# Patient Record
Sex: FEMALE | Race: WHITE | Employment: OTHER | URBAN - NONMETROPOLITAN AREA
[De-identification: names, ages, dates, MRNs, and addresses within clinical notes are randomized per-mention and may not be internally consistent; named-entity substitution may affect disease eponyms.]

---

## 2017-01-04 ENCOUNTER — TELEPHONE (OUTPATIENT)
Dept: PHYSICAL MEDICINE AND REHAB | Age: 37
End: 2017-01-04

## 2017-01-04 RX ORDER — LEVETIRACETAM 500 MG/1
500 TABLET ORAL 2 TIMES DAILY
Qty: 60 TABLET | Refills: 3 | Status: SHIPPED | OUTPATIENT
Start: 2017-01-04 | End: 2017-03-06 | Stop reason: ALTCHOICE

## 2017-01-09 ENCOUNTER — CARE COORDINATION (OUTPATIENT)
Dept: CARE COORDINATION | Age: 37
End: 2017-01-09

## 2017-01-25 ENCOUNTER — TELEPHONE (OUTPATIENT)
Dept: PHYSICAL MEDICINE AND REHAB | Age: 37
End: 2017-01-25

## 2017-02-01 ENCOUNTER — OFFICE VISIT (OUTPATIENT)
Dept: FAMILY MEDICINE CLINIC | Age: 37
End: 2017-02-01

## 2017-02-01 VITALS
WEIGHT: 233 LBS | BODY MASS INDEX: 41.27 KG/M2 | DIASTOLIC BLOOD PRESSURE: 82 MMHG | HEART RATE: 84 BPM | RESPIRATION RATE: 8 BRPM | SYSTOLIC BLOOD PRESSURE: 122 MMHG

## 2017-02-01 DIAGNOSIS — R56.9 SEIZURES (HCC): ICD-10-CM

## 2017-02-01 DIAGNOSIS — M32.9 LUPUS ARTHRITIS (HCC): ICD-10-CM

## 2017-02-01 DIAGNOSIS — E72.20 HYPERAMMONEMIA (HCC): ICD-10-CM

## 2017-02-01 DIAGNOSIS — M79.7 FIBROMYALGIA: ICD-10-CM

## 2017-02-01 DIAGNOSIS — G43.719 INTRACTABLE CHRONIC MIGRAINE WITHOUT AURA AND WITHOUT STATUS MIGRAINOSUS: ICD-10-CM

## 2017-02-01 DIAGNOSIS — E66.01 MORBID OBESITY DUE TO EXCESS CALORIES (HCC): ICD-10-CM

## 2017-02-01 DIAGNOSIS — E66.01 MORBID OBESITY WITH BMI OF 40.0-44.9, ADULT (HCC): ICD-10-CM

## 2017-02-01 DIAGNOSIS — M32.9 LUPUS (HCC): ICD-10-CM

## 2017-02-01 DIAGNOSIS — K21.9 GASTROESOPHAGEAL REFLUX DISEASE WITHOUT ESOPHAGITIS: ICD-10-CM

## 2017-02-01 DIAGNOSIS — R56.9 CONVULSIONS, UNSPECIFIED CONVULSION TYPE (HCC): ICD-10-CM

## 2017-02-01 DIAGNOSIS — E78.00 PURE HYPERCHOLESTEROLEMIA: Primary | ICD-10-CM

## 2017-02-01 PROCEDURE — G8427 DOCREV CUR MEDS BY ELIG CLIN: HCPCS | Performed by: FAMILY MEDICINE

## 2017-02-01 PROCEDURE — 1036F TOBACCO NON-USER: CPT | Performed by: FAMILY MEDICINE

## 2017-02-01 PROCEDURE — G8419 CALC BMI OUT NRM PARAM NOF/U: HCPCS | Performed by: FAMILY MEDICINE

## 2017-02-01 PROCEDURE — G8599 NO ASA/ANTIPLAT THER USE RNG: HCPCS | Performed by: FAMILY MEDICINE

## 2017-02-01 PROCEDURE — G8484 FLU IMMUNIZE NO ADMIN: HCPCS | Performed by: FAMILY MEDICINE

## 2017-02-01 PROCEDURE — 99214 OFFICE O/P EST MOD 30 MIN: CPT | Performed by: FAMILY MEDICINE

## 2017-02-01 RX ORDER — PROMETHAZINE HYDROCHLORIDE 25 MG/1
25 TABLET ORAL EVERY 6 HOURS PRN
Qty: 90 TABLET | Refills: 11 | Status: SHIPPED | OUTPATIENT
Start: 2017-02-01 | End: 2021-11-15

## 2017-02-01 RX ORDER — ACETAMINOPHEN 325 MG/1
650 TABLET ORAL EVERY 4 HOURS PRN
Qty: 120 TABLET | Refills: 3 | Status: SHIPPED | OUTPATIENT
Start: 2017-02-01 | End: 2017-12-02 | Stop reason: SDUPTHER

## 2017-02-02 ENCOUNTER — CARE COORDINATION (OUTPATIENT)
Dept: CARE COORDINATION | Age: 37
End: 2017-02-02

## 2017-02-03 ENCOUNTER — TELEPHONE (OUTPATIENT)
Dept: FAMILY MEDICINE CLINIC | Age: 37
End: 2017-02-03

## 2017-02-03 DIAGNOSIS — R79.89 ELEVATED LFTS: Primary | ICD-10-CM

## 2017-02-06 ENCOUNTER — OFFICE VISIT (OUTPATIENT)
Dept: PHYSICAL MEDICINE AND REHAB | Age: 37
End: 2017-02-06

## 2017-02-06 VITALS
BODY MASS INDEX: 41.29 KG/M2 | HEART RATE: 99 BPM | SYSTOLIC BLOOD PRESSURE: 127 MMHG | HEIGHT: 63 IN | WEIGHT: 233 LBS | DIASTOLIC BLOOD PRESSURE: 88 MMHG

## 2017-02-06 DIAGNOSIS — R56.9 PARTIAL SEIZURE (HCC): Primary | ICD-10-CM

## 2017-02-06 DIAGNOSIS — R56.9 CONVULSIONS, UNSPECIFIED CONVULSION TYPE (HCC): ICD-10-CM

## 2017-02-06 PROCEDURE — G8599 NO ASA/ANTIPLAT THER USE RNG: HCPCS | Performed by: PSYCHIATRY & NEUROLOGY

## 2017-02-06 PROCEDURE — 1036F TOBACCO NON-USER: CPT | Performed by: PSYCHIATRY & NEUROLOGY

## 2017-02-06 PROCEDURE — G8419 CALC BMI OUT NRM PARAM NOF/U: HCPCS | Performed by: PSYCHIATRY & NEUROLOGY

## 2017-02-06 PROCEDURE — G8484 FLU IMMUNIZE NO ADMIN: HCPCS | Performed by: PSYCHIATRY & NEUROLOGY

## 2017-02-06 PROCEDURE — G8427 DOCREV CUR MEDS BY ELIG CLIN: HCPCS | Performed by: PSYCHIATRY & NEUROLOGY

## 2017-02-06 PROCEDURE — 99214 OFFICE O/P EST MOD 30 MIN: CPT | Performed by: PSYCHIATRY & NEUROLOGY

## 2017-02-06 RX ORDER — CHOLECALCIFEROL (VITAMIN D3) 1250 MCG
CAPSULE ORAL
COMMUNITY
End: 2017-07-13 | Stop reason: ALTCHOICE

## 2017-02-06 RX ORDER — LEFLUNOMIDE 10 MG/1
10 TABLET ORAL DAILY
Status: ON HOLD | COMMUNITY
End: 2017-02-28 | Stop reason: HOSPADM

## 2017-02-16 ENCOUNTER — OFFICE VISIT (OUTPATIENT)
Dept: PHYSICAL MEDICINE AND REHAB | Age: 37
End: 2017-02-16

## 2017-02-16 VITALS
HEART RATE: 87 BPM | WEIGHT: 230 LBS | BODY MASS INDEX: 40.75 KG/M2 | DIASTOLIC BLOOD PRESSURE: 89 MMHG | SYSTOLIC BLOOD PRESSURE: 138 MMHG | HEIGHT: 63 IN

## 2017-02-16 DIAGNOSIS — G43.719 INTRACTABLE CHRONIC MIGRAINE WITHOUT AURA AND WITHOUT STATUS MIGRAINOSUS: Primary | ICD-10-CM

## 2017-02-16 PROCEDURE — 1036F TOBACCO NON-USER: CPT | Performed by: PHYSICAL MEDICINE & REHABILITATION

## 2017-02-16 PROCEDURE — G8419 CALC BMI OUT NRM PARAM NOF/U: HCPCS | Performed by: PHYSICAL MEDICINE & REHABILITATION

## 2017-02-16 PROCEDURE — G8427 DOCREV CUR MEDS BY ELIG CLIN: HCPCS | Performed by: PHYSICAL MEDICINE & REHABILITATION

## 2017-02-16 PROCEDURE — G8599 NO ASA/ANTIPLAT THER USE RNG: HCPCS | Performed by: PHYSICAL MEDICINE & REHABILITATION

## 2017-02-16 PROCEDURE — 99213 OFFICE O/P EST LOW 20 MIN: CPT | Performed by: PHYSICAL MEDICINE & REHABILITATION

## 2017-02-16 PROCEDURE — 64615 CHEMODENERV MUSC MIGRAINE: CPT | Performed by: PHYSICAL MEDICINE & REHABILITATION

## 2017-02-16 PROCEDURE — G8484 FLU IMMUNIZE NO ADMIN: HCPCS | Performed by: PHYSICAL MEDICINE & REHABILITATION

## 2017-02-26 PROBLEM — K81.9 CHOLECYSTITIS: Status: ACTIVE | Noted: 2017-02-26

## 2017-02-26 PROBLEM — R07.9 CHEST PAIN: Status: ACTIVE | Noted: 2017-02-26

## 2017-02-26 PROBLEM — R10.13 DYSPEPSIA: Status: ACTIVE | Noted: 2017-02-26

## 2017-02-26 PROBLEM — R13.10 DYSPHAGIA: Status: ACTIVE | Noted: 2017-02-26

## 2017-02-27 PROBLEM — R07.81 PLEURODYNIA: Status: ACTIVE | Noted: 2017-02-26

## 2017-03-01 ENCOUNTER — CARE COORDINATION (OUTPATIENT)
Dept: CARE COORDINATION | Age: 37
End: 2017-03-01

## 2017-03-06 ENCOUNTER — OFFICE VISIT (OUTPATIENT)
Dept: FAMILY MEDICINE CLINIC | Age: 37
End: 2017-03-06

## 2017-03-06 ENCOUNTER — TELEPHONE (OUTPATIENT)
Dept: FAMILY MEDICINE CLINIC | Age: 37
End: 2017-03-06

## 2017-03-06 VITALS
RESPIRATION RATE: 8 BRPM | BODY MASS INDEX: 40.57 KG/M2 | SYSTOLIC BLOOD PRESSURE: 128 MMHG | TEMPERATURE: 98 F | HEART RATE: 96 BPM | DIASTOLIC BLOOD PRESSURE: 92 MMHG | WEIGHT: 229 LBS

## 2017-03-06 DIAGNOSIS — R10.11 RUQ PAIN: Primary | ICD-10-CM

## 2017-03-06 DIAGNOSIS — J02.9 SORE THROAT: Primary | ICD-10-CM

## 2017-03-06 DIAGNOSIS — R10.11 RUQ PAIN: ICD-10-CM

## 2017-03-06 DIAGNOSIS — R07.81 PLEURODYNIA: ICD-10-CM

## 2017-03-06 DIAGNOSIS — R10.84 GENERALIZED ABDOMINAL PAIN: ICD-10-CM

## 2017-03-06 LAB — S PYO AG THROAT QL: NORMAL

## 2017-03-06 PROCEDURE — 99214 OFFICE O/P EST MOD 30 MIN: CPT | Performed by: FAMILY MEDICINE

## 2017-03-06 PROCEDURE — 1036F TOBACCO NON-USER: CPT | Performed by: FAMILY MEDICINE

## 2017-03-06 PROCEDURE — G8598 ASA/ANTIPLAT THER USED: HCPCS | Performed by: FAMILY MEDICINE

## 2017-03-06 PROCEDURE — G8484 FLU IMMUNIZE NO ADMIN: HCPCS | Performed by: FAMILY MEDICINE

## 2017-03-06 PROCEDURE — 1111F DSCHRG MED/CURRENT MED MERGE: CPT | Performed by: FAMILY MEDICINE

## 2017-03-06 PROCEDURE — G8427 DOCREV CUR MEDS BY ELIG CLIN: HCPCS | Performed by: FAMILY MEDICINE

## 2017-03-06 PROCEDURE — 87880 STREP A ASSAY W/OPTIC: CPT | Performed by: FAMILY MEDICINE

## 2017-03-06 PROCEDURE — G8417 CALC BMI ABV UP PARAM F/U: HCPCS | Performed by: FAMILY MEDICINE

## 2017-03-06 ASSESSMENT — PATIENT HEALTH QUESTIONNAIRE - PHQ9
SUM OF ALL RESPONSES TO PHQ QUESTIONS 1-9: 0
SUM OF ALL RESPONSES TO PHQ9 QUESTIONS 1 & 2: 0
2. FEELING DOWN, DEPRESSED OR HOPELESS: 0
1. LITTLE INTEREST OR PLEASURE IN DOING THINGS: 0

## 2017-03-07 ENCOUNTER — TELEPHONE (OUTPATIENT)
Dept: FAMILY MEDICINE CLINIC | Age: 37
End: 2017-03-07

## 2017-03-07 DIAGNOSIS — D72.828 OTHER ELEVATED WHITE BLOOD CELL (WBC) COUNT: Primary | ICD-10-CM

## 2017-03-07 DIAGNOSIS — E87.6 HYPOKALEMIA: ICD-10-CM

## 2017-03-13 ENCOUNTER — CARE COORDINATION (OUTPATIENT)
Dept: CARE COORDINATION | Age: 37
End: 2017-03-13

## 2017-03-17 ENCOUNTER — TELEPHONE (OUTPATIENT)
Dept: FAMILY MEDICINE CLINIC | Age: 37
End: 2017-03-17

## 2017-03-17 RX ORDER — ESOMEPRAZOLE MAGNESIUM 40 MG/1
40 CAPSULE, DELAYED RELEASE ORAL 2 TIMES DAILY
Qty: 60 CAPSULE | Refills: 11 | Status: SHIPPED | OUTPATIENT
Start: 2017-03-17 | End: 2017-10-23

## 2017-03-27 ENCOUNTER — CARE COORDINATION (OUTPATIENT)
Dept: CARE COORDINATION | Age: 37
End: 2017-03-27

## 2017-04-03 RX ORDER — SUCRALFATE 1 G/1
TABLET ORAL
Qty: 120 TABLET | Refills: 11 | Status: SHIPPED | OUTPATIENT
Start: 2017-04-03 | End: 2019-06-03

## 2017-04-04 ENCOUNTER — CARE COORDINATION (OUTPATIENT)
Dept: CARE COORDINATION | Age: 37
End: 2017-04-04

## 2017-04-07 RX ORDER — ATORVASTATIN CALCIUM 80 MG/1
80 TABLET, FILM COATED ORAL DAILY
Qty: 90 TABLET | Refills: 3
Start: 2017-04-07 | End: 2017-05-15 | Stop reason: SDUPTHER

## 2017-04-07 RX ORDER — ATORVASTATIN CALCIUM 80 MG/1
80 TABLET, FILM COATED ORAL DAILY
Qty: 90 TABLET | Refills: 3 | Status: SHIPPED | OUTPATIENT
Start: 2017-04-07 | End: 2018-02-28 | Stop reason: SDUPTHER

## 2017-04-21 ENCOUNTER — TELEPHONE (OUTPATIENT)
Dept: FAMILY MEDICINE CLINIC | Age: 37
End: 2017-04-21

## 2017-05-01 ENCOUNTER — NURSE ONLY (OUTPATIENT)
Dept: FAMILY MEDICINE CLINIC | Age: 37
End: 2017-05-01

## 2017-05-01 VITALS — DIASTOLIC BLOOD PRESSURE: 80 MMHG | RESPIRATION RATE: 18 BRPM | HEART RATE: 72 BPM | SYSTOLIC BLOOD PRESSURE: 122 MMHG

## 2017-05-01 PROCEDURE — 99211 OFF/OP EST MAY X REQ PHY/QHP: CPT | Performed by: FAMILY MEDICINE

## 2017-05-01 RX ORDER — MONTELUKAST SODIUM 10 MG/1
TABLET ORAL
Qty: 30 TABLET | Refills: 5 | Status: SHIPPED | OUTPATIENT
Start: 2017-05-01 | End: 2017-11-19 | Stop reason: SDUPTHER

## 2017-05-01 RX ORDER — DULOXETIN HYDROCHLORIDE 60 MG/1
CAPSULE, DELAYED RELEASE ORAL
Qty: 30 CAPSULE | Refills: 5 | Status: SHIPPED | OUTPATIENT
Start: 2017-05-01 | End: 2017-11-19 | Stop reason: SDUPTHER

## 2017-05-10 ENCOUNTER — CARE COORDINATION (OUTPATIENT)
Dept: CARE COORDINATION | Age: 37
End: 2017-05-10

## 2017-05-15 ENCOUNTER — OFFICE VISIT (OUTPATIENT)
Dept: PHYSICAL MEDICINE AND REHAB | Age: 37
End: 2017-05-15

## 2017-05-15 VITALS
HEIGHT: 63 IN | BODY MASS INDEX: 40.72 KG/M2 | SYSTOLIC BLOOD PRESSURE: 116 MMHG | WEIGHT: 229.8 LBS | HEART RATE: 100 BPM | DIASTOLIC BLOOD PRESSURE: 68 MMHG

## 2017-05-15 DIAGNOSIS — G43.719 INTRACTABLE CHRONIC MIGRAINE WITHOUT AURA AND WITHOUT STATUS MIGRAINOSUS: Primary | ICD-10-CM

## 2017-05-15 PROCEDURE — 64615 CHEMODENERV MUSC MIGRAINE: CPT | Performed by: PHYSICAL MEDICINE & REHABILITATION

## 2017-05-15 PROCEDURE — G8598 ASA/ANTIPLAT THER USED: HCPCS | Performed by: PHYSICAL MEDICINE & REHABILITATION

## 2017-05-15 PROCEDURE — G8417 CALC BMI ABV UP PARAM F/U: HCPCS | Performed by: PHYSICAL MEDICINE & REHABILITATION

## 2017-05-15 PROCEDURE — 1036F TOBACCO NON-USER: CPT | Performed by: PHYSICAL MEDICINE & REHABILITATION

## 2017-05-15 PROCEDURE — 99213 OFFICE O/P EST LOW 20 MIN: CPT | Performed by: PHYSICAL MEDICINE & REHABILITATION

## 2017-05-15 PROCEDURE — G8427 DOCREV CUR MEDS BY ELIG CLIN: HCPCS | Performed by: PHYSICAL MEDICINE & REHABILITATION

## 2017-05-30 RX ORDER — OMEPRAZOLE 20 MG/1
20 CAPSULE, DELAYED RELEASE ORAL 2 TIMES DAILY
Qty: 60 CAPSULE | Refills: 11 | Status: SHIPPED | OUTPATIENT
Start: 2017-05-30 | End: 2017-05-31 | Stop reason: SDUPTHER

## 2017-05-30 RX ORDER — IBUPROFEN 600 MG/1
600 TABLET ORAL 3 TIMES DAILY
Qty: 42 TABLET | Refills: 5 | Status: SHIPPED | OUTPATIENT
Start: 2017-05-30 | End: 2017-05-31 | Stop reason: SDUPTHER

## 2017-05-31 RX ORDER — OMEPRAZOLE 20 MG/1
20 CAPSULE, DELAYED RELEASE ORAL 2 TIMES DAILY
Qty: 60 CAPSULE | Refills: 11 | Status: SHIPPED | OUTPATIENT
Start: 2017-05-31 | End: 2017-10-18 | Stop reason: SDUPTHER

## 2017-05-31 RX ORDER — IBUPROFEN 600 MG/1
600 TABLET ORAL 3 TIMES DAILY
Qty: 42 TABLET | Refills: 5 | Status: SHIPPED | OUTPATIENT
Start: 2017-05-31 | End: 2017-08-21 | Stop reason: ALTCHOICE

## 2017-06-08 ENCOUNTER — TELEPHONE (OUTPATIENT)
Dept: FAMILY MEDICINE CLINIC | Age: 37
End: 2017-06-08

## 2017-06-12 ENCOUNTER — CARE COORDINATION (OUTPATIENT)
Dept: CARE COORDINATION | Age: 37
End: 2017-06-12

## 2017-06-12 ENCOUNTER — OFFICE VISIT (OUTPATIENT)
Dept: PHYSICAL MEDICINE AND REHAB | Age: 37
End: 2017-06-12

## 2017-06-12 VITALS
HEART RATE: 107 BPM | HEIGHT: 63 IN | WEIGHT: 225.8 LBS | DIASTOLIC BLOOD PRESSURE: 105 MMHG | BODY MASS INDEX: 40.01 KG/M2 | SYSTOLIC BLOOD PRESSURE: 134 MMHG

## 2017-06-12 DIAGNOSIS — R56.9 PARTIAL SEIZURE (HCC): ICD-10-CM

## 2017-06-12 DIAGNOSIS — R56.9 CONVULSIONS, UNSPECIFIED CONVULSION TYPE (HCC): Primary | ICD-10-CM

## 2017-06-12 PROCEDURE — 99213 OFFICE O/P EST LOW 20 MIN: CPT | Performed by: PSYCHIATRY & NEUROLOGY

## 2017-06-12 PROCEDURE — G8417 CALC BMI ABV UP PARAM F/U: HCPCS | Performed by: PSYCHIATRY & NEUROLOGY

## 2017-06-12 PROCEDURE — G8427 DOCREV CUR MEDS BY ELIG CLIN: HCPCS | Performed by: PSYCHIATRY & NEUROLOGY

## 2017-06-12 PROCEDURE — 1036F TOBACCO NON-USER: CPT | Performed by: PSYCHIATRY & NEUROLOGY

## 2017-06-12 PROCEDURE — G8598 ASA/ANTIPLAT THER USED: HCPCS | Performed by: PSYCHIATRY & NEUROLOGY

## 2017-06-26 ENCOUNTER — CARE COORDINATION (OUTPATIENT)
Dept: CARE COORDINATION | Age: 37
End: 2017-06-26

## 2017-07-13 ENCOUNTER — OFFICE VISIT (OUTPATIENT)
Dept: FAMILY MEDICINE CLINIC | Age: 37
End: 2017-07-13

## 2017-07-13 VITALS
SYSTOLIC BLOOD PRESSURE: 140 MMHG | BODY MASS INDEX: 39.61 KG/M2 | HEART RATE: 97 BPM | WEIGHT: 223.6 LBS | DIASTOLIC BLOOD PRESSURE: 98 MMHG | RESPIRATION RATE: 15 BRPM | OXYGEN SATURATION: 98 %

## 2017-07-13 DIAGNOSIS — M79.7 FIBROMYALGIA: ICD-10-CM

## 2017-07-13 DIAGNOSIS — J01.10 ACUTE NON-RECURRENT FRONTAL SINUSITIS: Primary | ICD-10-CM

## 2017-07-13 PROCEDURE — 99213 OFFICE O/P EST LOW 20 MIN: CPT | Performed by: FAMILY MEDICINE

## 2017-07-13 PROCEDURE — G8417 CALC BMI ABV UP PARAM F/U: HCPCS | Performed by: FAMILY MEDICINE

## 2017-07-13 PROCEDURE — G8427 DOCREV CUR MEDS BY ELIG CLIN: HCPCS | Performed by: FAMILY MEDICINE

## 2017-07-13 PROCEDURE — G8598 ASA/ANTIPLAT THER USED: HCPCS | Performed by: FAMILY MEDICINE

## 2017-07-13 PROCEDURE — 1036F TOBACCO NON-USER: CPT | Performed by: FAMILY MEDICINE

## 2017-07-13 PROCEDURE — 93000 ELECTROCARDIOGRAM COMPLETE: CPT | Performed by: FAMILY MEDICINE

## 2017-07-13 RX ORDER — METHYLPREDNISOLONE 4 MG/1
TABLET ORAL
Qty: 1 KIT | Refills: 0 | Status: SHIPPED | OUTPATIENT
Start: 2017-07-13 | End: 2017-07-19

## 2017-07-13 RX ORDER — TRAZODONE HYDROCHLORIDE 50 MG/1
50 TABLET ORAL NIGHTLY
Qty: 90 TABLET | Refills: 1 | Status: SHIPPED | OUTPATIENT
Start: 2017-07-13 | End: 2017-09-08 | Stop reason: SDUPTHER

## 2017-07-13 RX ORDER — TRAZODONE HYDROCHLORIDE 50 MG/1
50 TABLET ORAL NIGHTLY
Status: CANCELLED | OUTPATIENT
Start: 2017-07-13

## 2017-07-13 RX ORDER — CEFDINIR 300 MG/1
300 CAPSULE ORAL 2 TIMES DAILY
Qty: 20 CAPSULE | Refills: 0 | Status: SHIPPED | OUTPATIENT
Start: 2017-07-13 | End: 2017-07-23

## 2017-07-26 ENCOUNTER — CARE COORDINATION (OUTPATIENT)
Dept: CARE COORDINATION | Age: 37
End: 2017-07-26

## 2017-08-02 ENCOUNTER — HOSPITAL ENCOUNTER (OUTPATIENT)
Dept: GENERAL RADIOLOGY | Age: 37
Discharge: HOME | End: 2017-08-02
Payer: MEDICARE

## 2017-08-02 ENCOUNTER — HOSPITAL ENCOUNTER (OUTPATIENT)
Age: 37
Discharge: HOME | End: 2017-08-02
Payer: MEDICARE

## 2017-08-02 ENCOUNTER — OFFICE VISIT (OUTPATIENT)
Dept: FAMILY MEDICINE CLINIC | Age: 37
End: 2017-08-02
Payer: MEDICARE

## 2017-08-02 VITALS
HEART RATE: 76 BPM | OXYGEN SATURATION: 97 % | TEMPERATURE: 98.6 F | DIASTOLIC BLOOD PRESSURE: 70 MMHG | SYSTOLIC BLOOD PRESSURE: 120 MMHG | WEIGHT: 228.2 LBS | RESPIRATION RATE: 14 BRPM | BODY MASS INDEX: 40.42 KG/M2

## 2017-08-02 DIAGNOSIS — J30.1 SEASONAL ALLERGIC RHINITIS DUE TO POLLEN: Primary | ICD-10-CM

## 2017-08-02 DIAGNOSIS — M25.561 CHRONIC PAIN OF RIGHT KNEE: ICD-10-CM

## 2017-08-02 DIAGNOSIS — E66.01 MORBID OBESITY DUE TO EXCESS CALORIES (HCC): ICD-10-CM

## 2017-08-02 DIAGNOSIS — G40.909 SEIZURE DISORDER (HCC): ICD-10-CM

## 2017-08-02 DIAGNOSIS — G89.29 CHRONIC PAIN OF RIGHT KNEE: ICD-10-CM

## 2017-08-02 DIAGNOSIS — Z51.81 MEDICATION MONITORING ENCOUNTER: ICD-10-CM

## 2017-08-02 DIAGNOSIS — K21.9 GASTROESOPHAGEAL REFLUX DISEASE WITHOUT ESOPHAGITIS: ICD-10-CM

## 2017-08-02 DIAGNOSIS — B37.0 THRUSH: ICD-10-CM

## 2017-08-02 DIAGNOSIS — E78.00 PURE HYPERCHOLESTEROLEMIA: ICD-10-CM

## 2017-08-02 DIAGNOSIS — I63.9 CEREBROVASCULAR ACCIDENT (CVA), UNSPECIFIED MECHANISM (HCC): ICD-10-CM

## 2017-08-02 PROCEDURE — G8598 ASA/ANTIPLAT THER USED: HCPCS | Performed by: FAMILY MEDICINE

## 2017-08-02 PROCEDURE — 1036F TOBACCO NON-USER: CPT | Performed by: FAMILY MEDICINE

## 2017-08-02 PROCEDURE — 99215 OFFICE O/P EST HI 40 MIN: CPT | Performed by: FAMILY MEDICINE

## 2017-08-02 PROCEDURE — G8427 DOCREV CUR MEDS BY ELIG CLIN: HCPCS | Performed by: FAMILY MEDICINE

## 2017-08-02 PROCEDURE — G8417 CALC BMI ABV UP PARAM F/U: HCPCS | Performed by: FAMILY MEDICINE

## 2017-08-02 PROCEDURE — 73564 X-RAY EXAM KNEE 4 OR MORE: CPT

## 2017-08-02 RX ORDER — AZELASTINE 1 MG/ML
2 SPRAY, METERED NASAL 2 TIMES DAILY
Qty: 1 BOTTLE | Refills: 5 | Status: SHIPPED | OUTPATIENT
Start: 2017-08-02 | End: 2017-12-06

## 2017-08-21 ENCOUNTER — APPOINTMENT (OUTPATIENT)
Dept: GENERAL RADIOLOGY | Age: 37
End: 2017-08-21
Payer: MEDICARE

## 2017-08-21 ENCOUNTER — HOSPITAL ENCOUNTER (EMERGENCY)
Age: 37
Discharge: HOME | End: 2017-08-21
Attending: FAMILY MEDICINE
Payer: MEDICARE

## 2017-08-21 ENCOUNTER — OFFICE VISIT (OUTPATIENT)
Dept: PHYSICAL MEDICINE AND REHAB | Age: 37
End: 2017-08-21
Payer: MEDICARE

## 2017-08-21 VITALS
RESPIRATION RATE: 18 BRPM | OXYGEN SATURATION: 95 % | HEIGHT: 63 IN | HEART RATE: 114 BPM | BODY MASS INDEX: 40.04 KG/M2 | SYSTOLIC BLOOD PRESSURE: 141 MMHG | WEIGHT: 226 LBS | TEMPERATURE: 97.5 F | DIASTOLIC BLOOD PRESSURE: 105 MMHG

## 2017-08-21 VITALS
SYSTOLIC BLOOD PRESSURE: 132 MMHG | DIASTOLIC BLOOD PRESSURE: 88 MMHG | HEIGHT: 62 IN | BODY MASS INDEX: 42.07 KG/M2 | WEIGHT: 228.6 LBS | HEART RATE: 108 BPM

## 2017-08-21 DIAGNOSIS — G43.719 INTRACTABLE CHRONIC MIGRAINE WITHOUT AURA AND WITHOUT STATUS MIGRAINOSUS: Primary | ICD-10-CM

## 2017-08-21 DIAGNOSIS — S39.012A LUMBAR STRAIN, INITIAL ENCOUNTER: Primary | ICD-10-CM

## 2017-08-21 PROCEDURE — G8427 DOCREV CUR MEDS BY ELIG CLIN: HCPCS | Performed by: PHYSICAL MEDICINE & REHABILITATION

## 2017-08-21 PROCEDURE — 99213 OFFICE O/P EST LOW 20 MIN: CPT | Performed by: PHYSICAL MEDICINE & REHABILITATION

## 2017-08-21 PROCEDURE — 64615 CHEMODENERV MUSC MIGRAINE: CPT | Performed by: PHYSICAL MEDICINE & REHABILITATION

## 2017-08-21 PROCEDURE — 73502 X-RAY EXAM HIP UNI 2-3 VIEWS: CPT

## 2017-08-21 PROCEDURE — 72100 X-RAY EXAM L-S SPINE 2/3 VWS: CPT

## 2017-08-21 PROCEDURE — 72110 X-RAY EXAM L-2 SPINE 4/>VWS: CPT

## 2017-08-21 PROCEDURE — 1036F TOBACCO NON-USER: CPT | Performed by: PHYSICAL MEDICINE & REHABILITATION

## 2017-08-21 PROCEDURE — G8598 ASA/ANTIPLAT THER USED: HCPCS | Performed by: PHYSICAL MEDICINE & REHABILITATION

## 2017-08-21 PROCEDURE — 99283 EMERGENCY DEPT VISIT LOW MDM: CPT

## 2017-08-21 PROCEDURE — G8417 CALC BMI ABV UP PARAM F/U: HCPCS | Performed by: PHYSICAL MEDICINE & REHABILITATION

## 2017-08-21 RX ORDER — NAPROXEN 500 MG/1
500 TABLET ORAL 2 TIMES DAILY
Qty: 20 TABLET | Refills: 0 | Status: SHIPPED | OUTPATIENT
Start: 2017-08-21 | End: 2017-11-06

## 2017-08-21 RX ORDER — CYCLOBENZAPRINE HCL 10 MG
10 TABLET ORAL 2 TIMES DAILY PRN
Qty: 20 TABLET | Refills: 0 | Status: SHIPPED | OUTPATIENT
Start: 2017-08-21 | End: 2017-08-31

## 2017-08-21 RX ORDER — PREDNISONE 1 MG/1
5 TABLET ORAL 2 TIMES DAILY
COMMUNITY
End: 2019-09-12

## 2017-08-21 ASSESSMENT — PAIN SCALES - GENERAL
PAINLEVEL_OUTOF10: 9
PAINLEVEL_OUTOF10: 9

## 2017-08-21 ASSESSMENT — PAIN DESCRIPTION - LOCATION: LOCATION: BACK;HIP

## 2017-08-21 ASSESSMENT — PAIN DESCRIPTION - ORIENTATION: ORIENTATION: RIGHT

## 2017-08-21 ASSESSMENT — PAIN DESCRIPTION - DESCRIPTORS: DESCRIPTORS: SHARP;SHOOTING;STABBING;THROBBING

## 2017-08-21 ASSESSMENT — PAIN DESCRIPTION - PAIN TYPE: TYPE: ACUTE PAIN

## 2017-08-22 ENCOUNTER — CARE COORDINATION (OUTPATIENT)
Dept: FAMILY MEDICINE CLINIC | Age: 37
End: 2017-08-22

## 2017-08-24 ENCOUNTER — CARE COORDINATION (OUTPATIENT)
Dept: CARE COORDINATION | Age: 37
End: 2017-08-24

## 2017-08-25 ENCOUNTER — HOSPITAL ENCOUNTER (OUTPATIENT)
Age: 37
Discharge: HOME | End: 2017-08-25
Payer: MEDICARE

## 2017-08-25 DIAGNOSIS — G40.909 SEIZURE DISORDER (HCC): ICD-10-CM

## 2017-08-25 DIAGNOSIS — Z51.81 MEDICATION MONITORING ENCOUNTER: ICD-10-CM

## 2017-08-25 DIAGNOSIS — E78.00 PURE HYPERCHOLESTEROLEMIA: ICD-10-CM

## 2017-08-25 DIAGNOSIS — K21.9 GASTROESOPHAGEAL REFLUX DISEASE WITHOUT ESOPHAGITIS: ICD-10-CM

## 2017-08-25 LAB
ALBUMIN SERPL-MCNC: 4.2 G/DL (ref 3.5–5.1)
ALP BLD-CCNC: 175 U/L (ref 38–126)
ALT SERPL-CCNC: 73 U/L (ref 11–66)
ANION GAP SERPL CALCULATED.3IONS-SCNC: 15 MEQ/L (ref 8–16)
AST SERPL-CCNC: 62 U/L (ref 5–40)
BILIRUB SERPL-MCNC: 0.5 MG/DL (ref 0.3–1.2)
BUN BLDV-MCNC: 13 MG/DL (ref 7–22)
CALCIUM SERPL-MCNC: 9.3 MG/DL (ref 8.5–10.5)
CHLORIDE BLD-SCNC: 103 MEQ/L (ref 98–111)
CHOLESTEROL, TOTAL: 153 MG/DL (ref 100–199)
CO2: 24 MEQ/L (ref 23–33)
CREAT SERPL-MCNC: 0.6 MG/DL (ref 0.4–1.2)
GFR SERPL CREATININE-BSD FRML MDRD: > 90 ML/MIN/1.73M2
GLUCOSE BLD-MCNC: 101 MG/DL (ref 70–108)
HCT VFR BLD CALC: 44.5 % (ref 37–47)
HDLC SERPL-MCNC: 48 MG/DL
HEMOGLOBIN: 14.9 GM/DL (ref 12–16)
LDL CHOLESTEROL CALCULATED: 86 MG/DL
MCH RBC QN AUTO: 30.6 PG (ref 27–31)
MCHC RBC AUTO-ENTMCNC: 33.5 GM/DL (ref 33–37)
MCV RBC AUTO: 91.5 FL (ref 81–99)
PDW BLD-RTO: 15.2 % (ref 11.5–14.5)
PLATELET # BLD: 260 THOU/MM3 (ref 130–400)
PMV BLD AUTO: 9.3 MCM (ref 7.4–10.4)
POTASSIUM SERPL-SCNC: 3.5 MEQ/L (ref 3.5–5.2)
RBC # BLD: 4.86 MILL/MM3 (ref 4.2–5.4)
SODIUM BLD-SCNC: 142 MEQ/L (ref 135–145)
TOTAL PROTEIN: 6.7 G/DL (ref 6.1–8)
TRIGL SERPL-MCNC: 97 MG/DL (ref 0–199)
WBC # BLD: 14 THOU/MM3 (ref 4.8–10.8)

## 2017-08-25 PROCEDURE — 80053 COMPREHEN METABOLIC PANEL: CPT

## 2017-08-25 PROCEDURE — 80061 LIPID PANEL: CPT

## 2017-08-25 PROCEDURE — 80299 QUANTITATIVE ASSAY DRUG: CPT

## 2017-08-25 PROCEDURE — 85027 COMPLETE CBC AUTOMATED: CPT

## 2017-08-25 PROCEDURE — 36415 COLL VENOUS BLD VENIPUNCTURE: CPT

## 2017-08-26 LAB — METHOTREXATE LEVEL: < 0.04 UMOL/L

## 2017-08-28 ENCOUNTER — TELEPHONE (OUTPATIENT)
Dept: FAMILY MEDICINE CLINIC | Age: 37
End: 2017-08-28

## 2017-08-28 DIAGNOSIS — R79.89 ELEVATED LFTS: Primary | ICD-10-CM

## 2017-09-08 DIAGNOSIS — M79.7 FIBROMYALGIA: ICD-10-CM

## 2017-09-08 RX ORDER — TRAZODONE HYDROCHLORIDE 50 MG/1
50 TABLET ORAL NIGHTLY
Qty: 90 TABLET | Refills: 1 | Status: SHIPPED | OUTPATIENT
Start: 2017-09-08 | End: 2017-11-06

## 2017-09-11 ENCOUNTER — OFFICE VISIT (OUTPATIENT)
Dept: FAMILY MEDICINE CLINIC | Age: 37
End: 2017-09-11
Payer: MEDICARE

## 2017-09-11 VITALS
TEMPERATURE: 98.7 F | BODY MASS INDEX: 40.57 KG/M2 | RESPIRATION RATE: 18 BRPM | HEART RATE: 96 BPM | HEIGHT: 63 IN | SYSTOLIC BLOOD PRESSURE: 125 MMHG | WEIGHT: 229 LBS | DIASTOLIC BLOOD PRESSURE: 72 MMHG | OXYGEN SATURATION: 96 %

## 2017-09-11 DIAGNOSIS — J01.90 ACUTE BACTERIAL SINUSITIS: Primary | ICD-10-CM

## 2017-09-11 DIAGNOSIS — B96.89 ACUTE BACTERIAL SINUSITIS: Primary | ICD-10-CM

## 2017-09-11 DIAGNOSIS — R56.9 SEIZURE (HCC): ICD-10-CM

## 2017-09-11 DIAGNOSIS — J02.9 SORE THROAT: ICD-10-CM

## 2017-09-11 LAB — S PYO AG THROAT QL: NORMAL

## 2017-09-11 PROCEDURE — G8598 ASA/ANTIPLAT THER USED: HCPCS | Performed by: FAMILY MEDICINE

## 2017-09-11 PROCEDURE — 87880 STREP A ASSAY W/OPTIC: CPT | Performed by: FAMILY MEDICINE

## 2017-09-11 PROCEDURE — G8427 DOCREV CUR MEDS BY ELIG CLIN: HCPCS | Performed by: FAMILY MEDICINE

## 2017-09-11 PROCEDURE — 99214 OFFICE O/P EST MOD 30 MIN: CPT | Performed by: FAMILY MEDICINE

## 2017-09-11 PROCEDURE — 1036F TOBACCO NON-USER: CPT | Performed by: FAMILY MEDICINE

## 2017-09-11 PROCEDURE — G8417 CALC BMI ABV UP PARAM F/U: HCPCS | Performed by: FAMILY MEDICINE

## 2017-09-11 PROCEDURE — 96372 THER/PROPH/DIAG INJ SC/IM: CPT | Performed by: FAMILY MEDICINE

## 2017-09-11 RX ORDER — ASPIRIN 325 MG
325 TABLET ORAL DAILY
Qty: 30 TABLET | Refills: 3 | Status: SHIPPED | OUTPATIENT
Start: 2017-09-11

## 2017-09-11 RX ORDER — CEFDINIR 300 MG/1
300 CAPSULE ORAL 2 TIMES DAILY
Qty: 20 CAPSULE | Refills: 0 | Status: SHIPPED | OUTPATIENT
Start: 2017-09-11 | End: 2017-09-21

## 2017-09-11 RX ORDER — TRIAMCINOLONE ACETONIDE 40 MG/ML
40 INJECTION, SUSPENSION INTRA-ARTICULAR; INTRAMUSCULAR ONCE
Status: COMPLETED | OUTPATIENT
Start: 2017-09-11 | End: 2017-09-11

## 2017-09-11 RX ADMIN — TRIAMCINOLONE ACETONIDE 40 MG: 40 INJECTION, SUSPENSION INTRA-ARTICULAR; INTRAMUSCULAR at 16:12

## 2017-09-14 ENCOUNTER — HOSPITAL ENCOUNTER (OUTPATIENT)
Dept: MRI IMAGING | Age: 37
Discharge: HOME | End: 2017-09-14
Payer: MEDICARE

## 2017-09-14 DIAGNOSIS — R56.9 SEIZURE (HCC): ICD-10-CM

## 2017-09-14 PROCEDURE — 70553 MRI BRAIN STEM W/O & W/DYE: CPT

## 2017-09-14 PROCEDURE — 6360000004 HC RX CONTRAST MEDICATION: Performed by: FAMILY MEDICINE

## 2017-09-14 PROCEDURE — A9579 GAD-BASE MR CONTRAST NOS,1ML: HCPCS | Performed by: FAMILY MEDICINE

## 2017-09-14 RX ADMIN — GADOTERIDOL 20 ML: 279.3 INJECTION, SOLUTION INTRAVENOUS at 09:19

## 2017-09-16 ENCOUNTER — PATIENT MESSAGE (OUTPATIENT)
Dept: FAMILY MEDICINE CLINIC | Age: 37
End: 2017-09-16

## 2017-09-17 ENCOUNTER — APPOINTMENT (OUTPATIENT)
Dept: GENERAL RADIOLOGY | Age: 37
End: 2017-09-17
Payer: MEDICARE

## 2017-09-17 ENCOUNTER — HOSPITAL ENCOUNTER (EMERGENCY)
Age: 37
Discharge: HOME | End: 2017-09-17
Attending: EMERGENCY MEDICINE
Payer: MEDICARE

## 2017-09-17 VITALS
TEMPERATURE: 98.4 F | HEART RATE: 101 BPM | RESPIRATION RATE: 16 BRPM | OXYGEN SATURATION: 94 % | BODY MASS INDEX: 40.75 KG/M2 | DIASTOLIC BLOOD PRESSURE: 83 MMHG | HEIGHT: 63 IN | SYSTOLIC BLOOD PRESSURE: 114 MMHG | WEIGHT: 230 LBS

## 2017-09-17 DIAGNOSIS — R07.89 CHEST WALL PAIN: Primary | ICD-10-CM

## 2017-09-17 LAB
ALBUMIN SERPL-MCNC: 4 G/DL (ref 3.5–5.1)
ALP BLD-CCNC: 173 U/L (ref 38–126)
ALT SERPL-CCNC: 65 U/L (ref 11–66)
ANION GAP SERPL CALCULATED.3IONS-SCNC: 13 MEQ/L (ref 8–16)
AST SERPL-CCNC: 65 U/L (ref 5–40)
BASOPHILS # BLD: 1.1 %
BASOPHILS ABSOLUTE: 0.1 THOU/MM3 (ref 0–0.1)
BILIRUB SERPL-MCNC: 0.3 MG/DL (ref 0.3–1.2)
BILIRUBIN DIRECT: < 0.2 MG/DL (ref 0–0.3)
BUN BLDV-MCNC: 10 MG/DL (ref 7–22)
C-REACTIVE PROTEIN: 1.23 MG/DL (ref 0–1)
CALCIUM SERPL-MCNC: 9.4 MG/DL (ref 8.5–10.5)
CHLORIDE BLD-SCNC: 99 MEQ/L (ref 98–111)
CO2: 26 MEQ/L (ref 23–33)
CREAT SERPL-MCNC: 0.7 MG/DL (ref 0.4–1.2)
D-DIMER QUANTITATIVE: 395 NG/ML FEU (ref 0–500)
EKG ATRIAL RATE: 102 BPM
EKG P AXIS: 2 DEGREES
EKG P-R INTERVAL: 148 MS
EKG Q-T INTERVAL: 326 MS
EKG QRS DURATION: 80 MS
EKG QTC CALCULATION (BAZETT): 424 MS
EKG VENTRICULAR RATE: 102 BPM
EOSINOPHIL # BLD: 2.1 %
EOSINOPHILS ABSOLUTE: 0.2 THOU/MM3 (ref 0–0.4)
GFR SERPL CREATININE-BSD FRML MDRD: > 90 ML/MIN/1.73M2
GLUCOSE BLD-MCNC: 102 MG/DL (ref 70–108)
HCT VFR BLD CALC: 43.1 % (ref 37–47)
HEMOGLOBIN: 14.6 GM/DL (ref 12–16)
LIPASE: 19.7 U/L (ref 5.6–51.3)
LYMPHOCYTES # BLD: 28.4 %
LYMPHOCYTES ABSOLUTE: 2.9 THOU/MM3 (ref 1–4.8)
MCH RBC QN AUTO: 30.2 PG (ref 27–31)
MCHC RBC AUTO-ENTMCNC: 33.9 GM/DL (ref 33–37)
MCV RBC AUTO: 89.1 FL (ref 81–99)
MONOCYTES # BLD: 8.1 %
MONOCYTES ABSOLUTE: 0.8 THOU/MM3 (ref 0.4–1.3)
NUCLEATED RED BLOOD CELLS: 0 /100 WBC
OSMOLALITY CALCULATION: 274.9 MOSMOL/KG (ref 275–300)
PDW BLD-RTO: 14.2 % (ref 11.5–14.5)
PLATELET # BLD: 264 THOU/MM3 (ref 130–400)
PMV BLD AUTO: 9.4 MCM (ref 7.4–10.4)
POTASSIUM SERPL-SCNC: 3.4 MEQ/L (ref 3.5–5.2)
PRO-BNP: 17.6 PG/ML (ref 0–450)
PRO-BNP: 24.8 PG/ML (ref 0–450)
RBC # BLD: 4.84 MILL/MM3 (ref 4.2–5.4)
RBC # BLD: NORMAL 10*6/UL
SEDIMENTATION RATE, ERYTHROCYTE: 25 MM/HR (ref 0–20)
SEG NEUTROPHILS: 60.3 %
SEGMENTED NEUTROPHILS ABSOLUTE COUNT: 6.2 THOU/MM3 (ref 1.8–7.7)
SODIUM BLD-SCNC: 138 MEQ/L (ref 135–145)
TOTAL PROTEIN: 7 G/DL (ref 6.1–8)
TROPONIN T: < 0.01 NG/ML
WBC # BLD: 10.2 THOU/MM3 (ref 4.8–10.8)

## 2017-09-17 PROCEDURE — 6360000002 HC RX W HCPCS: Performed by: EMERGENCY MEDICINE

## 2017-09-17 PROCEDURE — 99285 EMERGENCY DEPT VISIT HI MDM: CPT

## 2017-09-17 PROCEDURE — 93005 ELECTROCARDIOGRAM TRACING: CPT

## 2017-09-17 PROCEDURE — 85025 COMPLETE CBC W/AUTO DIFF WBC: CPT

## 2017-09-17 PROCEDURE — 6370000000 HC RX 637 (ALT 250 FOR IP): Performed by: EMERGENCY MEDICINE

## 2017-09-17 PROCEDURE — 83880 ASSAY OF NATRIURETIC PEPTIDE: CPT

## 2017-09-17 PROCEDURE — 86140 C-REACTIVE PROTEIN: CPT

## 2017-09-17 PROCEDURE — 82248 BILIRUBIN DIRECT: CPT

## 2017-09-17 PROCEDURE — 71020 XR CHEST STANDARD TWO VW: CPT

## 2017-09-17 PROCEDURE — 85651 RBC SED RATE NONAUTOMATED: CPT

## 2017-09-17 PROCEDURE — 83690 ASSAY OF LIPASE: CPT

## 2017-09-17 PROCEDURE — 36415 COLL VENOUS BLD VENIPUNCTURE: CPT

## 2017-09-17 PROCEDURE — 96374 THER/PROPH/DIAG INJ IV PUSH: CPT

## 2017-09-17 PROCEDURE — 80053 COMPREHEN METABOLIC PANEL: CPT

## 2017-09-17 PROCEDURE — 84484 ASSAY OF TROPONIN QUANT: CPT

## 2017-09-17 PROCEDURE — 85379 FIBRIN DEGRADATION QUANT: CPT

## 2017-09-17 PROCEDURE — 96375 TX/PRO/DX INJ NEW DRUG ADDON: CPT

## 2017-09-17 RX ORDER — LORAZEPAM 2 MG/ML
1 INJECTION INTRAMUSCULAR ONCE
Status: COMPLETED | OUTPATIENT
Start: 2017-09-17 | End: 2017-09-17

## 2017-09-17 RX ORDER — KETOROLAC TROMETHAMINE 30 MG/ML
30 INJECTION, SOLUTION INTRAMUSCULAR; INTRAVENOUS ONCE
Status: COMPLETED | OUTPATIENT
Start: 2017-09-17 | End: 2017-09-17

## 2017-09-17 RX ADMIN — Medication 30 ML: at 16:53

## 2017-09-17 RX ADMIN — KETOROLAC TROMETHAMINE 30 MG: 30 INJECTION, SOLUTION INTRAMUSCULAR at 17:09

## 2017-09-17 RX ADMIN — LORAZEPAM 1 MG: 2 INJECTION INTRAMUSCULAR; INTRAVENOUS at 17:09

## 2017-09-17 ASSESSMENT — ENCOUNTER SYMPTOMS
WHEEZING: 0
EYE ITCHING: 0
ABDOMINAL DISTENTION: 0
VOMITING: 0
NAUSEA: 1
SHORTNESS OF BREATH: 0
RHINORRHEA: 0
EYE DISCHARGE: 0
DIARRHEA: 0
COUGH: 0
ABDOMINAL PAIN: 0

## 2017-09-17 ASSESSMENT — PAIN SCALES - GENERAL
PAINLEVEL_OUTOF10: 8
PAINLEVEL_OUTOF10: 4
PAINLEVEL_OUTOF10: 8

## 2017-09-17 ASSESSMENT — PAIN DESCRIPTION - DESCRIPTORS: DESCRIPTORS: ACHING

## 2017-09-17 ASSESSMENT — PAIN DESCRIPTION - PAIN TYPE: TYPE: ACUTE PAIN

## 2017-09-17 ASSESSMENT — PAIN DESCRIPTION - LOCATION: LOCATION: CHEST

## 2017-09-17 ASSESSMENT — PAIN DESCRIPTION - FREQUENCY: FREQUENCY: CONTINUOUS

## 2017-09-20 ENCOUNTER — CARE COORDINATION (OUTPATIENT)
Dept: FAMILY MEDICINE CLINIC | Age: 37
End: 2017-09-20

## 2017-10-18 RX ORDER — OMEPRAZOLE 20 MG/1
20 CAPSULE, DELAYED RELEASE ORAL 2 TIMES DAILY
Qty: 60 CAPSULE | Refills: 11 | Status: SHIPPED | OUTPATIENT
Start: 2017-10-18 | End: 2018-10-31

## 2017-10-18 NOTE — TELEPHONE ENCOUNTER
From: Zoe Gaytan  Sent: 10/18/2017 5:45 AM EDT  Subject: Medication Renewal Request    Bre Armstrong would like a refill of the following medications:  omeprazole (PRILOSEC) 20 MG delayed release capsule Fernando Nicholas MD]    Preferred pharmacy: [facility], [address] [address]  N [phone] - F [phone]    Comment:

## 2017-10-26 ENCOUNTER — TELEPHONE (OUTPATIENT)
Dept: FAMILY MEDICINE CLINIC | Age: 37
End: 2017-10-26

## 2017-10-27 ENCOUNTER — TELEPHONE (OUTPATIENT)
Dept: FAMILY MEDICINE CLINIC | Age: 37
End: 2017-10-27

## 2017-10-27 NOTE — TELEPHONE ENCOUNTER
Patient called stating she is not feeling any better from yesterday. Patient is no longer vomiting but is still having a cough and fever. Per Dr. Carlito Ni patient does not need to be seen today, voiced she will not change plan of care from yesterday. Advised patient to continue pushing fluids, bland diet, and using Phenergan as needed for nausea. Advised to call office if not better by Monday or Tuesday. No concerns voiced.

## 2017-10-31 ENCOUNTER — TELEPHONE (OUTPATIENT)
Dept: PHYSICAL MEDICINE AND REHAB | Age: 37
End: 2017-10-31

## 2017-10-31 NOTE — TELEPHONE ENCOUNTER
Pt had botox injection on 08.21.17 with Dr. Han and she sent us a message saying she has been having more headaches then normal since my injections.  I would say about 3 a week.  Right now I have a full blown migraine with vision changes and nausea. Please advise.

## 2017-11-03 NOTE — TELEPHONE ENCOUNTER
My Chart message sent to patient letting her know to follow up with Dr. Alicia Kim for her headaches since Botox has always helped in the past and now having issues per Dr. Lottie Cervantes.

## 2017-11-06 ENCOUNTER — HOSPITAL ENCOUNTER (EMERGENCY)
Age: 37
Discharge: HOME | End: 2017-11-06
Attending: EMERGENCY MEDICINE
Payer: MEDICARE

## 2017-11-06 VITALS
WEIGHT: 220 LBS | HEIGHT: 63 IN | OXYGEN SATURATION: 96 % | SYSTOLIC BLOOD PRESSURE: 130 MMHG | TEMPERATURE: 97.5 F | BODY MASS INDEX: 38.98 KG/M2 | HEART RATE: 86 BPM | DIASTOLIC BLOOD PRESSURE: 102 MMHG | RESPIRATION RATE: 16 BRPM

## 2017-11-06 DIAGNOSIS — J06.9 VIRAL URI WITH COUGH: ICD-10-CM

## 2017-11-06 DIAGNOSIS — R05.9 COUGH: Primary | ICD-10-CM

## 2017-11-06 PROCEDURE — 99283 EMERGENCY DEPT VISIT LOW MDM: CPT

## 2017-11-06 RX ORDER — LORATADINE 10 MG/1
10 TABLET ORAL DAILY
Qty: 10 TABLET | Refills: 0 | Status: SHIPPED | OUTPATIENT
Start: 2017-11-06 | End: 2017-11-16

## 2017-11-06 RX ORDER — FLUTICASONE PROPIONATE 50 MCG
1 SPRAY, SUSPENSION (ML) NASAL DAILY
Qty: 1 BOTTLE | Refills: 0 | Status: SHIPPED | OUTPATIENT
Start: 2017-11-06 | End: 2017-12-06

## 2017-11-06 ASSESSMENT — ENCOUNTER SYMPTOMS
BLOOD IN STOOL: 0
SORE THROAT: 0
CHOKING: 1
DIARRHEA: 0
SINUS PAIN: 0
WHEEZING: 0
ABDOMINAL PAIN: 0
EYE DISCHARGE: 0
RHINORRHEA: 1
EYE PAIN: 0
SHORTNESS OF BREATH: 0

## 2017-11-06 ASSESSMENT — PAIN DESCRIPTION - PAIN TYPE: TYPE: ACUTE PAIN

## 2017-11-06 ASSESSMENT — PAIN SCALES - GENERAL: PAINLEVEL_OUTOF10: 7

## 2017-11-06 ASSESSMENT — PAIN DESCRIPTION - LOCATION: LOCATION: GENERALIZED

## 2017-11-06 NOTE — ED PROVIDER NOTES
Take 2 tablets by mouth every 4 hours as needed for Pain or Fever    ALBUTEROL (PROVENTIL;VENTOLIN) 90 MCG/ACT INHALER    Inhale 2 puffs into the lungs every 8 hours as needed. AMITRIPTYLINE HCL (ELAVIL PO)    Take by mouth    ASPIRIN (LORENZO ASPIRIN) 325 MG TABLET    Take 1 tablet by mouth daily    ATORVASTATIN (LIPITOR) 80 MG TABLET    Take 1 tablet by mouth daily    AZELASTINE (ASTELIN) 0.1 % NASAL SPRAY    2 sprays by Nasal route 2 times daily Use in each nostril as directed    DICLOFENAC SODIUM (VOLTAREN) 1 % GEL    Apply 2 g topically 4 times daily as needed for Pain (topical)     DULOXETINE (CYMBALTA) 60 MG EXTENDED RELEASE CAPSULE    TAKE ONE CAPSULE BY MOUTH ONCE DAILY    MONTELUKAST (SINGULAIR) 10 MG TABLET    TAKE ONE TABLET BY MOUTH ONCE DAILY    NEXIUM 40 MG DELAYED RELEASE CAPSULE    TAKE ONE CAPSULE BY MOUTH ONCE DAILY    OMEPRAZOLE (PRILOSEC) 20 MG DELAYED RELEASE CAPSULE    Take 1 capsule by mouth 2 times daily    ONDANSETRON HCL (ZOFRAN PO)    Take  by mouth. PREDNISONE (DELTASONE) 5 MG TABLET    Take 5 mg by mouth 2 times daily    PROMETHAZINE (PHENERGAN) 25 MG TABLET    Take 1 tablet by mouth every 6 hours as needed for Nausea    SODIUM CHLORIDE (OCEAN) 0.65 % NASAL SPRAY    1 spray by Nasal route as needed for Congestion    SUCRALFATE (CARAFATE) 1 GM TABLET    TAKE ONE TABLET BY MOUTH 4 TIMES DAILY    VERAPAMIL (CALAN SR) 240 MG CR TABLET    Take 1 tablet by mouth daily       ALLERGIES     is allergic to cayenne; codeine; tape [adhesive tape]; and sulfa antibiotics. FAMILY HISTORY     indicated that her mother is alive. She indicated that her father is . She indicated that her sister is alive. She indicated that her brother is alive. family history includes Cancer (age of onset: 39) in her sister; Diabetes in her brother, father, and sister; High Blood Pressure in her father and mother; High Cholesterol in her brother.     SOCIAL HISTORY      reports that she has never

## 2017-11-08 ENCOUNTER — CARE COORDINATION (OUTPATIENT)
Dept: FAMILY MEDICINE CLINIC | Age: 37
End: 2017-11-08

## 2017-11-08 ENCOUNTER — PATIENT MESSAGE (OUTPATIENT)
Dept: FAMILY MEDICINE CLINIC | Age: 37
End: 2017-11-08

## 2017-11-08 NOTE — CARE COORDINATION
I left a voicemail stating this is [place] at PCP office. Please call at your earliest convenience, [phone].

## 2017-11-14 NOTE — TELEPHONE ENCOUNTER
Received My Chart message today from Lawyer Aguirre:  Yoselyn Fortune am starting to feel better. Samuel Stallingsrs you. Rachel Benavides, I think I have been having hot flashes.  I had a hysterectomy in 2009. Mk Salter had hot flashes for maybe 4 months and haven't had any problems since until now.  Can you mention this to Dr. Jarod Archuleta for me. Tamirjermain Salter haven't been sleeping well because I wake up all sweaty.   Please advise

## 2017-11-15 ENCOUNTER — CARE COORDINATION (OUTPATIENT)
Dept: CARE COORDINATION | Age: 37
End: 2017-11-15

## 2017-11-21 ENCOUNTER — CARE COORDINATION (OUTPATIENT)
Dept: CARE COORDINATION | Age: 37
End: 2017-11-21

## 2017-11-21 NOTE — CARE COORDINATION
Attempted to reach patient for continued Care Coordination follow up and education. Patient was unavailable at the time of my call, and generic voicemail message was left asking patient to please return call to my direct number.   Kimberly Waller RN Care Coordinator

## 2017-11-27 RX ORDER — MONTELUKAST SODIUM 10 MG/1
TABLET ORAL
Qty: 30 TABLET | Refills: 5 | Status: SHIPPED | OUTPATIENT
Start: 2017-11-27 | End: 2018-03-27 | Stop reason: ALTCHOICE

## 2017-11-27 RX ORDER — DULOXETIN HYDROCHLORIDE 60 MG/1
CAPSULE, DELAYED RELEASE ORAL
Qty: 30 CAPSULE | Refills: 5 | Status: SHIPPED | OUTPATIENT
Start: 2017-11-27 | End: 2018-05-30 | Stop reason: SDUPTHER

## 2017-12-02 DIAGNOSIS — M79.7 FIBROMYALGIA: ICD-10-CM

## 2017-12-03 DIAGNOSIS — M79.7 FIBROMYALGIA: ICD-10-CM

## 2017-12-04 RX ORDER — TRAZODONE HYDROCHLORIDE 50 MG/1
TABLET ORAL
Qty: 90 TABLET | Refills: 1 | Status: SHIPPED | OUTPATIENT
Start: 2017-12-04 | End: 2018-05-08 | Stop reason: SDUPTHER

## 2017-12-04 RX ORDER — ACETAMINOPHEN 325 MG/1
TABLET ORAL
Qty: 120 TABLET | Refills: 3 | Status: SHIPPED | OUTPATIENT
Start: 2017-12-04 | End: 2020-11-30

## 2017-12-06 ENCOUNTER — APPOINTMENT (OUTPATIENT)
Dept: GENERAL RADIOLOGY | Age: 37
End: 2017-12-06
Payer: MEDICARE

## 2017-12-06 ENCOUNTER — OFFICE VISIT (OUTPATIENT)
Dept: FAMILY MEDICINE CLINIC | Age: 37
End: 2017-12-06
Payer: MEDICARE

## 2017-12-06 ENCOUNTER — HOSPITAL ENCOUNTER (EMERGENCY)
Age: 37
Discharge: HOME | End: 2017-12-06
Attending: EMERGENCY MEDICINE
Payer: MEDICARE

## 2017-12-06 ENCOUNTER — TELEPHONE (OUTPATIENT)
Dept: FAMILY MEDICINE CLINIC | Age: 37
End: 2017-12-06

## 2017-12-06 VITALS
HEIGHT: 63 IN | DIASTOLIC BLOOD PRESSURE: 82 MMHG | WEIGHT: 235 LBS | OXYGEN SATURATION: 98 % | BODY MASS INDEX: 41.64 KG/M2 | TEMPERATURE: 97.9 F | RESPIRATION RATE: 20 BRPM | SYSTOLIC BLOOD PRESSURE: 170 MMHG

## 2017-12-06 VITALS
WEIGHT: 239.2 LBS | BODY MASS INDEX: 42.38 KG/M2 | TEMPERATURE: 98.7 F | DIASTOLIC BLOOD PRESSURE: 86 MMHG | RESPIRATION RATE: 20 BRPM | HEIGHT: 63 IN | HEART RATE: 80 BPM | SYSTOLIC BLOOD PRESSURE: 130 MMHG

## 2017-12-06 DIAGNOSIS — M32.9 LUPUS ARTHRITIS (HCC): ICD-10-CM

## 2017-12-06 DIAGNOSIS — J45.909 MILD ASTHMA WITHOUT COMPLICATION, UNSPECIFIED WHETHER PERSISTENT: ICD-10-CM

## 2017-12-06 DIAGNOSIS — E78.00 PURE HYPERCHOLESTEROLEMIA: ICD-10-CM

## 2017-12-06 DIAGNOSIS — G43.719 INTRACTABLE CHRONIC MIGRAINE WITHOUT AURA AND WITHOUT STATUS MIGRAINOSUS: ICD-10-CM

## 2017-12-06 DIAGNOSIS — E66.01 MORBID OBESITY DUE TO EXCESS CALORIES (HCC): ICD-10-CM

## 2017-12-06 DIAGNOSIS — I63.9 CEREBROVASCULAR ACCIDENT (CVA), UNSPECIFIED MECHANISM (HCC): ICD-10-CM

## 2017-12-06 DIAGNOSIS — J30.1 CHRONIC SEASONAL ALLERGIC RHINITIS DUE TO POLLEN: ICD-10-CM

## 2017-12-06 DIAGNOSIS — E87.6 HYPOKALEMIA: ICD-10-CM

## 2017-12-06 DIAGNOSIS — G45.8 OTHER SPECIFIED TRANSIENT CEREBRAL ISCHEMIAS: ICD-10-CM

## 2017-12-06 DIAGNOSIS — G40.909 SEIZURE DISORDER (HCC): ICD-10-CM

## 2017-12-06 DIAGNOSIS — S93.602A SPRAIN OF LEFT FOOT, INITIAL ENCOUNTER: Primary | ICD-10-CM

## 2017-12-06 DIAGNOSIS — K21.9 GASTROESOPHAGEAL REFLUX DISEASE WITHOUT ESOPHAGITIS: Primary | ICD-10-CM

## 2017-12-06 DIAGNOSIS — M79.7 FIBROMYALGIA: ICD-10-CM

## 2017-12-06 DIAGNOSIS — R56.9 CONVULSIONS, UNSPECIFIED CONVULSION TYPE (HCC): ICD-10-CM

## 2017-12-06 LAB — POTASSIUM SERPL-SCNC: 3.6 MEQ/L (ref 3.5–5.2)

## 2017-12-06 PROCEDURE — 73630 X-RAY EXAM OF FOOT: CPT

## 2017-12-06 PROCEDURE — 73610 X-RAY EXAM OF ANKLE: CPT

## 2017-12-06 PROCEDURE — G8417 CALC BMI ABV UP PARAM F/U: HCPCS | Performed by: FAMILY MEDICINE

## 2017-12-06 PROCEDURE — G8598 ASA/ANTIPLAT THER USED: HCPCS | Performed by: FAMILY MEDICINE

## 2017-12-06 PROCEDURE — 1036F TOBACCO NON-USER: CPT | Performed by: FAMILY MEDICINE

## 2017-12-06 PROCEDURE — G8427 DOCREV CUR MEDS BY ELIG CLIN: HCPCS | Performed by: FAMILY MEDICINE

## 2017-12-06 PROCEDURE — 99215 OFFICE O/P EST HI 40 MIN: CPT | Performed by: FAMILY MEDICINE

## 2017-12-06 PROCEDURE — G8484 FLU IMMUNIZE NO ADMIN: HCPCS | Performed by: FAMILY MEDICINE

## 2017-12-06 PROCEDURE — 99283 EMERGENCY DEPT VISIT LOW MDM: CPT

## 2017-12-06 PROCEDURE — 29515 APPLICATION SHORT LEG SPLINT: CPT

## 2017-12-06 RX ORDER — HYDROCODONE BITARTRATE AND ACETAMINOPHEN 5; 325 MG/1; MG/1
1 TABLET ORAL EVERY 6 HOURS PRN
Qty: 12 TABLET | Refills: 0 | Status: SHIPPED | OUTPATIENT
Start: 2017-12-06 | End: 2017-12-13

## 2017-12-06 RX ORDER — CLOPIDOGREL BISULFATE 75 MG/1
75 TABLET ORAL DAILY
Qty: 90 TABLET | Refills: 3 | Status: SHIPPED | OUTPATIENT
Start: 2017-12-06 | End: 2018-09-28 | Stop reason: SDUPTHER

## 2017-12-06 ASSESSMENT — PAIN SCALES - GENERAL
PAINLEVEL_OUTOF10: 6
PAINLEVEL_OUTOF10: 6

## 2017-12-06 ASSESSMENT — ENCOUNTER SYMPTOMS
SHORTNESS OF BREATH: 0
EYE REDNESS: 0
SINUS PRESSURE: 0
CONSTIPATION: 0
VOMITING: 0
WHEEZING: 0
ABDOMINAL PAIN: 0
COUGH: 0
EYE PAIN: 0
SORE THROAT: 0
RHINORRHEA: 0
NAUSEA: 0
EYE ITCHING: 0
DIARRHEA: 0

## 2017-12-06 ASSESSMENT — PAIN DESCRIPTION - ORIENTATION
ORIENTATION: LEFT
ORIENTATION: LEFT

## 2017-12-06 ASSESSMENT — PAIN DESCRIPTION - LOCATION
LOCATION: FOOT
LOCATION: ANKLE;FOOT

## 2017-12-06 NOTE — ED PROVIDER NOTES
[facility]  eMERGENCY dEPARTMENT eNCOUnter      Pt Name: Shahida Sanches  MRN: [id number]  [place] 1980  Date of evaluation: 12/6/17      CHIEF COMPLAINT       Chief Complaint   Patient presents with    Fall    Ankle Pain     left    Foot Pain     left       Nurses Notes reviewed and I agree except as noted in the HPI. HISTORY OF PRESENT ILLNESS    Shahida Sanches is a 44 y.o. female who presents By private car for evaluation of left ankle and foot pain. She apparently has fairly frequent falls due to fibromyalgia and some other muscle skeletal disorders-was walking on uneven lawn surface when she stumbled and fell she's got pain in the primarily dorsal lateral foot area and some in the ankle left side. Brought in by parent or family member for evaluation. REVIEW OF SYSTEMS     Review of Systems   Constitutional: Negative for appetite change, chills, fatigue and fever. HENT: Negative for congestion, ear discharge, ear pain, rhinorrhea, sinus pressure and sore throat. Eyes: Negative for pain, redness and itching. Respiratory: Negative for cough, shortness of breath and wheezing. Cardiovascular: Negative for chest pain, palpitations and leg swelling. Gastrointestinal: Negative for abdominal pain, constipation, diarrhea, nausea and vomiting. Genitourinary: Negative for difficulty urinating, dysuria, flank pain, frequency, menstrual problem, urgency and vaginal discharge. Musculoskeletal: Positive for arthralgias, gait problem and myalgias. Negative for joint swelling. Skin: Negative for rash. Neurological: Negative for dizziness, weakness, light-headedness, numbness and headaches. Hematological: Does not bruise/bleed easily. Psychiatric/Behavioral: Negative for agitation and sleep disturbance. The patient is not nervous/anxious.            PAST MEDICAL HISTORY    has a past medical history of Asthma; Fibromyalgia; GERD

## 2017-12-06 NOTE — ED NOTES
Discharge instructions reviewed with patient and questions answered. Skin warm and dry, color normal for ethnicity. Remains alert and cooperative. Splint intact to the left foot. Neuro/circ status remains intact. Patient states that she has crutches at home to use. Denies further questions or concerns at this time.      Maryse Henriquez RN  12/06/17 0463

## 2017-12-06 NOTE — TELEPHONE ENCOUNTER
Patient went home after appt and fell while letting dog out. She injured her left ankle and left foot. It is swollen. very painful and hard to put pressure on. Wants to know if dr Cyril Tavares could order an xray at [address] [facility]. Please advise.

## 2017-12-08 NOTE — PROGRESS NOTES
[facility] PROFESSIONAL SERVS  [facility]  [address] [facility]  Dept: [phone]  Dept Fax: [phone]  Loc: [phone]    Lissa Silveira is a 44 y.o. female who presents today for:  Chief Complaint   Patient presents with    3 Month Follow-Up     GERD and seizures    Hypertension     Blood Pressures running high at home           HPI:     HPI    Hyperlipidemia: Patient presents with hyperlipidemia. She was tested because screening. Her last labs showed   Lab Results   Component Value Date    CHOL 153 08/25/2017    CHOL 143 08/25/2016    CHOL 196 05/06/2015     Lab Results   Component Value Date    TRIG 97 08/25/2017    TRIG 104 08/25/2016    TRIG 96 05/06/2015     Lab Results   Component Value Date    HDL 48 08/25/2017    HDL 37 08/25/2016    HDL 49 05/06/2015     Lab Results   Component Value Date    LDLCALC 86 08/25/2017    LDLCALC 85 08/25/2016    [address] 128 05/06/2015     No results found for: LABVLDL, VLDL  No results found for: CHOLHDLRATIO    There is a family history of hyperlipidemia. There is not a family history of early ischemia heart disease. GERD: Lili complains of heartburn. This has been associated with early satiety and heartburn. She denies no other symptoms. Symptoms have been present for several years. She denies dysphagia. She has not lost weight. She denies melena, hematochezia, hematemesis, and coffee ground emesis. Medical therapy in the past has included proton pump inhibitors. Still seeing neurology ( Dr Lorena Meraz) for seizures. They are still occurring a few times per week. She is thinking about stopping all her medications and restarting \"fresh\". She is highly encouraged NOT to do this for fear of intractable seizure with brain damage. The seizures are usually followed by a headache. 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   She had an \"episode\" in my office the last time she was here, she was started on aspirin 325 mg and went from having 8-9 episodes a day to 8 episodes in the past 2 months. Fibromyalgia and Lupus and lupus arthritis is under the care of Rheumatology in [facility] and formerly also a patient of Dr Shaye Baca who is no longer in practice. Allergies are well controlled on current medications. Knee pain on the right is chronic but getting worse. Reviewed chart for past medical history , surgical history , allergies, social history , family history and medications. Health Maintenance   Topic Date Due    Flu vaccine (1) 12/06/2019 (Originally 9/1/2017)    DTaP/Tdap/Td vaccine (1 - Tdap) 09/26/2021 (Originally 8/10/1998)    Pneumococcal med risk  Completed    HIV screen  Excluded       Subjective:      Constitutional: Negative for fever, chills, diaphoresis, activity change, appetite change and fatigue. HENT: Negative for hearing loss, ear pain, congestion, sore throat, rhinorrhea, postnasal drip and ear discharge. Eyes: Negative for photophobia and visual disturbance. Respiratory: Negative for cough, chest tightness, shortness of breath and wheezing. Cardiovascular: Negative for chest pain and leg swelling. Gastrointestinal: Negative for nausea, vomiting, abdominal pain, diarrhea and constipation. Genitourinary: Negative for dysuria, urgency and frequency. Neurological: Negative for weakness, light-headedness and headaches. Psychiatric/Behavioral: Negative for sleep disturbance.       Objective:     Vitals:    12/06/17 0846 12/06/17 0855   BP: 122/86 130/86   Site: Left Arm Left Arm   Position: Sitting Sitting   Cuff Size: Large Adult Large Adult   Pulse: 80    Resp: 20    Temp: 98.7 °F (37.1 °C)    TempSrc: Oral    Weight: 239 lb 3.2 oz (108.5 kg)    Height: 5' 3\" (1.6 m)      Wt Readings from Last 3 Encounters:   12/06/17 235 lb (106.6 kg)   12/06/17 239 lb 3.2 oz (108.5 kg)   11/06/17 220 lb (99.8 kg) Physical Exam  Physical Exam   Constitutional: Vital signs are normal. She appears well-developed and well-nourished. She is active. HENT:   Head: Normocephalic and atraumatic. Right Ear: Tympanic membrane, external ear and ear canal normal. No drainage or tenderness. Left Ear: Tympanic membrane, external ear and ear canal normal. No drainage or tenderness. Nose: Nose normal. No mucosal edema or rhinorrhea. Mouth/Throat: Uvula is midline, oropharynx is clear and moist and mucous membranes are normal. Mucous membranes are not pale. Normal dentition. No posterior oropharyngeal edema or posterior oropharyngeal erythema. Eyes: Lids are normal. Right eye exhibits no chemosis and no discharge. Left eye exhibits no chemosis and no drainage. Right conjunctiva has no hemorrhage. Left conjunctiva has no hemorrhage. Right eye exhibits normal extraocular motion. Left eye exhibits normal extraocular motion. Right pupil is round and reactive. Left pupil is round and reactive. Pupils are equal.   Cardiovascular: Normal rate, regular rhythm, S1 normal, S2 normal and normal heart sounds. Exam reveals no gallop. No murmur heard. Pulmonary/Chest: Effort normal and breath sounds normal. No respiratory distress. She has no wheezes. She has no rhonchi. She has no rales. Abdominal: Soft. Normal appearance and bowel sounds are normal. She exhibits no distension and no mass. There is no hepatosplenomegaly. No tenderness. She has no rigidity, no rebound and no guarding. No hernia. Musculoskeletal:        Right lower leg: She exhibits no edema. Left lower leg: She exhibits no edema. Neurological: She is alert. Assessment/Plan   Pricilla Rich was seen today for 3 month follow-up and hypertension. Diagnoses and all orders for this visit:    Gastroesophageal reflux disease without esophagitis  -     CBC; Future    Pure hypercholesterolemia  -     Comprehensive Metabolic Panel;  Future  -     Lipid Panel; Future    Mild asthma without complication, unspecified whether persistent    Convulsions, unspecified convulsion type (HCC)    Lupus arthritis ([facility]  -     TSH With Reflex Ft4; Future    Intractable chronic migraine without aura and without status migrainosus    Fibromyalgia    Seizure disorder ([facility]    Cerebrovascular accident (CVA), unspecified mechanism ([facility]    Morbid obesity due to excess calories ([facility]    Chronic seasonal allergic rhinitis due to pollen    Other specified transient cerebral ischemias    Hypokalemia  -     Potassium; Future  -     Comprehensive Metabolic Panel; Future  -     Potassium    Other orders  -     clopidogrel (PLAVIX) 75 MG tablet; Take 1 tablet by mouth daily    No change to medications   Continue healthy diet and exercise  Aspirin daily     Discussed use, benefit, and side effects of prescribed medications. All patient questions answered. Pt voiced understanding. Reviewed health maintenance. Instructed to continue current medications, diet and exercise. Patient agreed with treatment plan. Follow up as directed.      Over 45 minutes spent with patient with >50% spent in counseling and coordination of care    Electronically signed by Ana Cristina Powers MD

## 2017-12-20 ENCOUNTER — CARE COORDINATION (OUTPATIENT)
Dept: CARE COORDINATION | Age: 37
End: 2017-12-20

## 2017-12-20 NOTE — CARE COORDINATION
Ambulatory Care Coordination Note  12/20/2017  CM Risk Score: 10  [place]ita Mortality Risk Score:      ACC: Cha Hoang RN    Summary Note: Patient returned my call from earlier today. Patient shared she has been doing well. Patient stated she continues to have some \"cold like\" symptoms, but they are improving. Patient was reminded to be sure she is drinking plenty of fluids and to call with any continued or worsening of symptoms and she verbalized understanding. Patient reported her foot continues to improve and she is no longer using crutches. Patient stated her \"seizure like\" symptoms have improved with recent start of ASA and Plavix. Patient reported she is currently not seeing anyone from behavioral health and she does not feel it is necessary as her symptoms are improving at this time. Med Rec was completed and patient denied any questions re: her medications. Patient denied any other questions, concerns, or needs and she was encouraged to call with any that may develop. If patient continues to do well will consider returning patient to surveillance f/u in the future. Care Coordination Interventions    Program Enrollment:  Complex Care  Referral from Primary Care Provider:  No  Suggested Interventions and Community Resources  Fall Risk Prevention:  Completed  Medication Assistance Program:  Declined (Comment: pt. denied any need for med assist. at this time)  Pharmacist:  Completed (Comment: Pt. encouraged to f/u with her local pharmacist as needed)  Other Services or Interventions: Following with [facility] and psychology          Goals Addressed             Most Recent     Activity Plan   No change (12/20/2017)             I will increase my activity by  continuing to take a walk daily.      Barriers: seizures and dizziness   Plan for overcoming my barriers: N/A  Confidence: 7/10  Anticipated Goal Completion Date: ongoing             Prior to Admission medications    Medication Sig Start Date End Date

## 2017-12-20 NOTE — CARE COORDINATION
Attempted to reach patient for continued Care Coordination follow up and education. Patient was not available at the time of my call and no voicemail option available. Will continue to attempt to reach out to patient in the future.   Shanta Moctezuma RN Care Coordinator

## 2017-12-22 RX ORDER — IBUPROFEN 600 MG/1
TABLET ORAL
Qty: 42 TABLET | Refills: 5 | Status: SHIPPED | OUTPATIENT
Start: 2017-12-22 | End: 2018-02-28 | Stop reason: SDUPTHER

## 2017-12-28 ENCOUNTER — PATIENT MESSAGE (OUTPATIENT)
Dept: FAMILY MEDICINE CLINIC | Age: 37
End: 2017-12-28

## 2018-01-03 ENCOUNTER — HOSPITAL ENCOUNTER (EMERGENCY)
Age: 38
Discharge: HOME | End: 2018-01-03
Attending: EMERGENCY MEDICINE
Payer: MEDICARE

## 2018-01-03 VITALS
DIASTOLIC BLOOD PRESSURE: 96 MMHG | SYSTOLIC BLOOD PRESSURE: 171 MMHG | BODY MASS INDEX: 40.75 KG/M2 | HEIGHT: 63 IN | TEMPERATURE: 97.3 F | OXYGEN SATURATION: 96 % | RESPIRATION RATE: 14 BRPM | WEIGHT: 230 LBS | HEART RATE: 112 BPM

## 2018-01-03 DIAGNOSIS — J40 BRONCHITIS: Primary | ICD-10-CM

## 2018-01-03 PROCEDURE — 99283 EMERGENCY DEPT VISIT LOW MDM: CPT

## 2018-01-03 RX ORDER — AZITHROMYCIN 250 MG/1
TABLET, FILM COATED ORAL
Qty: 1 PACKET | Refills: 0 | Status: SHIPPED | OUTPATIENT
Start: 2018-01-03 | End: 2018-01-13

## 2018-01-03 RX ORDER — BENZONATATE 200 MG/1
200 CAPSULE ORAL 3 TIMES DAILY PRN
Qty: 30 CAPSULE | Refills: 0 | Status: SHIPPED | OUTPATIENT
Start: 2018-01-03 | End: 2018-01-13

## 2018-01-03 ASSESSMENT — PAIN DESCRIPTION - DESCRIPTORS: DESCRIPTORS: ACHING

## 2018-01-03 ASSESSMENT — PAIN DESCRIPTION - PROGRESSION: CLINICAL_PROGRESSION: GRADUALLY IMPROVING

## 2018-01-03 ASSESSMENT — PAIN DESCRIPTION - ONSET: ONSET: ON-GOING

## 2018-01-03 ASSESSMENT — PAIN DESCRIPTION - FREQUENCY: FREQUENCY: CONTINUOUS

## 2018-01-03 ASSESSMENT — PAIN SCALES - GENERAL: PAINLEVEL_OUTOF10: 8

## 2018-01-03 ASSESSMENT — PAIN DESCRIPTION - PAIN TYPE: TYPE: ACUTE PAIN

## 2018-01-03 NOTE — ED PROVIDER NOTES
tablet by mouth daily, Disp-90 tablet, R-3Normal      EQ PAIN RELIEVER 325 MG tablet TAKE TWO TABLETS BY MOUTH EVERY 4 HOURS AS NEEDED FOR PAIN AND FEVER, Disp-120 tablet, R-3Please consider 90 day supplies to promote better adherenceNormal      DULoxetine (CYMBALTA) 60 MG extended release capsule TAKE ONE CAPSULE BY MOUTH ONCE DAILY, Disp-30 capsule, R-5Please consider 90 day supplies to promote better adherenceNormal      montelukast (SINGULAIR) 10 MG tablet TAKE ONE TABLET BY MOUTH ONCE DAILY, Disp-30 tablet, R-5Please consider 90 day supplies to promote better adherenceNormal      Amitriptyline HCl (ELAVIL PO) Take by mouth nightly Historical Med      NEXIUM 40 MG delayed release capsule TAKE ONE CAPSULE BY MOUTH ONCE DAILY, Disp-30 capsule, R-5, DAWPlease consider 90 day supplies to promote better adherenceNormal      omeprazole (PRILOSEC) 20 MG delayed release capsule Take 1 capsule by mouth 2 times daily, Disp-60 capsule, R-11Normal      aspirin (LORENZO ASPIRIN) 325 MG tablet Take 1 tablet by mouth daily, Disp-30 tablet, R-3Normal      predniSONE (DELTASONE) 5 MG tablet Take 5 mg by mouth 2 times dailyHistorical Med      atorvastatin (LIPITOR) 80 MG tablet Take 1 tablet by mouth daily, Disp-90 tablet, R-3Normal      sucralfate (CARAFATE) 1 GM tablet TAKE ONE TABLET BY MOUTH 4 TIMES DAILY, Disp-120 tablet, R-11Normal      promethazine (PHENERGAN) 25 MG tablet Take 1 tablet by mouth every 6 hours as needed for Nausea, Disp-90 tablet, R-11      sodium chloride (OCEAN) 0.65 % nasal spray 1 spray by Nasal route as needed for Congestion, Disp-1 Bottle, R-3      verapamil (CALAN SR) 240 MG CR tablet Take 1 tablet by mouth daily, Disp-30 tablet, R-3      diclofenac sodium (VOLTAREN) 1 % GEL Apply 2 g topically 4 times daily as needed for Pain (topical) , Topical, 4 TIMES DAILY PRN, Until Discontinued, Historical Med      albuterol (PROVENTIL;VENTOLIN) 90 MCG/ACT inhaler Inhale 2 puffs into the lungs every 8 hours as needed. traZODone (DESYREL) 50 MG tablet TAKE 3 TABLETS BY MOUTH NIGHTLY, Disp-90 tablet, R-1Please consider 90 day supplies to promote better adherenceNormal      Ondansetron HCl (ZOFRAN PO) Take  by mouth. ALLERGIES    is allergic to cayenne; codeine; tape [adhesive tape]; and sulfa antibiotics. FAMILY HISTORY    indicated that her mother is alive. She indicated that her father is . She indicated that her sister is alive. She indicated that her brother is alive. family history includes Cancer (age of onset: 39) in her sister; Diabetes in her brother, father, and sister; High Blood Pressure in her father and mother; High Cholesterol in her brother. SOCIAL HISTORY     reports that she has never smoked. She has never used smokeless tobacco. She reports that she does not drink alcohol or use drugs. PHYSICAL EXAM       INITIAL VITALS: BP (!) 171/96   Pulse 112   Temp 97.3 °F (36.3 °C) (Oral)   Resp 14   Ht 5' 3\" (1.6 m)   Wt 230 lb (104.3 kg)   SpO2 96%   BMI 40.74 kg/m²      Physical Exam   Constitutional: She is oriented to person, place, and time. She appears well-developed and well-nourished. She appears distressed. HENT:   Head: Normocephalic and atraumatic. Mouth/Throat: No oropharyngeal exudate. TM's pink and dull, nasal congestion, pharyngeal erythema   Eyes: Conjunctivae are normal. Pupils are equal, round, and reactive to light. Neck: Neck supple. Cardiovascular: Normal rate and regular rhythm. No murmur heard. Pulmonary/Chest: Effort normal and breath sounds normal. No respiratory distress. She has no wheezes. Moist cough   Abdominal: Soft. Bowel sounds are normal. She exhibits no mass. There is no tenderness. Musculoskeletal: She exhibits no edema or tenderness. Lymphadenopathy:     She has no cervical adenopathy. Neurological: She is alert and oriented to person, place, and time. She exhibits normal muscle tone.  Coordination normal. Skin: Skin is warm and dry. No rash noted. No erythema. Psychiatric: Her behavior is normal.   Nursing note and vitals reviewed. Vitals:    Vitals:    01/03/18 1814 01/03/18 1921   BP: (!) 171/96    Pulse: 115 112   Resp: 16 14   Temp: 97.3 °F (36.3 °C)    TempSrc: Oral    SpO2: 96%    Weight: 230 lb (104.3 kg)    Height: 5' 3\" (1.6 m)        EMERGENCY DEPARTMENT COURSE:    Plan of care discussed. FINAL IMPRESSION      1.  Bronchitis        DISPOSITION/PLAN    DISPOSITION        PATIENT REFERRED TO:    Lila Gould , [address]  [address] [place] [address]  [phone]    Schedule an appointment as soon as possible for a visit         DISCHARGE MEDICATIONS:    Discharge Medication List as of 1/3/2018  6:53 PM      START taking these medications    Details   azithromycin (ZITHROMAX) 250 MG tablet As directed on the pack for infection, Disp-1 packet, R-0Print      benzonatate (TESSALON) 200 MG capsule Take 1 capsule by mouth 3 times daily as needed for Cough, Disp-30 capsule, R-0Print                (Please note that portions of this note were completed with a voice recognition program.  Efforts were made to edit the dictations but occasionally words are mis-transcribed.)      Heidy Daniel MD  01/04/18 7223

## 2018-01-03 NOTE — ED NOTES
Patient resting quietly in bed. Call light in reach. Evaluated for comfort measures. Evaluated for safety measures. Monitoring continues. No concerns were identified at this time.        Yolanda Schwartz RN  01/03/18 3011

## 2018-01-04 ASSESSMENT — ENCOUNTER SYMPTOMS
VOMITING: 0
COUGH: 1
SORE THROAT: 1
SINUS PAIN: 1
SHORTNESS OF BREATH: 1
WHEEZING: 0
ABDOMINAL PAIN: 0

## 2018-01-15 ENCOUNTER — CARE COORDINATION (OUTPATIENT)
Dept: CARE COORDINATION | Age: 38
End: 2018-01-15

## 2018-01-30 ENCOUNTER — PATIENT MESSAGE (OUTPATIENT)
Dept: FAMILY MEDICINE CLINIC | Age: 38
End: 2018-01-30

## 2018-01-30 ENCOUNTER — TELEPHONE (OUTPATIENT)
Dept: FAMILY MEDICINE CLINIC | Age: 38
End: 2018-01-30

## 2018-01-31 ENCOUNTER — HOSPITAL ENCOUNTER (OUTPATIENT)
Age: 38
Discharge: HOME | End: 2018-01-31
Payer: MEDICARE

## 2018-01-31 ENCOUNTER — OFFICE VISIT (OUTPATIENT)
Dept: FAMILY MEDICINE CLINIC | Age: 38
End: 2018-01-31
Payer: MEDICARE

## 2018-01-31 VITALS
RESPIRATION RATE: 16 BRPM | SYSTOLIC BLOOD PRESSURE: 126 MMHG | BODY MASS INDEX: 41.39 KG/M2 | WEIGHT: 233.6 LBS | DIASTOLIC BLOOD PRESSURE: 84 MMHG | HEIGHT: 63 IN | OXYGEN SATURATION: 97 % | TEMPERATURE: 96.8 F | HEART RATE: 92 BPM

## 2018-01-31 DIAGNOSIS — R50.9 FEVER, UNSPECIFIED FEVER CAUSE: ICD-10-CM

## 2018-01-31 DIAGNOSIS — B96.89 ACUTE BACTERIAL SINUSITIS: ICD-10-CM

## 2018-01-31 DIAGNOSIS — J01.90 ACUTE BACTERIAL SINUSITIS: ICD-10-CM

## 2018-01-31 DIAGNOSIS — B96.89 ACUTE BACTERIAL SINUSITIS: Primary | ICD-10-CM

## 2018-01-31 DIAGNOSIS — J01.90 ACUTE BACTERIAL SINUSITIS: Primary | ICD-10-CM

## 2018-01-31 LAB
FLU A ANTIGEN: NEGATIVE
FLU B ANTIGEN: NEGATIVE

## 2018-01-31 PROCEDURE — G8484 FLU IMMUNIZE NO ADMIN: HCPCS | Performed by: FAMILY MEDICINE

## 2018-01-31 PROCEDURE — G8598 ASA/ANTIPLAT THER USED: HCPCS | Performed by: FAMILY MEDICINE

## 2018-01-31 PROCEDURE — 1036F TOBACCO NON-USER: CPT | Performed by: FAMILY MEDICINE

## 2018-01-31 PROCEDURE — G8417 CALC BMI ABV UP PARAM F/U: HCPCS | Performed by: FAMILY MEDICINE

## 2018-01-31 PROCEDURE — 87804 INFLUENZA ASSAY W/OPTIC: CPT

## 2018-01-31 PROCEDURE — 99213 OFFICE O/P EST LOW 20 MIN: CPT | Performed by: FAMILY MEDICINE

## 2018-01-31 PROCEDURE — G8427 DOCREV CUR MEDS BY ELIG CLIN: HCPCS | Performed by: FAMILY MEDICINE

## 2018-01-31 RX ORDER — LEVOFLOXACIN 500 MG/1
500 TABLET, FILM COATED ORAL DAILY
Qty: 10 TABLET | Refills: 0 | Status: SHIPPED | OUTPATIENT
Start: 2018-01-31 | End: 2018-02-10

## 2018-01-31 RX ORDER — PREDNISONE 20 MG/1
20 TABLET ORAL DAILY
Qty: 5 TABLET | Refills: 0 | Status: SHIPPED | OUTPATIENT
Start: 2018-01-31 | End: 2018-02-05

## 2018-01-31 NOTE — PROGRESS NOTES
Subjective:      Patient ID: Jesus Kelley is a 44 y.o. female. HPI  Chief Complaint   Patient presents with    Cough     off and on x4 weeks     Congestion    Fever    Facial Pain       Here for cough that started 5 days ago . She was treated 1-3-18 with zithromax and tessalon for bronchitis. Now associated with headache, sinus presure dizziness, ear ache, sore throat, chest tightness, abdominal cramps, nausea with sinus drainage, feverish, chills. Tried:  Coricidin and zicam is some help. Review of Systems  Constitutional: Negative for fever, chills, diaphoresis, activity change, appetite change and fatigue. HENT: Negative for hearing loss, ear pain, congestion, sore throat, rhinorrhea, postnasal drip and ear discharge. Eyes: Negative for photophobia and visual disturbance. Respiratory: Negative for cough, chest tightness, shortness of breath and wheezing. Cardiovascular: Negative for chest pain and leg swelling. Gastrointestinal: Negative for nausea, vomiting, abdominal pain, diarrhea and constipation. Genitourinary: Negative for dysuria, urgency and frequency. Neurological: Negative for weakness, light-headedness and headaches. Psychiatric/Behavioral: Negative for sleep disturbance. Objective:   Physical Exam  Vitals:    01/31/18 0826   BP: 126/84   Pulse: 92   Resp: 16   Temp: 96.8 °F (36 °C)   SpO2: 97%     Wt Readings from Last 3 Encounters:   01/31/18 233 lb 9.6 oz (106 kg)   01/03/18 230 lb (104.3 kg)   12/06/17 235 lb (106.6 kg)     Physical Exam   Constitutional: Vital signs are normal. She appears well-developed and well-nourished. She is active. HENT:   Head: Normocephalic and atraumatic. Right Ear: Tympanic membrane, external ear and ear canal normal. No drainage or tenderness. Left Ear: Tympanic membrane, external ear and ear canal normal. No drainage or tenderness. Nose: Nose normal. No mucosal edema or rhinorrhea.    Mouth/Throat: Uvula is midline, oropharynx is clear and moist and mucous membranes are normal. Mucous membranes are not pale. Normal dentition. No posterior oropharyngeal edema or posterior oropharyngeal erythema. Eyes: Lids are normal. Right eye exhibits no chemosis and no discharge. Left eye exhibits no chemosis and no drainage. Right conjunctiva has no hemorrhage. Left conjunctiva has no hemorrhage. Right eye exhibits normal extraocular motion. Left eye exhibits normal extraocular motion. Right pupil is round and reactive. Left pupil is round and reactive. Pupils are equal.   Cardiovascular: Normal rate, regular rhythm, S1 normal, S2 normal and normal heart sounds. Exam reveals no gallop. No murmur heard. Pulmonary/Chest: Effort normal and breath sounds normal. No respiratory distress. She has no wheezes. She has no rhonchi. She has no rales. Abdominal: Soft. Normal appearance and bowel sounds are normal. She exhibits no distension and no mass. There is no hepatosplenomegaly. No tenderness. She has no rigidity, no rebound and no guarding. No hernia. Musculoskeletal:        Right lower leg: She exhibits no edema. Left lower leg: She exhibits no edema. Neurological: She is alert. Assessment:      Matthew Graham was seen today for cough, congestion, fever and facial pain. Diagnoses and all orders for this visit:    Acute bacterial sinusitis  -     predniSONE (DELTASONE) 20 MG tablet; Take 1 tablet by mouth daily for 5 days  -     levofloxacin (LEVAQUIN) 500 MG tablet; Take 1 tablet by mouth daily for 10 days  -     Rapid Influenza A/B Antigens; Future    Fever, unspecified fever cause  -     Rapid Influenza A/B Antigens; Future      Push fluids  Tylenol or ibuprofen prn fever  Cool mist Humidifier in the bedroom  Follow up if not better in 1 week or if symptoms get worse.

## 2018-02-05 ENCOUNTER — TELEPHONE (OUTPATIENT)
Dept: FAMILY MEDICINE CLINIC | Age: 38
End: 2018-02-05

## 2018-02-05 NOTE — TELEPHONE ENCOUNTER
Patient called and stated that she started an antibiotic on 01/30/18 and is not feeling any better. Should she come in for an appointment?

## 2018-02-12 ENCOUNTER — CARE COORDINATION (OUTPATIENT)
Dept: CARE COORDINATION | Age: 38
End: 2018-02-12

## 2018-02-14 ENCOUNTER — TELEPHONE (OUTPATIENT)
Dept: FAMILY MEDICINE CLINIC | Age: 38
End: 2018-02-14

## 2018-02-14 NOTE — TELEPHONE ENCOUNTER
Is can be rsv in adults but treatment is the same as what she is doing with supportive treatment with fluids and tylenol or motrin prn.     Call patient

## 2018-02-14 NOTE — TELEPHONE ENCOUNTER
Patient states that she finished the prescribed medications on 2/11/18 and she has not seen any relief. Patient states that her sister, a nurse, told her that rsv is going around and presents in adults as cold symptoms. She wonders if this would be possible especially with her immune system being compromised due to her lupus. Please advise.     DOLV: 1/31/18

## 2018-02-28 ENCOUNTER — TELEPHONE (OUTPATIENT)
Dept: FAMILY MEDICINE CLINIC | Age: 38
End: 2018-02-28

## 2018-02-28 RX ORDER — IBUPROFEN 600 MG/1
600 TABLET ORAL EVERY 8 HOURS PRN
Qty: 42 TABLET | Refills: 5 | Status: SHIPPED | OUTPATIENT
Start: 2018-02-28 | End: 2018-10-01 | Stop reason: SDUPTHER

## 2018-02-28 RX ORDER — ATORVASTATIN CALCIUM 80 MG/1
80 TABLET, FILM COATED ORAL DAILY
Qty: 90 TABLET | Refills: 3 | Status: SHIPPED | OUTPATIENT
Start: 2018-02-28 | End: 2018-08-09 | Stop reason: SDUPTHER

## 2018-02-28 NOTE — TELEPHONE ENCOUNTER
From: Rivas Zapata  Sent: 2/27/2018 6:11 PM EST  Subject: Medication Renewal Request    Osmany Michelle.  [place] would like a refill of the following medications:     ibuprofen (ADVIL;MOTRIN) 600 MG tablet Elvis Singh MD]    Preferred pharmacy: [facility], [id number] .[facility]  N [phone] - F [phone]    Comment:

## 2018-02-28 NOTE — TELEPHONE ENCOUNTER
Pt notified that labs are in the computer system and she can go to [facility] anytime to get labs drawn - must fast, nothing to eat or drink except water 10-12 hrs prior to testing - understanding voiced

## 2018-03-01 ENCOUNTER — HOSPITAL ENCOUNTER (OUTPATIENT)
Age: 38
Discharge: HOME | End: 2018-03-01
Payer: MEDICARE

## 2018-03-01 DIAGNOSIS — E78.00 PURE HYPERCHOLESTEROLEMIA: ICD-10-CM

## 2018-03-01 DIAGNOSIS — E87.6 HYPOKALEMIA: ICD-10-CM

## 2018-03-01 DIAGNOSIS — K21.9 GASTROESOPHAGEAL REFLUX DISEASE WITHOUT ESOPHAGITIS: ICD-10-CM

## 2018-03-01 DIAGNOSIS — M32.9 LUPUS ARTHRITIS (HCC): ICD-10-CM

## 2018-03-01 LAB
ALBUMIN SERPL-MCNC: 4.1 G/DL (ref 3.5–5.1)
ALP BLD-CCNC: 158 U/L (ref 38–126)
ALT SERPL-CCNC: 41 U/L (ref 11–66)
ANION GAP SERPL CALCULATED.3IONS-SCNC: 14 MEQ/L (ref 8–16)
AST SERPL-CCNC: 41 U/L (ref 5–40)
BILIRUB SERPL-MCNC: 0.5 MG/DL (ref 0.3–1.2)
BUN BLDV-MCNC: 8 MG/DL (ref 7–22)
CALCIUM SERPL-MCNC: 9.4 MG/DL (ref 8.5–10.5)
CHLORIDE BLD-SCNC: 99 MEQ/L (ref 98–111)
CHOLESTEROL, TOTAL: 149 MG/DL (ref 100–199)
CO2: 27 MEQ/L (ref 23–33)
CREAT SERPL-MCNC: 0.6 MG/DL (ref 0.4–1.2)
GFR SERPL CREATININE-BSD FRML MDRD: > 90 ML/MIN/1.73M2
GLUCOSE BLD-MCNC: 125 MG/DL (ref 70–108)
HCT VFR BLD CALC: 43.3 % (ref 37–47)
HDLC SERPL-MCNC: 36 MG/DL
HEMOGLOBIN: 14.2 GM/DL (ref 12–16)
LDL CHOLESTEROL CALCULATED: 81 MG/DL
MCH RBC QN AUTO: 27.2 PG (ref 27–31)
MCHC RBC AUTO-ENTMCNC: 32.7 GM/DL (ref 33–37)
MCV RBC AUTO: 83.3 FL (ref 81–99)
PDW BLD-RTO: 14.2 % (ref 11.5–14.5)
PLATELET # BLD: 239 THOU/MM3 (ref 130–400)
PMV BLD AUTO: 9.6 FL (ref 7.4–10.4)
POTASSIUM SERPL-SCNC: 4 MEQ/L (ref 3.5–5.2)
RBC # BLD: 5.2 MILL/MM3 (ref 4.2–5.4)
SODIUM BLD-SCNC: 140 MEQ/L (ref 135–145)
T4 FREE: 1.24 NG/DL (ref 0.93–1.76)
TOTAL PROTEIN: 6.6 G/DL (ref 6.1–8)
TRIGL SERPL-MCNC: 161 MG/DL (ref 0–199)
TSH SERPL DL<=0.05 MIU/L-ACNC: 7.81 UIU/ML (ref 0.4–4.2)
WBC # BLD: 6.7 THOU/MM3 (ref 4.8–10.8)

## 2018-03-01 PROCEDURE — 80061 LIPID PANEL: CPT

## 2018-03-01 PROCEDURE — 84443 ASSAY THYROID STIM HORMONE: CPT

## 2018-03-01 PROCEDURE — 36415 COLL VENOUS BLD VENIPUNCTURE: CPT

## 2018-03-01 PROCEDURE — 84439 ASSAY OF FREE THYROXINE: CPT

## 2018-03-01 PROCEDURE — 80053 COMPREHEN METABOLIC PANEL: CPT

## 2018-03-01 PROCEDURE — 85027 COMPLETE CBC AUTOMATED: CPT

## 2018-03-02 ENCOUNTER — PATIENT MESSAGE (OUTPATIENT)
Dept: FAMILY MEDICINE CLINIC | Age: 38
End: 2018-03-02

## 2018-03-07 ENCOUNTER — OFFICE VISIT (OUTPATIENT)
Dept: FAMILY MEDICINE CLINIC | Age: 38
End: 2018-03-07
Payer: MEDICARE

## 2018-03-07 VITALS
BODY MASS INDEX: 42.35 KG/M2 | TEMPERATURE: 97.6 F | WEIGHT: 239 LBS | SYSTOLIC BLOOD PRESSURE: 118 MMHG | HEART RATE: 90 BPM | HEIGHT: 63 IN | DIASTOLIC BLOOD PRESSURE: 64 MMHG | RESPIRATION RATE: 14 BRPM

## 2018-03-07 DIAGNOSIS — G43.719 INTRACTABLE CHRONIC MIGRAINE WITHOUT AURA AND WITHOUT STATUS MIGRAINOSUS: ICD-10-CM

## 2018-03-07 DIAGNOSIS — K21.9 GASTROESOPHAGEAL REFLUX DISEASE WITHOUT ESOPHAGITIS: ICD-10-CM

## 2018-03-07 DIAGNOSIS — J45.909 MILD ASTHMA WITHOUT COMPLICATION, UNSPECIFIED WHETHER PERSISTENT: ICD-10-CM

## 2018-03-07 DIAGNOSIS — M32.8 OTHER FORMS OF SYSTEMIC LUPUS ERYTHEMATOSUS, UNSPECIFIED ORGAN INVOLVEMENT STATUS (HCC): ICD-10-CM

## 2018-03-07 DIAGNOSIS — R73.09 ELEVATED GLUCOSE: ICD-10-CM

## 2018-03-07 DIAGNOSIS — J30.1 CHRONIC SEASONAL ALLERGIC RHINITIS DUE TO POLLEN: ICD-10-CM

## 2018-03-07 DIAGNOSIS — E78.00 PURE HYPERCHOLESTEROLEMIA: Primary | ICD-10-CM

## 2018-03-07 DIAGNOSIS — E66.01 MORBID OBESITY DUE TO EXCESS CALORIES (HCC): ICD-10-CM

## 2018-03-07 DIAGNOSIS — I63.9 CEREBROVASCULAR ACCIDENT (CVA), UNSPECIFIED MECHANISM (HCC): ICD-10-CM

## 2018-03-07 DIAGNOSIS — R79.89 ELEVATED TSH: ICD-10-CM

## 2018-03-07 DIAGNOSIS — G40.909 SEIZURE DISORDER (HCC): ICD-10-CM

## 2018-03-07 PROCEDURE — G8427 DOCREV CUR MEDS BY ELIG CLIN: HCPCS | Performed by: FAMILY MEDICINE

## 2018-03-07 PROCEDURE — 1036F TOBACCO NON-USER: CPT | Performed by: FAMILY MEDICINE

## 2018-03-07 PROCEDURE — 99214 OFFICE O/P EST MOD 30 MIN: CPT | Performed by: FAMILY MEDICINE

## 2018-03-07 PROCEDURE — G8484 FLU IMMUNIZE NO ADMIN: HCPCS | Performed by: FAMILY MEDICINE

## 2018-03-07 PROCEDURE — G8598 ASA/ANTIPLAT THER USED: HCPCS | Performed by: FAMILY MEDICINE

## 2018-03-07 PROCEDURE — G8417 CALC BMI ABV UP PARAM F/U: HCPCS | Performed by: FAMILY MEDICINE

## 2018-03-07 ASSESSMENT — PATIENT HEALTH QUESTIONNAIRE - PHQ9
SUM OF ALL RESPONSES TO PHQ9 QUESTIONS 1 & 2: 0
1. LITTLE INTEREST OR PLEASURE IN DOING THINGS: 0
SUM OF ALL RESPONSES TO PHQ QUESTIONS 1-9: 0

## 2018-03-08 ENCOUNTER — CARE COORDINATION (OUTPATIENT)
Dept: CARE COORDINATION | Age: 38
End: 2018-03-08

## 2018-03-12 NOTE — PROGRESS NOTES
aspirin 325 mg and went from having 8-9 episodes a day to 8 episodes in the past 2 months. Migraines are controlled with prn medications less then 2 per month. Fibromyalgia and Lupus and lupus arthritis is under the care of Rheumatology in [facility] and formerly also a patient of Dr Poonam Orr who is no longer in practice. Allergies are well controlled on current medications. Reviewed chart for past medical history , surgical history , allergies, social history , family history and medications. Health Maintenance   Topic Date Due    Flu vaccine (1) 12/06/2019 (Originally 9/1/2017)    DTaP/Tdap/Td vaccine (1 - Tdap) 09/26/2021 (Originally 8/10/1998)    Pneumococcal med risk  Completed    HIV screen  Excluded       Subjective:      Constitutional: Negative for fever, chills, diaphoresis, activity change, appetite change and fatigue. HENT: Negative for hearing loss, ear pain, congestion, sore throat, rhinorrhea, postnasal drip and ear discharge. Eyes: Negative for photophobia and visual disturbance. Respiratory: Negative for cough, chest tightness, shortness of breath and wheezing. Cardiovascular: Negative for chest pain and leg swelling. Gastrointestinal: Negative for nausea, vomiting, abdominal pain, diarrhea and constipation. Genitourinary: Negative for dysuria, urgency and frequency. Neurological: Negative for weakness, light-headedness and headaches. Psychiatric/Behavioral: Negative for sleep disturbance.       Objective:     Vitals:    03/07/18 0813   BP: 118/64   Site: Left Arm   Position: Sitting   Cuff Size: Medium Adult   Pulse: 90   Resp: 14   Temp: 97.6 °F (36.4 °C)   TempSrc: Temporal   Weight: 239 lb (108.4 kg)   Height: 5' 2.99\" (1.6 m)     Wt Readings from Last 3 Encounters:   03/07/18 239 lb (108.4 kg)   01/31/18 233 lb 9.6 oz (106 kg)   01/03/18 230 lb (104.3 kg)       Physical Exam  Physical Exam   Constitutional: Vital signs are normal. She appears

## 2018-03-27 ENCOUNTER — APPOINTMENT (OUTPATIENT)
Dept: CT IMAGING | Age: 38
End: 2018-03-27
Payer: MEDICARE

## 2018-03-27 ENCOUNTER — HOSPITAL ENCOUNTER (EMERGENCY)
Age: 38
Discharge: HOME | End: 2018-03-27
Attending: FAMILY MEDICINE
Payer: MEDICARE

## 2018-03-27 VITALS
OXYGEN SATURATION: 98 % | HEART RATE: 87 BPM | HEIGHT: 63 IN | DIASTOLIC BLOOD PRESSURE: 80 MMHG | SYSTOLIC BLOOD PRESSURE: 134 MMHG | WEIGHT: 233 LBS | TEMPERATURE: 97.5 F | BODY MASS INDEX: 41.29 KG/M2 | RESPIRATION RATE: 18 BRPM

## 2018-03-27 DIAGNOSIS — R10.11 RUQ PAIN: Primary | ICD-10-CM

## 2018-03-27 LAB
ALBUMIN SERPL-MCNC: 3.6 GM/DL (ref 3.4–5)
ALP BLD-CCNC: 174 U/L (ref 46–116)
ALT SERPL-CCNC: 119 U/L (ref 14–63)
AMORPHOUS: ABNORMAL
ANION GAP: 8 MEQ/L (ref 8–16)
AST SERPL-CCNC: 97 U/L (ref 15–37)
BACTERIA: ABNORMAL
BASOPHILS # BLD: 0.8 % (ref 0–3)
BILIRUB SERPL-MCNC: 0.3 MG/DL (ref 0.2–1)
BILIRUBIN URINE: NEGATIVE
BLOOD, URINE: ABNORMAL
BUN BLDV-MCNC: 15 MG/DL (ref 7–18)
CASTS UA: ABNORMAL /LPF
CHARACTER, URINE: ABNORMAL
CHLORIDE BLD-SCNC: 102 MEQ/L (ref 98–107)
CO2: 30 MEQ/L (ref 21–32)
COLOR: YELLOW
CREAT SERPL-MCNC: 0.7 MG/DL (ref 0.6–1.3)
CRYSTALS, UA: ABNORMAL
EOSINOPHILS RELATIVE PERCENT: 1.2 % (ref 0–4)
EPITHELIAL CELLS, UA: ABNORMAL /HPF
GFR, ESTIMATED: > 90 ML/MIN/1.73M2
GLUCOSE BLD-MCNC: 179 MG/DL (ref 74–106)
GLUCOSE, URINE: NEGATIVE MG/DL
HCT VFR BLD CALC: 42.7 % (ref 37–47)
HEMOGLOBIN: 14.3 GM/DL (ref 12–16)
KETONES, URINE: NEGATIVE
LEUKOCYTE ESTERASE, URINE: ABNORMAL
LIPASE: 108 U/L (ref 73–393)
LYMPHOCYTES # BLD: 28.6 % (ref 15–47)
MCH RBC QN AUTO: 27.4 PG (ref 27–31)
MCHC RBC AUTO-ENTMCNC: 33.4 GM/DL (ref 33–37)
MCV RBC AUTO: 82.1 FL (ref 81–99)
MONOCYTES: 7 % (ref 0–12)
MUCUS: ABNORMAL
NITRITE, URINE: NEGATIVE
PDW BLD-RTO: 12.3 % (ref 11.5–14.5)
PH UA: 5.5 (ref 5–9)
PLATELET # BLD: 249 THOU/MM3 (ref 130–400)
PMV BLD AUTO: 8.5 FL (ref 7.4–10.4)
POC CALCIUM: 9.2 MG/DL (ref 8.5–10.1)
POTASSIUM SERPL-SCNC: 4 MEQ/L (ref 3.5–5.1)
PREGNANCY, SERUM: NEGATIVE
PROTEIN UA: NEGATIVE MG/DL
RBC # BLD: 5.21 MILL/MM3 (ref 4.2–5.4)
RBC URINE: ABNORMAL /HPF
SEGS: 62.4 % (ref 43–75)
SODIUM BLD-SCNC: 140 MEQ/L (ref 136–145)
SPECIFIC GRAVITY UA: 1.02 (ref 1–1.03)
TOTAL PROTEIN: 7.5 GM/DL (ref 6.4–8.2)
TROPONIN I: < 0.017 NG/ML (ref 0.02–0.05)
UROBILINOGEN, URINE: 0.2 EU/DL (ref 0.2–1)
WBC # BLD: 9.4 THOU/MM3 (ref 4.8–10.8)
WBC UA: ABNORMAL /HPF

## 2018-03-27 PROCEDURE — 81001 URINALYSIS AUTO W/SCOPE: CPT

## 2018-03-27 PROCEDURE — 83690 ASSAY OF LIPASE: CPT

## 2018-03-27 PROCEDURE — 96376 TX/PRO/DX INJ SAME DRUG ADON: CPT

## 2018-03-27 PROCEDURE — 6360000002 HC RX W HCPCS: Performed by: FAMILY MEDICINE

## 2018-03-27 PROCEDURE — 84703 CHORIONIC GONADOTROPIN ASSAY: CPT

## 2018-03-27 PROCEDURE — 80074 ACUTE HEPATITIS PANEL: CPT

## 2018-03-27 PROCEDURE — 96374 THER/PROPH/DIAG INJ IV PUSH: CPT

## 2018-03-27 PROCEDURE — 80053 COMPREHEN METABOLIC PANEL: CPT

## 2018-03-27 PROCEDURE — 84484 ASSAY OF TROPONIN QUANT: CPT

## 2018-03-27 PROCEDURE — 36415 COLL VENOUS BLD VENIPUNCTURE: CPT

## 2018-03-27 PROCEDURE — 85025 COMPLETE CBC W/AUTO DIFF WBC: CPT

## 2018-03-27 PROCEDURE — 96375 TX/PRO/DX INJ NEW DRUG ADDON: CPT

## 2018-03-27 PROCEDURE — 74177 CT ABD & PELVIS W/CONTRAST: CPT

## 2018-03-27 PROCEDURE — 87086 URINE CULTURE/COLONY COUNT: CPT

## 2018-03-27 PROCEDURE — 99284 EMERGENCY DEPT VISIT MOD MDM: CPT

## 2018-03-27 PROCEDURE — 6360000004 HC RX CONTRAST MEDICATION: Performed by: FAMILY MEDICINE

## 2018-03-27 RX ORDER — ONDANSETRON 2 MG/ML
4 INJECTION INTRAMUSCULAR; INTRAVENOUS ONCE
Status: COMPLETED | OUTPATIENT
Start: 2018-03-27 | End: 2018-03-27

## 2018-03-27 RX ORDER — METOCLOPRAMIDE HYDROCHLORIDE 5 MG/ML
10 INJECTION INTRAMUSCULAR; INTRAVENOUS ONCE
Status: COMPLETED | OUTPATIENT
Start: 2018-03-27 | End: 2018-03-27

## 2018-03-27 RX ORDER — TRAMADOL HYDROCHLORIDE 50 MG/1
50 TABLET, COATED ORAL EVERY 6 HOURS PRN
Qty: 20 TABLET | Refills: 0 | Status: SHIPPED | OUTPATIENT
Start: 2018-03-27 | End: 2018-04-01

## 2018-03-27 RX ORDER — ONDANSETRON 4 MG/1
4 TABLET, ORALLY DISINTEGRATING ORAL EVERY 8 HOURS PRN
Qty: 20 TABLET | Refills: 0 | Status: SHIPPED | OUTPATIENT
Start: 2018-03-27 | End: 2020-02-03

## 2018-03-27 RX ADMIN — HYDROMORPHONE HYDROCHLORIDE 1 MG: 1 INJECTION, SOLUTION INTRAMUSCULAR; INTRAVENOUS; SUBCUTANEOUS at 16:38

## 2018-03-27 RX ADMIN — ONDANSETRON 4 MG: 2 INJECTION INTRAMUSCULAR; INTRAVENOUS at 16:38

## 2018-03-27 RX ADMIN — IOPAMIDOL 100 ML: 755 INJECTION, SOLUTION INTRAVENOUS at 16:58

## 2018-03-27 RX ADMIN — METOCLOPRAMIDE 10 MG: 5 INJECTION, SOLUTION INTRAMUSCULAR; INTRAVENOUS at 18:01

## 2018-03-27 RX ADMIN — HYDROMORPHONE HYDROCHLORIDE 0.5 MG: 1 INJECTION, SOLUTION INTRAMUSCULAR; INTRAVENOUS; SUBCUTANEOUS at 18:50

## 2018-03-27 ASSESSMENT — PAIN DESCRIPTION - PAIN TYPE: TYPE: ACUTE PAIN

## 2018-03-27 ASSESSMENT — ENCOUNTER SYMPTOMS
COUGH: 0
VOMITING: 1
NAUSEA: 1
SHORTNESS OF BREATH: 0
SORE THROAT: 0
ABDOMINAL PAIN: 1
EYE PAIN: 0
EYE DISCHARGE: 0
WHEEZING: 0
BACK PAIN: 0
RHINORRHEA: 0
DIARRHEA: 0

## 2018-03-27 ASSESSMENT — PAIN SCALES - GENERAL
PAINLEVEL_OUTOF10: 7
PAINLEVEL_OUTOF10: 9

## 2018-03-27 ASSESSMENT — PAIN DESCRIPTION - LOCATION: LOCATION: ABDOMEN

## 2018-03-27 NOTE — ED NOTES
Patient complaint of dry-heaving. New orders obtained. Patient given medication.       Gwen Aguirre RN  03/27/18 1870

## 2018-03-27 NOTE — ED NOTES
Patient presents with complaint of abdominal and right flank pain. Patient states she has a history of lupus. Rates pain a 9 on 0-10 scale. Skin is pink, warm and dry. Respirations are even and unlabored.       Sheldon Kelley RN  03/27/18 9822

## 2018-03-27 NOTE — ED NOTES
Pt states pain improved at this time and she would like to be discharged. Pt given discharge instructions and prescriptions. Pt left ambulatory with mother. Pt in stable condition.       Bryce Barrientos RN  03/27/18 8222

## 2018-03-28 LAB
HAV IGM SER IA-ACNC: NEGATIVE
HEPATITIS B CORE IGM ANTIBODY: NEGATIVE
HEPATITIS B SURFACE ANTIGEN: NEGATIVE
HEPATITIS C ANTIBODY: NEGATIVE

## 2018-03-29 LAB
ORGANISM: ABNORMAL
URINE CULTURE, ROUTINE: ABNORMAL

## 2018-04-02 ENCOUNTER — CARE COORDINATION (OUTPATIENT)
Dept: CARE COORDINATION | Age: 38
End: 2018-04-02

## 2018-04-09 ENCOUNTER — CARE COORDINATION (OUTPATIENT)
Dept: CARE COORDINATION | Age: 38
End: 2018-04-09

## 2018-04-12 ENCOUNTER — APPOINTMENT (OUTPATIENT)
Dept: GENERAL RADIOLOGY | Age: 38
End: 2018-04-12
Payer: MEDICARE

## 2018-04-12 ENCOUNTER — HOSPITAL ENCOUNTER (EMERGENCY)
Age: 38
Discharge: HOME | End: 2018-04-12
Attending: FAMILY MEDICINE
Payer: MEDICARE

## 2018-04-12 ENCOUNTER — TELEPHONE (OUTPATIENT)
Dept: FAMILY MEDICINE CLINIC | Age: 38
End: 2018-04-12

## 2018-04-12 VITALS
RESPIRATION RATE: 15 BRPM | WEIGHT: 232 LBS | BODY MASS INDEX: 41.11 KG/M2 | HEIGHT: 63 IN | OXYGEN SATURATION: 95 % | HEART RATE: 96 BPM | DIASTOLIC BLOOD PRESSURE: 110 MMHG | SYSTOLIC BLOOD PRESSURE: 144 MMHG | TEMPERATURE: 98.4 F

## 2018-04-12 DIAGNOSIS — S93.402A SPRAIN OF LEFT ANKLE, UNSPECIFIED LIGAMENT, INITIAL ENCOUNTER: Primary | ICD-10-CM

## 2018-04-12 PROCEDURE — 99283 EMERGENCY DEPT VISIT LOW MDM: CPT

## 2018-04-12 PROCEDURE — 73610 X-RAY EXAM OF ANKLE: CPT

## 2018-04-12 ASSESSMENT — PAIN SCALES - GENERAL: PAINLEVEL_OUTOF10: 9

## 2018-04-12 ASSESSMENT — PAIN DESCRIPTION - LOCATION: LOCATION: ANKLE

## 2018-04-16 ENCOUNTER — CARE COORDINATION (OUTPATIENT)
Dept: CARE COORDINATION | Age: 38
End: 2018-04-16

## 2018-04-26 ENCOUNTER — CARE COORDINATION (OUTPATIENT)
Dept: CARE COORDINATION | Age: 38
End: 2018-04-26

## 2018-04-27 RX ORDER — SUCRALFATE 1 G/1
TABLET ORAL
Qty: 120 TABLET | Refills: 11 | Status: SHIPPED | OUTPATIENT
Start: 2018-04-27 | End: 2018-06-08 | Stop reason: SDUPTHER

## 2018-05-01 ENCOUNTER — NURSE ONLY (OUTPATIENT)
Dept: FAMILY MEDICINE CLINIC | Age: 38
End: 2018-05-01
Payer: MEDICARE

## 2018-05-01 DIAGNOSIS — R73.09 ELEVATED GLUCOSE: ICD-10-CM

## 2018-05-01 DIAGNOSIS — R79.89 ELEVATED LFTS: ICD-10-CM

## 2018-05-01 DIAGNOSIS — R79.89 ELEVATED TSH: ICD-10-CM

## 2018-05-01 LAB
ALBUMIN SERPL-MCNC: 4.1 G/DL (ref 3.5–5.1)
ALP BLD-CCNC: 167 U/L (ref 38–126)
ALT SERPL-CCNC: 38 U/L (ref 11–66)
ANION GAP SERPL CALCULATED.3IONS-SCNC: 14 MEQ/L (ref 8–16)
AST SERPL-CCNC: 39 U/L (ref 5–40)
AVERAGE GLUCOSE: 141 MG/DL (ref 70–126)
BILIRUB SERPL-MCNC: 0.4 MG/DL (ref 0.3–1.2)
BILIRUBIN DIRECT: < 0.2 MG/DL (ref 0–0.3)
BUN BLDV-MCNC: 9 MG/DL (ref 7–22)
CALCIUM SERPL-MCNC: 9.4 MG/DL (ref 8.5–10.5)
CHLORIDE BLD-SCNC: 98 MEQ/L (ref 98–111)
CO2: 25 MEQ/L (ref 23–33)
CREAT SERPL-MCNC: 0.6 MG/DL (ref 0.4–1.2)
GFR SERPL CREATININE-BSD FRML MDRD: > 90 ML/MIN/1.73M2
GLUCOSE BLD-MCNC: 132 MG/DL (ref 70–108)
HBA1C MFR BLD: 6.7 % (ref 4.4–6.4)
INSULIN, RANDOM OR FASTING: NORMAL
POTASSIUM SERPL-SCNC: 3.3 MEQ/L (ref 3.5–5.2)
SODIUM BLD-SCNC: 137 MEQ/L (ref 135–145)
TOTAL PROTEIN: 7 G/DL (ref 6.1–8)
TSH SERPL DL<=0.05 MIU/L-ACNC: 3.95 UIU/ML (ref 0.4–4.2)

## 2018-05-01 PROCEDURE — 36415 COLL VENOUS BLD VENIPUNCTURE: CPT | Performed by: FAMILY MEDICINE

## 2018-05-02 ENCOUNTER — TELEPHONE (OUTPATIENT)
Dept: FAMILY MEDICINE CLINIC | Age: 38
End: 2018-05-02

## 2018-05-02 DIAGNOSIS — E87.6 HYPOKALEMIA: ICD-10-CM

## 2018-05-02 DIAGNOSIS — E88.81 INSULIN RESISTANCE: Primary | ICD-10-CM

## 2018-05-03 ENCOUNTER — NURSE ONLY (OUTPATIENT)
Dept: FAMILY MEDICINE CLINIC | Age: 38
End: 2018-05-03
Payer: MEDICARE

## 2018-05-03 DIAGNOSIS — E87.6 HYPOKALEMIA: ICD-10-CM

## 2018-05-03 LAB — POTASSIUM SERPL-SCNC: 3.3 MEQ/L (ref 3.5–5.2)

## 2018-05-03 PROCEDURE — 36415 COLL VENOUS BLD VENIPUNCTURE: CPT | Performed by: FAMILY MEDICINE

## 2018-05-04 ENCOUNTER — TELEPHONE (OUTPATIENT)
Dept: FAMILY MEDICINE CLINIC | Age: 38
End: 2018-05-04

## 2018-05-04 DIAGNOSIS — E87.6 HYPOKALEMIA: Primary | ICD-10-CM

## 2018-05-04 RX ORDER — POTASSIUM CHLORIDE 750 MG/1
10 TABLET, EXTENDED RELEASE ORAL DAILY
Qty: 30 TABLET | Refills: 3 | Status: SHIPPED | OUTPATIENT
Start: 2018-05-04 | End: 2018-08-29 | Stop reason: SDUPTHER

## 2018-05-08 DIAGNOSIS — M79.7 FIBROMYALGIA: ICD-10-CM

## 2018-05-09 RX ORDER — TRAZODONE HYDROCHLORIDE 50 MG/1
TABLET ORAL
Qty: 90 TABLET | Refills: 1 | Status: SHIPPED | OUTPATIENT
Start: 2018-05-09 | End: 2018-07-31 | Stop reason: SDUPTHER

## 2018-05-22 ENCOUNTER — APPOINTMENT (OUTPATIENT)
Dept: GENERAL RADIOLOGY | Age: 38
End: 2018-05-22
Payer: MEDICARE

## 2018-05-22 ENCOUNTER — CARE COORDINATION (OUTPATIENT)
Dept: CARE COORDINATION | Age: 38
End: 2018-05-22

## 2018-05-22 ENCOUNTER — HOSPITAL ENCOUNTER (EMERGENCY)
Age: 38
Discharge: HOME | End: 2018-05-22
Attending: EMERGENCY MEDICINE
Payer: MEDICARE

## 2018-05-22 VITALS
RESPIRATION RATE: 15 BRPM | SYSTOLIC BLOOD PRESSURE: 132 MMHG | DIASTOLIC BLOOD PRESSURE: 84 MMHG | WEIGHT: 223 LBS | HEIGHT: 63 IN | HEART RATE: 100 BPM | BODY MASS INDEX: 39.51 KG/M2 | OXYGEN SATURATION: 96 % | TEMPERATURE: 98.3 F

## 2018-05-22 DIAGNOSIS — S93.402A SPRAIN OF LEFT ANKLE, UNSPECIFIED LIGAMENT, INITIAL ENCOUNTER: Primary | ICD-10-CM

## 2018-05-22 DIAGNOSIS — S93.602A SPRAIN OF LEFT FOOT, INITIAL ENCOUNTER: ICD-10-CM

## 2018-05-22 PROCEDURE — 99283 EMERGENCY DEPT VISIT LOW MDM: CPT

## 2018-05-22 PROCEDURE — 73610 X-RAY EXAM OF ANKLE: CPT

## 2018-05-22 PROCEDURE — 73630 X-RAY EXAM OF FOOT: CPT

## 2018-05-22 PROCEDURE — L4361 PNEUMA/VAC WALK BOOT PRE OTS: HCPCS

## 2018-05-22 ASSESSMENT — PAIN SCALES - GENERAL
PAINLEVEL_OUTOF10: 2
PAINLEVEL_OUTOF10: 8

## 2018-05-22 ASSESSMENT — PAIN DESCRIPTION - ORIENTATION: ORIENTATION: LEFT

## 2018-05-22 ASSESSMENT — PAIN DESCRIPTION - LOCATION: LOCATION: ANKLE

## 2018-05-24 ENCOUNTER — NURSE ONLY (OUTPATIENT)
Dept: FAMILY MEDICINE CLINIC | Age: 38
End: 2018-05-24
Payer: MEDICARE

## 2018-05-24 ENCOUNTER — CARE COORDINATION (OUTPATIENT)
Dept: CARE COORDINATION | Age: 38
End: 2018-05-24

## 2018-05-24 DIAGNOSIS — E88.81 INSULIN RESISTANCE: ICD-10-CM

## 2018-05-24 DIAGNOSIS — E87.6 HYPOKALEMIA: ICD-10-CM

## 2018-05-24 LAB
ANION GAP SERPL CALCULATED.3IONS-SCNC: 17 MEQ/L (ref 8–16)
AVERAGE GLUCOSE: 135 MG/DL (ref 70–126)
BUN BLDV-MCNC: 9 MG/DL (ref 7–22)
CALCIUM SERPL-MCNC: 9.1 MG/DL (ref 8.5–10.5)
CHLORIDE BLD-SCNC: 101 MEQ/L (ref 98–111)
CO2: 24 MEQ/L (ref 23–33)
CREAT SERPL-MCNC: 0.5 MG/DL (ref 0.4–1.2)
GFR SERPL CREATININE-BSD FRML MDRD: > 90 ML/MIN/1.73M2
GLUCOSE BLD-MCNC: 115 MG/DL (ref 70–108)
HBA1C MFR BLD: 6.5 % (ref 4.4–6.4)
INSULIN, RANDOM OR FASTING: NORMAL
MAGNESIUM: 1.5 MG/DL (ref 1.6–2.4)
POTASSIUM SERPL-SCNC: 3.4 MEQ/L (ref 3.5–5.2)
SODIUM BLD-SCNC: 142 MEQ/L (ref 135–145)

## 2018-05-24 PROCEDURE — 36415 COLL VENOUS BLD VENIPUNCTURE: CPT | Performed by: FAMILY MEDICINE

## 2018-05-25 ENCOUNTER — CARE COORDINATION (OUTPATIENT)
Dept: CARE COORDINATION | Age: 38
End: 2018-05-25

## 2018-05-30 RX ORDER — MONTELUKAST SODIUM 10 MG/1
TABLET ORAL
Qty: 30 TABLET | Refills: 5 | Status: SHIPPED | OUTPATIENT
Start: 2018-05-30 | End: 2018-05-31 | Stop reason: SDUPTHER

## 2018-05-30 RX ORDER — DULOXETIN HYDROCHLORIDE 60 MG/1
CAPSULE, DELAYED RELEASE ORAL
Qty: 30 CAPSULE | Refills: 5 | Status: SHIPPED | OUTPATIENT
Start: 2018-05-30 | End: 2018-11-28 | Stop reason: SDUPTHER

## 2018-05-31 RX ORDER — MONTELUKAST SODIUM 10 MG/1
10 TABLET ORAL NIGHTLY
Qty: 30 TABLET | Refills: 5 | Status: SHIPPED | OUTPATIENT
Start: 2018-05-31 | End: 2018-11-28 | Stop reason: SDUPTHER

## 2018-06-08 ENCOUNTER — CARE COORDINATION (OUTPATIENT)
Dept: CARE COORDINATION | Age: 38
End: 2018-06-08

## 2018-06-08 ENCOUNTER — OFFICE VISIT (OUTPATIENT)
Dept: FAMILY MEDICINE CLINIC | Age: 38
End: 2018-06-08
Payer: MEDICARE

## 2018-06-08 VITALS
RESPIRATION RATE: 16 BRPM | TEMPERATURE: 98.6 F | HEART RATE: 96 BPM | HEIGHT: 63 IN | DIASTOLIC BLOOD PRESSURE: 74 MMHG | WEIGHT: 233.6 LBS | BODY MASS INDEX: 41.39 KG/M2 | SYSTOLIC BLOOD PRESSURE: 122 MMHG

## 2018-06-08 DIAGNOSIS — E78.00 PURE HYPERCHOLESTEROLEMIA: ICD-10-CM

## 2018-06-08 DIAGNOSIS — S93.402A SPRAIN OF LEFT ANKLE, UNSPECIFIED LIGAMENT, INITIAL ENCOUNTER: ICD-10-CM

## 2018-06-08 DIAGNOSIS — E66.01 MORBID OBESITY DUE TO EXCESS CALORIES (HCC): ICD-10-CM

## 2018-06-08 DIAGNOSIS — J30.1 CHRONIC SEASONAL ALLERGIC RHINITIS DUE TO POLLEN: ICD-10-CM

## 2018-06-08 DIAGNOSIS — R13.12 OROPHARYNGEAL DYSPHAGIA: ICD-10-CM

## 2018-06-08 DIAGNOSIS — R10.13 DYSPEPSIA: ICD-10-CM

## 2018-06-08 DIAGNOSIS — K21.9 GASTROESOPHAGEAL REFLUX DISEASE WITHOUT ESOPHAGITIS: ICD-10-CM

## 2018-06-08 DIAGNOSIS — E11.69 TYPE 2 DIABETES MELLITUS WITH OTHER SPECIFIED COMPLICATION, WITHOUT LONG-TERM CURRENT USE OF INSULIN (HCC): Primary | ICD-10-CM

## 2018-06-08 DIAGNOSIS — I63.9 CEREBROVASCULAR ACCIDENT (CVA), UNSPECIFIED MECHANISM (HCC): ICD-10-CM

## 2018-06-08 DIAGNOSIS — M32.9 LUPUS ARTHRITIS (HCC): ICD-10-CM

## 2018-06-08 DIAGNOSIS — M79.7 FIBROMYALGIA: ICD-10-CM

## 2018-06-08 DIAGNOSIS — E83.42 HYPOMAGNESEMIA: ICD-10-CM

## 2018-06-08 PROBLEM — E11.9 DIABETES MELLITUS (HCC): Status: ACTIVE | Noted: 2018-06-08

## 2018-06-08 PROCEDURE — G8598 ASA/ANTIPLAT THER USED: HCPCS | Performed by: FAMILY MEDICINE

## 2018-06-08 PROCEDURE — G8427 DOCREV CUR MEDS BY ELIG CLIN: HCPCS | Performed by: FAMILY MEDICINE

## 2018-06-08 PROCEDURE — 99215 OFFICE O/P EST HI 40 MIN: CPT | Performed by: FAMILY MEDICINE

## 2018-06-08 PROCEDURE — 3044F HG A1C LEVEL LT 7.0%: CPT | Performed by: FAMILY MEDICINE

## 2018-06-08 PROCEDURE — 1036F TOBACCO NON-USER: CPT | Performed by: FAMILY MEDICINE

## 2018-06-08 PROCEDURE — G8417 CALC BMI ABV UP PARAM F/U: HCPCS | Performed by: FAMILY MEDICINE

## 2018-06-08 PROCEDURE — 2022F DILAT RTA XM EVC RTNOPTHY: CPT | Performed by: FAMILY MEDICINE

## 2018-06-08 RX ORDER — LANOLIN ALCOHOL/MO/W.PET/CERES
400 CREAM (GRAM) TOPICAL DAILY
Qty: 30 TABLET | Refills: 5 | Status: SHIPPED | OUTPATIENT
Start: 2018-06-08 | End: 2018-11-28 | Stop reason: SDUPTHER

## 2018-06-08 ASSESSMENT — PATIENT HEALTH QUESTIONNAIRE - PHQ9
2. FEELING DOWN, DEPRESSED OR HOPELESS: 0
1. LITTLE INTEREST OR PLEASURE IN DOING THINGS: 0
SUM OF ALL RESPONSES TO PHQ QUESTIONS 1-9: 0
SUM OF ALL RESPONSES TO PHQ9 QUESTIONS 1 & 2: 0

## 2018-06-14 ENCOUNTER — TELEPHONE (OUTPATIENT)
Dept: FAMILY MEDICINE CLINIC | Age: 38
End: 2018-06-14

## 2018-06-25 ENCOUNTER — CARE COORDINATION (OUTPATIENT)
Dept: CARE COORDINATION | Age: 38
End: 2018-06-25

## 2018-06-26 ENCOUNTER — TELEPHONE (OUTPATIENT)
Dept: FAMILY MEDICINE CLINIC | Age: 38
End: 2018-06-26

## 2018-07-09 ENCOUNTER — PATIENT MESSAGE (OUTPATIENT)
Dept: FAMILY MEDICINE CLINIC | Age: 38
End: 2018-07-09

## 2018-07-17 ENCOUNTER — CARE COORDINATION (OUTPATIENT)
Dept: CARE COORDINATION | Age: 38
End: 2018-07-17

## 2018-07-17 NOTE — CARE COORDINATION
Attempted to reach patient for continued Care Coordination follow up and education. Patient was unavailable at the time of my call, and generic voicemail message was left asking patient to please return call to my direct number.   Dustin Delcid RN Care Coordinator

## 2018-07-18 ENCOUNTER — OFFICE VISIT (OUTPATIENT)
Dept: FAMILY MEDICINE CLINIC | Age: 38
End: 2018-07-18
Payer: MEDICARE

## 2018-07-18 VITALS
BODY MASS INDEX: 40.57 KG/M2 | SYSTOLIC BLOOD PRESSURE: 120 MMHG | TEMPERATURE: 98.6 F | DIASTOLIC BLOOD PRESSURE: 78 MMHG | WEIGHT: 229 LBS | HEART RATE: 80 BPM

## 2018-07-18 DIAGNOSIS — H65.03 BILATERAL ACUTE SEROUS OTITIS MEDIA, RECURRENCE NOT SPECIFIED: Primary | ICD-10-CM

## 2018-07-18 PROCEDURE — 1036F TOBACCO NON-USER: CPT | Performed by: FAMILY MEDICINE

## 2018-07-18 PROCEDURE — G8598 ASA/ANTIPLAT THER USED: HCPCS | Performed by: FAMILY MEDICINE

## 2018-07-18 PROCEDURE — G8427 DOCREV CUR MEDS BY ELIG CLIN: HCPCS | Performed by: FAMILY MEDICINE

## 2018-07-18 PROCEDURE — 99213 OFFICE O/P EST LOW 20 MIN: CPT | Performed by: FAMILY MEDICINE

## 2018-07-18 PROCEDURE — G8417 CALC BMI ABV UP PARAM F/U: HCPCS | Performed by: FAMILY MEDICINE

## 2018-07-18 NOTE — PROGRESS NOTES
Subjective:      Patient ID: Amina Ulloa is a 44 y.o. female. HPI  Chief Complaint   Patient presents with    Otalgia     x 1 day    Pharyngitis     x 1 day    Cough     x 1 day       here for a cough for the past 48 hours. Associated with headache, sore throat, ear ache, dysphagia on the left side, sob with exertion, but normal appetite. Tried:  Nothing yet. Review of Systems  Constitutional: Negative for fever, chills, diaphoresis, activity change, appetite change and fatigue. HENT: Positive for hearing loss, ear pain, congestion, sore throat, rhinorrhea, postnasal drip and no ear discharge. Eyes: Negative for photophobia and visual disturbance. Respiratory: Negative for cough, chest tightness, shortness of breath and wheezing. Cardiovascular: Negative for chest pain and leg swelling. Gastrointestinal: Negative for nausea, vomiting, abdominal pain, diarrhea and constipation. Genitourinary: Negative for dysuria, urgency and frequency. Neurological: Negative for weakness, light-headedness and headaches. Psychiatric/Behavioral: Negative for sleep disturbance. Objective:   Physical Exam  Vitals:    07/18/18 1154   BP: 120/78   Pulse: 80   Temp: 98.6 °F (37 °C)     Wt Readings from Last 3 Encounters:   07/18/18 229 lb (103.9 kg)   06/08/18 233 lb 9.6 oz (106 kg)   05/22/18 223 lb (101.2 kg)     Physical Exam   Constitutional: Vital signs are normal. She appears well-developed and well-nourished. She is active. HENT:   Head: Normocephalic and atraumatic. Right Ear: Tympanic membrane, external ear and ear canal normal. No drainage or tenderness. Left Ear: Tympanic membrane, external ear and ear canal normal. No drainage or tenderness. Nose: Nose normal. moderate mucosal edema or rhinorrhea. Mouth/Throat: Uvula is midline, oropharynx is clear and moist and mucous membranes are normal. Mucous membranes are not pale. Normal dentition.  No posterior oropharyngeal edema or posterior oropharyngeal erythema. Eyes: Lids are normal. Right eye exhibits no chemosis and no discharge. Left eye exhibits no chemosis and no drainage. Right conjunctiva has no hemorrhage. Left conjunctiva has no hemorrhage. Right eye exhibits normal extraocular motion. Left eye exhibits normal extraocular motion. Right pupil is round and reactive. Left pupil is round and reactive. Pupils are equal.   Cardiovascular: Normal rate, regular rhythm, S1 normal, S2 normal and normal heart sounds. Exam reveals no gallop. No murmur heard. Pulmonary/Chest: Effort normal and breath sounds normal. No respiratory distress. She has no wheezes. She has no rhonchi. She has no rales. Abdominal: Soft. Normal appearance and bowel sounds are normal. She exhibits no distension and no mass. There is no hepatosplenomegaly. No tenderness. She has no rigidity, no rebound and no guarding. No hernia. Musculoskeletal:        Right lower leg: She exhibits no edema. Left lower leg: She exhibits no edema. Neurological: She is alert. Assessment:      Mikaela Belcher was seen today for otalgia, pharyngitis and cough. Diagnoses and all orders for this visit:    Bilateral acute serous otitis media, recurrence not specified      Push fluids  Tylenol or ibuprofen prn fever  Cool mist Humidifier in the bedroom  Follow up if not better in 1 week or if symptoms get worse.

## 2018-07-31 DIAGNOSIS — M79.7 FIBROMYALGIA: ICD-10-CM

## 2018-07-31 RX ORDER — TRAZODONE HYDROCHLORIDE 50 MG/1
TABLET ORAL
Qty: 90 TABLET | Refills: 1 | Status: SHIPPED | OUTPATIENT
Start: 2018-07-31 | End: 2018-09-28 | Stop reason: SDUPTHER

## 2018-07-31 NOTE — TELEPHONE ENCOUNTER
From: Hunter Crawford  Sent: 2018 5:27 PM EDT  Subject: Medication Renewal Request    Goldie Ball.  [facility] [facility] would like a refill of the following medications:     traZODone (DESYREL) 50 MG tablet Ole Ladd MD]    [facility]: [facility], [address] [address] [address]  N [phone] - F [phone]    Comment:

## 2018-08-07 ENCOUNTER — APPOINTMENT (OUTPATIENT)
Dept: CT IMAGING | Age: 38
DRG: 069 | End: 2018-08-07
Payer: MEDICARE

## 2018-08-07 ENCOUNTER — APPOINTMENT (OUTPATIENT)
Dept: MRI IMAGING | Age: 38
DRG: 069 | End: 2018-08-07
Payer: MEDICARE

## 2018-08-07 ENCOUNTER — HOSPITAL ENCOUNTER (INPATIENT)
Age: 38
LOS: 1 days | Discharge: HOME | DRG: 069 | End: 2018-08-08
Attending: EMERGENCY MEDICINE | Admitting: INTERNAL MEDICINE
Payer: MEDICARE

## 2018-08-07 DIAGNOSIS — R29.90 STROKE-LIKE SYMPTOMS: Primary | ICD-10-CM

## 2018-08-07 DIAGNOSIS — R91.1 LUNG NODULE, SOLITARY: ICD-10-CM

## 2018-08-07 PROBLEM — I63.9 ACUTE CEREBROVASCULAR ACCIDENT (CVA) (HCC): Status: ACTIVE | Noted: 2018-08-07

## 2018-08-07 PROBLEM — G45.9 TIA (TRANSIENT ISCHEMIC ATTACK): Status: ACTIVE | Noted: 2018-08-07

## 2018-08-07 LAB
ALBUMIN SERPL-MCNC: 3.5 GM/DL (ref 3.4–5)
ALP BLD-CCNC: 162 U/L (ref 46–116)
ALT SERPL-CCNC: 78 U/L (ref 14–63)
ANION GAP: 7 MEQ/L (ref 8–16)
APTT: 28.8 SECONDS (ref 22–38)
AST SERPL-CCNC: 61 U/L (ref 15–37)
BASOPHILS # BLD: 0.5 % (ref 0–3)
BILIRUB SERPL-MCNC: 0.3 MG/DL (ref 0.2–1)
BUN BLDV-MCNC: 5 MG/DL (ref 7–18)
C-REACTIVE PROTEIN: 0.59 MG/DL (ref 0–1)
CHLORIDE BLD-SCNC: 104 MEQ/L (ref 98–107)
CO2: 27 MEQ/L (ref 21–32)
CREAT SERPL-MCNC: 0.6 MG/DL (ref 0.6–1.3)
EKG ATRIAL RATE: 93 BPM
EKG P AXIS: 38 DEGREES
EKG P-R INTERVAL: 150 MS
EKG Q-T INTERVAL: 380 MS
EKG QRS DURATION: 72 MS
EKG QTC CALCULATION (BAZETT): 472 MS
EKG R AXIS: 14 DEGREES
EKG T AXIS: 24 DEGREES
EKG VENTRICULAR RATE: 93 BPM
EOSINOPHILS RELATIVE PERCENT: 3.7 % (ref 0–4)
GFR, ESTIMATED: > 90 ML/MIN/1.73M2
GLUCOSE BLD-MCNC: 108 MG/DL (ref 70–108)
GLUCOSE BLD-MCNC: 138 MG/DL (ref 70–108)
GLUCOSE BLD-MCNC: 140 MG/DL (ref 70–108)
GLUCOSE BLD-MCNC: 144 MG/DL (ref 74–106)
HCT VFR BLD CALC: 42.6 % (ref 37–47)
HEMOGLOBIN: 14.2 GM/DL (ref 12–16)
INR BLD: 1.06 (ref 0.85–1.13)
LYMPHOCYTES # BLD: 38.2 % (ref 15–47)
MCH RBC QN AUTO: 27.4 PG (ref 27–31)
MCHC RBC AUTO-ENTMCNC: 33.4 GM/DL (ref 33–37)
MCV RBC AUTO: 82 FL (ref 81–99)
MONOCYTES: 11 % (ref 0–12)
PDW BLD-RTO: 14.5 % (ref 11.5–14.5)
PLATELET # BLD: 235 THOU/MM3 (ref 130–400)
PLATELET ESTIMATE: NORMAL
PMV BLD AUTO: 9.1 FL (ref 7.4–10.4)
POC CALCIUM: 8.9 MG/DL (ref 8.5–10.1)
POTASSIUM SERPL-SCNC: 4 MEQ/L (ref 3.5–5.1)
RBC # BLD: 5.2 MILL/MM3 (ref 4.2–5.4)
SCAN OF BLOOD SMEAR: NORMAL
SEDIMENTATION RATE, ERYTHROCYTE: 10 MM/HR (ref 0–20)
SEGS: 46.6 % (ref 43–75)
SODIUM BLD-SCNC: 138 MEQ/L (ref 136–145)
TOTAL PROTEIN: 7 GM/DL (ref 6.4–8.2)
TROPONIN I: < 0.017 NG/ML (ref 0.02–0.05)
WBC # BLD: 6.2 THOU/MM3 (ref 4.8–10.8)

## 2018-08-07 PROCEDURE — 2060000000 HC ICU INTERMEDIATE R&B

## 2018-08-07 PROCEDURE — 70551 MRI BRAIN STEM W/O DYE: CPT

## 2018-08-07 PROCEDURE — 85610 PROTHROMBIN TIME: CPT

## 2018-08-07 PROCEDURE — 85025 COMPLETE CBC W/AUTO DIFF WBC: CPT

## 2018-08-07 PROCEDURE — 2709999900 HC NON-CHARGEABLE SUPPLY

## 2018-08-07 PROCEDURE — 6370000000 HC RX 637 (ALT 250 FOR IP): Performed by: INTERNAL MEDICINE

## 2018-08-07 PROCEDURE — 84484 ASSAY OF TROPONIN QUANT: CPT

## 2018-08-07 PROCEDURE — 80053 COMPREHEN METABOLIC PANEL: CPT

## 2018-08-07 PROCEDURE — 70496 CT ANGIOGRAPHY HEAD: CPT

## 2018-08-07 PROCEDURE — 6370000000 HC RX 637 (ALT 250 FOR IP): Performed by: FAMILY MEDICINE

## 2018-08-07 PROCEDURE — 70498 CT ANGIOGRAPHY NECK: CPT

## 2018-08-07 PROCEDURE — 70450 CT HEAD/BRAIN W/O DYE: CPT

## 2018-08-07 PROCEDURE — 99285 EMERGENCY DEPT VISIT HI MDM: CPT

## 2018-08-07 PROCEDURE — 6360000004 HC RX CONTRAST MEDICATION: Performed by: PSYCHIATRY & NEUROLOGY

## 2018-08-07 PROCEDURE — 86140 C-REACTIVE PROTEIN: CPT

## 2018-08-07 PROCEDURE — 93010 ELECTROCARDIOGRAM REPORT: CPT | Performed by: INTERNAL MEDICINE

## 2018-08-07 PROCEDURE — 99223 1ST HOSP IP/OBS HIGH 75: CPT | Performed by: INTERNAL MEDICINE

## 2018-08-07 PROCEDURE — 85651 RBC SED RATE NONAUTOMATED: CPT

## 2018-08-07 PROCEDURE — 85730 THROMBOPLASTIN TIME PARTIAL: CPT

## 2018-08-07 PROCEDURE — 93005 ELECTROCARDIOGRAM TRACING: CPT | Performed by: EMERGENCY MEDICINE

## 2018-08-07 PROCEDURE — 36415 COLL VENOUS BLD VENIPUNCTURE: CPT

## 2018-08-07 PROCEDURE — 82948 REAGENT STRIP/BLOOD GLUCOSE: CPT

## 2018-08-07 PROCEDURE — 2580000003 HC RX 258: Performed by: INTERNAL MEDICINE

## 2018-08-07 RX ORDER — TRAZODONE HYDROCHLORIDE 50 MG/1
50 TABLET ORAL NIGHTLY
Status: DISCONTINUED | OUTPATIENT
Start: 2018-08-07 | End: 2018-08-07 | Stop reason: ALTCHOICE

## 2018-08-07 RX ORDER — DULOXETIN HYDROCHLORIDE 60 MG/1
60 CAPSULE, DELAYED RELEASE ORAL DAILY
Status: DISCONTINUED | OUTPATIENT
Start: 2018-08-08 | End: 2018-08-08 | Stop reason: HOSPADM

## 2018-08-07 RX ORDER — ACETAMINOPHEN 325 MG/1
650 TABLET ORAL EVERY 4 HOURS PRN
Status: DISCONTINUED | OUTPATIENT
Start: 2018-08-07 | End: 2018-08-08 | Stop reason: HOSPADM

## 2018-08-07 RX ORDER — DEXTROSE MONOHYDRATE 25 G/50ML
12.5 INJECTION, SOLUTION INTRAVENOUS PRN
Status: DISCONTINUED | OUTPATIENT
Start: 2018-08-07 | End: 2018-08-08 | Stop reason: HOSPADM

## 2018-08-07 RX ORDER — MONTELUKAST SODIUM 10 MG/1
10 TABLET ORAL NIGHTLY
Status: DISCONTINUED | OUTPATIENT
Start: 2018-08-07 | End: 2018-08-08 | Stop reason: HOSPADM

## 2018-08-07 RX ORDER — DEXTROSE MONOHYDRATE 50 MG/ML
100 INJECTION, SOLUTION INTRAVENOUS PRN
Status: DISCONTINUED | OUTPATIENT
Start: 2018-08-07 | End: 2018-08-08 | Stop reason: HOSPADM

## 2018-08-07 RX ORDER — AMITRIPTYLINE HYDROCHLORIDE 25 MG/1
25 TABLET, FILM COATED ORAL NIGHTLY
Status: DISCONTINUED | OUTPATIENT
Start: 2018-08-07 | End: 2018-08-08 | Stop reason: HOSPADM

## 2018-08-07 RX ORDER — ASPIRIN 325 MG
325 TABLET ORAL DAILY
Status: DISCONTINUED | OUTPATIENT
Start: 2018-08-08 | End: 2018-08-08 | Stop reason: HOSPADM

## 2018-08-07 RX ORDER — SODIUM CHLORIDE 0.9 % (FLUSH) 0.9 %
10 SYRINGE (ML) INJECTION PRN
Status: DISCONTINUED | OUTPATIENT
Start: 2018-08-07 | End: 2018-08-08 | Stop reason: HOSPADM

## 2018-08-07 RX ORDER — ONDANSETRON 4 MG/1
4 TABLET, ORALLY DISINTEGRATING ORAL EVERY 6 HOURS PRN
Status: DISCONTINUED | OUTPATIENT
Start: 2018-08-07 | End: 2018-08-08 | Stop reason: HOSPADM

## 2018-08-07 RX ORDER — ONDANSETRON 2 MG/ML
4 INJECTION INTRAMUSCULAR; INTRAVENOUS EVERY 6 HOURS PRN
Status: DISCONTINUED | OUTPATIENT
Start: 2018-08-07 | End: 2018-08-07

## 2018-08-07 RX ORDER — PANTOPRAZOLE SODIUM 40 MG/1
40 TABLET, DELAYED RELEASE ORAL
Status: DISCONTINUED | OUTPATIENT
Start: 2018-08-08 | End: 2018-08-08 | Stop reason: HOSPADM

## 2018-08-07 RX ORDER — SODIUM CHLORIDE 0.9 % (FLUSH) 0.9 %
10 SYRINGE (ML) INJECTION EVERY 12 HOURS SCHEDULED
Status: DISCONTINUED | OUTPATIENT
Start: 2018-08-07 | End: 2018-08-08 | Stop reason: HOSPADM

## 2018-08-07 RX ORDER — ALBUTEROL SULFATE 90 UG/1
2 AEROSOL, METERED RESPIRATORY (INHALATION) EVERY 8 HOURS PRN
Status: DISCONTINUED | OUTPATIENT
Start: 2018-08-07 | End: 2018-08-08 | Stop reason: HOSPADM

## 2018-08-07 RX ORDER — ATORVASTATIN CALCIUM 80 MG/1
80 TABLET, FILM COATED ORAL NIGHTLY
Status: DISCONTINUED | OUTPATIENT
Start: 2018-08-07 | End: 2018-08-08 | Stop reason: HOSPADM

## 2018-08-07 RX ORDER — NICOTINE POLACRILEX 4 MG
15 LOZENGE BUCCAL PRN
Status: DISCONTINUED | OUTPATIENT
Start: 2018-08-07 | End: 2018-08-08 | Stop reason: HOSPADM

## 2018-08-07 RX ORDER — SUCRALFATE 1 G/1
1 TABLET ORAL EVERY 12 HOURS SCHEDULED
Status: DISCONTINUED | OUTPATIENT
Start: 2018-08-07 | End: 2018-08-08 | Stop reason: HOSPADM

## 2018-08-07 RX ORDER — CLOPIDOGREL BISULFATE 75 MG/1
75 TABLET ORAL DAILY
Status: DISCONTINUED | OUTPATIENT
Start: 2018-08-08 | End: 2018-08-08 | Stop reason: HOSPADM

## 2018-08-07 RX ORDER — LABETALOL HYDROCHLORIDE 5 MG/ML
10 INJECTION, SOLUTION INTRAVENOUS EVERY 10 MIN PRN
Status: DISCONTINUED | OUTPATIENT
Start: 2018-08-07 | End: 2018-08-08 | Stop reason: HOSPADM

## 2018-08-07 RX ADMIN — ATORVASTATIN CALCIUM 80 MG: 80 TABLET, FILM COATED ORAL at 21:49

## 2018-08-07 RX ADMIN — MONTELUKAST SODIUM 10 MG: 10 TABLET, FILM COATED ORAL at 21:49

## 2018-08-07 RX ADMIN — ACETAMINOPHEN 650 MG: 325 TABLET ORAL at 23:34

## 2018-08-07 RX ADMIN — IOPAMIDOL 80 ML: 755 INJECTION, SOLUTION INTRAVENOUS at 16:14

## 2018-08-07 RX ADMIN — AMITRIPTYLINE HYDROCHLORIDE 25 MG: 25 TABLET, FILM COATED ORAL at 21:49

## 2018-08-07 RX ADMIN — SUCRALFATE 1 G: 1 TABLET ORAL at 21:49

## 2018-08-07 RX ADMIN — Medication 10 ML: at 21:54

## 2018-08-07 ASSESSMENT — PAIN SCALES - GENERAL
PAINLEVEL_OUTOF10: 8
PAINLEVEL_OUTOF10: 7
PAINLEVEL_OUTOF10: 7

## 2018-08-07 ASSESSMENT — PAIN DESCRIPTION - LOCATION
LOCATION: LEG;HEAD
LOCATION: LEG
LOCATION: HEAD

## 2018-08-07 ASSESSMENT — ENCOUNTER SYMPTOMS
ABDOMINAL PAIN: 0
COUGH: 0
BACK PAIN: 0
SHORTNESS OF BREATH: 0
BLURRED VISION: 0
DOUBLE VISION: 0
NAUSEA: 0

## 2018-08-07 ASSESSMENT — PAIN DESCRIPTION - ORIENTATION
ORIENTATION: RIGHT;LEFT
ORIENTATION: ANTERIOR

## 2018-08-07 ASSESSMENT — PAIN DESCRIPTION - PAIN TYPE
TYPE: ACUTE PAIN
TYPE: CHRONIC PAIN
TYPE: ACUTE PAIN;CHRONIC PAIN

## 2018-08-07 ASSESSMENT — PAIN DESCRIPTION - ONSET: ONSET: ON-GOING

## 2018-08-07 ASSESSMENT — PAIN DESCRIPTION - FREQUENCY: FREQUENCY: CONTINUOUS

## 2018-08-07 ASSESSMENT — PAIN DESCRIPTION - DESCRIPTORS: DESCRIPTORS: ACHING

## 2018-08-07 NOTE — ED NOTES
Dr Phillips All talking with Dr Dinh Driscoll at this time.  Pt to be tranported to [facility] ED to see Dr Raul Corley, EDWIN  08/07/18 1722

## 2018-08-07 NOTE — PROGRESS NOTES
Telephone orders entered per Dr. Darby Johnson for STAT CTA of head and neck ordered, patient to go directly to CT scan before going to 4A15. Staff in ED, CT and 4A all aware of this plan.

## 2018-08-07 NOTE — H&P
Patient:  Gonzalo Lucio  YOB: 1980    MRN: [id number]     Acct: [de-identified]    PCP: Kirby Leach MD    Date of Admission: 8/7/2018    Date of Service: Pt seen/examined on 8/7/2018   and Admitted to Inpatient with expected LOS greater than two midnights due to medical therapy. Chief Complaint:  Slurred speech and Rt sided weakness      History Of Present Illness:      44 y.o. female who presented to [address] [place] [address] from [facility] ED where she initially presented with slurred speech that stated in the morning later after few hrs having Rt arm and leg weakness which made her unstable and so her her mother brought her to ED . She was so transferred to [address] [place] [address] for evaluation by neurology as she is having these episodes several times in a month per her mother . During my examination her slurred speech aswell her weakness is better . She denies head ache , dizziness , palpitations , SOB or CP . Occasional diffuse joint pains. NIHSS on initial arrival to ED was 8 , now its 1    Past Medical History:          Diagnosis Date    Asthma     Diabetes mellitus ([facility] 6/8/2018    Fibromyalgia     GERD (gastroesophageal reflux disease)     Hyperlipidemia     Lupus (systemic lupus erythematosus) ([facility]     Lupus arthritis [facility])     [facility]    Migraine     Plaquenil adverse reaction in therapeutic use 5/2016    changes in the eyes    TIA (transient ischemic attack)     8/2016       Past Surgical History:          Procedure Laterality Date    HYSTERECTOMY  2009    Total, endometriosis    LAPAROSCOPY      SHOULDER ARTHROSCOPY Left 08/20/2015    Debridement of rotator cuff and bone spurs       Medications Prior to Admission:      Prior to Admission medications    Medication Sig Start Date End Date Taking?  Authorizing Provider   ibuprofen (ADVIL;MOTRIN) 600 MG tablet Take 1 tablet by mouth every 8 hours as needed for Pain 2/28/18  Yes Kirby Leach MD   aspirin (LORENZO ASPIRIN) 325 MG tablet Take 1 tablet by mouth daily 9/11/17  Yes Alka Bustos MD   traZODone (DESYREL) 50 MG tablet TAKE 3 TABLETS BY MOUTH NIGHTLY 7/31/18   Alka Bustos MD   magnesium oxide (MAG-OX) 400 (240 Mg) MG tablet Take 1 tablet by mouth daily 6/8/18   Alka Bustos MD   montelukast (SINGULAIR) 10 MG tablet Take 1 tablet by mouth nightly 5/31/18   Alka Bustos MD   DULoxetine (CYMBALTA) 60 MG extended release capsule TAKE ONE CAPSULE BY MOUTH ONCE DAILY 5/30/18   Alka Bustos MD   potassium chloride (KLOR-CON M) 10 MEQ extended release tablet Take 1 tablet by mouth daily 5/4/18   Alka Bustos MD   metFORMIN (GLUCOPHAGE) 500 MG tablet Take 1 tablet by mouth 2 times daily (with meals) 5/2/18   Alka Bustos MD   NEXIUM 40 MG delayed release capsule TAKE ONE CAPSULE BY MOUTH ONCE DAILY 4/27/18   Alka Bustos MD   ondansetron (ZOFRAN ODT) 4 MG disintegrating tablet Take 1 tablet by mouth every 8 hours as needed for Nausea or Vomiting 3/27/18   Marina Richmond MD   atorvastatin (LIPITOR) 80 MG tablet Take 1 tablet by mouth daily 2/28/18   Alka Bustos MD   clopidogrel (PLAVIX) 75 MG tablet Take 1 tablet by mouth daily 12/6/17   Alka Bustos MD   EQ PAIN RELIEVER 325 MG tablet TAKE TWO TABLETS BY MOUTH EVERY 4 HOURS AS NEEDED FOR PAIN AND FEVER 12/4/17   Alka Bustos MD   Amitriptyline HCl (ELAVIL PO) Take 25 mg by mouth nightly     Historical Provider, MD   omeprazole (PRILOSEC) 20 MG delayed release capsule Take 1 capsule by mouth 2 times daily 10/18/17   Alka Bustos MD   predniSONE (DELTASONE) 5 MG tablet Take 5 mg by mouth 2 times daily    Historical Provider, MD   sucralfate (CARAFATE) 1 GM tablet TAKE ONE TABLET BY MOUTH 4 TIMES DAILY 4/3/17   Alka Bustos MD   promethazine (PHENERGAN) 25 MG tablet Take 1 tablet by mouth every 6 hours as needed for Nausea 2/1/17   Alka Bustos MD   sodium chloride (OCEAN) 0.65 % nasal spray 1 spray by Nasal route as needed for Congestion 12/19/16

## 2018-08-07 NOTE — ED NOTES
Report called to Gilda Schumacher, RN at [facility] ED.      Elizabeth Encinas RN  08/07/18 1800

## 2018-08-07 NOTE — PROGRESS NOTES
Pharmacy will assess for completion of ordered procedure and administration of IV contrast media. Serum creatinine: 0.6 mg/dL 08/07/18 1257  Estimated creatinine clearance: 143 mL/min    Clinical Pharmacy Note  Metformin-IV Contrast Interaction Monitoring    James Castelan is a 44 y.o. female. Patient has received IV contrast and is on metformin. Current Metformin dose: 500 mg BID with meals    Height:   Ht Readings from Last 1 Encounters:   08/07/18 5' 3\" (1.6 m)     Weight:  Wt Readings from Last 1 Encounters:   08/07/18 220 lb (99.8 kg)       Recent Labs      08/07/18   1257   CREATININE  0.6       Estimated Creatinine Clearance: 143 mL/min (based on SCr of 0.6 mg/dL). Assessment:  Contrast administered: Yes  Metformin discontinued: Yes    Date/time of contrast administration: 8-7-18 at 1614      Plan:  1. Metformin will be held for at least 48 hours and restarted if serum creatinine is within FDA approved guidelines. 2.  Pharmacy will check serum creatinine with morning labs on 8-10-18    Thank you. Merlyn ROBLES. Ph. 8/7/2018 5:08 PM

## 2018-08-07 NOTE — ED PROVIDER NOTES
HISTORY     has a past surgical history that includes Hysterectomy (2009); laparoscopy; and Shoulder arthroscopy (Left, 08/20/2015).     CURRENT MEDICATIONS    Current Discharge Medication List      CONTINUE these medications which have NOT CHANGED    Details   ibuprofen (ADVIL;MOTRIN) 600 MG tablet Take 1 tablet by mouth every 8 hours as needed for Pain  Qty: 42 tablet, Refills: 5    Comments: Please consider 90 day supplies to promote better adherence      aspirin (LORENZO ASPIRIN) 325 MG tablet Take 1 tablet by mouth daily  Qty: 30 tablet, Refills: 3    Associated Diagnoses: Seizure (HCC)      traZODone (DESYREL) 50 MG tablet TAKE 3 TABLETS BY MOUTH NIGHTLY  Qty: 90 tablet, Refills: 1    Comments: Please consider 90 day supplies to promote better adherence  Associated Diagnoses: Fibromyalgia      magnesium oxide (MAG-OX) 400 (240 Mg) MG tablet Take 1 tablet by mouth daily  Qty: 30 tablet, Refills: 5    Associated Diagnoses: Hypomagnesemia      montelukast (SINGULAIR) 10 MG tablet Take 1 tablet by mouth nightly  Qty: 30 tablet, Refills: 5    Comments: Please consider 90 day supplies to promote better adherence      DULoxetine (CYMBALTA) 60 MG extended release capsule TAKE ONE CAPSULE BY MOUTH ONCE DAILY  Qty: 30 capsule, Refills: 5    Comments: Please consider 90 day supplies to promote better adherence      potassium chloride (KLOR-CON M) 10 MEQ extended release tablet Take 1 tablet by mouth daily  Qty: 30 tablet, Refills: 3      metFORMIN (GLUCOPHAGE) 500 MG tablet Take 1 tablet by mouth 2 times daily (with meals)  Qty: 60 tablet, Refills: 3      NEXIUM 40 MG delayed release capsule TAKE ONE CAPSULE BY MOUTH ONCE DAILY  Qty: 30 capsule, Refills: 5    Comments: Please consider 90 day supplies to promote better adherence      ondansetron (ZOFRAN ODT) 4 MG disintegrating tablet Take 1 tablet by mouth every 8 hours as needed for Nausea or Vomiting  Qty: 20 tablet, Refills: 0      atorvastatin (LIPITOR) 80 MG tablet report has been created using voice recognition software. It may contain minor errors which are inherent in voice recognition technology. **      Final report electronically signed by Dr. Francisco Serna on 8/7/2018 1:01 PM      CTA HEAD W WO CONTRAST    (Results Pending)   CTA NECK W WO CONTRAST    (Results Pending)      LABS:     Labs Reviewed   COMPREHENSIVE METABOLIC PANEL - Abnormal; Notable for the following:        Result Value    Glucose 144 (*)     BUN 5 (*)     AST 61 (*)     Alkaline Phosphatase 162 (*)     ALT 78 (*)     All other components within normal limits   ANION GAP - Abnormal; Notable for the following: Anion Gap 7.0 (*)     All other components within normal limits   POCT GLUCOSE - Abnormal; Notable for the following:     POC Glucose 140 (*)     All other components within normal limits   CBC WITH AUTO DIFFERENTIAL   SEDIMENTATION RATE   TROPONIN   APTT   PROTIME   GLOMERULAR FILTRATION RATE, ESTIMATED   SCAN OF BLOOD SMEAR   URINE RT REFLEX TO CULTURE       Vitals:    Vitals:    08/07/18 1259 08/07/18 1330 08/07/18 1359 08/07/18 1519   BP: (!) 128/109 (!) 130/91 (!) 143/96 (!) 150/97   Pulse: 95 85 89 86   Resp: 13 20 18 18   Temp:    98.3 °F (36.8 °C)   TempSrc:    Oral   SpO2: 96% 92% 94% 95%   Weight:       Height:           EMERGENCY DEPARTMENT COURSE:    IV, monitored. No progressive neurologic changes. Complains of a dull frontal headache. After I got no response to [employer] call,  I spoke with Dr. Sharp Patient of the ED, who suggested that I call the hospitalist, Dr. Ramiro Block, who did accept the patient for admission. I then got a call back from Dr. Pat Barth, neurologist, who asked that I speak with Dr. Marika Mcdonough, neuro-interventionalist. He called back, and asked to hear the patient speak on the phone. This was done, and Dr. Marika Mcdonough then directed that the patient go to main ED for further evaluation.  This message was relayed to ED, and Dr. Sharp Patient called back, and I explained the

## 2018-08-08 ENCOUNTER — TELEPHONE (OUTPATIENT)
Dept: FAMILY MEDICINE CLINIC | Age: 38
End: 2018-08-08

## 2018-08-08 VITALS
HEIGHT: 63 IN | RESPIRATION RATE: 16 BRPM | DIASTOLIC BLOOD PRESSURE: 99 MMHG | WEIGHT: 224.3 LBS | HEART RATE: 98 BPM | TEMPERATURE: 98.6 F | BODY MASS INDEX: 39.74 KG/M2 | SYSTOLIC BLOOD PRESSURE: 132 MMHG | OXYGEN SATURATION: 96 %

## 2018-08-08 PROBLEM — Z79.52 LONG TERM SYSTEMIC STEROID USER: Status: ACTIVE | Noted: 2018-08-08

## 2018-08-08 PROBLEM — E66.9 OBESITY (BMI 30-39.9): Status: ACTIVE | Noted: 2018-08-08

## 2018-08-08 PROBLEM — R91.1 LUNG NODULE, SOLITARY: Status: ACTIVE | Noted: 2018-08-08

## 2018-08-08 LAB
AVERAGE GLUCOSE: 111 MG/DL (ref 70–126)
CHOLESTEROL, TOTAL: 263 MG/DL (ref 100–199)
GLUCOSE BLD-MCNC: 158 MG/DL (ref 70–108)
HBA1C MFR BLD: 5.7 % (ref 4.4–6.4)
HDLC SERPL-MCNC: 28 MG/DL
LDL CHOLESTEROL CALCULATED: 188 MG/DL
TRIGL SERPL-MCNC: 237 MG/DL (ref 0–199)

## 2018-08-08 PROCEDURE — G8979 MOBILITY GOAL STATUS: HCPCS

## 2018-08-08 PROCEDURE — 2709999900 HC NON-CHARGEABLE SUPPLY

## 2018-08-08 PROCEDURE — 97165 OT EVAL LOW COMPLEX 30 MIN: CPT

## 2018-08-08 PROCEDURE — G8980 MOBILITY D/C STATUS: HCPCS

## 2018-08-08 PROCEDURE — 82948 REAGENT STRIP/BLOOD GLUCOSE: CPT

## 2018-08-08 PROCEDURE — 6370000000 HC RX 637 (ALT 250 FOR IP): Performed by: FAMILY MEDICINE

## 2018-08-08 PROCEDURE — G8978 MOBILITY CURRENT STATUS: HCPCS

## 2018-08-08 PROCEDURE — 6370000000 HC RX 637 (ALT 250 FOR IP): Performed by: INTERNAL MEDICINE

## 2018-08-08 PROCEDURE — 99239 HOSP IP/OBS DSCHRG MGMT >30: CPT | Performed by: INTERNAL MEDICINE

## 2018-08-08 PROCEDURE — 83036 HEMOGLOBIN GLYCOSYLATED A1C: CPT

## 2018-08-08 PROCEDURE — G8987 SELF CARE CURRENT STATUS: HCPCS

## 2018-08-08 PROCEDURE — G8988 SELF CARE GOAL STATUS: HCPCS

## 2018-08-08 PROCEDURE — 86147 CARDIOLIPIN ANTIBODY EA IG: CPT

## 2018-08-08 PROCEDURE — 97161 PT EVAL LOW COMPLEX 20 MIN: CPT

## 2018-08-08 PROCEDURE — G8989 SELF CARE D/C STATUS: HCPCS

## 2018-08-08 PROCEDURE — 80061 LIPID PANEL: CPT

## 2018-08-08 PROCEDURE — 36415 COLL VENOUS BLD VENIPUNCTURE: CPT

## 2018-08-08 PROCEDURE — 2580000003 HC RX 258: Performed by: INTERNAL MEDICINE

## 2018-08-08 RX ADMIN — Medication 10 ML: at 08:41

## 2018-08-08 RX ADMIN — ASPIRIN 325 MG: 325 TABLET ORAL at 08:37

## 2018-08-08 RX ADMIN — ACETAMINOPHEN 650 MG: 325 TABLET ORAL at 05:56

## 2018-08-08 RX ADMIN — DULOXETINE HYDROCHLORIDE 60 MG: 60 CAPSULE, DELAYED RELEASE ORAL at 08:37

## 2018-08-08 RX ADMIN — SUCRALFATE 1 G: 1 TABLET ORAL at 08:37

## 2018-08-08 RX ADMIN — PANTOPRAZOLE SODIUM 40 MG: 40 TABLET, DELAYED RELEASE ORAL at 05:59

## 2018-08-08 RX ADMIN — CLOPIDOGREL BISULFATE 75 MG: 75 TABLET ORAL at 08:37

## 2018-08-08 ASSESSMENT — PAIN SCALES - GENERAL
PAINLEVEL_OUTOF10: 8
PAINLEVEL_OUTOF10: 7

## 2018-08-08 NOTE — DISCHARGE SUMMARY
Hospital Medicine Discharge Summary      Patient Identification:   Leoncio oPrter   : 1980  MRN: [id number]   Account: [de-identified]      Patient's PCP: Paula Landeros MD    Admit Date: 2018     Discharge Date:   18      Admitting Physician: Lulu Finnegan MD     Discharge Physician: Sharmaine Massey MD     Discharge Diagnoses: Active Hospital Problems    Diagnosis Date Noted    Lung nodule, solitary [R91.1] 2018    Obesity (BMI 30-39. 9) [E66.9] 2018    Long term systemic steroid user [Z79.52] 2018    TIA (transient ischemic attack) [G45.9] 2018    Lupus [L93.0] 2016    GERD (gastroesophageal reflux disease) [K21.9] 01/10/2013       The patient was seen and examined on day of discharge and this discharge summary is in conjunction with any daily progress note from day of discharge. Hospital Course:   Leoncio Porter is a 44 y.o. female admitted to [address] [place] [address] on 2018 for slurred speech and right sided weakness-Negative brain MRI for acute CVA. Left pleural lung nodule at the lung apex-1.1 cm on CT. Needs OP f/u w/ pulmonary. Stable for home d/c today. Exam:     Vitals:  Vitals:    18 2126 18 2337 18 0523 18 0829   BP: (!) 153/99 (!) 153/100 125/85 (!) 132/99   Pulse: 94 92 88 98   Resp: 16 18 16 16   Temp: 98.1 °F (36.7 °C) 97.3 °F (36.3 °C) 98.2 °F (36.8 °C) 98.6 °F (37 °C)   TempSrc: Oral Oral Oral Oral   SpO2: 97% 95% 92% 96%   Weight:   224 lb 4.8 oz (101.7 kg)    Height:         Weight: Weight: 224 lb 4.8 oz (101.7 kg)     24 hour intake/output:    Intake/Output Summary (Last 24 hours) at 18 1109  Last data filed at 18 5534   Gross per 24 hour   Intake             2225 ml   Output                0 ml   Net             2225 ml       General appearance:  No apparent distress, appears stated age and cooperative. Respiratory:  Normal respiratory effort.  Clear to auscultation bilaterally-no rales or rhonchi. Cardiovascular:  Regular rate and rhythm with normal S1/S2,no murmurs. Abdomen: Soft, non-tender, non-distended with normal bowel sounds. Musculoskeletal:  No joint swellings or deformities ,no b/l  LE edema. No limited  range of joint motions. Neurologic: Non focal neuro exam , intact sensation and no cranial nerves deficits. Labs: For convenience and continuity at follow-up the following most recent labs are provided:      CBC:    Lab Results   Component Value Date    WBC 6.2 08/07/2018    HGB 14.2 08/07/2018    HCT 42.6 08/07/2018     08/07/2018       Renal:    Lab Results   Component Value Date     08/07/2018    K 4.0 08/07/2018     08/07/2018    CO2 27 08/07/2018    BUN 5 08/07/2018    CREATININE 0.6 08/07/2018    CALCIUM 9.1 05/24/2018     MICROBIOLOGY AND PATHOLOGY STUDIES. None. PROCEDURES. None. ENDOSCOPE STUDIES. None. RADIOLOGY STUDIES. BRAIN MRI-8/7/2018. Unremarkable MRI of the brain. No acute intracranial abnormality is identified. CT HEAD-8/7/2018. No acute intracranial findings. CTA HEAD AND NECK-8/7/2018. 1. No major branch occlusion, flow-limiting stenosis or aneurysm is identified intracranially. 2. No occlusion, flow-limiting stenosis, dissection or aneurysm is identified of the major       cervical arterial structures.     3. A 1.1 cm nodule is noted along the pleura at the left lung apex. A follow-up CT of the chest with contrast in 3 months is recommended according to the       Fleischner Society guidelines. ECHO-2/27/2017. Systolic function was normal.  Ejection fraction is visually estimated at 55%. Consults:     None    Disposition:    [x] Home       [] TCU       [] Rehab       [] Psych       [] SNF       [] [place]       [] Other-    Condition at Discharge: Stable    Code Status:  Full Code     Patient Instructions:    Discharge lab work:    Activity: activity as tolerated  Diet: DIET LOW SODIUM 2 GM; Carb Control: 4 carb choices (60 gms)/meal      Follow-up visits:   Ponce Alvarado MD  [address], [address]  [address] [place] [id number] [phone]    Schedule an appointment as soon as possible for a visit  Nurse will call you to schedule follow up   Discharge Medications:      Angel Cullen   [place] Medication Instructions VZO:[id number]    Printed on:08/08/18 7846   Medication Information                      albuterol (PROVENTIL;VENTOLIN) 90 MCG/ACT inhaler  Inhale 2 puffs into the lungs every 8 hours as needed.                Amitriptyline HCl (ELAVIL PO)  Take 25 mg by mouth nightly              aspirin (LORENZO ASPIRIN) 325 MG tablet  Take 1 tablet by mouth daily             atorvastatin (LIPITOR) 80 MG tablet  Take 1 tablet by mouth daily             clopidogrel (PLAVIX) 75 MG tablet  Take 1 tablet by mouth daily             diclofenac sodium (VOLTAREN) 1 % GEL  Apply 2 g topically 4 times daily as needed for Pain (topical)              DULoxetine (CYMBALTA) 60 MG extended release capsule  TAKE ONE CAPSULE BY MOUTH ONCE DAILY             EQ PAIN RELIEVER 325 MG tablet  TAKE TWO TABLETS BY MOUTH EVERY 4 HOURS AS NEEDED FOR PAIN AND FEVER             ibuprofen (ADVIL;MOTRIN) 600 MG tablet  Take 1 tablet by mouth every 8 hours as needed for Pain             magnesium oxide (MAG-OX) 400 (240 Mg) MG tablet  Take 1 tablet by mouth daily             metFORMIN (GLUCOPHAGE) 500 MG tablet  Take 1 tablet by mouth 2 times daily (with meals)             montelukast (SINGULAIR) 10 MG tablet  Take 1 tablet by mouth nightly             NEXIUM 40 MG delayed release capsule  TAKE ONE CAPSULE BY MOUTH ONCE DAILY             omeprazole (PRILOSEC) 20 MG delayed release capsule  Take 1 capsule by mouth 2 times daily             ondansetron (ZOFRAN ODT) 4 MG disintegrating tablet  Take 1 tablet by mouth every 8 hours as needed for Nausea or Vomiting             potassium chloride (KLOR-CON M) 10 MEQ extended release tablet  Take 1 tablet by mouth daily             predniSONE (DELTASONE) 5 MG tablet  Take 5 mg by mouth 2 times daily             promethazine (PHENERGAN) 25 MG tablet  Take 1 tablet by mouth every 6 hours as needed for Nausea             sodium chloride (OCEAN) 0.65 % nasal spray  1 spray by Nasal route as needed for Congestion             sucralfate (CARAFATE) 1 GM tablet  TAKE ONE TABLET BY MOUTH 4 TIMES DAILY             traZODone (DESYREL) 50 MG tablet  TAKE 3 TABLETS BY MOUTH NIGHTLY             verapamil (CALAN SR) 240 MG CR tablet  Take 1 tablet by mouth daily                 Time Spent on discharge is more than 30 minutes in the examination, evaluation, counseling and review of medications and discharge plan. Signed: Thank you Gina Bagley MD for the opportunity to be involved in this patient's care.     Electronically signed by Soraya Baker MD on 8/8/2018 at 11:09 AM

## 2018-08-08 NOTE — PROGRESS NOTES
Kevin Zuniga 59  INPATIENT OCCUPATIONAL THERAPY  [facility] NEUROSCIENCES 4A  EVALUATION    Time:  Time In: 5317  Time Out: 9914  Timed Code Treatment Minutes: 0 Minutes  Minutes: 12          Date: 2018  Patient Name: Rohith Carter,   Gender: female      MRN: [id number]  : 1980  (44 y.o.)  Referring Practitioner: Dr. Padmini Egan. Divi  Diagnosis: acute CVA  Additional Pertinent Hx: Pt ER note on 18: 44 y.o. female who presents with her mother, who states that when the patient got up at 0900 this AM, she noticed that her speech was halting and slurred. At first, she did not think much of this, since this has happened before, and and the patient has been diagnosed with TIA's and possible seizures. However, then symptoms usually clear up within 30 minutes or so, but today, then slurred speech continued, and then at 1100, the mother noticed that when the patient was walking she was \"dragging\" her right leg, and then she \"dropped her fork\" when trying to eat lunch. By then, it had been about 3 1/2 hours since she got up, and she seemed worse, so the mother decided to bring her in. The patient has slow, halting speech, and says she \"feels tired\". MRI & CT are negative.      Restrictions/Precautions:  General Precautions           Past Medical History:   Diagnosis Date    Asthma     Diabetes mellitus ([facility] [address] 2018    Fibromyalgia     GERD (gastroesophageal reflux disease)     Hyperlipidemia     Lup[facility]ic lupus erythematosus) ([facility])     Lupus arthritis [facility])     [facility]    Migraine     Plaquenil adverse reaction in therapeutic use 2016    changes in the eyes    TIA (transient ischemic attack)     2016     Past Surgical History:   Procedure Laterality Date    HYSTERECTOMY  2009    Total, endometriosis    LAPAROSCOPY      SHOULDER ARTHROSCOPY Left 2015    Debridement of rotator cuff and bone spurs           Subjective  Chart Reviewed: Yes (orders, progress notes)  Patient device  Activity: To/from bathroom  Assist Level: Independent        Activity Tolerance:  Activity Tolerance: Patient Tolerated treatment well    Treatment Initiated:  Pt ambulated to/from 4A gym with indep using no AD. Assessment:  Assessment: Pt appears to be at baseline. Prognosis: Good  Discharge Recommendations: Home with assist PRN  No Skilled OT: At baseline function    Clinical Decision Making: Clinical Decision making was of Low Complexity as the result of analysis of data from a problem focused assessment, a consideration of a limited number of treatment options, no significant comorbidities affecting the plan of care and no modification or assistance required to complete the evaluation. Patient Education:  Patient Education: OT role  Barriers to Learning: reported decreased STM    Equipment Recommendations:  Equipment Needed: No    Safety:  Safety Devices in place: Yes  Type of devices: All fall risk precautions in place, Gait belt, Call light within reach    Plan:  Times per week: No further OT indicated at this time. Goals:  Patient goals : Go home ASAP         Evaluation Complexity: Based on the findings of patient history, examination, clinical presentation, and decision making during this evaluation, this patient is of low complexity.     OT G-codes  Functional Limitation: Self care  Self Care Current Status (): 0 percent impaired, limited or restricted  Self Care Goal Status (): 0 percent impaired, limited or restricted  Self Care Discharge Status (): 0 percent impaired, limited or restricted  AM-PAC Inpatient Daily Activity Raw Score: 24  AM-PAC Inpatient ADL T-Scale Score : 57.54  ADL Inpatient CMS 0-100% Score: 0  ADL Inpatient CMS G-Code Modifier : [facility]

## 2018-08-08 NOTE — PLAN OF CARE
Problem: Pain:  Goal: Pain level will decrease  Pain level will decrease   Outcome: Ongoing  Patient continues to report pain in legs. Tylenol given per order. Will continue to monitor. Goal: Control of acute pain  Control of acute pain   Outcome: Ongoing    Goal: Control of chronic pain  Control of chronic pain   Outcome: Ongoing      Problem: HEMODYNAMIC STATUS  Goal: Patient has stable vital signs and fluid balance  Outcome: Ongoing  Vitals signs remain stable at this time without intervention. Continue to monitor. Patient tolerating prescribed diet and oral fluids without difficulty. No signs of edema. Problem: ACTIVITY INTOLERANCE/IMPAIRED MOBILITY  Goal: Mobility/activity is maintained at optimum level for patient  Outcome: Ongoing  Patient ambulates well with standby assist and no assistive devices. Continue to encourage patient to ambulate as able/activity as tolerated. Problem: COMMUNICATION IMPAIRMENT  Goal: Ability to express needs and understand communication  Outcome: Ongoing  Patient alert and oriented x4. Able to communicate well without difficulty at this time. Comments: Care plan reviewed with patient. Patient verbalizes understanding of the plan of care and contributes to goal setting.

## 2018-08-08 NOTE — TELEPHONE ENCOUNTER
.Transition of Care visit NEEDS scheduled. Patient is being discharged from [facility] on 8/8/18 to home. She was admitted for \"stroke like\" symptoms. Geisinger score is 12%   Requesting 1 week hospital follow up appointment. Please contact patient with appointment. Thank you!

## 2018-08-08 NOTE — PROGRESS NOTES
home today. General:  Overall Orientation Status: Within Functional Limits  Follows Commands: Within Functional Limits    Vision: Impaired  Vision Exceptions: Wears glasses at all times    Hearing: Within functional limits       Pain:   . Pre Treatment Pain Screening  Pain at present: 7  Scale Used: Numeric Score  Intervention List: Patient able to continue with treatment  Comments / Details: Chronic leg pain, fibromyalgia and lupus pain    Social/Functional History:    Lives With:  (Mother)  Type of Home: House  Home Layout: One level  Home Access: Stairs to enter with rails  Entrance Stairs - Number of Steps: 3  Entrance Stairs - Rails: Right  Home Equipment: Rolling walker, Cane        Receives Help From: Family        Ambulation Assistance: Independent  Transfer Assistance: Independent    Active : No  Occupation: On disability  Additional Comments: Pt amb with SC at times with increased pain      Objective:       RLE AROM: WFL       LLE AROM : WFL       B LE's grossly 4+/5       RLE Tone: Normotonic  LLE Tone: Normotonic        Supine to Sit: Independent  Sit to Supine: Independent    Transfers  Sit to Stand: Independent  Stand to sit: Independent       Ambulation 1  Surface: level tile  Device: No Device  Assistance: Independent  Quality of Gait: Pt amb with decreased speed, good step length, steady without LOB. Distance: ~250 feet          Stairs  # Steps : 3  Stairs Height: 6\"  Rails: Right ascending  Assistance: Supervision  Comment: Step-to pattern          Exercises:  Comments: Pt performs supine B LE AROM: ankle pumps, heel slides and hip abd/add x 10 reps to increase strength for improved gait. Activity Tolerance:  Activity Tolerance: Patient Tolerated treatment well    Treatment Initiated: None    Assessment: Body structures, Functions, Activity limitations: Decreased strength  Assessment: Pt tolerates session well, amb in hallway without AD without difficulty.   Pt ed to amb

## 2018-08-09 ENCOUNTER — CARE COORDINATION (OUTPATIENT)
Dept: CASE MANAGEMENT | Age: 38
End: 2018-08-09

## 2018-08-09 RX ORDER — LEVOCETIRIZINE DIHYDROCHLORIDE 5 MG/1
TABLET, FILM COATED ORAL
Qty: 30 TABLET | Refills: 11 | Status: SHIPPED | OUTPATIENT
Start: 2018-08-09 | End: 2019-07-30 | Stop reason: SDUPTHER

## 2018-08-09 RX ORDER — ATORVASTATIN CALCIUM 80 MG/1
80 TABLET, FILM COATED ORAL DAILY
Qty: 90 TABLET | Refills: 3 | Status: SHIPPED | OUTPATIENT
Start: 2018-08-09 | End: 2019-09-01 | Stop reason: SDUPTHER

## 2018-08-09 NOTE — CARE COORDINATION
[facility] Transitions Initial Follow Up Call    Call within 2 business days of discharge: Yes    Patient: Jessenia Fragoso Patient : 1980   MRN: [id number]  Reason for Admission: stroke like symptoms   Discharge Date: 18 RARS: Readmission Risk Score: 12 CMRS 7     Spoke with: Oli Mendozaton: [facility]    Non-face-to-face services provided:  Scheduled appointment with PCP-declined, stating she spoke with the office. Obtained and reviewed discharge summary and/or continuity of care documents    Care Transitions 24 Hour Call    Schedule Follow Up Appointment with PCP:  Declined  Do you have any ongoing symptoms?:  No  Do you have a copy of your discharge instructions?:  Yes  Do you have all of your prescriptions and are they filled?:  Yes  Have you been contacted by a [facility] Pharmacist?:  No  Have you scheduled your follow up appointment?:  Yes  Were you discharged with any Home Care or Post Acute Services:  No  Patient DME:  Sundar geiger  Do you have support at home?:  Parent(s)  Do you feel like you have everything you need to keep you well at home?:  Yes  Are you an active caregiver in your home?:  No  Care Transitions Interventions     Prisca Valadez returned [facility]'s call for initial care transition call post hospital discharge. Med review not completed at this time, as she stated she completed it with the office earlier today. Prisca Valadez reports that she is feeling \"back to normal\". She denies any numbness, tingling, or weakness on the right side and her slurred speech has resolved. Prisca Valadez denies any needs, questions, or concerns at this time. Since Prisca Valadez has expressed no concerns today, care transition signing off, will provide warm handoff to Ash Andre, [address], as she is active with this patient.           Follow Up  Future Appointments  Date Time Provider Herbert Portillo   2018 8:00 AM [facility]EDU[facility] [place]   2018 8:10 AM MD Jos rOopeza [facility] [place]   2018 11:20 NATHAN Sewell [facility] [place] RN

## 2018-08-09 NOTE — TELEPHONE ENCOUNTER
[facility] Transitions Initial Follow Up Call    Outreach made within 2 business days of discharge: Yes    Patient: Duane [place] Patient : 1980   MRN: [id number]  Reason for Admission: There are no discharge diagnoses documented for the most recent discharge. Discharge Date: 18       Spoke with: Luke Mcgrath    Discharge department/facility: [facility]    TCM Interactive Patient Contact:  Was patient able to fill all prescriptions: No: no new meds prescribed  Was patient instructed to bring all medications to the follow-up visit: Yes- advised to bring to her up coming rheumatology appointment and any future appointments in this the office. Is patient taking all medications as directed in the discharge summary? Yes  Does patient understand their discharge instructions: Yes  Does patient have questions or concerns that need addressed prior to 7-14 day follow up office visit: no   Does patient have pain? yes - patient voiced she is always in pain. She is seeing Rheumatology in a few weeks and will discuss her increased pain at that appointment.        Scheduled appointment with PCP within 7-14 days    Follow Up  Future Appointments  Date Time Provider Herbert Portillo   2018 8:10 AM MD Marlene Larkin - Ramon Thornton LPN

## 2018-08-09 NOTE — TELEPHONE ENCOUNTER
Patient asking if she will need to have the blood work done that Dr. Bobby Hernandez ordered for her to have done next month. Patient had most of it done during her hospital stay. Please advise.

## 2018-08-10 LAB
CARDIOLIPIN AB IGM: 0 MPL (ref 0–12)
CARDIOLIPIN AB IGM: 0 MPL (ref 0–12)
CARDIOLIPIN ANTIBODY, IGG: 0 GPL (ref 0–14)
CARDIOLIPIN ANTIBODY, IGG: 0 GPL (ref 0–14)

## 2018-08-15 ENCOUNTER — CARE COORDINATION (OUTPATIENT)
Dept: CARE COORDINATION | Age: 38
End: 2018-08-15

## 2018-08-15 NOTE — CARE COORDINATION
other questions, concerns or needs and she was encouraged to call with any that may develop. Will continue to work to f/u with patient in the future. Care Coordination Interventions    Program Enrollment:  Complex Care  Referral from Primary Care Provider:  No  Suggested Interventions and Community Resources  Diabetes Education:  Completed  Fall Risk Prevention:  Completed  Medication Assistance Program:  Declined (Comment: pt. denied any need for med assist. at this time)  Pharmacist:  Completed (Comment: Pt. encouraged to f/u with her local pharmacist as needed)  Physical Therapy:  Not Started  Zone Management Tools:  Completed  Other Services or Interventions: Following with [facility] and psychology          Goals Addressed             Most Recent     Activity Plan   No change (8/15/2018)             I will increase my activity by  continuing to take a walk daily. Barriers: seizures and dizziness   Plan for overcoming my barriers: N/A  Confidence: 7/10  Anticipated Goal Completion Date: ongoing             Prior to Admission medications    Medication Sig Start Date End Date Taking?  Authorizing Provider   levocetirizine (XYZAL) 5 MG tablet TAKE ONE TABLET BY MOUTH NIGHTLY 8/9/18   Megha Victor MD   atorvastatin (LIPITOR) 80 MG tablet Take 1 tablet by mouth daily 8/9/18   Megha Victor MD   traZODone (DESYREL) 50 MG tablet TAKE 3 TABLETS BY MOUTH NIGHTLY 7/31/18   Megha Victor MD   magnesium oxide (MAG-OX) 400 (240 Mg) MG tablet Take 1 tablet by mouth daily 6/8/18   Megha Victor MD   montelukast (SINGULAIR) 10 MG tablet Take 1 tablet by mouth nightly 5/31/18   Megha Victor MD   DULoxetine (CYMBALTA) 60 MG extended release capsule TAKE ONE CAPSULE BY MOUTH ONCE DAILY 5/30/18   Megha Victor MD   potassium chloride (KLOR-CON M) 10 MEQ extended release tablet Take 1 tablet by mouth daily 5/4/18   Megha Victor MD   metFORMIN (GLUCOPHAGE) 500 MG tablet Take 1 tablet by mouth 2 times daily (with meals) 5/2/18   Carroll Boyd MD   NEXIUM 40 MG delayed release capsule TAKE ONE CAPSULE BY MOUTH ONCE DAILY 4/27/18   Carroll Boyd MD   ondansetron (ZOFRAN ODT) 4 MG disintegrating tablet Take 1 tablet by mouth every 8 hours as needed for Nausea or Vomiting 3/27/18   Kandi Brewer MD   ibuprofen (ADVIL;MOTRIN) 600 MG tablet Take 1 tablet by mouth every 8 hours as needed for Pain 2/28/18   Carroll Boyd MD   clopidogrel (PLAVIX) 75 MG tablet Take 1 tablet by mouth daily 12/6/17   Carroll Boyd MD   EQ PAIN RELIEVER 325 MG tablet TAKE TWO TABLETS BY MOUTH EVERY 4 HOURS AS NEEDED FOR PAIN AND FEVER 12/4/17   Carroll Boyd MD   Amitriptyline HCl (ELAVIL PO) Take 25 mg by mouth nightly     Historical Provider, MD   omeprazole (PRILOSEC) 20 MG delayed release capsule Take 1 capsule by mouth 2 times daily 10/18/17   Carroll Boyd MD   aspirin (LORENZO ASPIRIN) 325 MG tablet Take 1 tablet by mouth daily 9/11/17   Carroll Boyd MD   predniSONE (DELTASONE) 5 MG tablet Take 5 mg by mouth 2 times daily    Historical Provider, MD   sucralfate (CARAFATE) 1 GM tablet TAKE ONE TABLET BY MOUTH 4 TIMES DAILY 4/3/17   Carroll Boyd MD   promethazine (PHENERGAN) 25 MG tablet Take 1 tablet by mouth every 6 hours as needed for Nausea 2/1/17   Carroll Boyd MD   sodium chloride (OCEAN) 0.65 % nasal spray 1 spray by Nasal route as needed for Congestion 12/19/16   Carroll Boyd MD   verapamil (CALAN SR) 240 MG CR tablet Take 1 tablet by mouth daily 8/17/16   Regis Pedroza MD   diclofenac sodium (VOLTAREN) 1 % GEL Apply 2 g topically 4 times daily as needed for Pain (topical)     Historical Provider, MD   albuterol (PROVENTIL;VENTOLIN) 90 MCG/ACT inhaler Inhale 2 puffs into the lungs every 8 hours as needed.       Historical Provider, MD       Future Appointments  Date Time Provider Herbert Portillo   9/6/2018 8:00 AM [facility] [place]   9/14/2018 8:10 AM Carroll Boyd MD [facility] [place]   11/8/2018 11:20 AM Titi Woodruff MD [facility] [address]     ,   Diabetes Assessment    Meal Planning:  Avoidance of concentrated sweets   How often do you test your blood sugar?:  Daily, Bedtime   Do you have barriers with adherence to non-pharmacologic self-management interventions?  (Nutrition/Exercise/Self-Monitoring):  No   Have you ever had to go to the ED for symptoms of low blood sugar?:  No       No patient-reported symptoms   Do you have hyperglycemia symptoms?:  No   Do you have hypoglycemia symptoms?:  No   Last Blood Sugar Value:  108   Blood Sugar Monitoring Regimen:  At Bedtime, Morning Fasting   Blood Sugar Trends:  No Change      ,   General Assessment    Do you have any symptoms that are causing concern?:  Yes  Progression since Onset:  Unchanged, Intermittent - Waxing/Waning  Reported Symptoms:  Pain      and Care Coordination Episodes    Type: Amb Care Coordination  Episode: Complex Care   Noted: 12/21/2015

## 2018-08-15 NOTE — CARE COORDINATION
Dr. Vashti Stanton:  Please see note below. Patient hadCT completed during recent hospitalization that showed lung nodule and they recommend f/u test in 3 months and f/u with pulmonary. Patient is scheduled to see pulmonary in Nov. but they will not order CT scan prior to appointment since patient is new to their office. Patient is asking if you are willing to order the repeat CT scan so she can have it completed and pulmonary can review prior to her appointment. (3 months would be in Oct.  Patient is scheduled to see you 9/14). Please advise. Thank you!

## 2018-08-16 NOTE — CARE COORDINATION
Attempted to reach patient in f/u to our conversation yesterday. Patient was not available at the time of my call and voicemail message was left asking patient to please return call to my direct number.   Lizzie Kirk RN Care Coordinator

## 2018-08-16 NOTE — TELEPHONE ENCOUNTER
We can discuss this in September but she should probably wait to get pulmonology opinion on the type of scan that they would like to see

## 2018-08-30 NOTE — TELEPHONE ENCOUNTER
From: Robe Kitchen  Sent: 8/29/2018 4:59 PM EDT  Subject: Medication Renewal Request    Marly Gamez.  Afua Odor would like a refill of the following medications:     metFORMIN (GLUCOPHAGE) 500 MG tablet Roberta Vail MD]    Preferred pharmacy: [facility], [address] [address] [place] [address]  N [phone] - F [phone]    Comment:

## 2018-09-06 ENCOUNTER — HOSPITAL ENCOUNTER (OUTPATIENT)
Age: 38
Discharge: HOME | End: 2018-09-06
Payer: MEDICARE

## 2018-09-06 DIAGNOSIS — E11.69 TYPE 2 DIABETES MELLITUS WITH OTHER SPECIFIED COMPLICATION, WITHOUT LONG-TERM CURRENT USE OF INSULIN (HCC): ICD-10-CM

## 2018-09-06 DIAGNOSIS — E83.42 HYPOMAGNESEMIA: ICD-10-CM

## 2018-09-06 LAB
ANION GAP SERPL CALCULATED.3IONS-SCNC: 14 MEQ/L (ref 8–16)
AVERAGE GLUCOSE: 126 MG/DL (ref 70–126)
BUN BLDV-MCNC: 7 MG/DL (ref 7–22)
CALCIUM SERPL-MCNC: 9.2 MG/DL (ref 8.5–10.5)
CHLORIDE BLD-SCNC: 101 MEQ/L (ref 98–111)
CO2: 27 MEQ/L (ref 23–33)
CREAT SERPL-MCNC: 0.6 MG/DL (ref 0.4–1.2)
GFR SERPL CREATININE-BSD FRML MDRD: > 90 ML/MIN/1.73M2
GLUCOSE BLD-MCNC: 92 MG/DL (ref 70–108)
HBA1C MFR BLD: 6.2 % (ref 4.4–6.4)
INSULIN, RANDOM OR FASTING: NORMAL
MAGNESIUM: 1.8 MG/DL (ref 1.6–2.4)
POTASSIUM SERPL-SCNC: 3.6 MEQ/L (ref 3.5–5.2)
SODIUM BLD-SCNC: 142 MEQ/L (ref 135–145)

## 2018-09-06 PROCEDURE — 80048 BASIC METABOLIC PNL TOTAL CA: CPT

## 2018-09-06 PROCEDURE — 83735 ASSAY OF MAGNESIUM: CPT

## 2018-09-06 PROCEDURE — 83036 HEMOGLOBIN GLYCOSYLATED A1C: CPT

## 2018-09-06 PROCEDURE — 36415 COLL VENOUS BLD VENIPUNCTURE: CPT

## 2018-09-06 PROCEDURE — 83525 ASSAY OF INSULIN: CPT

## 2018-09-12 ENCOUNTER — CARE COORDINATION (OUTPATIENT)
Dept: CARE COORDINATION | Age: 38
End: 2018-09-12

## 2018-09-12 NOTE — CARE COORDINATION
Attempted to reach patient for continued Care Coordination follow up and education. Patient was unavailable at the time of my call, and generic voicemail message was left asking patient to please return call to my direct number.   Albino Waldron RN Care Coordinator

## 2018-09-14 ENCOUNTER — OFFICE VISIT (OUTPATIENT)
Dept: FAMILY MEDICINE CLINIC | Age: 38
End: 2018-09-14
Payer: MEDICARE

## 2018-09-14 VITALS
WEIGHT: 221.4 LBS | HEART RATE: 88 BPM | HEIGHT: 63 IN | TEMPERATURE: 98.9 F | SYSTOLIC BLOOD PRESSURE: 122 MMHG | RESPIRATION RATE: 16 BRPM | DIASTOLIC BLOOD PRESSURE: 86 MMHG | BODY MASS INDEX: 39.23 KG/M2

## 2018-09-14 DIAGNOSIS — E66.9 OBESITY (BMI 30-39.9): ICD-10-CM

## 2018-09-14 DIAGNOSIS — E78.00 PURE HYPERCHOLESTEROLEMIA: ICD-10-CM

## 2018-09-14 DIAGNOSIS — E11.69 TYPE 2 DIABETES MELLITUS WITH OTHER SPECIFIED COMPLICATION, WITHOUT LONG-TERM CURRENT USE OF INSULIN (HCC): Primary | ICD-10-CM

## 2018-09-14 DIAGNOSIS — I63.9 CEREBROVASCULAR ACCIDENT (CVA), UNSPECIFIED MECHANISM (HCC): ICD-10-CM

## 2018-09-14 DIAGNOSIS — G40.909 SEIZURE DISORDER (HCC): ICD-10-CM

## 2018-09-14 DIAGNOSIS — H81.13 BPV (BENIGN POSITIONAL VERTIGO), BILATERAL: ICD-10-CM

## 2018-09-14 DIAGNOSIS — K21.9 GASTROESOPHAGEAL REFLUX DISEASE WITHOUT ESOPHAGITIS: ICD-10-CM

## 2018-09-14 DIAGNOSIS — E66.01 MORBID OBESITY DUE TO EXCESS CALORIES (HCC): ICD-10-CM

## 2018-09-14 DIAGNOSIS — M32.9 LUPUS ARTHRITIS (HCC): ICD-10-CM

## 2018-09-14 PROCEDURE — 99214 OFFICE O/P EST MOD 30 MIN: CPT | Performed by: FAMILY MEDICINE

## 2018-09-14 PROCEDURE — 2022F DILAT RTA XM EVC RTNOPTHY: CPT | Performed by: FAMILY MEDICINE

## 2018-09-14 PROCEDURE — G8417 CALC BMI ABV UP PARAM F/U: HCPCS | Performed by: FAMILY MEDICINE

## 2018-09-14 PROCEDURE — 1036F TOBACCO NON-USER: CPT | Performed by: FAMILY MEDICINE

## 2018-09-14 PROCEDURE — G8598 ASA/ANTIPLAT THER USED: HCPCS | Performed by: FAMILY MEDICINE

## 2018-09-14 PROCEDURE — G8427 DOCREV CUR MEDS BY ELIG CLIN: HCPCS | Performed by: FAMILY MEDICINE

## 2018-09-14 PROCEDURE — 3044F HG A1C LEVEL LT 7.0%: CPT | Performed by: FAMILY MEDICINE

## 2018-09-14 NOTE — PROGRESS NOTES
wheezing. Cardiovascular: Negative for chest pain and leg swelling. Gastrointestinal: Negative for nausea, vomiting, abdominal pain, diarrhea and constipation. Genitourinary: Negative for dysuria, urgency and frequency. Neurological: Negative for weakness, light-headedness and headaches. Psychiatric/Behavioral: Negative for sleep disturbance. Objective:     Vitals:    09/14/18 0810   BP: 122/86   Site: Left Upper Arm   Position: Sitting   Cuff Size: Large Adult   Pulse: 88   Resp: 16   Temp: 98.9 °F (37.2 °C)   TempSrc: Oral   Weight: 221 lb 6.4 oz (100.4 kg)   Height: 5' 3\" (1.6 m)     Wt Readings from Last 3 Encounters:   09/14/18 221 lb 6.4 oz (100.4 kg)   08/08/18 224 lb 4.8 oz (101.7 kg)   07/18/18 229 lb (103.9 kg)       Physical Exam  Physical Exam   Constitutional: Vital signs are normal. She appears well-developed and well-nourished. She is active. HENT:   Head: Normocephalic and atraumatic. Right Ear: Tympanic membrane, external ear and ear canal normal. No drainage or tenderness. Left Ear: Tympanic membrane, external ear and ear canal normal. No drainage or tenderness. Nose: Nose normal. No mucosal edema or rhinorrhea. Mouth/Throat: Uvula is midline, oropharynx is clear and moist and mucous membranes are normal. Mucous membranes are not pale. Normal dentition. No posterior oropharyngeal edema or posterior oropharyngeal erythema. Eyes: Lids are normal. Right eye exhibits no chemosis and no discharge. Left eye exhibits no chemosis and no drainage. Right conjunctiva has no hemorrhage. Left conjunctiva has no hemorrhage. Right eye exhibits normal extraocular motion. Left eye exhibits normal extraocular motion. Right pupil is round and reactive. Left pupil is round and reactive. Pupils are equal.   Cardiovascular: Normal rate, regular rhythm, S1 normal, S2 normal and normal heart sounds. Exam reveals no gallop. No murmur heard.   Pulmonary/Chest: Effort normal and breath sounds

## 2018-09-28 DIAGNOSIS — M79.7 FIBROMYALGIA: ICD-10-CM

## 2018-10-01 DIAGNOSIS — M79.7 FIBROMYALGIA: ICD-10-CM

## 2018-10-01 RX ORDER — IBUPROFEN 600 MG/1
600 TABLET ORAL EVERY 8 HOURS PRN
Qty: 42 TABLET | Refills: 5 | Status: SHIPPED | OUTPATIENT
Start: 2018-10-01 | End: 2018-10-31 | Stop reason: SDUPTHER

## 2018-10-01 RX ORDER — CLOPIDOGREL BISULFATE 75 MG/1
75 TABLET ORAL DAILY
Qty: 90 TABLET | Refills: 3 | Status: SHIPPED | OUTPATIENT
Start: 2018-10-01 | End: 2018-11-28 | Stop reason: SDUPTHER

## 2018-10-01 RX ORDER — POTASSIUM CHLORIDE 750 MG/1
10 TABLET, EXTENDED RELEASE ORAL DAILY
Qty: 30 TABLET | Refills: 5 | Status: SHIPPED | OUTPATIENT
Start: 2018-10-01 | End: 2019-03-30 | Stop reason: SDUPTHER

## 2018-10-01 RX ORDER — CLOPIDOGREL BISULFATE 75 MG/1
TABLET ORAL
Qty: 90 TABLET | Refills: 3 | Status: SHIPPED | OUTPATIENT
Start: 2018-10-01 | End: 2018-10-01

## 2018-10-01 RX ORDER — TRAZODONE HYDROCHLORIDE 50 MG/1
TABLET ORAL
Qty: 90 TABLET | Refills: 1 | Status: SHIPPED | OUTPATIENT
Start: 2018-10-01 | End: 2018-10-01

## 2018-10-01 RX ORDER — TRAZODONE HYDROCHLORIDE 50 MG/1
150 TABLET ORAL NIGHTLY
Qty: 90 TABLET | Refills: 5 | Status: SHIPPED | OUTPATIENT
Start: 2018-10-01 | End: 2019-03-30 | Stop reason: SDUPTHER

## 2018-10-04 ENCOUNTER — CARE COORDINATION (OUTPATIENT)
Dept: CARE COORDINATION | Age: 38
End: 2018-10-04

## 2018-10-10 ENCOUNTER — CARE COORDINATION (OUTPATIENT)
Dept: CARE COORDINATION | Age: 38
End: 2018-10-10

## 2018-10-10 NOTE — CARE COORDINATION
Ambulatory Care Coordination Note  10/10/2018  CM Risk Score: 7  Ankita Mortality Risk Score:      ACC: Anju Gossr, RN    Summary Note: Patient was called for continued Care Coordination follow up and education. Patient shared she has been feeling under the weather with \"cold\" like symptoms. Patient denied any c/o fever being present but stated she does have sinus congestion, cough, and sore throat present. Patient stated she is taking coricidin with some relief. Patient denied need for evaluation at this time. Patient was encouraged to continue with OTC treatment and to be sure she is drinking plenty of fluids and to use humidifier at night. Patient acknowledged understanding. Patient was instructed to call for an appointment if symptoms continue or worsen and she verbalized understanding. Patient stated she continues to monitor her BS every AM and prior to dinner in the evening. Patient reported BS have been slightly higher at times recently while having cold symptoms. Diabetes education was reviewed including what to call and report and patient verbalized understanding. Patient stated BS returned to normal range of 110 this AM but was 180 yesterday. Patient continues to work to monitor her diet and she was congratulated on recent improvements and monitoring. RNCC reviewed the importance of good BS control to help prevent development/worsening of chronic complications as well as importance of regular foot and eye exams. Patient verbalized understanding. Patient denied any other questions, concerns, or needs and she was encouraged to call with any that may develop. Will continue to work to f/u with patient in the future for continued support and education and monitor for readiness to return to surveillance follow up.       Care Coordination Interventions    Program Enrollment:  Complex Care  Referral from Primary Care Provider:  No  Suggested Interventions and Community Resources  Diabetes 4/27/18   Jatinder Tse MD   ondansetron (ZOFRAN ODT) 4 MG disintegrating tablet Take 1 tablet by mouth every 8 hours as needed for Nausea or Vomiting 3/27/18   Omar Jasmine MD   EQ PAIN RELIEVER 325 MG tablet TAKE TWO TABLETS BY MOUTH EVERY 4 HOURS AS NEEDED FOR PAIN AND FEVER 12/4/17   Jatinder Tse MD   Amitriptyline HCl (ELAVIL PO) Take 25 mg by mouth nightly     Historical Provider, MD   omeprazole (PRILOSEC) 20 MG delayed release capsule Take 1 capsule by mouth 2 times daily 10/18/17   Jatinder Tse MD   aspirin (LORENZO ASPIRIN) 325 MG tablet Take 1 tablet by mouth daily 9/11/17   Jatinder Tse MD   predniSONE (DELTASONE) 5 MG tablet Take 5 mg by mouth 2 times daily    Historical Provider, MD   sucralfate (CARAFATE) 1 GM tablet TAKE ONE TABLET BY MOUTH 4 TIMES DAILY 4/3/17   Jatinder Tse MD   promethazine (PHENERGAN) 25 MG tablet Take 1 tablet by mouth every 6 hours as needed for Nausea 2/1/17   Jatinder Tse MD   sodium chloride (OCEAN) 0.65 % nasal spray 1 spray by Nasal route as needed for Congestion 12/19/16   Jatinder Tse MD   verapamil (CALAN SR) 240 MG CR tablet Take 1 tablet by mouth daily 8/17/16   Carlos Longoria MD   diclofenac sodium (VOLTAREN) 1 % GEL Apply 2 g topically 4 times daily as needed for Pain (topical)     Historical Provider, MD   albuterol (PROVENTIL;VENTOLIN) 90 MCG/ACT inhaler Inhale 2 puffs into the lungs every 8 hours as needed. Historical Provider, MD       Future Appointments  Date Time Provider Herbert Portillo   11/8/2018 11:20 AM [facility] [place] [facility]   12/11/2018 8:20 AM MD Ce Jara [facility] [address] [address]     ,   Diabetes Assessment    Meal Planning:  Avoidance of concentrated sweets   How often do you test your blood sugar?:  Daily, Bedtime   Do you have barriers with adherence to non-pharmacologic self-management interventions?  (Nutrition/Exercise/Self-Monitoring):  No   Have you ever had to go to the ED for symptoms of low blood

## 2018-10-31 RX ORDER — OMEPRAZOLE 20 MG/1
20 CAPSULE, DELAYED RELEASE ORAL 2 TIMES DAILY
Qty: 60 CAPSULE | Refills: 11 | Status: SHIPPED | OUTPATIENT
Start: 2018-10-31 | End: 2019-03-30 | Stop reason: SDUPTHER

## 2018-10-31 RX ORDER — IBUPROFEN 600 MG/1
600 TABLET ORAL EVERY 8 HOURS PRN
Qty: 42 TABLET | Refills: 5 | Status: SHIPPED | OUTPATIENT
Start: 2018-10-31 | End: 2019-03-31 | Stop reason: SDUPTHER

## 2018-10-31 RX ORDER — OMEPRAZOLE 20 MG/1
CAPSULE, DELAYED RELEASE ORAL
Qty: 60 CAPSULE | Refills: 11 | Status: SHIPPED | OUTPATIENT
Start: 2018-10-31 | End: 2018-10-31

## 2018-11-08 ENCOUNTER — OFFICE VISIT (OUTPATIENT)
Dept: PULMONOLOGY | Age: 38
End: 2018-11-08
Payer: MEDICARE

## 2018-11-08 VITALS
HEART RATE: 90 BPM | WEIGHT: 223.6 LBS | TEMPERATURE: 97.9 F | DIASTOLIC BLOOD PRESSURE: 84 MMHG | SYSTOLIC BLOOD PRESSURE: 128 MMHG | OXYGEN SATURATION: 97 % | BODY MASS INDEX: 39.62 KG/M2 | HEIGHT: 63 IN

## 2018-11-08 DIAGNOSIS — M32.9 SYSTEMIC LUPUS ERYTHEMATOSUS, UNSPECIFIED SLE TYPE, UNSPECIFIED ORGAN INVOLVEMENT STATUS (HCC): ICD-10-CM

## 2018-11-08 DIAGNOSIS — R06.02 SOB (SHORTNESS OF BREATH): ICD-10-CM

## 2018-11-08 DIAGNOSIS — R91.1 LUNG NODULE, SOLITARY: Primary | ICD-10-CM

## 2018-11-08 DIAGNOSIS — R91.1 LUNG NODULE: ICD-10-CM

## 2018-11-08 PROCEDURE — 99204 OFFICE O/P NEW MOD 45 MIN: CPT | Performed by: INTERNAL MEDICINE

## 2018-11-08 PROCEDURE — G8427 DOCREV CUR MEDS BY ELIG CLIN: HCPCS | Performed by: INTERNAL MEDICINE

## 2018-11-08 PROCEDURE — G8417 CALC BMI ABV UP PARAM F/U: HCPCS | Performed by: INTERNAL MEDICINE

## 2018-11-08 PROCEDURE — G8598 ASA/ANTIPLAT THER USED: HCPCS | Performed by: INTERNAL MEDICINE

## 2018-11-08 PROCEDURE — G8484 FLU IMMUNIZE NO ADMIN: HCPCS | Performed by: INTERNAL MEDICINE

## 2018-11-08 PROCEDURE — 1036F TOBACCO NON-USER: CPT | Performed by: INTERNAL MEDICINE

## 2018-11-08 NOTE — PROGRESS NOTES
Vannessa Sofia Tape]      Tears skin    Sulfa Antibiotics Other (See Comments)     Can not take medication due to illness      Family History   Problem Relation Age of Onset    High Blood Pressure Mother     Diabetes Father     High Blood Pressure Father     Diabetes Sister     Cancer Sister 39        breast    Other Sister         Crest Syndrome    Diabetes Brother     High Cholesterol Brother      Current Outpatient Prescriptions   Medication Sig Dispense Refill    omeprazole (PRILOSEC) 20 MG delayed release capsule Take 1 capsule by mouth 2 times daily 60 capsule 11    NEXIUM 40 MG delayed release capsule Take 1 capsule by mouth every morning (before breakfast) 30 capsule 5    ibuprofen (ADVIL;MOTRIN) 600 MG tablet Take 1 tablet by mouth every 8 hours as needed for Pain 42 tablet 5    potassium chloride (KLOR-CON M10) 10 MEQ extended release tablet Take 1 tablet by mouth daily 30 tablet 5    clopidogrel (PLAVIX) 75 MG tablet Take 1 tablet by mouth daily 90 tablet 3    traZODone (DESYREL) 50 MG tablet Take 3 tablets by mouth nightly 90 tablet 5    metFORMIN (GLUCOPHAGE) 500 MG tablet Take 1 tablet by mouth 2 times daily (with meals) 60 tablet 3    levocetirizine (XYZAL) 5 MG tablet TAKE ONE TABLET BY MOUTH NIGHTLY 30 tablet 11    atorvastatin (LIPITOR) 80 MG tablet Take 1 tablet by mouth daily 90 tablet 3    magnesium oxide (MAG-OX) 400 (240 Mg) MG tablet Take 1 tablet by mouth daily 30 tablet 5    montelukast (SINGULAIR) 10 MG tablet Take 1 tablet by mouth nightly 30 tablet 5    DULoxetine (CYMBALTA) 60 MG extended release capsule TAKE ONE CAPSULE BY MOUTH ONCE DAILY 30 capsule 5    ondansetron (ZOFRAN ODT) 4 MG disintegrating tablet Take 1 tablet by mouth every 8 hours as needed for Nausea or Vomiting 20 tablet 0    EQ PAIN RELIEVER 325 MG tablet TAKE TWO TABLETS BY MOUTH EVERY 4 HOURS AS NEEDED FOR PAIN AND FEVER 120 tablet 3    Amitriptyline HCl (ELAVIL PO) Take 25 mg by mouth nightly      

## 2018-11-13 ENCOUNTER — CARE COORDINATION (OUTPATIENT)
Dept: CARE COORDINATION | Age: 38
End: 2018-11-13

## 2018-11-21 ENCOUNTER — HOSPITAL ENCOUNTER (OUTPATIENT)
Dept: PULMONOLOGY | Age: 38
Discharge: HOME | End: 2018-11-21
Payer: MEDICARE

## 2018-11-21 ENCOUNTER — HOSPITAL ENCOUNTER (OUTPATIENT)
Dept: CT IMAGING | Age: 38
Discharge: HOME | End: 2018-11-21
Payer: MEDICARE

## 2018-11-21 DIAGNOSIS — R06.02 SOB (SHORTNESS OF BREATH): ICD-10-CM

## 2018-11-21 DIAGNOSIS — M32.9 SYSTEMIC LUPUS ERYTHEMATOSUS, UNSPECIFIED SLE TYPE, UNSPECIFIED ORGAN INVOLVEMENT STATUS (HCC): ICD-10-CM

## 2018-11-21 DIAGNOSIS — R91.1 LUNG NODULE, SOLITARY: ICD-10-CM

## 2018-11-21 LAB — POC CREATININE WHOLE BLOOD: 0.7 MG/DL (ref 0.5–1.2)

## 2018-11-21 PROCEDURE — 2709999900 HC NON-CHARGEABLE SUPPLY

## 2018-11-21 PROCEDURE — 71260 CT THORAX DX C+: CPT

## 2018-11-21 PROCEDURE — 6360000004 HC RX CONTRAST MEDICATION: Performed by: INTERNAL MEDICINE

## 2018-11-21 PROCEDURE — 94726 PLETHYSMOGRAPHY LUNG VOLUMES: CPT

## 2018-11-21 PROCEDURE — 94060 EVALUATION OF WHEEZING: CPT

## 2018-11-21 PROCEDURE — 94729 DIFFUSING CAPACITY: CPT

## 2018-11-21 PROCEDURE — 82565 ASSAY OF CREATININE: CPT

## 2018-11-21 RX ADMIN — IOPAMIDOL 85 ML: 755 INJECTION, SOLUTION INTRAVENOUS at 08:34

## 2018-11-28 ENCOUNTER — CARE COORDINATION (OUTPATIENT)
Dept: CARE COORDINATION | Age: 38
End: 2018-11-28

## 2018-11-28 DIAGNOSIS — E83.42 HYPOMAGNESEMIA: ICD-10-CM

## 2018-11-28 RX ORDER — MONTELUKAST SODIUM 10 MG/1
10 TABLET ORAL NIGHTLY
Qty: 30 TABLET | Refills: 5 | Status: SHIPPED | OUTPATIENT
Start: 2018-11-28 | End: 2019-05-30 | Stop reason: SDUPTHER

## 2018-11-28 RX ORDER — DULOXETIN HYDROCHLORIDE 60 MG/1
CAPSULE, DELAYED RELEASE ORAL
Qty: 30 CAPSULE | Refills: 5 | Status: SHIPPED | OUTPATIENT
Start: 2018-11-28 | End: 2019-05-30 | Stop reason: SDUPTHER

## 2018-11-28 RX ORDER — CLOPIDOGREL BISULFATE 75 MG/1
75 TABLET ORAL DAILY
Qty: 90 TABLET | Refills: 3 | Status: SHIPPED | OUTPATIENT
Start: 2018-11-28 | End: 2019-11-29 | Stop reason: SDUPTHER

## 2018-11-28 NOTE — CARE COORDINATION
sodium (VOLTAREN) 1 % GEL Apply 2 g topically 4 times daily as needed for Pain (topical)     Historical Provider, MD   albuterol (PROVENTIL;VENTOLIN) 90 MCG/ACT inhaler Inhale 2 puffs into the lungs every 8 hours as needed. Historical Provider, MD       Future Appointments  Date Time Provider Herbert Saavedrai   12/6/2018 10:00 AM [facility]res [facility] [place]   12/11/2018 8:20 AM MD Sera Egan [facility] [place] [address]     ,   Diabetes Assessment    Meal Planning:  Avoidance of concentrated sweets   How often do you test your blood sugar?:  Daily, Bedtime   Do you have barriers with adherence to non-pharmacologic self-management interventions?  (Nutrition/Exercise/Self-Monitoring):  No   Have you ever had to go to the ED for symptoms of low blood sugar?:  No       No patient-reported symptoms      ,   General Assessment    Do you have any symptoms that are causing concern?:  Yes  Progression since Onset:  Unchanged  Reported Symptoms:  Other, Fatigue (Comment: viral symptoms)      and Care Coordination Episodes    Type: Amb Care Coordination  Episode: Complex Care   Noted: 12/21/2015

## 2018-12-06 ENCOUNTER — OFFICE VISIT (OUTPATIENT)
Dept: PULMONOLOGY | Age: 38
End: 2018-12-06
Payer: MEDICARE

## 2018-12-06 VITALS
SYSTOLIC BLOOD PRESSURE: 126 MMHG | OXYGEN SATURATION: 96 % | HEIGHT: 63 IN | BODY MASS INDEX: 39.51 KG/M2 | WEIGHT: 223 LBS | DIASTOLIC BLOOD PRESSURE: 82 MMHG | TEMPERATURE: 96.9 F | HEART RATE: 100 BPM

## 2018-12-06 DIAGNOSIS — M79.7 FIBROMYALGIA: ICD-10-CM

## 2018-12-06 DIAGNOSIS — M32.9 LUPUS ARTHRITIS (HCC): ICD-10-CM

## 2018-12-06 DIAGNOSIS — R91.1 INCIDENTAL LUNG NODULE, GREATER THAN OR EQUAL TO 8MM: Primary | ICD-10-CM

## 2018-12-06 DIAGNOSIS — E66.01 MORBID OBESITY DUE TO EXCESS CALORIES (HCC): ICD-10-CM

## 2018-12-06 DIAGNOSIS — J30.2 SEASONAL ALLERGIES: ICD-10-CM

## 2018-12-06 DIAGNOSIS — J45.40 MODERATE PERSISTENT ASTHMA WITHOUT COMPLICATION: ICD-10-CM

## 2018-12-06 DIAGNOSIS — Z79.52 CURRENT CHRONIC USE OF SYSTEMIC STEROIDS: ICD-10-CM

## 2018-12-06 PROCEDURE — 99214 OFFICE O/P EST MOD 30 MIN: CPT | Performed by: NURSE PRACTITIONER

## 2018-12-06 PROCEDURE — G8417 CALC BMI ABV UP PARAM F/U: HCPCS | Performed by: NURSE PRACTITIONER

## 2018-12-06 PROCEDURE — 95012 NITRIC OXIDE EXP GAS DETER: CPT | Performed by: NURSE PRACTITIONER

## 2018-12-06 PROCEDURE — G8598 ASA/ANTIPLAT THER USED: HCPCS | Performed by: NURSE PRACTITIONER

## 2018-12-06 PROCEDURE — 1036F TOBACCO NON-USER: CPT | Performed by: NURSE PRACTITIONER

## 2018-12-06 PROCEDURE — G8484 FLU IMMUNIZE NO ADMIN: HCPCS | Performed by: NURSE PRACTITIONER

## 2018-12-06 PROCEDURE — G8427 DOCREV CUR MEDS BY ELIG CLIN: HCPCS | Performed by: NURSE PRACTITIONER

## 2018-12-06 RX ORDER — NEBULIZER ACCESSORIES
1 EACH MISCELLANEOUS EVERY 6 HOURS PRN
Qty: 1 EACH | Refills: 0 | Status: SHIPPED | OUTPATIENT
Start: 2018-12-06

## 2018-12-06 RX ORDER — LEVALBUTEROL INHALATION SOLUTION 0.63 MG/3ML
0.63 SOLUTION RESPIRATORY (INHALATION) EVERY 6 HOURS PRN
Qty: 270 ML | Refills: 5 | Status: SHIPPED | OUTPATIENT
Start: 2018-12-06 | End: 2022-08-11 | Stop reason: SDUPTHER

## 2018-12-06 ASSESSMENT — ENCOUNTER SYMPTOMS
COUGH: 1
WHEEZING: 1
SHORTNESS OF BREATH: 1
VOMITING: 0
ABDOMINAL PAIN: 0
NAUSEA: 0
EYES NEGATIVE: 1
DIARRHEA: 0

## 2018-12-06 NOTE — PROGRESS NOTES
[facility]    Patient: Anaid Miner, 43 y.o.   : 1980  2018    Pt of Dr. John Harding   Patient presents with   [facility] Pulmonary Nodule     4 week follow up with CT chest 18  PFT- 18 Creatinine 18. DNQ  N-15.75  MP- 4        HPI  Becca Dick is here for 4 week follow up for Lung nodule, last seen by Dr Cesar Gray  History of incidental.1 PAVEL peripheral lung nodule found during neurological w/u for TIA. PMH SLE, discoid, fibromyalgia, DMII, Asthma- follows with rheumatologist in [facility] [place]   C/o exertional dyspnea and occasional wheeze, wheezing worse in summer months. Denies nocturnal symptoms. C/o frequent bronchitis infections. Per patient \"was sick with bronchitis for almost 6 months of the year last year\"  On daily prednisone of 5 mg PO BID for her SLE, joint pain. Previously on Advair and Symbicort for her asthma - stopped due to concern of bone health with daily oral steroids. 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 Had Aunt with lung cancer - was a smoker  Has Albuterol to use PRN- not using due to severe jitteriness - hands will shake so bad she drops things with one use of albuterol     Any recent ER visits or hospitalizations? No  Flu / Pneumonia vaccine ?  Yes, UTD on both   On disability    Past Medical hx   PMH:  Past Medical History:   Diagnosis Date    Asthma     Diabetes mellitus ([facility] [address] 2018    Fibromyalgia     GERD (gastroesophageal reflux disease)     Hyperlipidemia     Lupus (systemic lupus erythematosus) ([facility] [address]     Lupus arthritis [facility]    Migraine     Plaquenil adverse reaction in therapeutic use 2016    changes in the eyes    TIA (transient ischemic attack)     2016     SURGICAL HISTORY:  Past Surgical History:   Procedure Laterality Date    HYSTERECTOMY  2009    Total, endometriosis    LAPAROSCOPY      SHOULDER ARTHROSCOPY Left 08/20/2015    Debridement of rotator cuff and bone spurs     SOCIAL HISTORY:  Social History   Substance Use Topics    Smoking status: Never Smoker    Smokeless tobacco: Never Used    Alcohol use No     ALLERGIES:  Allergies   Allergen Reactions    Cayenne Swelling     Tongue and throat swelling    Codeine Rash    Tape [Adhesive Tape]      Tears skin    Sulfa Antibiotics Other (See Comments)     Can not take medication due to illness     FAMILY HISTORY:  Family History   Problem Relation Age of Onset    High Blood Pressure Mother     Diabetes Father     High Blood Pressure Father     Diabetes Sister     Cancer Sister 39        breast    Other Sister         Crest Syndrome    Diabetes Brother     High Cholesterol Brother      CURRENT MEDICATIONS:  Current Outpatient Prescriptions   Medication Sig Dispense Refill    levalbuterol (XOPENEX) 0.63 MG/3ML nebulization Take 3 mLs by nebulization every 6 hours as needed for Wheezing or Shortness of Breath 270 mL 5    Respiratory Therapy Supplies (NEBULIZER AIR TUBE/PLUGS) MISC 1 kit by Does not apply route every 6 hours as needed (Wheezing/shortness of breath) Please provide nebulizer kit and supplies.  1 each 0    magnesium oxide (MAG-OX) 400 (241.3 Mg) MG TABS tablet TAKE 1 TABLET BY MOUTH ONCE DAILY 30 tablet 5    DULoxetine (CYMBALTA) 60 MG extended release capsule TAKE 1 CAPSULE BY MOUTH ONCE DAILY 30 capsule 5    montelukast (SINGULAIR) 10 MG tablet Take 1 tablet by mouth nightly 30 tablet 5    clopidogrel (PLAVIX) 75 MG tablet Take 1 tablet by mouth daily 90 tablet 3    omeprazole (PRILOSEC) 20 MG delayed release capsule Take 1 capsule by mouth 2 times daily 60 capsule 11    NEXIUM 40 MG delayed release capsule Take 1 capsule by mouth every morning (before breakfast) 30 capsule 5    ibuprofen (ADVIL;MOTRIN) 600 MG tablet Take 1 tablet by mouth every 8 hours as needed for Pain 42 tablet 5    potassium chloride (KLOR-CON M10) 10 MEQ extended release tablet Take 1 tablet by mouth daily 30 tablet 5    traZODone (DESYREL) 50 MG tablet Take 3 tablets by mouth nightly 90 tablet 5    metFORMIN (GLUCOPHAGE) 500 MG tablet Take 1 tablet by mouth 2 times daily (with meals) 60 tablet 3    levocetirizine (XYZAL) 5 MG tablet TAKE ONE TABLET BY MOUTH NIGHTLY 30 tablet 11    atorvastatin (LIPITOR) 80 MG tablet Take 1 tablet by mouth daily 90 tablet 3    ondansetron (ZOFRAN ODT) 4 MG disintegrating tablet Take 1 tablet by mouth every 8 hours as needed for Nausea or Vomiting 20 tablet 0    EQ PAIN RELIEVER 325 MG tablet TAKE TWO TABLETS BY MOUTH EVERY 4 HOURS AS NEEDED FOR PAIN AND FEVER 120 tablet 3    Amitriptyline HCl (ELAVIL PO) Take 25 mg by mouth nightly       aspirin (LORENZO ASPIRIN) 325 MG tablet Take 1 tablet by mouth daily 30 tablet 3    predniSONE (DELTASONE) 5 MG tablet Take 5 mg by mouth 2 times daily      sucralfate (CARAFATE) 1 GM tablet TAKE ONE TABLET BY MOUTH 4 TIMES DAILY 120 tablet 11    promethazine (PHENERGAN) 25 MG tablet Take 1 tablet by mouth every 6 hours as needed for Nausea 90 tablet 11    sodium chloride (OCEAN) 0.65 % nasal spray 1 spray by Nasal route as needed for Congestion 1 Bottle 3    verapamil (CALAN SR) 240 MG CR tablet Take 1 tablet by mouth daily 30 tablet 3    diclofenac sodium (VOLTAREN) 1 % GEL Apply 2 g topically 4 times daily as needed for Pain (topical)       albuterol (PROVENTIL;VENTOLIN) 90 MCG/ACT inhaler Inhale 2 puffs into the lungs every 8 hours as needed. No current facility-administered medications for this visit. [place] Route ROS   Review of Systems   Constitutional: Negative for chills and fever. HENT: Negative. Eyes: Negative. Respiratory: Positive for cough, shortness of breath (with exertion ) and wheezing (occasional ). Cardiovascular: Negative for chest pain, palpitations and leg swelling. Gastrointestinal: Negative for abdominal pain, diarrhea, nausea and vomiting.

## 2018-12-09 ASSESSMENT — ENCOUNTER SYMPTOMS
WHEEZING: 0
SHORTNESS OF BREATH: 0
COUGH: 0
TROUBLE SWALLOWING: 0
VOICE CHANGE: 0
CHEST TIGHTNESS: 0
STRIDOR: 0
CHOKING: 0

## 2018-12-17 ENCOUNTER — CARE COORDINATION (OUTPATIENT)
Dept: CARE COORDINATION | Age: 38
End: 2018-12-17

## 2018-12-18 ENCOUNTER — HOSPITAL ENCOUNTER (OUTPATIENT)
Age: 38
Discharge: HOME | End: 2018-12-18
Payer: MEDICARE

## 2018-12-18 DIAGNOSIS — J45.40 MODERATE PERSISTENT ASTHMA WITHOUT COMPLICATION: ICD-10-CM

## 2018-12-18 DIAGNOSIS — M32.9 LUPUS ARTHRITIS (HCC): ICD-10-CM

## 2018-12-18 DIAGNOSIS — E78.00 PURE HYPERCHOLESTEROLEMIA: ICD-10-CM

## 2018-12-18 DIAGNOSIS — E11.69 TYPE 2 DIABETES MELLITUS WITH OTHER SPECIFIED COMPLICATION, WITHOUT LONG-TERM CURRENT USE OF INSULIN (HCC): ICD-10-CM

## 2018-12-18 LAB
ALBUMIN SERPL-MCNC: 4.4 G/DL (ref 3.5–5.1)
ALP BLD-CCNC: 175 U/L (ref 38–126)
ALT SERPL-CCNC: 44 U/L (ref 11–66)
ANION GAP SERPL CALCULATED.3IONS-SCNC: 17 MEQ/L (ref 8–16)
AST SERPL-CCNC: 42 U/L (ref 5–40)
AVERAGE GLUCOSE: 123 MG/DL (ref 70–126)
BASOPHILS # BLD: 0.7 %
BASOPHILS ABSOLUTE: 0 THOU/MM3 (ref 0–0.1)
BILIRUB SERPL-MCNC: 0.4 MG/DL (ref 0.3–1.2)
BUN BLDV-MCNC: 12 MG/DL (ref 7–22)
CALCIUM SERPL-MCNC: 9.7 MG/DL (ref 8.5–10.5)
CHLORIDE BLD-SCNC: 99 MEQ/L (ref 98–111)
CHOLESTEROL, TOTAL: 135 MG/DL (ref 100–199)
CO2: 26 MEQ/L (ref 23–33)
CREAT SERPL-MCNC: 0.6 MG/DL (ref 0.4–1.2)
EOSINOPHIL # BLD: 3.1 %
EOSINOPHILS ABSOLUTE: 0.2 THOU/MM3 (ref 0–0.4)
ERYTHROCYTE [DISTWIDTH] IN BLOOD BY AUTOMATED COUNT: 13.9 % (ref 11.5–14.5)
ERYTHROCYTE [DISTWIDTH] IN BLOOD BY AUTOMATED COUNT: 41.7 FL (ref 35–45)
GFR SERPL CREATININE-BSD FRML MDRD: > 90 ML/MIN/1.73M2
GLUCOSE BLD-MCNC: 103 MG/DL (ref 70–108)
HBA1C MFR BLD: 6.1 % (ref 4.4–6.4)
HCT VFR BLD CALC: 42.3 % (ref 37–47)
HDLC SERPL-MCNC: 32 MG/DL
HEMOGLOBIN: 13.4 GM/DL (ref 12–16)
IMMATURE GRANS (ABS): 0.01 THOU/MM3 (ref 0–0.07)
IMMATURE GRANULOCYTES: 0.1 %
LDL CHOLESTEROL CALCULATED: 79 MG/DL
LYMPHOCYTES # BLD: 36 %
LYMPHOCYTES ABSOLUTE: 2.6 THOU/MM3 (ref 1–4.8)
MCH RBC QN AUTO: 26.1 PG (ref 26–33)
MCHC RBC AUTO-ENTMCNC: 31.7 GM/DL (ref 32.2–35.5)
MCV RBC AUTO: 82.5 FL (ref 81–99)
MONOCYTES # BLD: 7.8 %
MONOCYTES ABSOLUTE: 0.6 THOU/MM3 (ref 0.4–1.3)
NUCLEATED RED BLOOD CELLS: 0 /100 WBC
PLATELET # BLD: 231 THOU/MM3 (ref 130–400)
PMV BLD AUTO: 11.6 FL (ref 9.4–12.4)
POTASSIUM SERPL-SCNC: 4 MEQ/L (ref 3.5–5.2)
RBC # BLD: 5.13 MILL/MM3 (ref 4.2–5.4)
SEG NEUTROPHILS: 52.3 %
SEGMENTED NEUTROPHILS ABSOLUTE COUNT: 3.7 THOU/MM3 (ref 1.8–7.7)
SODIUM BLD-SCNC: 142 MEQ/L (ref 135–145)
TOTAL PROTEIN: 7 G/DL (ref 6.1–8)
TRIGL SERPL-MCNC: 121 MG/DL (ref 0–199)
WBC # BLD: 7.1 THOU/MM3 (ref 4.8–10.8)

## 2018-12-18 PROCEDURE — 80053 COMPREHEN METABOLIC PANEL: CPT

## 2018-12-18 PROCEDURE — 80061 LIPID PANEL: CPT

## 2018-12-18 PROCEDURE — 86003 ALLG SPEC IGE CRUDE XTRC EA: CPT

## 2018-12-18 PROCEDURE — 36415 COLL VENOUS BLD VENIPUNCTURE: CPT

## 2018-12-18 PROCEDURE — 83036 HEMOGLOBIN GLYCOSYLATED A1C: CPT

## 2018-12-18 PROCEDURE — 85025 COMPLETE CBC W/AUTO DIFF WBC: CPT

## 2018-12-20 LAB
ALLERGEN BERMUDA GRASS IGE: < 0.1 KU/L
ALLERGEN BIRCH IGE: < 0.1 KU/L
ALLERGEN BOX ELDER: < 0.1 KU/L
ALLERGEN CAT DANDER IGE: < 0.1 KU/L
ALLERGEN COMMON SHORT RAGWEED IGE: < 0.1 KU/L
ALLERGEN COTTONWOOD: < 0.1 KU/L
ALLERGEN D. FARINAE (DUST MITE): < 0.1 KU/L
ALLERGEN DOG DANDER IGE: < 0.1 KU/L
ALLERGEN ELM IGE: < 0.1 KU/L
ALLERGEN FUNGI/MOLD A. ALTERNATA IGE: < 0.1 KU/L
ALLERGEN FUNGI/MOLD A. FUMIGATUS IGE: < 0.1 KU/L
ALLERGEN FUNGI/MOLD M.RACEMOSUS IGE: < 0.1 KU/L
ALLERGEN FUNGI/MOLD P. NOTATUM IGE: < 0.1 KU/L
ALLERGEN GERMAN COCKROACH IGE: < 0.1 KU/L
ALLERGEN INTERPRETATION/SCORE: NORMAL
ALLERGEN MILK IGE: < 0.1 KU/L
ALLERGEN MITE DUST PTERONYSSINUS IGE: < 0.1 KU/L
ALLERGEN MOUNTAIN CEDAR: < 0.1 KU/L
ALLERGEN MOUSE EPITHELIA IGE: < 0.1 KU/L
ALLERGEN PEANUT (F13) IGE: < 0.1 KU/L
ALLERGEN PECAN TREE IGE: < 0.1 KU/L
ALLERGEN RUSSIAN THISTLE IGE: < 0.1 KU/L
ALLERGEN SHEEP SORREL (W18) IGE: < 0.1 KU/L
ALLERGEN TIMOTHY GRASS: < 0.1 KU/L
ALLERGEN TREE SYCAMORE: < 0.1 KU/L
ALLERGEN WALNUT TREE IGE: < 0.1 KU/L
ALLERGEN WEED, PIGWEED IGE: < 0.1 KU/L
ALLERGEN WHITE ASH: < 0.1 KU/L
ALLERGEN WHITE MULBERRY TREE, IGE: < 0.1 KU/L
ALLERGEN, FUNGI/MOLD: < 0.1 KU/L
ALLERGEN, TREE, OAK: < 0.1 KU/L
IMMUNOGLOBULIN E: 3 KU/L

## 2019-01-03 ENCOUNTER — OFFICE VISIT (OUTPATIENT)
Dept: PULMONOLOGY | Age: 39
End: 2019-01-03
Payer: MEDICARE

## 2019-01-03 VITALS
HEART RATE: 80 BPM | BODY MASS INDEX: 39.16 KG/M2 | DIASTOLIC BLOOD PRESSURE: 70 MMHG | OXYGEN SATURATION: 95 % | WEIGHT: 221 LBS | TEMPERATURE: 98.5 F | HEIGHT: 63 IN | SYSTOLIC BLOOD PRESSURE: 124 MMHG

## 2019-01-03 DIAGNOSIS — M32.9 LUPUS ARTHRITIS (HCC): ICD-10-CM

## 2019-01-03 DIAGNOSIS — R91.1 LUNG NODULE, SOLITARY: ICD-10-CM

## 2019-01-03 DIAGNOSIS — J45.40 MODERATE PERSISTENT ASTHMA WITHOUT COMPLICATION: Primary | ICD-10-CM

## 2019-01-03 DIAGNOSIS — M32.9 SYSTEMIC LUPUS ERYTHEMATOSUS, UNSPECIFIED SLE TYPE, UNSPECIFIED ORGAN INVOLVEMENT STATUS (HCC): ICD-10-CM

## 2019-01-03 DIAGNOSIS — E66.01 MORBID OBESITY DUE TO EXCESS CALORIES (HCC): ICD-10-CM

## 2019-01-03 DIAGNOSIS — M79.7 FIBROMYALGIA: ICD-10-CM

## 2019-01-03 PROCEDURE — G8484 FLU IMMUNIZE NO ADMIN: HCPCS | Performed by: NURSE PRACTITIONER

## 2019-01-03 PROCEDURE — 1036F TOBACCO NON-USER: CPT | Performed by: NURSE PRACTITIONER

## 2019-01-03 PROCEDURE — G8427 DOCREV CUR MEDS BY ELIG CLIN: HCPCS | Performed by: NURSE PRACTITIONER

## 2019-01-03 PROCEDURE — 99214 OFFICE O/P EST MOD 30 MIN: CPT | Performed by: NURSE PRACTITIONER

## 2019-01-03 PROCEDURE — G8598 ASA/ANTIPLAT THER USED: HCPCS | Performed by: NURSE PRACTITIONER

## 2019-01-03 PROCEDURE — G8417 CALC BMI ABV UP PARAM F/U: HCPCS | Performed by: NURSE PRACTITIONER

## 2019-01-03 RX ORDER — LEVALBUTEROL TARTRATE 45 UG/1
1-2 AEROSOL, METERED ORAL EVERY 4 HOURS PRN
Qty: 1 INHALER | Refills: 3 | Status: SHIPPED | OUTPATIENT
Start: 2019-01-03

## 2019-01-03 ASSESSMENT — ENCOUNTER SYMPTOMS
NAUSEA: 0
EYES NEGATIVE: 1
CHEST TIGHTNESS: 0
BACK PAIN: 0
STRIDOR: 0
COUGH: 0
ALLERGIC/IMMUNOLOGIC NEGATIVE: 1
WHEEZING: 1
DIARRHEA: 0
SHORTNESS OF BREATH: 1

## 2019-01-10 ENCOUNTER — OFFICE VISIT (OUTPATIENT)
Dept: FAMILY MEDICINE CLINIC | Age: 39
End: 2019-01-10
Payer: MEDICARE

## 2019-01-10 VITALS
BODY MASS INDEX: 39.8 KG/M2 | HEIGHT: 63 IN | TEMPERATURE: 96.9 F | HEART RATE: 98 BPM | WEIGHT: 224.6 LBS | DIASTOLIC BLOOD PRESSURE: 74 MMHG | RESPIRATION RATE: 16 BRPM | SYSTOLIC BLOOD PRESSURE: 128 MMHG | OXYGEN SATURATION: 98 %

## 2019-01-10 DIAGNOSIS — G40.909 SEIZURE DISORDER (HCC): ICD-10-CM

## 2019-01-10 DIAGNOSIS — E78.00 PURE HYPERCHOLESTEROLEMIA: ICD-10-CM

## 2019-01-10 DIAGNOSIS — E66.01 MORBID OBESITY DUE TO EXCESS CALORIES (HCC): ICD-10-CM

## 2019-01-10 DIAGNOSIS — J30.2 SEASONAL ALLERGIES: ICD-10-CM

## 2019-01-10 DIAGNOSIS — K21.9 GASTROESOPHAGEAL REFLUX DISEASE WITHOUT ESOPHAGITIS: ICD-10-CM

## 2019-01-10 DIAGNOSIS — E11.69 TYPE 2 DIABETES MELLITUS WITH OTHER SPECIFIED COMPLICATION, WITHOUT LONG-TERM CURRENT USE OF INSULIN (HCC): Primary | ICD-10-CM

## 2019-01-10 DIAGNOSIS — I63.9 CEREBROVASCULAR ACCIDENT (CVA), UNSPECIFIED MECHANISM (HCC): ICD-10-CM

## 2019-01-10 DIAGNOSIS — M32.9 LUPUS ARTHRITIS (HCC): ICD-10-CM

## 2019-01-10 DIAGNOSIS — E28.319 EARLY MENOPAUSE OCCURRING IN PATIENT AGE YOUNGER THAN 45 YEARS: ICD-10-CM

## 2019-01-10 DIAGNOSIS — E66.9 OBESITY (BMI 30-39.9): ICD-10-CM

## 2019-01-10 PROCEDURE — 1036F TOBACCO NON-USER: CPT | Performed by: FAMILY MEDICINE

## 2019-01-10 PROCEDURE — G8598 ASA/ANTIPLAT THER USED: HCPCS | Performed by: FAMILY MEDICINE

## 2019-01-10 PROCEDURE — G8484 FLU IMMUNIZE NO ADMIN: HCPCS | Performed by: FAMILY MEDICINE

## 2019-01-10 PROCEDURE — 99214 OFFICE O/P EST MOD 30 MIN: CPT | Performed by: FAMILY MEDICINE

## 2019-01-10 PROCEDURE — G8427 DOCREV CUR MEDS BY ELIG CLIN: HCPCS | Performed by: FAMILY MEDICINE

## 2019-01-10 PROCEDURE — G8417 CALC BMI ABV UP PARAM F/U: HCPCS | Performed by: FAMILY MEDICINE

## 2019-01-10 PROCEDURE — 3046F HEMOGLOBIN A1C LEVEL >9.0%: CPT | Performed by: FAMILY MEDICINE

## 2019-01-10 PROCEDURE — 2022F DILAT RTA XM EVC RTNOPTHY: CPT | Performed by: FAMILY MEDICINE

## 2019-01-16 ENCOUNTER — CARE COORDINATION (OUTPATIENT)
Dept: CARE COORDINATION | Age: 39
End: 2019-01-16

## 2019-02-01 ENCOUNTER — CARE COORDINATION (OUTPATIENT)
Dept: CARE COORDINATION | Age: 39
End: 2019-02-01

## 2019-02-27 ENCOUNTER — CARE COORDINATION (OUTPATIENT)
Dept: CARE COORDINATION | Age: 39
End: 2019-02-27

## 2019-03-04 ENCOUNTER — CARE COORDINATION (OUTPATIENT)
Dept: CARE COORDINATION | Age: 39
End: 2019-03-04

## 2019-03-21 ENCOUNTER — CARE COORDINATION (OUTPATIENT)
Dept: CARE COORDINATION | Age: 39
End: 2019-03-21

## 2019-03-21 DIAGNOSIS — M32.9 LUPUS ARTHRITIS (HCC): ICD-10-CM

## 2019-03-21 DIAGNOSIS — M79.7 FIBROMYALGIA: Primary | ICD-10-CM

## 2019-03-28 ENCOUNTER — APPOINTMENT (OUTPATIENT)
Dept: GENERAL RADIOLOGY | Age: 39
End: 2019-03-28
Payer: MEDICARE

## 2019-03-28 ENCOUNTER — HOSPITAL ENCOUNTER (EMERGENCY)
Age: 39
Discharge: HOME | End: 2019-03-28
Attending: FAMILY MEDICINE
Payer: MEDICARE

## 2019-03-28 VITALS
HEIGHT: 63 IN | DIASTOLIC BLOOD PRESSURE: 91 MMHG | HEART RATE: 109 BPM | BODY MASS INDEX: 39.69 KG/M2 | OXYGEN SATURATION: 95 % | SYSTOLIC BLOOD PRESSURE: 135 MMHG | TEMPERATURE: 98.8 F | RESPIRATION RATE: 16 BRPM | WEIGHT: 224 LBS

## 2019-03-28 DIAGNOSIS — M25.551 RIGHT HIP PAIN: Primary | ICD-10-CM

## 2019-03-28 DIAGNOSIS — S70.01XA CONTUSION OF RIGHT HIP, INITIAL ENCOUNTER: ICD-10-CM

## 2019-03-28 PROCEDURE — 6360000002 HC RX W HCPCS: Performed by: FAMILY MEDICINE

## 2019-03-28 PROCEDURE — 96372 THER/PROPH/DIAG INJ SC/IM: CPT

## 2019-03-28 PROCEDURE — 99284 EMERGENCY DEPT VISIT MOD MDM: CPT

## 2019-03-28 PROCEDURE — 73502 X-RAY EXAM HIP UNI 2-3 VIEWS: CPT

## 2019-03-28 RX ORDER — KETOROLAC TROMETHAMINE 30 MG/ML
30 INJECTION, SOLUTION INTRAMUSCULAR; INTRAVENOUS ONCE
Status: COMPLETED | OUTPATIENT
Start: 2019-03-28 | End: 2019-03-28

## 2019-03-28 RX ORDER — ORPHENADRINE CITRATE 30 MG/ML
60 INJECTION INTRAMUSCULAR; INTRAVENOUS ONCE
Status: DISCONTINUED | OUTPATIENT
Start: 2019-03-28 | End: 2019-03-28 | Stop reason: HOSPADM

## 2019-03-28 RX ADMIN — KETOROLAC TROMETHAMINE 30 MG: 30 INJECTION, SOLUTION INTRAMUSCULAR; INTRAVENOUS at 17:20

## 2019-03-28 ASSESSMENT — PAIN SCALES - GENERAL
PAINLEVEL_OUTOF10: 2
PAINLEVEL_OUTOF10: 9
PAINLEVEL_OUTOF10: 9

## 2019-03-28 ASSESSMENT — ENCOUNTER SYMPTOMS: COLOR CHANGE: 0

## 2019-03-28 ASSESSMENT — PAIN DESCRIPTION - LOCATION: LOCATION: HIP;BACK

## 2019-03-29 ENCOUNTER — TELEPHONE (OUTPATIENT)
Dept: FAMILY MEDICINE CLINIC | Age: 39
End: 2019-03-29

## 2019-03-30 DIAGNOSIS — M79.7 FIBROMYALGIA: ICD-10-CM

## 2019-04-01 RX ORDER — OMEPRAZOLE 20 MG/1
20 CAPSULE, DELAYED RELEASE ORAL 2 TIMES DAILY
Qty: 60 CAPSULE | Refills: 11 | Status: SHIPPED | OUTPATIENT
Start: 2019-04-01 | End: 2019-04-01

## 2019-04-01 RX ORDER — POTASSIUM CHLORIDE 750 MG/1
10 TABLET, EXTENDED RELEASE ORAL DAILY
Qty: 30 TABLET | Refills: 5 | Status: SHIPPED | OUTPATIENT
Start: 2019-04-01 | End: 2019-09-29 | Stop reason: SDUPTHER

## 2019-04-01 RX ORDER — OMEPRAZOLE 20 MG/1
20 CAPSULE, DELAYED RELEASE ORAL 2 TIMES DAILY
Qty: 60 CAPSULE | Refills: 11 | Status: SHIPPED | OUTPATIENT
Start: 2019-04-01 | End: 2019-04-29

## 2019-04-01 RX ORDER — IBUPROFEN 600 MG/1
600 TABLET ORAL EVERY 8 HOURS PRN
Qty: 42 TABLET | Refills: 5 | Status: SHIPPED | OUTPATIENT
Start: 2019-04-01 | End: 2019-09-29 | Stop reason: SDUPTHER

## 2019-04-01 RX ORDER — POTASSIUM CHLORIDE 750 MG/1
TABLET, EXTENDED RELEASE ORAL
Qty: 30 TABLET | Refills: 5 | Status: SHIPPED | OUTPATIENT
Start: 2019-04-01 | End: 2019-04-01

## 2019-04-01 RX ORDER — TRAZODONE HYDROCHLORIDE 50 MG/1
150 TABLET ORAL NIGHTLY
Qty: 90 TABLET | Refills: 5 | Status: SHIPPED | OUTPATIENT
Start: 2019-04-01 | End: 2019-04-01

## 2019-04-01 RX ORDER — TRAZODONE HYDROCHLORIDE 50 MG/1
150 TABLET ORAL NIGHTLY
Qty: 90 TABLET | Refills: 5 | Status: SHIPPED | OUTPATIENT
Start: 2019-04-01 | End: 2019-09-29 | Stop reason: SDUPTHER

## 2019-04-03 ENCOUNTER — CARE COORDINATION (OUTPATIENT)
Dept: CARE COORDINATION | Age: 39
End: 2019-04-03

## 2019-04-03 NOTE — CARE COORDINATION
Attempted to reach patient for ED follow up and education. Patient was unavailable at the time of my call, and a generic voicemail message was left asking patient to return my call at [phone].

## 2019-04-11 ENCOUNTER — HOSPITAL ENCOUNTER (OUTPATIENT)
Age: 39
Discharge: HOME | End: 2019-04-11
Payer: MEDICARE

## 2019-04-11 DIAGNOSIS — E11.69 TYPE 2 DIABETES MELLITUS WITH OTHER SPECIFIED COMPLICATION, WITHOUT LONG-TERM CURRENT USE OF INSULIN (HCC): ICD-10-CM

## 2019-04-11 DIAGNOSIS — M32.9 LUPUS ARTHRITIS (HCC): ICD-10-CM

## 2019-04-11 LAB
ANION GAP SERPL CALCULATED.3IONS-SCNC: 11 MEQ/L (ref 8–16)
AVERAGE GLUCOSE: 138 MG/DL (ref 70–126)
BUN BLDV-MCNC: 8 MG/DL (ref 7–22)
CALCIUM SERPL-MCNC: 9.2 MG/DL (ref 8.5–10.5)
CHLORIDE BLD-SCNC: 103 MEQ/L (ref 98–111)
CO2: 26 MEQ/L (ref 23–33)
CREAT SERPL-MCNC: 0.6 MG/DL (ref 0.4–1.2)
CREATININE, URINE: 90.5 MG/DL
GFR SERPL CREATININE-BSD FRML MDRD: > 90 ML/MIN/1.73M2
GLUCOSE BLD-MCNC: 133 MG/DL (ref 70–108)
HBA1C MFR BLD: 6.6 % (ref 4.4–6.4)
MICROALBUMIN UR-MCNC: < 1.2 MG/DL
MICROALBUMIN/CREAT UR-RTO: 13 MG/G (ref 0–30)
POTASSIUM SERPL-SCNC: 3.9 MEQ/L (ref 3.5–5.2)
SODIUM BLD-SCNC: 140 MEQ/L (ref 135–145)
TSH SERPL DL<=0.05 MIU/L-ACNC: 2.94 UIU/ML (ref 0.4–4.2)

## 2019-04-11 PROCEDURE — 82043 UR ALBUMIN QUANTITATIVE: CPT

## 2019-04-11 PROCEDURE — 84443 ASSAY THYROID STIM HORMONE: CPT

## 2019-04-11 PROCEDURE — 83036 HEMOGLOBIN GLYCOSYLATED A1C: CPT

## 2019-04-11 PROCEDURE — 36415 COLL VENOUS BLD VENIPUNCTURE: CPT

## 2019-04-11 PROCEDURE — 80048 BASIC METABOLIC PNL TOTAL CA: CPT

## 2019-04-16 ENCOUNTER — OFFICE VISIT (OUTPATIENT)
Dept: FAMILY MEDICINE CLINIC | Age: 39
End: 2019-04-16
Payer: MEDICARE

## 2019-04-16 ENCOUNTER — TELEPHONE (OUTPATIENT)
Dept: FAMILY MEDICINE CLINIC | Age: 39
End: 2019-04-16

## 2019-04-16 VITALS
OXYGEN SATURATION: 98 % | TEMPERATURE: 96.8 F | SYSTOLIC BLOOD PRESSURE: 122 MMHG | HEIGHT: 63 IN | HEART RATE: 82 BPM | BODY MASS INDEX: 40.4 KG/M2 | RESPIRATION RATE: 15 BRPM | WEIGHT: 228 LBS | DIASTOLIC BLOOD PRESSURE: 82 MMHG

## 2019-04-16 DIAGNOSIS — E78.00 PURE HYPERCHOLESTEROLEMIA: ICD-10-CM

## 2019-04-16 DIAGNOSIS — M32.8 OTHER FORMS OF SYSTEMIC LUPUS ERYTHEMATOSUS, UNSPECIFIED ORGAN INVOLVEMENT STATUS (HCC): ICD-10-CM

## 2019-04-16 DIAGNOSIS — G45.9 TIA (TRANSIENT ISCHEMIC ATTACK): ICD-10-CM

## 2019-04-16 DIAGNOSIS — R91.1 LUNG NODULE, SOLITARY: ICD-10-CM

## 2019-04-16 DIAGNOSIS — E11.69 TYPE 2 DIABETES MELLITUS WITH OTHER SPECIFIED COMPLICATION, WITHOUT LONG-TERM CURRENT USE OF INSULIN (HCC): Primary | ICD-10-CM

## 2019-04-16 DIAGNOSIS — I63.9 CEREBROVASCULAR ACCIDENT (CVA), UNSPECIFIED MECHANISM (HCC): ICD-10-CM

## 2019-04-16 DIAGNOSIS — G43.719 INTRACTABLE CHRONIC MIGRAINE WITHOUT AURA AND WITHOUT STATUS MIGRAINOSUS: ICD-10-CM

## 2019-04-16 DIAGNOSIS — J30.2 SEASONAL ALLERGIES: ICD-10-CM

## 2019-04-16 DIAGNOSIS — E66.01 MORBID OBESITY DUE TO EXCESS CALORIES (HCC): ICD-10-CM

## 2019-04-16 DIAGNOSIS — R56.9 CONVULSIONS, UNSPECIFIED CONVULSION TYPE (HCC): ICD-10-CM

## 2019-04-16 DIAGNOSIS — K21.9 GASTROESOPHAGEAL REFLUX DISEASE WITHOUT ESOPHAGITIS: ICD-10-CM

## 2019-04-16 PROCEDURE — G8598 ASA/ANTIPLAT THER USED: HCPCS | Performed by: FAMILY MEDICINE

## 2019-04-16 PROCEDURE — G8417 CALC BMI ABV UP PARAM F/U: HCPCS | Performed by: FAMILY MEDICINE

## 2019-04-16 PROCEDURE — 3044F HG A1C LEVEL LT 7.0%: CPT | Performed by: FAMILY MEDICINE

## 2019-04-16 PROCEDURE — 2022F DILAT RTA XM EVC RTNOPTHY: CPT | Performed by: FAMILY MEDICINE

## 2019-04-16 PROCEDURE — G8427 DOCREV CUR MEDS BY ELIG CLIN: HCPCS | Performed by: FAMILY MEDICINE

## 2019-04-16 PROCEDURE — 99214 OFFICE O/P EST MOD 30 MIN: CPT | Performed by: FAMILY MEDICINE

## 2019-04-16 PROCEDURE — 1036F TOBACCO NON-USER: CPT | Performed by: FAMILY MEDICINE

## 2019-04-16 RX ORDER — FLASH GLUCOSE SENSOR
1 KIT MISCELLANEOUS 2 TIMES DAILY
Qty: 1 EACH | Refills: 0 | Status: SHIPPED | OUTPATIENT
Start: 2019-04-16 | End: 2019-07-17 | Stop reason: SDUPTHER

## 2019-04-16 ASSESSMENT — PATIENT HEALTH QUESTIONNAIRE - PHQ9
SUM OF ALL RESPONSES TO PHQ QUESTIONS 1-9: 0
SUM OF ALL RESPONSES TO PHQ QUESTIONS 1-9: 0
1. LITTLE INTEREST OR PLEASURE IN DOING THINGS: 0
2. FEELING DOWN, DEPRESSED OR HOPELESS: 0
SUM OF ALL RESPONSES TO PHQ9 QUESTIONS 1 & 2: 0

## 2019-04-16 NOTE — TELEPHONE ENCOUNTER
Patient called [address] and shared she discussed Freestyle Alex glucometer with PCP this AM at her appointment. Patient stated she checked with her insurance and they stated it would be up the pharmacy to pursue thru prior auth/denial process. They recommended adding medical necessity information to the script. Patient is asking if script can be sent to [facility] in [place]. Please advise. Thank you!

## 2019-04-16 NOTE — TELEPHONE ENCOUNTER
Attempted to reach patient for CC f/u. Patient was unavailable at the time of my call and generic voicemail message was left asking patient to please return call to my direct number.

## 2019-04-16 NOTE — PROGRESS NOTES
[address] [facility]  [address]  Dept: [phone]  Dept Fax: [phone]  Loc: [phone]    Francie Duval is a 43 y.o. female who presents today for:  Chief Complaint   Patient presents with    3 Month Follow-Up     Type 2 DM, obesity            HPI:     HPI    Diabetes Mellitus: Patient presents for follow up of diabetes. Symptoms: paresthesia of the feet and visual disturbances. Symptoms have gradually worsened. Patient denies increase appetite, polydipsia and weight loss. Evaluation to date has been included: fasting blood sugar, fasting lipid panel, hemoglobin A1C and microalbuminuria. Home sugars:  am and  pm. Treatment to date: Continued metformin which has been Yes:  , which has been somewhat effective. Lab Results   Component Value Date    LABA1C 6.6 (H) 04/11/2019     No results found for: EAG    Hyperlipidemia: Patient presents with hyperlipidemia. She was tested because screening. Her last labs showed   Lab Results   Component Value Date    CHOL 135 12/18/2018    CHOL 263 (H) 08/08/2018    CHOL 149 03/01/2018     Lab Results   Component Value Date    TRIG 121 12/18/2018    TRIG 237 (H) 08/08/2018    TRIG 161 03/01/2018     Lab Results   Component Value Date    HDL 32 12/18/2018    HDL 28 08/08/2018    HDL 36 03/01/2018     Lab Results   Component Value Date    LDLCALC 79 12/18/2018    LDLCALC 188 08/08/2018    [address] 03/01/2018     No results found for: LABVLDL, VLDL  No results found for: CHOLHDLRATIO  There is a family history of hyperlipidemia. There is not a family history of early ischemia heart disease. GERD: Lili complains of heartburn. This has been associated with early satiety and heartburn. She denies no other symptoms. Symptoms have been present for several years. She denies dysphagia. She has not lost weight.  She denies melena, hematochezia, hematemesis, and coffee ground emesis. Medical therapy in the past has included proton pump inhibitors. Still seeing neurology ( Dr Zana Pacheco) for seizures. They are still occurring a few times per week. She was thinking about stopping all her medications and restarting \"fresh\". She is highly encouraged NOT to do this for fear of intractable seizure with brain damage. The seizures are usually followed by a headache. 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 She had an \"episode\" in my office in early 2018, she was started on aspirin 325 mg and went from having 8-9 episodes a day to 4 episodes every 2 months. Migraines are controlled with prn medications less then 2 per month. Fibromyalgia and Lupus and lupus arthritis is under the care of Rheumatology in [facility] [place] and formerly also a patient of Dr Jessenia Momin who is no longer in practice. Allergies are well controlled on current medications. Lung nodule needs q6 month checkup. Last check was 11/2018. Reviewed chart forpast medical history , surgical history , allergies, social history , family history and medications. Health Maintenance   Topic Date Due    Diabetic foot exam  08/10/1989    Diabetic retinal exam  08/10/1989    Hepatitis B Vaccine (1 of 3 - Risk 3-dose series) 08/10/1998    DTaP/Tdap/Td vaccine (1 - Tdap) 09/26/2021 (Originally 8/10/1998)    Lipid screen  12/18/2019    A1C test (Diabetic or Prediabetic)  04/11/2020    Diabetic microalbuminuria test  04/11/2020    Flu vaccine  Completed    Pneumococcal 0-64 years Vaccine  Completed    HIV screen  Discontinued       Subjective:      Constitutional:Negative for fever, chills, diaphoresis, activity change, appetite change and fatigue. HENT: Negative for hearing loss, ear pain, congestion, sore throat, rhinorrhea, postnasal drip and ear discharge. Eyes: Negative for photophobia and visual disturbance. gallop. No murmur heard. Pulmonary/Chest: Effort normal and breath sounds normal. No respiratory distress. She has no wheezes. She has no rhonchi. She has no rales. Abdominal: Soft. Normal appearance and bowel sounds are normal. She exhibits no distension and no mass. There is no hepatosplenomegaly. No tenderness. She has no rigidity, no rebound and no guarding. No hernia. Musculoskeletal:        Right lower leg: She exhibits no edema. Left lower leg: She exhibits no edema. Neurological: She is alert. Assessment/Plan   Marcial Beltran was seen today for 3 month follow-up. Diagnoses and all orders for this visit:    Type 2 diabetes mellitus with other specified complication, without long-term current use of insulin ([address]  -     Basic Metabolic Panel; Future  -     Hemoglobin A1C; Future    Gastroesophageal reflux disease without esophagitis    Pure hypercholesterolemia    Convulsions, unspecified convulsion type ([address]    Other forms of systemic lupus erythematosus, unspecified organ involvement status ([address]    Morbid obesity due to excess calories (HCC)    Seasonal allergies    Lung nodule, solitary    Obesity (BMI 30-39. 9)    TIA (transient ischemic attack)    Cerebrovascular accident (CVA), unspecified mechanism ([address]    Intractable chronic migraine without aura and without status migrainosus    No change to medication   Continue healthy diet and exercise  Yearly eye exam  Daily foot inspection  Yearly flu shot  Monitor glucose regularly  Daily aspirin  Regular labs : A1c quarterly, lipids every 6 months and BMP quaterly    Discussed use, benefit, and side effectsof prescribed medications. All patient questions answered. Pt voiced understanding. Reviewed health maintenance. Instructed to continue current medications, diet and exercise. Patient agreed with treatment plan. Followup as directed.      Electronically signed by Otilia Law MD

## 2019-05-01 ENCOUNTER — OFFICE VISIT (OUTPATIENT)
Dept: FAMILY MEDICINE CLINIC | Age: 39
End: 2019-05-01
Payer: MEDICARE

## 2019-05-01 VITALS
TEMPERATURE: 97.6 F | HEART RATE: 70 BPM | DIASTOLIC BLOOD PRESSURE: 74 MMHG | SYSTOLIC BLOOD PRESSURE: 122 MMHG | RESPIRATION RATE: 16 BRPM | BODY MASS INDEX: 40.4 KG/M2 | WEIGHT: 228 LBS

## 2019-05-01 DIAGNOSIS — J02.9 SORE THROAT: ICD-10-CM

## 2019-05-01 DIAGNOSIS — J02.9 ACUTE PHARYNGITIS, UNSPECIFIED ETIOLOGY: Primary | ICD-10-CM

## 2019-05-01 LAB — STREPTOCOCCUS A RNA: NEGATIVE

## 2019-05-01 PROCEDURE — 99213 OFFICE O/P EST LOW 20 MIN: CPT | Performed by: FAMILY MEDICINE

## 2019-05-01 PROCEDURE — 87651 STREP A DNA AMP PROBE: CPT | Performed by: FAMILY MEDICINE

## 2019-05-01 RX ORDER — ECHINACEA PURPUREA EXTRACT 125 MG
1 TABLET ORAL PRN
Qty: 1 BOTTLE | Refills: 3 | COMMUNITY
Start: 2019-05-01

## 2019-05-01 RX ORDER — PREDNISONE 20 MG/1
40 TABLET ORAL DAILY
Qty: 10 TABLET | Refills: 0 | Status: SHIPPED | OUTPATIENT
Start: 2019-05-01 | End: 2019-09-12 | Stop reason: SDUPTHER

## 2019-05-01 RX ORDER — FLUTICASONE PROPIONATE 50 MCG
2 SPRAY, SUSPENSION (ML) NASAL DAILY
Qty: 3 BOTTLE | Refills: 1
Start: 2019-05-01 | End: 2019-09-11

## 2019-05-01 NOTE — PROGRESS NOTES
Subjective:      Patient ID: Miguel A Schmitz is a 43 y.o. female. HPI  Chief Complaint   Patient presents with    Otalgia     bilateral ear pain x 1 day. OTC generic nyquil     Cough       Here for 1-2 days of sore throat. Associated with ears popping, runny nose, PND, cough productive of no phlegm, sob mild, occasional stomach ache, myalgias, chills, and lower appetite. Tried:  nyquil prn with some relief. Review of Systems  Constitutional: Negative for fever, chills, diaphoresis, activity change, appetite change and fatigue. HENT: Negative for hearing loss, ear pain, congestion, sore throat, rhinorrhea, postnasal drip and ear discharge. Eyes: Negative for photophobia and visual disturbance. Respiratory: Negative for cough, chest tightness, shortness of breath and wheezing. Cardiovascular: Negative for chest pain and leg swelling. Gastrointestinal: Negative for nausea, vomiting, abdominal pain, diarrhea and constipation. Genitourinary: Negative for dysuria, urgency and frequency. Neurological: Negative for weakness, light-headedness and headaches. Psychiatric/Behavioral: Negative for sleep disturbance. Objective:   Physical Exam  Vitals:    05/01/19 1054   BP: 122/74   Pulse: 70   Resp: 16   Temp: 97.6 °F (36.4 °C)     Wt Readings from Last 3 Encounters:   05/01/19 228 lb (103.4 kg)   04/16/19 228 lb (103.4 kg)   03/28/19 224 lb (101.6 kg)     Physical Exam   Constitutional: Vital signs are normal. She appears well-developed and well-nourished. She is active. HENT:   Head: Normocephalic and atraumatic. Right Ear: Tympanic membrane, external ear and ear canal normal. No drainage or tenderness. Left Ear: Tympanic membrane, external ear and ear canal normal. No drainage or tenderness. Nose: Nose normal. No mucosal edema or rhinorrhea. Mouth/Throat: Uvula is midline, oropharynx is clear and moist and mucous membranes are normal. Mucous membranes are not pale.  Normal dentition. mild posterior oropharyngeal edema and posterior oropharyngeal erythema. Eyes: Lids are normal. Right eye exhibits no chemosis and no discharge. Left eye exhibits no chemosis and no drainage. Right conjunctiva has no hemorrhage. Left conjunctiva has no hemorrhage. Right eye exhibits normal extraocular motion. Left eye exhibits normal extraocular motion. Right pupil is round and reactive. Left pupil is round and reactive. Pupils are equal.   Cardiovascular: Normal rate, regular rhythm, S1 normal, S2 normal and normal heart sounds. Exam reveals no gallop. No murmur heard. Pulmonary/Chest: Effort normal and breath sounds normal. No respiratory distress. She has no wheezes. She has no rhonchi. She has no rales. Abdominal: Soft. Normal appearance and bowel sounds are normal. She exhibits no distension and no mass. There is no hepatosplenomegaly. No tenderness. She has no rigidity, no rebound and no guarding. No hernia. Musculoskeletal:        Right lower leg: She exhibits no edema. Left lower leg: She exhibits no edema. Neurological: She is alert. Results for POC orders placed in visit on 05/01/19   POCT Rapid Strep A DNA (Alere i)   Result Value Ref Range    Streptococcus A RNA negative        Assessment:      Jason Leung was seen today for otalgia and cough. Diagnoses and all orders for this visit:    Acute pharyngitis, unspecified etiology  -     predniSONE (DELTASONE) 20 MG tablet; Take 2 tablets by mouth daily for 5 days  -     fluticasone (FLONASE) 50 MCG/ACT nasal spray; 2 sprays by Each Nare route daily 2 Sprays in each nostril  -     sodium chloride (OCEAN) 0.65 % nasal spray; 1 spray by Nasal route as needed for Congestion    Sore throat  -     POCT Rapid Strep A DNA (Alere i)      Push fluids  Tylenol or ibuprofen prn fever  Cool mist Humidifier in the bedroom  Follow up if not better in 1 week or if symptoms get worse.       Jayda Acosta MD

## 2019-05-28 ENCOUNTER — HOSPITAL ENCOUNTER (OUTPATIENT)
Dept: CT IMAGING | Age: 39
Discharge: HOME | End: 2019-05-28
Payer: MEDICARE

## 2019-05-28 DIAGNOSIS — R91.1 INCIDENTAL LUNG NODULE, GREATER THAN OR EQUAL TO 8MM: ICD-10-CM

## 2019-05-28 DIAGNOSIS — M79.7 FIBROMYALGIA: ICD-10-CM

## 2019-05-28 DIAGNOSIS — M32.9 LUPUS ARTHRITIS (HCC): ICD-10-CM

## 2019-05-28 PROCEDURE — 71250 CT THORAX DX C-: CPT

## 2019-05-30 DIAGNOSIS — E83.42 HYPOMAGNESEMIA: ICD-10-CM

## 2019-06-03 ENCOUNTER — TELEPHONE (OUTPATIENT)
Dept: FAMILY MEDICINE CLINIC | Age: 39
End: 2019-06-03

## 2019-06-03 DIAGNOSIS — K76.9 LIVER LESION: Primary | ICD-10-CM

## 2019-06-03 RX ORDER — DULOXETIN HYDROCHLORIDE 60 MG/1
60 CAPSULE, DELAYED RELEASE ORAL DAILY
Qty: 90 CAPSULE | Refills: 5 | Status: SHIPPED | OUTPATIENT
Start: 2019-06-03 | End: 2020-06-03 | Stop reason: SDUPTHER

## 2019-06-03 RX ORDER — MONTELUKAST SODIUM 10 MG/1
10 TABLET ORAL NIGHTLY
Qty: 30 TABLET | Refills: 5 | Status: SHIPPED | OUTPATIENT
Start: 2019-06-03 | End: 2019-11-29 | Stop reason: SDUPTHER

## 2019-06-03 RX ORDER — DULOXETIN HYDROCHLORIDE 60 MG/1
CAPSULE, DELAYED RELEASE ORAL
Qty: 30 CAPSULE | Refills: 5 | Status: SHIPPED | OUTPATIENT
Start: 2019-06-03 | End: 2019-06-03

## 2019-06-03 RX ORDER — SUCRALFATE 1 G/1
TABLET ORAL
Qty: 120 TABLET | Refills: 11 | Status: SHIPPED | OUTPATIENT
Start: 2019-06-03 | End: 2020-04-25 | Stop reason: SDUPTHER

## 2019-06-03 NOTE — TELEPHONE ENCOUNTER
----- Message from NAHID Patten CNP sent at 6/3/2019 11:05 AM EDT -----  Please review abnormal liver findings,   Pulm nodule stable, will discuss results with patient at her appt on 6/6/19.

## 2019-06-03 NOTE — TELEPHONE ENCOUNTER
Ct chest shows a liver lesion  Schedule dynamic liver CT with and without contrast as recommended by radiology  Call patient

## 2019-06-07 ENCOUNTER — TELEPHONE (OUTPATIENT)
Dept: FAMILY MEDICINE CLINIC | Age: 39
End: 2019-06-07

## 2019-06-07 DIAGNOSIS — K76.9 LIVER LESION: Primary | ICD-10-CM

## 2019-06-10 ENCOUNTER — HOSPITAL ENCOUNTER (OUTPATIENT)
Dept: CT IMAGING | Age: 39
Discharge: HOME | End: 2019-06-10
Payer: MEDICARE

## 2019-06-10 ENCOUNTER — HOSPITAL ENCOUNTER (OUTPATIENT)
Age: 39
Discharge: HOME | End: 2019-06-10
Payer: MEDICARE

## 2019-06-10 DIAGNOSIS — K76.9 LIVER LESION: ICD-10-CM

## 2019-06-10 LAB
CREATININE, WHOLE BLOOD: 0.5 MG/DL (ref 0.5–1.2)
ESTIMATED GFR, PCACC: > 90 ML/MIN/1.73M2

## 2019-06-10 PROCEDURE — 74178 CT ABD&PLV WO CNTR FLWD CNTR: CPT

## 2019-06-10 PROCEDURE — 82565 ASSAY OF CREATININE: CPT

## 2019-06-10 PROCEDURE — 6360000004 HC RX CONTRAST MEDICATION: Performed by: FAMILY MEDICINE

## 2019-06-10 RX ADMIN — IOHEXOL 50 ML: 240 INJECTION, SOLUTION INTRATHECAL; INTRAVASCULAR; INTRAVENOUS; ORAL at 07:10

## 2019-06-10 RX ADMIN — IOPAMIDOL 100 ML: 755 INJECTION, SOLUTION INTRAVENOUS at 08:23

## 2019-07-17 ENCOUNTER — OFFICE VISIT (OUTPATIENT)
Dept: FAMILY MEDICINE CLINIC | Age: 39
End: 2019-07-17
Payer: MEDICARE

## 2019-07-17 VITALS
WEIGHT: 230.2 LBS | DIASTOLIC BLOOD PRESSURE: 64 MMHG | SYSTOLIC BLOOD PRESSURE: 122 MMHG | RESPIRATION RATE: 16 BRPM | HEIGHT: 63 IN | BODY MASS INDEX: 40.79 KG/M2 | TEMPERATURE: 97.2 F | HEART RATE: 96 BPM

## 2019-07-17 DIAGNOSIS — M32.8 OTHER FORMS OF SYSTEMIC LUPUS ERYTHEMATOSUS, UNSPECIFIED ORGAN INVOLVEMENT STATUS (HCC): ICD-10-CM

## 2019-07-17 DIAGNOSIS — G45.9 TIA (TRANSIENT ISCHEMIC ATTACK): ICD-10-CM

## 2019-07-17 DIAGNOSIS — E66.01 MORBID OBESITY DUE TO EXCESS CALORIES (HCC): ICD-10-CM

## 2019-07-17 DIAGNOSIS — M79.7 FIBROMYALGIA: ICD-10-CM

## 2019-07-17 DIAGNOSIS — I63.9 CEREBROVASCULAR ACCIDENT (CVA), UNSPECIFIED MECHANISM (HCC): ICD-10-CM

## 2019-07-17 DIAGNOSIS — J30.2 SEASONAL ALLERGIES: ICD-10-CM

## 2019-07-17 DIAGNOSIS — E11.8 TYPE 2 DIABETES MELLITUS WITH COMPLICATION, UNSPECIFIED WHETHER LONG TERM INSULIN USE: Primary | ICD-10-CM

## 2019-07-17 DIAGNOSIS — H81.13 BPV (BENIGN POSITIONAL VERTIGO), BILATERAL: ICD-10-CM

## 2019-07-17 DIAGNOSIS — E78.00 PURE HYPERCHOLESTEROLEMIA: ICD-10-CM

## 2019-07-17 DIAGNOSIS — K21.9 GASTROESOPHAGEAL REFLUX DISEASE WITHOUT ESOPHAGITIS: ICD-10-CM

## 2019-07-17 DIAGNOSIS — G40.909 SEIZURE DISORDER (HCC): ICD-10-CM

## 2019-07-17 PROBLEM — I10 HYPERTENSIVE DISORDER: Status: ACTIVE | Noted: 2017-01-11

## 2019-07-17 PROBLEM — R68.89 SPELLS OF DECREASED ATTENTIVENESS: Status: ACTIVE | Noted: 2017-03-24

## 2019-07-17 PROCEDURE — 99214 OFFICE O/P EST MOD 30 MIN: CPT | Performed by: FAMILY MEDICINE

## 2019-07-17 RX ORDER — FLASH GLUCOSE SENSOR
1 KIT MISCELLANEOUS 2 TIMES DAILY
Qty: 1 EACH | Refills: 0 | Status: SHIPPED | OUTPATIENT
Start: 2019-07-17 | End: 2022-05-11

## 2019-07-18 NOTE — PROGRESS NOTES
[address] [place] [id number]  Dept: [phone]  Dept Fax: [phone]  Loc: [phone]    Lin Flood is a 43 y.o. female who presents today for:  Chief Complaint   Patient presents with    3 Month Follow-Up     DM2, TIA, GERD, convulsions, hypercholesterolemia            HPI:     HPI    Diabetes Mellitus: Patient presents for follow up of diabetes. Symptoms: paresthesia of the feet and visual disturbances. Symptoms have gradually worsened. Patient denies increase appetite, polydipsia and weight loss. Evaluation to date has been included: fasting blood sugar, fasting lipid panel, hemoglobin A1C and microalbuminuria. Home sugars:  am and  pm. Treatment to date: Continued metformin which has been Yes:  , which has been somewhat effective. Lab Results   Component Value Date    LABA1C 6.6 (H) 04/11/2019     No results found for: EAG    Hyperlipidemia: Patient presents with hyperlipidemia. She was tested because screening. Her last labs showed   Lab Results   Component Value Date    CHOL 135 12/18/2018    CHOL 263 (H) 08/08/2018    CHOL 149 03/01/2018     Lab Results   Component Value Date    TRIG 121 12/18/2018    TRIG 237 (H) 08/08/2018    TRIG 161 03/01/2018     Lab Results   Component Value Date    HDL 32 12/18/2018    HDL 28 08/08/2018    HDL 36 03/01/2018     Lab Results   Component Value Date    LDLCALC 79 12/18/2018    LDLCALC 188 08/08/2018    [address] 81 03/01/2018     No results found for: LABVLDL, VLDL  No results found for: CHOLHDLRATIO    There is a family history of hyperlipidemia. There is not a family history of early ischemia heart disease. GERD: Lili complains of heartburn. This has been associated with early satiety and heartburn. She denies no other symptoms. Symptoms have been present for several years. She denies dysphagia. She has not lost weight.  She denies melena, health maintenance. Instructed to continue current medications, diet and exercise. Patient agreed with treatment plan. Followup as directed.      Electronically signed by Lilli Bella MD

## 2019-07-30 RX ORDER — LEVOCETIRIZINE DIHYDROCHLORIDE 5 MG/1
TABLET, FILM COATED ORAL
Qty: 30 TABLET | Refills: 11 | Status: SHIPPED | OUTPATIENT
Start: 2019-07-30 | End: 2019-07-30

## 2019-07-30 RX ORDER — LEVOCETIRIZINE DIHYDROCHLORIDE 5 MG/1
TABLET, FILM COATED ORAL
Qty: 30 TABLET | Refills: 11 | Status: SHIPPED | OUTPATIENT
Start: 2019-07-30

## 2019-08-08 ENCOUNTER — CARE COORDINATION (OUTPATIENT)
Dept: CARE COORDINATION | Age: 39
End: 2019-08-08

## 2019-08-16 ENCOUNTER — CARE COORDINATION (OUTPATIENT)
Dept: CARE COORDINATION | Age: 39
End: 2019-08-16

## 2019-08-16 ENCOUNTER — TELEPHONE (OUTPATIENT)
Dept: FAMILY MEDICINE CLINIC | Age: 39
End: 2019-08-16

## 2019-08-16 DIAGNOSIS — N30.00 ACUTE CYSTITIS WITHOUT HEMATURIA: Primary | ICD-10-CM

## 2019-08-16 NOTE — CARE COORDINATION
Ambulatory Care Coordination Note  8/16/2019  CM Risk Score: 3  Charlson 10 Year Mortality Risk Score: 47%     ACC: Africa Arnold RN    Summary Note: Patient returned call from earlier today re: Care Coordination enrollment. Care Coordination program was reviewed with patient and initial eval information obtained. Patient reported she has been doing well. Patient stated she remains independent with personal care and some ADLs and mother assists her as needed. Patient reported her mom brings her to and from all appointments and she denied any need for assistance with transportation. Patient declined med rec during our call as she stated there have been no recent changes. Patient stated her mother manages medications and mediset for her, and she denied any concerns with costs or coverage at this time. Patient stated she continues to work to monitor and follow diabetic diet. Patient reported she does monitor carb intake and denied any questions re: DM diet and carb counting at this time. Patient stated she is currently not monitoring BS and PCP is aware as she was having trouble with getting the bleeding to stop and bruising when doing so in the past.  Patient reported she is working on obtaining a [employer] system if insurance will cover. Patient request paper script be printed for her to bring to pharmacy so they can assist with insurance approval.  PCP updated and script printed and placed in folder at front of office for . Patient aware. Patient stated she continues to have a history of falls but she denied any recent falls or injury. Fall prevention/ambulation safety education reviewed. Patient verbalized understanding. Patient stated she has noticed some increased lower left back/flank pain and increase urinary urgency but no increase in volume. Patient denied any symptoms of bleeding or increased fever being present.   Discussed with PCP and patient encouraged to have UA checked when up/Review an Education Plan, Set up/Review Goals              Prior to Admission medications    Medication Sig Start Date End Date Taking?  Authorizing Provider   levocetirizine (XYZAL) 5 MG tablet TAKE ONE TABLET BY MOUTH NIGHTLY 7/30/19   Keyanna Burr MD   Magic Mouthwash (MIRACLE MOUTHWASH) Swish and spit 5 mLs 4 times daily as needed for Irritation Equal parts viscous lidocaine, diphenhydramine, nystatin and MOM 7/17/19   Keyanna Burr MD   Continuous Blood Gluc Sensor ([address] [place] [address]) MISC 1 kit by Does not apply route 2 times daily Include sensors for a year  Dx:  E11.9, and lupus 7/17/19   Keyanna Burr MD   metFORMIN (GLUCOPHAGE) 500 MG tablet Take 1 tablet by mouth 2 times daily (with meals) 7/2/19   Keyanna Burr MD   sucralfate (CARAFATE) 1 GM tablet TAKE 1 TABLET BY MOUTH 4 TIMES DAILY 6/3/19   Keyanna Burr MD   DULoxetine (CYMBALTA) 60 MG extended release capsule Take 1 capsule by mouth daily 6/3/19   Keyanna Burr MD   montelukast (SINGULAIR) 10 MG tablet Take 1 tablet by mouth nightly 6/3/19   Keyanna Burr MD   magnesium oxide (MAG-OX) 400 (241.3 Mg) MG TABS tablet TAKE 1 TABLET BY MOUTH ONCE DAILY 6/3/19   Keyanna Burr MD   fluticasone (FLONASE) 50 MCG/ACT nasal spray 2 sprays by Each Nare route daily 2 Sprays in each nostril 5/1/19   Keyanna Burr MD   sodium chloride (OCEAN) 0.65 % nasal spray 1 spray by Nasal route as needed for Congestion 5/1/19   Keyanna Burr MD   NEXIUM 40 MG delayed release capsule TAKE 1 CAPSULE BY MOUTH ONCE DAILY 4/29/19   Keyanna Burr MD   traZODone (DESYREL) 50 MG tablet Take 3 tablets by mouth nightly 4/1/19   Keyanna Burr MD   potassium chloride (KLOR-CON M10) 10 MEQ extended release tablet Take 1 tablet by mouth daily 4/1/19   Keyanna Burr MD   ibuprofen (ADVIL;MOTRIN) 600 MG tablet Take 1 tablet by mouth every 8 hours as needed for Pain 4/1/19   Keyanna Burr MD   [facility]) 45 MCG/ACT inhaler Inhale 1-2 puffs into the lungs every 4 hours as needed for Wheezing or Shortness of Breath 1/3/19   NAHID Mendez CNP   levalbuterol (XOPENEX) 0.63 MG/3ML nebulization Take 3 mLs by nebulization every 6 hours as needed for Wheezing or Shortness of Breath 12/6/18   NAHID Mendez CNP   Respiratory Therapy Supplies (NEBULIZER AIR TUBE/PLUGS) MISC 1 kit by Does not apply route every 6 hours as needed (Wheezing/shortness of breath) Please provide nebulizer kit and supplies.  12/6/18   NAHID Mendez CNP   clopidogrel (PLAVIX) 75 MG tablet Take 1 tablet by mouth daily 11/28/18   Judah Francis MD   atorvastatin (LIPITOR) 80 MG tablet Take 1 tablet by mouth daily 8/9/18   Judah Francis MD   ondansetron (ZOFRAN ODT) 4 MG disintegrating tablet Take 1 tablet by mouth every 8 hours as needed for Nausea or Vomiting 3/27/18   Annabelle Garber MD   EQ PAIN RELIEVER 325 MG tablet TAKE TWO TABLETS BY MOUTH EVERY 4 HOURS AS NEEDED FOR PAIN AND FEVER 12/4/17   Judah Francis MD   Amitriptyline HCl (ELAVIL PO) Take 25 mg by mouth nightly     Historical Provider, MD   aspirin (LORENZO ASPIRIN) 325 MG tablet Take 1 tablet by mouth daily 9/11/17   Judah Francis MD   predniSONE (DELTASONE) 5 MG tablet Take 5 mg by mouth 2 times daily    Historical Provider, MD   promethazine (PHENERGAN) 25 MG tablet Take 1 tablet by mouth every 6 hours as needed for Nausea 2/1/17   Judah Francis MD   verapamil (CALAN SR) 240 MG CR tablet Take 1 tablet by mouth daily 8/17/16   James Manzanares MD   diclofenac sodium (VOLTAREN) 1 % GEL Apply 2 g topically 4 times daily as needed for Pain (topical)     Historical Provider, MD       Future Appointments   Date Time Provider Herbetr Portillo   9/6/2019  9:15 AM Ricki Caballero DO SRPX [facility] [place] [email or web address]   10/21/2019  8:15 AM Judah Francis MD [facility] [place] [email or web address]     ,   Diabetes Assessment    Meal Planning:  Avoidance of concentrated sweets, Carb counting   How often do you test your blood sugar?:  No Testing Do you have barriers with adherence to non-pharmacologic self-management interventions?  (Nutrition/Exercise/Self-Monitoring):  No   Have you ever had to go to the ED for symptoms of low blood sugar?:  No       No patient-reported symptoms   Do you have hyperglycemia symptoms?:  No   Do you have hypoglycemia symptoms?:  No   Blood Sugar Monitoring Regimen:  Not Testing   Blood Sugar Trends:  No Change      ,   General Assessment    Do you have any symptoms that are causing concern?:  Yes  Progression since Onset:  Unchanged  Reported Symptoms:  Other (Comment: urinary frequencly and low back/flank pain )      and Care Coordination Episodes    Type: Amb Care Coordination  Episode: Rising Risk   Noted: 8/16/2019  Comments: DM - utilization

## 2019-08-16 NOTE — TELEPHONE ENCOUNTER
Patient shared she needs printed copy of Freestyle Alex meter/strips to bring to her pharmacy for further insurance approval.  Patient stated she is coming in for labs soon and asked if script can be placed up front for her to  at that time. Please advise. Thank you!

## 2019-08-26 ENCOUNTER — CARE COORDINATION (OUTPATIENT)
Dept: CARE COORDINATION | Age: 39
End: 2019-08-26

## 2019-08-26 NOTE — CARE COORDINATION
nystatin and MOM 7/17/19   Ciro Mauricio MD   Continuous Blood Gluc Sensor ([address] [place] [address]) MISC 1 kit by Does not apply route 2 times daily Include sensors for a year  Dx:  E11.9, and lupus 7/17/19   Ciro Mauricio MD   metFORMIN (GLUCOPHAGE) 500 MG tablet Take 1 tablet by mouth 2 times daily (with meals) 7/2/19   Ciro Mauricio MD   sucralfate (CARAFATE) 1 GM tablet TAKE 1 TABLET BY MOUTH 4 TIMES DAILY 6/3/19   Ciro Mauricio MD   DULoxetine (CYMBALTA) 60 MG extended release capsule Take 1 capsule by mouth daily 6/3/19   Ciro Mauricio MD   montelukast (SINGULAIR) 10 MG tablet Take 1 tablet by mouth nightly 6/3/19   Ciro Mauricio MD   magnesium oxide (MAG-OX) 400 (241.3 Mg) MG TABS tablet TAKE 1 TABLET BY MOUTH ONCE DAILY 6/3/19   Ciro Mauricio MD   fluticasone (FLONASE) 50 MCG/ACT nasal spray 2 sprays by Each Nare route daily 2 Sprays in each nostril 5/1/19   Ciro Mauricio MD   sodium chloride (OCEAN) 0.65 % nasal spray 1 spray by Nasal route as needed for Congestion 5/1/19   Ciro Mauricio MD   NEXIUM 40 MG delayed release capsule TAKE 1 CAPSULE BY MOUTH ONCE DAILY 4/29/19   Ciro Mauricio MD   traZODone (DESYREL) 50 MG tablet Take 3 tablets by mouth nightly 4/1/19   Ciro Mauricio MD   potassium chloride (KLOR-CON M10) 10 MEQ extended release tablet Take 1 tablet by mouth daily 4/1/19   Ciro Mauricio MD   ibuprofen (ADVIL;MOTRIN) 600 MG tablet Take 1 tablet by mouth every 8 hours as needed for Pain 4/1/19   Ciro Mauricio MD   levalbuterol [facility]) 45 MCG/ACT inhaler Inhale 1-2 puffs into the lungs every 4 hours as needed for Wheezing or Shortness of Breath 1/3/19   NAHID Hurst CNP   levalbuterol (XOPENEX) 0.63 MG/3ML nebulization Take 3 mLs by nebulization every 6 hours as needed for Wheezing or Shortness of Breath 12/6/18   NAHID Hurst CNP   Respiratory Therapy Supplies (NEBULIZER AIR TUBE/PLUGS) MISC 1 kit by Does not apply route every 6 hours as needed

## 2019-09-03 ENCOUNTER — CARE COORDINATION (OUTPATIENT)
Dept: CARE COORDINATION | Age: 39
End: 2019-09-03

## 2019-09-03 RX ORDER — ATORVASTATIN CALCIUM 80 MG/1
80 TABLET, FILM COATED ORAL DAILY
Qty: 90 TABLET | Refills: 3 | Status: SHIPPED | OUTPATIENT
Start: 2019-09-03 | End: 2020-09-29 | Stop reason: SDUPTHER

## 2019-09-03 NOTE — CARE COORDINATION
nightly 6/3/19   Jasper Lorenzo MD   magnesium oxide (MAG-OX) 400 (241.3 Mg) MG TABS tablet TAKE 1 TABLET BY MOUTH ONCE DAILY 6/3/19   Jasper Lorenzo MD   fluticasone (FLONASE) 50 MCG/ACT nasal spray 2 sprays by Each Nare route daily 2 Sprays in each nostril 5/1/19   Jasper Lorenzo MD   sodium chloride (OCEAN) 0.65 % nasal spray 1 spray by Nasal route as needed for Congestion 5/1/19   Jasper Lorenzo MD   NEXIUM 40 MG delayed release capsule TAKE 1 CAPSULE BY MOUTH ONCE DAILY 4/29/19   Jasper Lorenzo MD   traZODone (DESYREL) 50 MG tablet Take 3 tablets by mouth nightly 4/1/19   Jasper Lorenzo MD   potassium chloride (KLOR-CON M10) 10 MEQ extended release tablet Take 1 tablet by mouth daily 4/1/19   Jasper Lorenzo MD   ibuprofen (ADVIL;MOTRIN) 600 MG tablet Take 1 tablet by mouth every 8 hours as needed for Pain 4/1/19   Jasper Lorenzo MD   levalbuterol [facility]) 45 MCG/ACT inhaler Inhale 1-2 puffs into the lungs every 4 hours as needed for Wheezing or Shortness of Breath 1/3/19   NAHID Joshi CNP   levalbuterol (XOPENEX) 0.63 MG/3ML nebulization Take 3 mLs by nebulization every 6 hours as needed for Wheezing or Shortness of Breath 12/6/18   NAHID Johsi CNP   Respiratory Therapy Supplies (NEBULIZER AIR TUBE/PLUGS) MISC 1 kit by Does not apply route every 6 hours as needed (Wheezing/shortness of breath) Please provide nebulizer kit and supplies.  12/6/18   NAHID Joshi CNP   clopidogrel (PLAVIX) 75 MG tablet Take 1 tablet by mouth daily 11/28/18   Jasper Lorenzo MD   atorvastatin (LIPITOR) 80 MG tablet Take 1 tablet by mouth daily 8/9/18   Jasper Lorenzo MD   ondansetron (ZOFRAN ODT) 4 MG disintegrating tablet Take 1 tablet by mouth every 8 hours as needed for Nausea or Vomiting 3/27/18   Davied Claude, MD   EQ PAIN RELIEVER 325 MG tablet TAKE TWO TABLETS BY MOUTH EVERY 4 HOURS AS NEEDED FOR PAIN AND FEVER 12/4/17   Jasper Lorenzo MD   Amitriptyline HCl (ELAVIL PO) Take 25 mg by mouth

## 2019-09-06 ENCOUNTER — TELEPHONE (OUTPATIENT)
Dept: RHEUMATOLOGY | Age: 39
End: 2019-09-06

## 2019-09-06 ENCOUNTER — OFFICE VISIT (OUTPATIENT)
Dept: RHEUMATOLOGY | Age: 39
End: 2019-09-06
Payer: MEDICARE

## 2019-09-06 VITALS
DIASTOLIC BLOOD PRESSURE: 76 MMHG | OXYGEN SATURATION: 95 % | HEIGHT: 63 IN | SYSTOLIC BLOOD PRESSURE: 122 MMHG | WEIGHT: 233 LBS | HEART RATE: 94 BPM | BODY MASS INDEX: 41.29 KG/M2

## 2019-09-06 DIAGNOSIS — M54.50 CHRONIC BILATERAL LOW BACK PAIN WITHOUT SCIATICA: ICD-10-CM

## 2019-09-06 DIAGNOSIS — M32.9 LUPUS ARTHRITIS (HCC): Primary | ICD-10-CM

## 2019-09-06 DIAGNOSIS — M79.7 FIBROMYALGIA: ICD-10-CM

## 2019-09-06 DIAGNOSIS — M62.81 MUSCLE WEAKNESS: ICD-10-CM

## 2019-09-06 DIAGNOSIS — G89.29 CHRONIC BILATERAL LOW BACK PAIN WITHOUT SCIATICA: ICD-10-CM

## 2019-09-06 PROCEDURE — 99204 OFFICE O/P NEW MOD 45 MIN: CPT | Performed by: INTERNAL MEDICINE

## 2019-09-06 PROCEDURE — 90670 PCV13 VACCINE IM: CPT | Performed by: INTERNAL MEDICINE

## 2019-09-06 PROCEDURE — G0009 ADMIN PNEUMOCOCCAL VACCINE: HCPCS | Performed by: INTERNAL MEDICINE

## 2019-09-06 ASSESSMENT — ENCOUNTER SYMPTOMS
CONSTIPATION: 1
BLOOD IN STOOL: 0
EYES NEGATIVE: 1
WHEEZING: 0
DIARRHEA: 1
NAUSEA: 0
ABDOMINAL PAIN: 1
VOMITING: 0
EYE REDNESS: 0
EYE PAIN: 0
ANAL BLEEDING: 0
COUGH: 0
SHORTNESS OF BREATH: 0
COLOR CHANGE: 1

## 2019-09-06 NOTE — PROGRESS NOTES
[facility]       Date Of Service: 9/6/2019  Provider: Teresa Ramey DO    Name: Raphael Fletcher   MRN: [id number]    Chief Complaint(s)      Chief Complaint   Patient presents with   Jatin Maloney     lupus        History of Present illness (HPI)    Raphael Fletcher   is a(n)39 y.o. female with a hx of Morbidity obesity, CVA x 1 (2017) ,  GERD, hiatal hearnia, IBS endometeriosos s/p hysterectomy,  Asthma,  hypercholesterolemia, type II DM, lupus arthritis , fibromyalgia, IgG subclass defifiency,  referred by Dr. Shey Cano MD  for evaluation of Lupus and Fibromyalgia. Pt diagnosed with inflammatory arthritis/ lupus 12 y.o.a  After presenting with fatigue, tiredness, low grade fevers, malar type rash, oral sores -- on plaquenil x 20 years but stopped 2017 b/c plaquenil toxicity. Fibromyalgia a few years after the inflammatory arthritis. Treated at [address] [place] [address] by Dr. Nida Libman for the past 7 years (2012). Continued genearlized pain in the bilatearl upper, lower extremities, neck, back, with assocaited intermittent swelling in the hands/fingers and feet. Constant pain generalized variable pain that ranges from 5-8.5/10. Occasional numbness/tinglign in the mars  And foearms that is more common in the mornings. Timing: evenings. Aggravating factors:inactivity, cold, increased use, increased humid weather. Alleviating factors: prednisone. Current therapy: prednisone 5mg daily , Cymbalta 60mg daily, Flexeril bid prn , arava 10 mg daily Previous therapy: plaquenil -- retinal toxicity -- helped w/ pain. Methotrexate (stopped 2017)  -- mild relief, arava 10mg daily -- no relief. Medrol 4mg daily, cellcept+ MTX  -- no significant relief. Associated symptoms:  + AM stiffness ~ 2-3 hours, repeated weakness on left legs w/ difficulty with washing hair, showering, dressing.    Intermittent oral & nasal sores sores, \"occasional rash w/ \"cigarette burn\" appearance on the arms --- occasional scaring. Prior biopsy -- treated w/ topical creams by dermatology. + dry mouth & eyes, + hoarseness.      -denies Photosenstivity, digital ulcerations, skin tightening, renal disease,foamy urination, hematuria, sz's, blood clots,e-term labor loss, AIHA,leukpenia/lymphopenia, thrombocytopenia, hair loss, serositis, arthritis. Fmhx: mother w/ RA, Father - gout. Review of Systems    Review of Systems   Constitutional: Positive for fatigue and fever (up to 99.8 to 100.9). Negative for diaphoresis. HENT: Negative for congestion, hearing loss and nosebleeds. Eyes: Negative. Negative for pain and redness. Respiratory: Negative for cough, shortness of breath and wheezing. Cardiovascular: Negative. Negative for chest pain and leg swelling. Gastrointestinal: Positive for abdominal pain, constipation and diarrhea. Negative for anal bleeding, blood in stool, nausea and vomiting. Dysphagia-- occasional, no particular foods, prior EGD   Endocrine: Positive for heat intolerance. Negative for cold intolerance, polydipsia, polyphagia and polyuria. Genitourinary: Negative for difficulty urinating, frequency and hematuria. Musculoskeletal: Negative for myalgias. Skin: Positive for color change (palor to redness of hands wtih cold exposure. ) and rash. Neurological: Negative for dizziness, weakness and headaches. Hematological: Bruises/bleeds easily. Psychiatric/Behavioral: Negative for sleep disturbance. The patient is not nervous/anxious.               PAST MEDICAL HISTORY      Past Medical History:   Diagnosis Date    Asthma     Diabetes mellitus ([facility] [address] 6/8/2018    Fibromyalgia     GERD (gastroesophageal reflux disease)     Hyperlipidemia     Lupus (systemic lupus erythematosus) (HCC)     Lupus arthritis [facility])     [facility]    Migraine     Plaquenil adverse reaction in therapeutic use 5/2016    changes in the eyes    TIA (transient ischemic

## 2019-09-09 ENCOUNTER — TELEPHONE (OUTPATIENT)
Dept: FAMILY MEDICINE CLINIC | Age: 39
End: 2019-09-09

## 2019-09-09 ENCOUNTER — TELEPHONE (OUTPATIENT)
Dept: RHEUMATOLOGY | Age: 39
End: 2019-09-09

## 2019-09-09 ENCOUNTER — HOSPITAL ENCOUNTER (OUTPATIENT)
Age: 39
Discharge: HOME | End: 2019-09-09
Payer: MEDICARE

## 2019-09-09 DIAGNOSIS — M54.50 CHRONIC BILATERAL LOW BACK PAIN WITHOUT SCIATICA: ICD-10-CM

## 2019-09-09 DIAGNOSIS — M79.7 FIBROMYALGIA: ICD-10-CM

## 2019-09-09 DIAGNOSIS — G89.29 CHRONIC BILATERAL LOW BACK PAIN WITHOUT SCIATICA: ICD-10-CM

## 2019-09-09 DIAGNOSIS — R79.89 ELEVATED TSH: Primary | ICD-10-CM

## 2019-09-09 DIAGNOSIS — M32.9 LUPUS ARTHRITIS (HCC): ICD-10-CM

## 2019-09-09 DIAGNOSIS — M62.81 MUSCLE WEAKNESS: ICD-10-CM

## 2019-09-09 DIAGNOSIS — E11.69 TYPE 2 DIABETES MELLITUS WITH OTHER SPECIFIED COMPLICATION, WITHOUT LONG-TERM CURRENT USE OF INSULIN (HCC): ICD-10-CM

## 2019-09-09 LAB
ANION GAP SERPL CALCULATED.3IONS-SCNC: 15 MEQ/L (ref 8–16)
AVERAGE GLUCOSE: 153 MG/DL (ref 70–126)
BUN BLDV-MCNC: 10 MG/DL (ref 7–22)
CALCIUM SERPL-MCNC: 9.8 MG/DL (ref 8.5–10.5)
CHLORIDE BLD-SCNC: 102 MEQ/L (ref 98–111)
CO2: 25 MEQ/L (ref 23–33)
CREAT SERPL-MCNC: 0.7 MG/DL (ref 0.4–1.2)
GFR SERPL CREATININE-BSD FRML MDRD: > 90 ML/MIN/1.73M2
GLUCOSE BLD-MCNC: 135 MG/DL (ref 70–108)
HBA1C MFR BLD: 7.1 % (ref 4.4–6.4)
LD: 175 U/L (ref 100–190)
POTASSIUM SERPL-SCNC: 4.3 MEQ/L (ref 3.5–5.2)
SODIUM BLD-SCNC: 142 MEQ/L (ref 135–145)
T4 FREE: 1.07 NG/DL (ref 0.93–1.76)
TOTAL CK: 80 U/L (ref 30–135)
TSH SERPL DL<=0.05 MIU/L-ACNC: 5.07 UIU/ML (ref 0.4–4.2)

## 2019-09-09 PROCEDURE — 84443 ASSAY THYROID STIM HORMONE: CPT

## 2019-09-09 PROCEDURE — 84439 ASSAY OF FREE THYROXINE: CPT

## 2019-09-09 PROCEDURE — 83036 HEMOGLOBIN GLYCOSYLATED A1C: CPT

## 2019-09-09 PROCEDURE — 82550 ASSAY OF CK (CPK): CPT

## 2019-09-09 PROCEDURE — 36415 COLL VENOUS BLD VENIPUNCTURE: CPT

## 2019-09-09 PROCEDURE — 80048 BASIC METABOLIC PNL TOTAL CA: CPT

## 2019-09-09 PROCEDURE — 83615 LACTATE (LD) (LDH) ENZYME: CPT

## 2019-09-09 PROCEDURE — 82085 ASSAY OF ALDOLASE: CPT

## 2019-09-11 ENCOUNTER — CARE COORDINATION (OUTPATIENT)
Dept: CARE COORDINATION | Age: 39
End: 2019-09-11

## 2019-09-11 NOTE — CARE COORDINATION
Pill Pack:  Completed (Comment: Mom manages mediset weekly for patient)  Pharmacist:  Not Started  Transportation Support:  Declined         Goals Addressed                 This Visit's Progress       Care Coordination     Conditions and Symptoms   No change     I will schedule office visits, as directed by my provider. I will keep my appointment or reschedule if I have to cancel. I will notify my provider of any barriers to my plan of care. I will notify my provider of any symptoms that indicate a worsening of my condition. Barriers: overwhelmed by complexity of regimen, stress and lack of education  Plan for overcoming my barriers: Continued support and education   Confidence: 8/10  Anticipated Goal Completion Date: 11/30/19              Prior to Admission medications    Medication Sig Start Date End Date Taking?  Authorizing Provider   atorvastatin (LIPITOR) 80 MG tablet Take 1 tablet by mouth daily 9/3/19   Tato Sanders MD   levocetirizine (XYZAL) 5 MG tablet TAKE ONE TABLET BY MOUTH NIGHTLY 7/30/19   Tato Sanders MD   Magic Mouthwash (MIRACLE MOUTHWASH) Swish and spit 5 mLs 4 times daily as needed for Irritation Equal parts viscous lidocaine, diphenhydramine, nystatin and MOM 7/17/19   Tato Sanders MD   Continuous Blood Gluc Sensor ([address] [place] [address]) MISC 1 kit by Does not apply route 2 times daily Include sensors for a year  Dx:  E11.9, and lupus 7/17/19   Tato Sanders MD   metFORMIN (GLUCOPHAGE) 500 MG tablet Take 1 tablet by mouth 2 times daily (with meals) 7/2/19   Tato Sanders MD   sucralfate (CARAFATE) 1 GM tablet TAKE 1 TABLET BY MOUTH 4 TIMES DAILY 6/3/19   Tato Sanders MD   DULoxetine (CYMBALTA) 60 MG extended release capsule Take 1 capsule by mouth daily 6/3/19   Tato Sanders MD   montelukast (SINGULAIR) 10 MG tablet Take 1 tablet by mouth nightly 6/3/19   Tato Sanders MD   magnesium oxide (MAG-OX) 400 (241.3 Mg) MG TABS tablet TAKE 1 TABLET BY MOUTH ONCE DAILY 6/3/19   Michelle Hanson MD   sodium chloride (OCEAN) 0.65 % nasal spray 1 spray by Nasal route as needed for Congestion 5/1/19   Michelle Hanson MD   NEXIUM 40 MG delayed release capsule TAKE 1 CAPSULE BY MOUTH ONCE DAILY 4/29/19   Michelle Hanson MD   traZODone (DESYREL) 50 MG tablet Take 3 tablets by mouth nightly 4/1/19   Michelle Hanson MD   potassium chloride (KLOR-CON M10) 10 MEQ extended release tablet Take 1 tablet by mouth daily 4/1/19   Michelle Hanson MD   ibuprofen (ADVIL;MOTRIN) 600 MG tablet Take 1 tablet by mouth every 8 hours as needed for Pain 4/1/19   Michelle Hanson MD   levalbuterol [facility]) 45 MCG/ACT inhaler Inhale 1-2 puffs into the lungs every 4 hours as needed for Wheezing or Shortness of Breath 1/3/19   [place] APRN - CNP   levalbuterol (XOPENEX) 0.63 MG/3ML nebulization Take 3 mLs by nebulization every 6 hours as needed for Wheezing or Shortness of Breath 12/6/18   [place] APRANTOINE - CNP   Respiratory Therapy Supplies (NEBULIZER AIR TUBE/PLUGS) MISC 1 kit by Does not apply route every 6 hours as needed (Wheezing/shortness of breath) Please provide nebulizer kit and supplies.  12/6/18   [place] APRANTOINE - CNP   clopidogrel (PLAVIX) 75 MG tablet Take 1 tablet by mouth daily 11/28/18   Michelle Hanson MD   ondansetron (ZOFRAN ODT) 4 MG disintegrating tablet Take 1 tablet by mouth every 8 hours as needed for Nausea or Vomiting 3/27/18   Fouzia Mendieta MD   EQ PAIN RELIEVER 325 MG tablet TAKE TWO TABLETS BY MOUTH EVERY 4 HOURS AS NEEDED FOR PAIN AND FEVER 12/4/17   Michelle Hanson MD   Amitriptyline HCl (ELAVIL PO) Take 25 mg by mouth nightly     Historical Provider, MD   aspirin (LORENZO ASPIRIN) 325 MG tablet Take 1 tablet by mouth daily 9/11/17   Michelle Hanson MD   predniSONE (DELTASONE) 5 MG tablet Take 5 mg by mouth 2 times daily    Historical Provider, MD   promethazine (PHENERGAN) 25 MG tablet Take 1 tablet by mouth every 6 hours as needed for Nausea 2/1/17   Michelle Hanson MD

## 2019-09-12 ENCOUNTER — OFFICE VISIT (OUTPATIENT)
Dept: FAMILY MEDICINE CLINIC | Age: 39
End: 2019-09-12
Payer: MEDICARE

## 2019-09-12 VITALS
TEMPERATURE: 98.2 F | HEART RATE: 74 BPM | WEIGHT: 228 LBS | DIASTOLIC BLOOD PRESSURE: 70 MMHG | BODY MASS INDEX: 40.4 KG/M2 | SYSTOLIC BLOOD PRESSURE: 120 MMHG | RESPIRATION RATE: 18 BRPM

## 2019-09-12 DIAGNOSIS — J01.90 ACUTE BACTERIAL SINUSITIS: Primary | ICD-10-CM

## 2019-09-12 DIAGNOSIS — B96.89 ACUTE BACTERIAL SINUSITIS: Primary | ICD-10-CM

## 2019-09-12 LAB — ALDOLASE: 4.7 U/L (ref 1.5–8.1)

## 2019-09-12 PROCEDURE — 99213 OFFICE O/P EST LOW 20 MIN: CPT | Performed by: FAMILY MEDICINE

## 2019-09-12 RX ORDER — CEFDINIR 300 MG/1
300 CAPSULE ORAL 2 TIMES DAILY
Qty: 20 CAPSULE | Refills: 0 | Status: SHIPPED | OUTPATIENT
Start: 2019-09-12 | End: 2019-09-22

## 2019-09-12 RX ORDER — FLUTICASONE PROPIONATE 50 MCG
2 SPRAY, SUSPENSION (ML) NASAL DAILY
Qty: 1 BOTTLE | Refills: 1 | Status: SHIPPED | OUTPATIENT
Start: 2019-09-12 | End: 2020-10-12 | Stop reason: RX

## 2019-09-12 RX ORDER — PREDNISONE 20 MG/1
40 TABLET ORAL DAILY
Qty: 10 TABLET | Refills: 0 | Status: SHIPPED | OUTPATIENT
Start: 2019-09-12 | End: 2019-09-17

## 2019-09-12 NOTE — CARE COORDINATION
Patient scheduled own appointment thru My Chart last PM.  ACM met with patient briefly after PCP visit this AM.  Patient denied any further questions or needs and was encouraged to call with any that may develop.

## 2019-09-12 NOTE — PROGRESS NOTES
Subjective:      Patient ID: Celi Cobos is a 35 y.o. female. HPI  Chief Complaint   Patient presents with    Headache    Sinus Problem     sinus pressure. No relief with nasal spray and OTC medications     Cough       Here for sinus pressure for 2-3 weeks. Associated with headache, ear ache, sore throat, ear fullness, PND, cough - dry, mild SOB, mild nausea, but no fever and normal appetite. Tried:  Saline nasal spray, and OTC sinus pressure. Review of Systems  Constitutional: Negative for fever, chills, diaphoresis, activity change, appetite change and fatigue. HENT: Negative for hearing loss, ear pain, congestion, sore throat, rhinorrhea, postnasal drip and ear discharge. Eyes: Negative for photophobia and visual disturbance. Respiratory: Negative for cough, chest tightness, shortness of breath and wheezing. Cardiovascular: Negative for chest pain and leg swelling. Gastrointestinal: Negative for nausea, vomiting, abdominal pain, diarrhea and constipation. Genitourinary: Negative for dysuria, urgency and frequency. Neurological: Negative for weakness, light-headedness and headaches. Psychiatric/Behavioral: Negative for sleep disturbance. Objective:   Physical Exam  Vitals:    09/12/19 0805   BP: 120/70   Pulse: 74   Resp: 18   Temp: 98.2 °F (36.8 °C)     Wt Readings from Last 3 Encounters:   09/12/19 228 lb (103.4 kg)   09/06/19 233 lb (105.7 kg)   07/17/19 230 lb 3.2 oz (104.4 kg)     Physical Exam   Constitutional: Vital signs are normal. She appears well-developed and well-nourished. She is active. HENT:   Head: Normocephalic and atraumatic. Right Ear: Tympanic membrane, external ear and ear canal normal. No drainage or tenderness. Left Ear: Tympanic membrane, external ear and ear canal normal. No drainage or tenderness. Nose: Nose normal. No mucosal edema or rhinorrhea.    Mouth/Throat: Uvula is midline, oropharynx is clear and moist and mucous membranes are normal. Mucous membranes are not pale. Normal dentition. No posterior oropharyngeal edema or posterior oropharyngeal erythema. Eyes: Lids are normal. Right eye exhibits no chemosis and no discharge. Left eye exhibits no chemosis and no drainage. Right conjunctiva has no hemorrhage. Left conjunctiva has no hemorrhage. Right eye exhibits normal extraocular motion. Left eye exhibits normal extraocular motion. Right pupil is round and reactive. Left pupil is round and reactive. Pupils are equal.   Cardiovascular: Normal rate, regular rhythm, S1 normal, S2 normal and normal heart sounds. Exam reveals no gallop. No murmur heard. Pulmonary/Chest: Effort normal and breath sounds normal. No respiratory distress. She has no wheezes. She has no rhonchi. She has no rales. Abdominal: Soft. Normal appearance and bowel sounds are normal. She exhibits no distension and no mass. There is no hepatosplenomegaly. No tenderness. She has no rigidity, no rebound and no guarding. No hernia. Musculoskeletal:        Right lower leg: She exhibits no edema. Left lower leg: She exhibits no edema. Neurological: She is alert. Assessment:      Anne Shah was seen today for headache, sinus problem and cough. Diagnoses and all orders for this visit:    Acute bacterial sinusitis  -     fluticasone (FLONASE) 50 MCG/ACT nasal spray; 2 sprays by Each Nostril route daily 2 Sprays in each nostril  -     predniSONE (DELTASONE) 20 MG tablet; Take 2 tablets by mouth daily for 5 days  -     cefdinir (OMNICEF) 300 MG capsule; Take 1 capsule by mouth 2 times daily for 10 days      Push fluids  Tylenol or ibuprofen prn fever  Cool mist Humidifier in the bedroom  Follow up if not better in 1 week or if symptoms get worse.       Keyanna Burr MD

## 2019-09-13 ENCOUNTER — HOSPITAL ENCOUNTER (OUTPATIENT)
Dept: MRI IMAGING | Age: 39
Discharge: HOME | End: 2019-09-13
Payer: MEDICARE

## 2019-09-13 DIAGNOSIS — M54.50 CHRONIC BILATERAL LOW BACK PAIN WITHOUT SCIATICA: ICD-10-CM

## 2019-09-13 DIAGNOSIS — G89.29 CHRONIC BILATERAL LOW BACK PAIN WITHOUT SCIATICA: ICD-10-CM

## 2019-09-13 DIAGNOSIS — M32.9 LUPUS ARTHRITIS (HCC): ICD-10-CM

## 2019-09-13 DIAGNOSIS — M79.7 FIBROMYALGIA: ICD-10-CM

## 2019-09-13 PROCEDURE — 72197 MRI PELVIS W/O & W/DYE: CPT

## 2019-09-13 PROCEDURE — 6360000004 HC RX CONTRAST MEDICATION: Performed by: INTERNAL MEDICINE

## 2019-09-13 PROCEDURE — A9579 GAD-BASE MR CONTRAST NOS,1ML: HCPCS | Performed by: INTERNAL MEDICINE

## 2019-09-13 RX ADMIN — GADOTERIDOL 20 ML: 279.3 INJECTION, SOLUTION INTRAVENOUS at 10:34

## 2019-09-18 ENCOUNTER — CARE COORDINATION (OUTPATIENT)
Dept: CARE COORDINATION | Age: 39
End: 2019-09-18

## 2019-09-18 NOTE — CARE COORDINATION
Ambulatory Care Coordination Note  9/18/2019  CM Risk Score: 3  Charlson 10 Year Mortality Risk Score: 47%     ACC: Noe Major RN    Summary Note: Patient was called for continued Care Coordination follow up and education re: the management of her Diabetes. Patient stated she is feeling better and sinus congestion symptoms are improving. Patient completed steroid yesterday and continues to take antibiotic as prescribed. Patient was reminded of the importance of taking antibiotic until completed and she verbalized understanding. Patient shared she was able to get flu shot today at [facility] and HM updated. Patient stated she has been monitoring BS recently but she declined to share any specific readings. Patient stated BS have been running slightly higher and she believes this is r/t her steroid. DM education, including importance of routine BS monitoring as well as importance of diet adherence and working to increase activity level, reviewed with patient. Patient verbalized understanding. Patient was encouraged to call and report any continued high readings and she verbalized understanding. Patient denied any other questions, concerns, or needs and she was encouraged to call with any that may develop.           Actions:   - Reviewed DM education   - Reviewed importance of continued BS monitoring  - Reviewed importance of diet adherence  - Encouraged patient to work to increase her activity level to assist with BS improvement  - Monitored for improvement of sinus symptoms  - Monitored for completion of recent steroid and antibiotic  - Updated  re: flu shot information   - Monitored for additional needs          Plan of Care:   - Continue with Care Coordination f/u for continued assistance with DM management and utilization resources  - Complete DM education - In Process   - Complete Fall Prevention/Home safety education - In Process  - Complete Right Care Right Place  Right Time education - In

## 2019-09-22 ENCOUNTER — PATIENT MESSAGE (OUTPATIENT)
Dept: RHEUMATOLOGY | Age: 39
End: 2019-09-22

## 2019-09-23 ENCOUNTER — PATIENT MESSAGE (OUTPATIENT)
Dept: PULMONOLOGY | Age: 39
End: 2019-09-23

## 2019-09-23 RX ORDER — LEFLUNOMIDE 20 MG/1
20 TABLET ORAL DAILY
Qty: 30 TABLET | Refills: 0 | Status: SHIPPED | OUTPATIENT
Start: 2019-09-23 | End: 2019-10-30 | Stop reason: SDUPTHER

## 2019-09-23 NOTE — TELEPHONE ENCOUNTER
From: Gerhardt Brazil  To: Jeancarlos Amaro DO  Sent: 9/22/2019 5:25 PM EDT  Subject: Test Results Question    MRI results: Increasing the arava sounds good to me. Do you think I would benefit from [facility]?

## 2019-09-24 DIAGNOSIS — J45.40 MODERATE PERSISTENT ASTHMA WITHOUT COMPLICATION: ICD-10-CM

## 2019-09-24 DIAGNOSIS — M32.9 SYSTEMIC LUPUS ERYTHEMATOSUS, UNSPECIFIED SLE TYPE, UNSPECIFIED ORGAN INVOLVEMENT STATUS (HCC): ICD-10-CM

## 2019-09-24 DIAGNOSIS — M32.9 LUPUS ARTHRITIS (HCC): ICD-10-CM

## 2019-09-24 DIAGNOSIS — R91.1 LUNG NODULE, SOLITARY: Primary | ICD-10-CM

## 2019-09-29 DIAGNOSIS — M79.7 FIBROMYALGIA: ICD-10-CM

## 2019-09-30 ENCOUNTER — PATIENT MESSAGE (OUTPATIENT)
Dept: FAMILY MEDICINE CLINIC | Age: 39
End: 2019-09-30

## 2019-09-30 RX ORDER — FLASH GLUCOSE SENSOR
1 KIT MISCELLANEOUS
Qty: 7 EACH | Refills: 3 | Status: SHIPPED | OUTPATIENT
Start: 2019-09-30 | End: 2020-09-16

## 2019-09-30 RX ORDER — IBUPROFEN 600 MG/1
600 TABLET ORAL EVERY 8 HOURS PRN
Qty: 42 TABLET | Refills: 5 | Status: SHIPPED | OUTPATIENT
Start: 2019-09-30 | End: 2020-04-25 | Stop reason: SDUPTHER

## 2019-09-30 RX ORDER — TRAZODONE HYDROCHLORIDE 50 MG/1
150 TABLET ORAL NIGHTLY
Qty: 90 TABLET | Refills: 5 | Status: SHIPPED | OUTPATIENT
Start: 2019-09-30 | End: 2020-02-26 | Stop reason: SDUPTHER

## 2019-09-30 RX ORDER — FLASH GLUCOSE SENSOR
1 KIT MISCELLANEOUS DAILY
Qty: 1 EACH | Refills: 0 | Status: SHIPPED | OUTPATIENT
Start: 2019-09-30 | End: 2022-05-11

## 2019-09-30 RX ORDER — POTASSIUM CHLORIDE 750 MG/1
10 TABLET, EXTENDED RELEASE ORAL DAILY
Qty: 30 TABLET | Refills: 5 | Status: SHIPPED | OUTPATIENT
Start: 2019-09-30 | End: 2020-02-28 | Stop reason: SDUPTHER

## 2019-09-30 NOTE — TELEPHONE ENCOUNTER
From: Gaudencio Davidsonain  To: Cisco Zaldivar MD  Sent: 9/30/2019 3:59 PM EDT  Subject: Prescription Question    I forgot to  my prescription for the freestyle sarita when I was in the office can that be mailed to me?

## 2019-10-01 ENCOUNTER — CARE COORDINATION (OUTPATIENT)
Dept: CARE COORDINATION | Age: 39
End: 2019-10-01

## 2019-10-01 ENCOUNTER — NURSE ONLY (OUTPATIENT)
Dept: LAB | Age: 39
End: 2019-10-01

## 2019-10-01 DIAGNOSIS — R79.89 ELEVATED TSH: ICD-10-CM

## 2019-10-01 LAB — TSH SERPL DL<=0.05 MIU/L-ACNC: 4.11 UIU/ML (ref 0.4–4.2)

## 2019-10-10 ENCOUNTER — CARE COORDINATION (OUTPATIENT)
Dept: CARE COORDINATION | Age: 39
End: 2019-10-10

## 2019-10-14 DIAGNOSIS — M32.9 LUPUS ARTHRITIS (HCC): Primary | ICD-10-CM

## 2019-10-14 RX ORDER — PREDNISONE 1 MG/1
TABLET ORAL
Qty: 40 TABLET | Refills: 0 | Status: SHIPPED | OUTPATIENT
Start: 2019-10-14 | End: 2019-10-30

## 2019-10-21 ENCOUNTER — OFFICE VISIT (OUTPATIENT)
Dept: FAMILY MEDICINE CLINIC | Age: 39
End: 2019-10-21
Payer: MEDICARE

## 2019-10-21 VITALS
DIASTOLIC BLOOD PRESSURE: 78 MMHG | HEART RATE: 74 BPM | BODY MASS INDEX: 40.75 KG/M2 | WEIGHT: 230 LBS | RESPIRATION RATE: 16 BRPM | TEMPERATURE: 98.3 F | SYSTOLIC BLOOD PRESSURE: 120 MMHG

## 2019-10-21 DIAGNOSIS — E11.69 TYPE 2 DIABETES MELLITUS WITH OTHER SPECIFIED COMPLICATION, WITHOUT LONG-TERM CURRENT USE OF INSULIN (HCC): Primary | ICD-10-CM

## 2019-10-21 PROCEDURE — 99213 OFFICE O/P EST LOW 20 MIN: CPT | Performed by: FAMILY MEDICINE

## 2019-10-24 ENCOUNTER — CARE COORDINATION (OUTPATIENT)
Dept: CARE COORDINATION | Age: 39
End: 2019-10-24

## 2019-10-29 ENCOUNTER — CARE COORDINATION (OUTPATIENT)
Dept: CARE COORDINATION | Age: 39
End: 2019-10-29

## 2019-10-30 RX ORDER — LEFLUNOMIDE 20 MG/1
TABLET ORAL
Qty: 30 TABLET | Refills: 0 | Status: SHIPPED | OUTPATIENT
Start: 2019-10-30 | End: 2019-11-29 | Stop reason: SDUPTHER

## 2019-11-14 ENCOUNTER — CARE COORDINATION (OUTPATIENT)
Dept: CARE COORDINATION | Age: 39
End: 2019-11-14

## 2019-11-29 DIAGNOSIS — E83.42 HYPOMAGNESEMIA: ICD-10-CM

## 2019-12-01 RX ORDER — CLOPIDOGREL BISULFATE 75 MG/1
75 TABLET ORAL DAILY
Qty: 90 TABLET | Refills: 3 | Status: SHIPPED | OUTPATIENT
Start: 2019-12-01 | End: 2019-12-01

## 2019-12-01 RX ORDER — CLOPIDOGREL BISULFATE 75 MG/1
TABLET ORAL
Qty: 90 TABLET | Refills: 3 | Status: SHIPPED | OUTPATIENT
Start: 2019-12-01 | End: 2020-11-29

## 2019-12-01 RX ORDER — MONTELUKAST SODIUM 10 MG/1
10 TABLET ORAL NIGHTLY
Qty: 30 TABLET | Refills: 5 | Status: SHIPPED | OUTPATIENT
Start: 2019-12-01 | End: 2020-06-30 | Stop reason: SDUPTHER

## 2019-12-02 RX ORDER — LEFLUNOMIDE 20 MG/1
TABLET ORAL
Qty: 30 TABLET | Refills: 0 | Status: SHIPPED | OUTPATIENT
Start: 2019-12-02 | End: 2019-12-03

## 2019-12-03 ENCOUNTER — PATIENT MESSAGE (OUTPATIENT)
Dept: FAMILY MEDICINE CLINIC | Age: 39
End: 2019-12-03

## 2019-12-03 ENCOUNTER — HOSPITAL ENCOUNTER (OUTPATIENT)
Age: 39
Discharge: HOME | End: 2019-12-03
Payer: MEDICARE

## 2019-12-03 DIAGNOSIS — M32.9 LUPUS ARTHRITIS (HCC): ICD-10-CM

## 2019-12-03 DIAGNOSIS — M32.9 LUPUS ARTHRITIS (HCC): Primary | ICD-10-CM

## 2019-12-03 DIAGNOSIS — M79.7 FIBROMYALGIA: ICD-10-CM

## 2019-12-03 DIAGNOSIS — M54.50 CHRONIC BILATERAL LOW BACK PAIN WITHOUT SCIATICA: ICD-10-CM

## 2019-12-03 DIAGNOSIS — E11.69 TYPE 2 DIABETES MELLITUS WITH OTHER SPECIFIED COMPLICATION, WITHOUT LONG-TERM CURRENT USE OF INSULIN (HCC): ICD-10-CM

## 2019-12-03 DIAGNOSIS — G89.29 CHRONIC BILATERAL LOW BACK PAIN WITHOUT SCIATICA: ICD-10-CM

## 2019-12-03 LAB
ALBUMIN SERPL-MCNC: 4.5 G/DL (ref 3.5–5.1)
ALP BLD-CCNC: 166 U/L (ref 38–126)
ALT SERPL-CCNC: 61 U/L (ref 11–66)
ANION GAP SERPL CALCULATED.3IONS-SCNC: 19 MEQ/L (ref 8–16)
AST SERPL-CCNC: 43 U/L (ref 5–40)
AVERAGE GLUCOSE: 186 MG/DL (ref 70–126)
BASOPHILS # BLD: 0.2 %
BASOPHILS ABSOLUTE: 0 THOU/MM3 (ref 0–0.1)
BILIRUB SERPL-MCNC: 0.4 MG/DL (ref 0.3–1.2)
BUN BLDV-MCNC: 18 MG/DL (ref 7–22)
C-REACTIVE PROTEIN: 0.41 MG/DL (ref 0–1)
CALCIUM SERPL-MCNC: 9.7 MG/DL (ref 8.5–10.5)
CHLORIDE BLD-SCNC: 97 MEQ/L (ref 98–111)
CO2: 21 MEQ/L (ref 23–33)
CREAT SERPL-MCNC: 0.6 MG/DL (ref 0.4–1.2)
EOSINOPHIL # BLD: 0 %
EOSINOPHILS ABSOLUTE: 0 THOU/MM3 (ref 0–0.4)
ERYTHROCYTE [DISTWIDTH] IN BLOOD BY AUTOMATED COUNT: 14.3 % (ref 11.5–14.5)
ERYTHROCYTE [DISTWIDTH] IN BLOOD BY AUTOMATED COUNT: 42.3 FL (ref 35–45)
GFR SERPL CREATININE-BSD FRML MDRD: > 90 ML/MIN/1.73M2
GLUCOSE BLD-MCNC: 260 MG/DL (ref 70–108)
HBA1C MFR BLD: 8.2 % (ref 4.4–6.4)
HCT VFR BLD CALC: 44.3 % (ref 37–47)
HEMOGLOBIN: 13.8 GM/DL (ref 12–16)
IMMATURE GRANS (ABS): 0.05 THOU/MM3 (ref 0–0.07)
IMMATURE GRANULOCYTES: 0.4 %
LYMPHOCYTES # BLD: 16 %
LYMPHOCYTES ABSOLUTE: 1.8 THOU/MM3 (ref 1–4.8)
MCH RBC QN AUTO: 25.7 PG (ref 26–33)
MCHC RBC AUTO-ENTMCNC: 31.2 GM/DL (ref 32.2–35.5)
MCV RBC AUTO: 82.3 FL (ref 81–99)
MONOCYTES # BLD: 3.8 %
MONOCYTES ABSOLUTE: 0.4 THOU/MM3 (ref 0.4–1.3)
NUCLEATED RED BLOOD CELLS: 0 /100 WBC
PLATELET # BLD: 299 THOU/MM3 (ref 130–400)
PMV BLD AUTO: 11.2 FL (ref 9.4–12.4)
POTASSIUM SERPL-SCNC: 3.9 MEQ/L (ref 3.5–5.2)
RBC # BLD: 5.38 MILL/MM3 (ref 4.2–5.4)
SEDIMENTATION RATE, ERYTHROCYTE: 12 MM/HR (ref 0–20)
SEG NEUTROPHILS: 79.6 %
SEGMENTED NEUTROPHILS ABSOLUTE COUNT: 9.1 THOU/MM3 (ref 1.8–7.7)
SODIUM BLD-SCNC: 137 MEQ/L (ref 135–145)
TOTAL PROTEIN: 7.5 G/DL (ref 6.1–8)
WBC # BLD: 11.4 THOU/MM3 (ref 4.8–10.8)

## 2019-12-03 PROCEDURE — 36415 COLL VENOUS BLD VENIPUNCTURE: CPT

## 2019-12-03 PROCEDURE — 86140 C-REACTIVE PROTEIN: CPT

## 2019-12-03 PROCEDURE — 85651 RBC SED RATE NONAUTOMATED: CPT

## 2019-12-03 PROCEDURE — 85025 COMPLETE CBC W/AUTO DIFF WBC: CPT

## 2019-12-03 PROCEDURE — 83036 HEMOGLOBIN GLYCOSYLATED A1C: CPT

## 2019-12-03 PROCEDURE — 80053 COMPREHEN METABOLIC PANEL: CPT

## 2019-12-03 RX ORDER — LEFLUNOMIDE 20 MG/1
20 TABLET ORAL DAILY
Qty: 30 TABLET | Refills: 0 | Status: SHIPPED | OUTPATIENT
Start: 2019-12-03 | End: 2020-01-01 | Stop reason: SDUPTHER

## 2019-12-03 RX ORDER — LEFLUNOMIDE 10 MG/1
15 TABLET ORAL DAILY
Qty: 45 TABLET | Refills: 0 | Status: SHIPPED | OUTPATIENT
Start: 2019-12-03 | End: 2019-12-03

## 2019-12-06 ENCOUNTER — CARE COORDINATION (OUTPATIENT)
Dept: CARE COORDINATION | Age: 39
End: 2019-12-06

## 2019-12-20 ENCOUNTER — CARE COORDINATION (OUTPATIENT)
Dept: CARE COORDINATION | Age: 39
End: 2019-12-20

## 2019-12-20 ENCOUNTER — PATIENT MESSAGE (OUTPATIENT)
Dept: FAMILY MEDICINE CLINIC | Age: 39
End: 2019-12-20

## 2019-12-20 RX ORDER — TRIAMCINOLONE ACETONIDE 1 MG/G
CREAM TOPICAL 2 TIMES DAILY
Qty: 1 TUBE | Refills: 5 | Status: SHIPPED | OUTPATIENT
Start: 2019-12-20

## 2019-12-20 RX ORDER — TRIAMCINOLONE ACETONIDE 1 MG/G
CREAM TOPICAL 2 TIMES DAILY
COMMUNITY
End: 2019-12-20 | Stop reason: SDUPTHER

## 2019-12-30 ENCOUNTER — PATIENT MESSAGE (OUTPATIENT)
Dept: RHEUMATOLOGY | Age: 39
End: 2019-12-30

## 2019-12-30 RX ORDER — CYCLOBENZAPRINE HCL 5 MG
5 TABLET ORAL 3 TIMES DAILY PRN
Qty: 90 TABLET | Refills: 2 | Status: SHIPPED | OUTPATIENT
Start: 2019-12-30 | End: 2020-01-30 | Stop reason: SDUPTHER

## 2019-12-31 ENCOUNTER — HOSPITAL ENCOUNTER (OUTPATIENT)
Age: 39
Discharge: HOME | End: 2019-12-31
Payer: MEDICARE

## 2019-12-31 DIAGNOSIS — Z51.81 MEDICATION MONITORING ENCOUNTER: ICD-10-CM

## 2019-12-31 LAB
ALBUMIN SERPL-MCNC: 4.3 G/DL (ref 3.5–5.1)
ALP BLD-CCNC: 148 U/L (ref 38–126)
ALT SERPL-CCNC: 44 U/L (ref 11–66)
ANION GAP SERPL CALCULATED.3IONS-SCNC: 15 MEQ/L (ref 8–16)
AST SERPL-CCNC: 34 U/L (ref 5–40)
BASOPHILS # BLD: 0.7 %
BASOPHILS ABSOLUTE: 0.1 THOU/MM3 (ref 0–0.1)
BILIRUB SERPL-MCNC: 0.2 MG/DL (ref 0.3–1.2)
BUN BLDV-MCNC: 12 MG/DL (ref 7–22)
C-REACTIVE PROTEIN: 1.07 MG/DL (ref 0–1)
CALCIUM SERPL-MCNC: 9.7 MG/DL (ref 8.5–10.5)
CHLORIDE BLD-SCNC: 101 MEQ/L (ref 98–111)
CO2: 23 MEQ/L (ref 23–33)
CREAT SERPL-MCNC: 0.8 MG/DL (ref 0.4–1.2)
EOSINOPHIL # BLD: 2.3 %
EOSINOPHILS ABSOLUTE: 0.2 THOU/MM3 (ref 0–0.4)
ERYTHROCYTE [DISTWIDTH] IN BLOOD BY AUTOMATED COUNT: 14.1 % (ref 11.5–14.5)
ERYTHROCYTE [DISTWIDTH] IN BLOOD BY AUTOMATED COUNT: 42.3 FL (ref 35–45)
GFR SERPL CREATININE-BSD FRML MDRD: 79 ML/MIN/1.73M2
GLUCOSE BLD-MCNC: 141 MG/DL (ref 70–108)
HCT VFR BLD CALC: 41.9 % (ref 37–47)
HEMOGLOBIN: 13.2 GM/DL (ref 12–16)
IMMATURE GRANS (ABS): 0.02 THOU/MM3 (ref 0–0.07)
IMMATURE GRANULOCYTES: 0.2 %
LYMPHOCYTES # BLD: 31 %
LYMPHOCYTES ABSOLUTE: 2.6 THOU/MM3 (ref 1–4.8)
MCH RBC QN AUTO: 26.2 PG (ref 26–33)
MCHC RBC AUTO-ENTMCNC: 31.5 GM/DL (ref 32.2–35.5)
MCV RBC AUTO: 83.3 FL (ref 81–99)
MONOCYTES # BLD: 7.3 %
MONOCYTES ABSOLUTE: 0.6 THOU/MM3 (ref 0.4–1.3)
NUCLEATED RED BLOOD CELLS: 0 /100 WBC
PLATELET # BLD: 269 THOU/MM3 (ref 130–400)
PMV BLD AUTO: 11 FL (ref 9.4–12.4)
POTASSIUM SERPL-SCNC: 4.1 MEQ/L (ref 3.5–5.2)
RBC # BLD: 5.03 MILL/MM3 (ref 4.2–5.4)
SEDIMENTATION RATE, ERYTHROCYTE: 16 MM/HR (ref 0–20)
SEG NEUTROPHILS: 58.5 %
SEGMENTED NEUTROPHILS ABSOLUTE COUNT: 4.9 THOU/MM3 (ref 1.8–7.7)
SODIUM BLD-SCNC: 139 MEQ/L (ref 135–145)
TOTAL PROTEIN: 7 G/DL (ref 6.1–8)
WBC # BLD: 8.3 THOU/MM3 (ref 4.8–10.8)

## 2019-12-31 PROCEDURE — 86140 C-REACTIVE PROTEIN: CPT

## 2019-12-31 PROCEDURE — 80053 COMPREHEN METABOLIC PANEL: CPT

## 2019-12-31 PROCEDURE — 36415 COLL VENOUS BLD VENIPUNCTURE: CPT

## 2019-12-31 PROCEDURE — 85651 RBC SED RATE NONAUTOMATED: CPT

## 2019-12-31 PROCEDURE — 85025 COMPLETE CBC W/AUTO DIFF WBC: CPT

## 2020-01-01 RX ORDER — LEFLUNOMIDE 20 MG/1
20 TABLET ORAL DAILY
Qty: 30 TABLET | Refills: 0 | Status: SHIPPED | OUTPATIENT
Start: 2020-01-01 | End: 2020-01-30 | Stop reason: SDUPTHER

## 2020-01-01 NOTE — PROGRESS NOTES
Diagnoses and all orders for this visit:    Lupus arthritis ([facility] [address]  -     leflunomide (ARAVA) 20 MG tablet;  Take 1 tablet by mouth daily        Medication monitoring --- Repeat labs q 4 wks x 1

## 2020-01-03 ENCOUNTER — CARE COORDINATION (OUTPATIENT)
Dept: CARE COORDINATION | Age: 40
End: 2020-01-03

## 2020-01-07 ENCOUNTER — HOSPITAL ENCOUNTER (OUTPATIENT)
Age: 40
Discharge: HOME | End: 2020-01-07
Payer: MEDICARE

## 2020-01-07 ENCOUNTER — OFFICE VISIT (OUTPATIENT)
Dept: RHEUMATOLOGY | Age: 40
End: 2020-01-07
Payer: MEDICARE

## 2020-01-07 VITALS
HEIGHT: 63 IN | OXYGEN SATURATION: 94 % | DIASTOLIC BLOOD PRESSURE: 80 MMHG | WEIGHT: 233.6 LBS | BODY MASS INDEX: 41.39 KG/M2 | HEART RATE: 96 BPM | SYSTOLIC BLOOD PRESSURE: 120 MMHG

## 2020-01-07 DIAGNOSIS — R79.89 LFT ELEVATION: ICD-10-CM

## 2020-01-07 PROCEDURE — 82103 ALPHA-1-ANTITRYPSIN TOTAL: CPT

## 2020-01-07 PROCEDURE — 83516 IMMUNOASSAY NONANTIBODY: CPT

## 2020-01-07 PROCEDURE — 86376 MICROSOMAL ANTIBODY EACH: CPT

## 2020-01-07 PROCEDURE — 36415 COLL VENOUS BLD VENIPUNCTURE: CPT

## 2020-01-07 PROCEDURE — 99214 OFFICE O/P EST MOD 30 MIN: CPT | Performed by: INTERNAL MEDICINE

## 2020-01-07 RX ORDER — GABAPENTIN 300 MG/1
CAPSULE ORAL
Qty: 90 CAPSULE | Refills: 1 | Status: SHIPPED | OUTPATIENT
Start: 2020-01-07 | End: 2020-02-26

## 2020-01-07 ASSESSMENT — ENCOUNTER SYMPTOMS
WHEEZING: 0
DIARRHEA: 1
CONSTIPATION: 1
ABDOMINAL PAIN: 1
SHORTNESS OF BREATH: 0
ANAL BLEEDING: 0
VOMITING: 0
EYES NEGATIVE: 1
BLOOD IN STOOL: 0
EYE PAIN: 0
EYE REDNESS: 0
COLOR CHANGE: 1
COUGH: 0
NAUSEA: 0

## 2020-01-07 NOTE — PROGRESS NOTES
Ashwini       Date Of Service: 1/7/2020  Provider: Jacek Lucas DO    Name: Chao Starkey   MRN: [id number]    Chief Complaint(s)      Chief Complaint   Patient presents with    Follow-up     4 MONTHS- Lupus     History of Present illness (HPI)    Chao Starkey   is a(n)39 y.o. female with a hx of Morbidity obesity, CVA x 1 (2017) ,  GERD, hiatal hearnia, IBS endometeriosos s/p hysterectomy,  Asthma,  hypercholesterolemia, type II DM, lupus arthritis , fibromyalgia, IgG subclass defifiency, here  for evaluation of h/o Lupus and Fibromyalgia. - Arava - tolerated -- no significant relief reported. - cymbalta -- not helping with pain per pt's mother. Mother reported Stairing spells, with slurred speech and dragging of foot afterwards - prior evaluation by neurology and no relief with Seizure medication. Plavix started for possible TIA's and pt experienced  partial relief. Continued episodes occurring about once daily. Pts mother reporting PCP recommended better pain control given the concern for pain being the trigger. Ongoing genearlized arthralgia, myalgia w/ no change form last evaluation. Pain up to 8/10. Constant in back and hips. Timing: evenings. B/l hand numbness - occurring \"a lot\" - occasionally occurring while in bed and waking pt. Aggravating factors:inactivity, cold, increased use, increased humid weather. Alleviating factors: prednisone AM stiffness lasting about 1 hour to half the day. No Swelling - hands feel tight. Generalized weakness     + sicca - dry eyes and mouth, + fatigue, + tinnitus, insomnia. Recurrent sinus infections reported greatest in winter months         Pt diagnosed with inflammatory arthritis/ lupus 12 y.o.a  After presenting with fatigue, tiredness, low grade fevers, malar type rash, oral sores -- on plaquenil x 20 years but stopped 2017 b/c plaquenil toxicity. Fibromyalgia a few years after the inflammatory arthritis.  Treated at use 5/2016    changes in the eyes    TIA (transient ischemic attack)     8/2016       PAST SURGICAL HISTORY      Past Surgical History:   Procedure Laterality Date    HYSTERECTOMY  2009    Total, endometriosis    LAPAROSCOPY      SHOULDER ARTHROSCOPY Left 08/20/2015    Debridement of rotator cuff and bone spurs       FAMILY HISTORY      Family History   Problem Relation Age of Onset    High Blood Pressure Mother     Rheum Arthritis Mother     Osteoporosis Mother     Diabetes Father     High Blood Pressure Father     Arthritis Father         gout    Diabetes Sister     Cancer Sister 39        breast    Other Sister         Crest Syndrome    Diabetes Brother     High Cholesterol Brother        SOCIAL HISTORY      Social History     Tobacco History     Smoking Status  Never Smoker    Smokeless Tobacco Use  Never Used          Alcohol History     Alcohol Use Status  No          Drug Use     Drug Use Status  No          Sexual Activity     Sexually Active  Not Currently                ALLERGIES     Allergies   Allergen Reactions    Cayenne Swelling     Tongue and throat swelling    Codeine Rash    Tape [Adhesive Tape]      Tears skin    Amoxicillin Other (See Comments)    Sulfa Antibiotics Other (See Comments)     Can not take medication due to illness       CURRENT MEDICATIONS      Current Outpatient Medications   Medication Sig Dispense Refill    leflunomide (ARAVA) 20 MG tablet Take 1 tablet by mouth daily 30 tablet 0    metFORMIN (GLUCOPHAGE) 500 MG tablet Take 1 tablet by mouth 2 times daily (with meals) 60 tablet 11    verapamil (CALAN SR) 240 MG extended release tablet Take 1 tablet by mouth daily 90 tablet 3    cyclobenzaprine (FLEXERIL) 5 MG tablet Take 1 tablet by mouth 3 times daily as needed for Muscle spasms 90 tablet 2    triamcinolone (KENALOG) 0.1 % cream Apply topically 2 times daily Apply topically 2 times daily.  1 Tube 5    montelukast (SINGULAIR) 10 MG tablet Take 1 tablet by mouth nightly 30 tablet 5    magnesium oxide (MAG-OX) 400 (241.3 Mg) MG TABS tablet Take 1 tablet by mouth daily 90 tablet 5    clopidogrel (PLAVIX) 75 MG tablet TAKE 1 TABLET BY MOUTH ONCE DAILY 90 tablet 3    traZODone (DESYREL) 50 MG tablet Take 3 tablets by mouth nightly 90 tablet 5    potassium chloride (KLOR-CON M10) 10 MEQ extended release tablet Take 1 tablet by mouth daily 30 tablet 5    ibuprofen (ADVIL;MOTRIN) 600 MG tablet Take 1 tablet by mouth every 8 hours as needed for Pain 42 tablet 5    Continuous Blood Gluc Sensor (FREESTYLE SANDRA SENSOR SYSTEM) MISC 1 Units by Does not apply route daily 1 each 0    Continuous Blood Gluc Sensor (FREESTYLE SANDRA 14 DAY SENSOR) MISC 1 Units by Does not apply route every 14 days 7 each 3    fluticasone (FLONASE) 50 MCG/ACT nasal spray 2 sprays by Each Nostril route daily 2 Sprays in each nostril 1 Bottle 1    atorvastatin (LIPITOR) 80 MG tablet Take 1 tablet by mouth daily 90 tablet 3    levocetirizine (XYZAL) 5 MG tablet TAKE ONE TABLET BY MOUTH NIGHTLY 30 tablet 11    Magic Mouthwash (MIRACLE MOUTHWASH) Swish and spit 5 mLs 4 times daily as needed for Irritation Equal parts viscous lidocaine, diphenhydramine, nystatin and  mL 5    Continuous Blood Gluc Sensor (FREESTYLE SANDRA SENSOR SYSTEM) MISC 1 kit by Does not apply route 2 times daily Include sensors for a year  Dx:  E11.9, and lupus 1 each 0    sucralfate (CARAFATE) 1 GM tablet TAKE 1 TABLET BY MOUTH 4 TIMES DAILY 120 tablet 11    DULoxetine (CYMBALTA) 60 MG extended release capsule Take 1 capsule by mouth daily 90 capsule 5    sodium chloride (OCEAN) 0.65 % nasal spray 1 spray by Nasal route as needed for Congestion 1 Bottle 3    NEXIUM 40 MG delayed release capsule TAKE 1 CAPSULE BY MOUTH ONCE DAILY 30 capsule 5    levalbuterol (XOPENEX HFA) 45 MCG/ACT inhaler Inhale 1-2 puffs into the lungs every 4 hours as needed for Wheezing or Shortness of Breath 1 Inhaler 3    levalbuterol (XOPENEX) 0.63 MG/3ML nebulization Take 3 mLs by nebulization every 6 hours as needed for Wheezing or Shortness of Breath 270 mL 5    Respiratory Therapy Supplies (NEBULIZER AIR TUBE/PLUGS) MISC 1 kit by Does not apply route every 6 hours as needed (Wheezing/shortness of breath) Please provide nebulizer kit and supplies. 1 each 0    ondansetron (ZOFRAN ODT) 4 MG disintegrating tablet Take 1 tablet by mouth every 8 hours as needed for Nausea or Vomiting 20 tablet 0    EQ PAIN RELIEVER 325 MG tablet TAKE TWO TABLETS BY MOUTH EVERY 4 HOURS AS NEEDED FOR PAIN AND FEVER 120 tablet 3    Amitriptyline HCl (ELAVIL PO) Take 25 mg by mouth nightly       aspirin (LORENZO ASPIRIN) 325 MG tablet Take 1 tablet by mouth daily 30 tablet 3    promethazine (PHENERGAN) 25 MG tablet Take 1 tablet by mouth every 6 hours as needed for Nausea 90 tablet 11    diclofenac sodium (VOLTAREN) 1 % GEL Apply 2 g topically 4 times daily as needed for Pain (topical)        No current facility-administered medications for this visit. PHYSICAL EXAMINATION / OBJECTIVE     Objective:  /80 (Site: Left Upper Arm, Position: Sitting, Cuff Size: Large Adult)   Pulse 96   Ht 5' 2.99\" (1.6 m)   Wt 233 lb 9.6 oz (106 kg)   SpO2 94%   BMI 41.39 kg/m²     Physical Exam  Constitutional:       General: She is not in acute distress. Appearance: She is well-developed. She is not diaphoretic. HENT:      Head: Normocephalic and atraumatic. Eyes:      General: No scleral icterus. Conjunctiva/sclera: Conjunctivae normal.   Neck:      Musculoskeletal: Normal range of motion and neck supple. Cardiovascular:      Rate and Rhythm: Normal rate and regular rhythm. Heart sounds: Normal heart sounds. Pulmonary:      Effort: Pulmonary effort is normal. No respiratory distress. Breath sounds: Normal breath sounds. No wheezing or rales. Lymphadenopathy:      Cervical: No cervical adenopathy.    Skin:     General: Skin is warm and No results found for: SSA  No results found for: SSB  No results found for: ANTI-SMITH  No results found for: DSDNAAB   No results found for: ANTIRNP  No results found for: C3, C4  No results found for: CCPAB  No results found for: RF    No components found for: CANCASCRN, APANCASCRN  Lab Results   Component Value Date    SEDRATE 16 12/31/2019     Lab Results   Component Value Date    CRP 1.07 (H) 12/31/2019           RADIOLOGY:         ASSESSMENT/PLAN    Assessment   Plan   1. Lupus arthritis ([address]  H/o lupus diagnosed around 14 y.o.a  H/o reported oral/nasal sores, malar rash, inflammatory arthritis, episodic fevers. Treated with plaquenil stopped 2017 after 20 years b/c retinal toxicity. Methotrexate , Arava, Methotrexate + arava -- some relief. With sicca sx's. Reported raynauds . Mild joint swelling, warmth noted on exam. Last [facility] note w/ diagnosis of inflammatory poly-arthropathy    - repeat testing w/ EVELIN :1:80 dense fine speckled. - Arava 20mg daily    - Prednisone 5mg daily    - holding on other treatment at this time. Fibromyalgia - active. - phys therapy referral for graded exercise program.   - gabapentin 300mg qhs x 3 days then increase to 300mg nightly. --- discussed risk of mood changes, fatigue, euphoria, additive qualities and to avoid abrupt d/c of medication     LFT elevation   - h/o fatty liver disease    - normalized on recent evaluation    - checking A1A, Smooth muscle, [id number] , mitochondrial ab, etc.        Chronic low back pain. - June 2019 CT with sclerosis around bilateral SI joints given the inflammatory arthritis and inflammatory back pain sx's ? MRI of pelvis. Carpal tunnel:  Bilateral   - + tinel bilateral,   intermittent numbness/tingling   - encourage wrist splinting.    - declined EMG/NCV evaluation      Cubital  tunnel:  bilateral  - + tinel bilateral,  intermittent numbness/tingling bilateral hands.    - encourage elbow  splinting.    - declined

## 2020-01-08 ENCOUNTER — TELEPHONE (OUTPATIENT)
Dept: PHYSICAL MEDICINE AND REHAB | Age: 40
End: 2020-01-08

## 2020-01-08 NOTE — TELEPHONE ENCOUNTER
Received called from Yaw at [facility] in [place], asking for call back to clarify lab orders. Phone# [phone]    Questioning the liver study and F-actin lab. She is sending over the papers to review and what they are questioning for us to fill out and fax back to them.

## 2020-01-10 ENCOUNTER — HOSPITAL ENCOUNTER (OUTPATIENT)
Dept: CT IMAGING | Age: 40
Discharge: HOME | End: 2020-01-10
Payer: MEDICARE

## 2020-01-10 ENCOUNTER — CARE COORDINATION (OUTPATIENT)
Dept: CARE COORDINATION | Age: 40
End: 2020-01-10

## 2020-01-10 PROCEDURE — 71250 CT THORAX DX C-: CPT

## 2020-01-10 NOTE — CARE COORDINATION
Attempted to reach patient for continued Care Coordination follow up and education. Patient was unavailable at the time of my call, and a generic voicemail message was left asking patient to return my call at [phone].

## 2020-01-12 LAB
F-ACTIN AB IGG: 6 UNITS (ref 0–19)
LIVER-KIDNEY MICROSOMAL AB IGG: NORMAL
MITOCHONDRIAL ANTIBODY: 2.2 UNITS (ref 0–20)

## 2020-01-13 ENCOUNTER — OFFICE VISIT (OUTPATIENT)
Dept: PULMONOLOGY | Age: 40
End: 2020-01-13
Payer: MEDICARE

## 2020-01-13 VITALS
WEIGHT: 235.6 LBS | HEART RATE: 87 BPM | TEMPERATURE: 98.5 F | HEIGHT: 63 IN | DIASTOLIC BLOOD PRESSURE: 76 MMHG | BODY MASS INDEX: 41.75 KG/M2 | OXYGEN SATURATION: 98 % | SYSTOLIC BLOOD PRESSURE: 120 MMHG

## 2020-01-13 LAB — ALPHA-1 ANTITRYPSIN: 165 MG/DL (ref 90–200)

## 2020-01-13 PROCEDURE — 99214 OFFICE O/P EST MOD 30 MIN: CPT | Performed by: NURSE PRACTITIONER

## 2020-01-13 ASSESSMENT — ENCOUNTER SYMPTOMS
CHOKING: 0
SORE THROAT: 0
WHEEZING: 0
SINUS PRESSURE: 1
EYES NEGATIVE: 1
ABDOMINAL PAIN: 0
TROUBLE SWALLOWING: 0
DIARRHEA: 0
SHORTNESS OF BREATH: 1
CHEST TIGHTNESS: 0
STRIDOR: 0
COUGH: 0
VOMITING: 0
NAUSEA: 0

## 2020-01-13 NOTE — PROGRESS NOTES
[facility]     Patient: Jose Galindo, 35 y.o.   : 1980  2020    Pt of Dr. Tarah Renae   Patient presents with    Follow-up     6 month f/u with ct chest 1/10/2020    Other     neck 15.75  mp 4        HPI  [place] is here for 6 month follow up for asthma. Not on maintenance ICS, using Xopenex PRN- feels that she rarely uses. Occasional SOB with exertional activity, non limiting. More limited due to joint pains. On daily prednisone 5 mg  Daily for her SLE - follows with Dr Kilo Conti DO. Has had increased doses of her Prednisone to help with sinusitis. Using Flonase daily with some benefit. Is taking Singulair and Xyzal daily     Last PFT 18- WNL  Stable 9 mm pulmonary nodule left lung likely due to SLE- her for follow up CT     Accompanied today by her Mother.    Past Medical hx   PMH:  Past Medical History:   Diagnosis Date    Asthma     Diabetes mellitus ([facility] [address] 2018    Fibromyalgia     GERD (gastroesophageal reflux disease)     Hyperlipidemia     Lupus (systemic lupus erythematosus) (HCC)     Lupus arthritis ([facility] [address]     [facility]    Migraine     Plaquenil adverse reaction in therapeutic use 2016    changes in the eyes    TIA (transient ischemic attack)     2016     SURGICAL HISTORY:  Past Surgical History:   Procedure Laterality Date    HYSTERECTOMY  2009    Total, endometriosis    LAPAROSCOPY      SHOULDER ARTHROSCOPY Left 2015    Debridement of rotator cuff and bone spurs     SOCIAL HISTORY:  Social History     Tobacco Use    Smoking status: Never Smoker    Smokeless tobacco: Never Used   Substance Use Topics    Alcohol use: No     Alcohol/week: 0.0 standard drinks    Drug use: No     ALLERGIES:  Allergies   Allergen Reactions    Cayenne Swelling     Tongue and throat swelling    Codeine Rash    Tape [Adhesive Tape]      Tears skin    Amoxicillin Other (See Comments)    Sulfa Antibiotics Other (See Comments)     Can not take medication due to illness     FAMILY HISTORY:  Family History   Problem Relation Age of Onset    High Blood Pressure Mother     Rheum Arthritis Mother     Osteoporosis Mother     Diabetes Father     High Blood Pressure Father     Arthritis Father         gout    Diabetes Sister     Cancer Sister 39        breast    Other Sister         Crest Syndrome    Diabetes Brother     High Cholesterol Brother      CURRENT MEDICATIONS:  Current Outpatient Medications   Medication Sig Dispense Refill    gabapentin (NEURONTIN) 300 MG capsule Take 300mg qhs (1 tablet) nightly for 3 nights then increase to 300mg twice daily 90 capsule 1    leflunomide (ARAVA) 20 MG tablet Take 1 tablet by mouth daily 30 tablet 0    metFORMIN (GLUCOPHAGE) 500 MG tablet Take 1 tablet by mouth 2 times daily (with meals) 60 tablet 11    verapamil (CALAN SR) 240 MG extended release tablet Take 1 tablet by mouth daily 90 tablet 3    cyclobenzaprine (FLEXERIL) 5 MG tablet Take 1 tablet by mouth 3 times daily as needed for Muscle spasms 90 tablet 2    triamcinolone (KENALOG) 0.1 % cream Apply topically 2 times daily Apply topically 2 times daily.  1 Tube 5    montelukast (SINGULAIR) 10 MG tablet Take 1 tablet by mouth nightly 30 tablet 5    magnesium oxide (MAG-OX) 400 (241.3 Mg) MG TABS tablet Take 1 tablet by mouth daily 90 tablet 5    clopidogrel (PLAVIX) 75 MG tablet TAKE 1 TABLET BY MOUTH ONCE DAILY 90 tablet 3    traZODone (DESYREL) 50 MG tablet Take 3 tablets by mouth nightly 90 tablet 5    potassium chloride (KLOR-CON M10) 10 MEQ extended release tablet Take 1 tablet by mouth daily 30 tablet 5    ibuprofen (ADVIL;MOTRIN) 600 MG tablet Take 1 tablet by mouth every 8 hours as needed for Pain 42 tablet 5    Continuous Blood Gluc Sensor (FREESTYLE SANDRA SENSOR SYSTEM) MISC 1 Units by Does not apply route daily 1 each 0    Continuous Blood Gluc Sensor (FREESTYLE SANDRA 14 DAY SENSOR) MISC 1 Units by Does not apply route every 14 days 7 each 3    fluticasone (FLONASE) 50 MCG/ACT nasal spray 2 sprays by Each Nostril route daily 2 Sprays in each nostril 1 Bottle 1    atorvastatin (LIPITOR) 80 MG tablet Take 1 tablet by mouth daily 90 tablet 3    levocetirizine (XYZAL) 5 MG tablet TAKE ONE TABLET BY MOUTH NIGHTLY 30 tablet 11    Magic Mouthwash (MIRACLE MOUTHWASH) Swish and spit 5 mLs 4 times daily as needed for Irritation Equal parts viscous lidocaine, diphenhydramine, nystatin and  mL 5    Continuous Blood Gluc Sensor (c3 creationsE SENSOR SYSTEM) [facility] 1 kit by Does not apply route 2 times daily Include sensors for a year  Dx:  E11.9, and lupus 1 each 0    sucralfate (CARAFATE) 1 GM tablet TAKE 1 TABLET BY MOUTH 4 TIMES DAILY 120 tablet 11    DULoxetine (CYMBALTA) 60 MG extended release capsule Take 1 capsule by mouth daily 90 capsule 5    sodium chloride (OCEAN) 0.65 % nasal spray 1 spray by Nasal route as needed for Congestion 1 Bottle 3    NEXIUM 40 MG delayed release capsule TAKE 1 CAPSULE BY MOUTH ONCE DAILY 30 capsule 5    levalbuterol (XOPENEX HFA) 45 MCG/ACT inhaler Inhale 1-2 puffs into the lungs every 4 hours as needed for Wheezing or Shortness of Breath 1 Inhaler 3    levalbuterol (XOPENEX) 0.63 MG/3ML nebulization Take 3 mLs by nebulization every 6 hours as needed for Wheezing or Shortness of Breath 270 mL 5    Respiratory Therapy Supplies (NEBULIZER AIR TUBE/PLUGS) MISC 1 kit by Does not apply route every 6 hours as needed (Wheezing/shortness of breath) Please provide nebulizer kit and supplies.  1 each 0    ondansetron (ZOFRAN ODT) 4 MG disintegrating tablet Take 1 tablet by mouth every 8 hours as needed for Nausea or Vomiting 20 tablet 0    EQ PAIN RELIEVER 325 MG tablet TAKE TWO TABLETS BY MOUTH EVERY 4 HOURS AS NEEDED FOR PAIN AND FEVER 120 tablet 3    Amitriptyline HCl (ELAVIL PO) Take 25 mg by mouth nightly       aspirin (LORENZO ASPIRIN) 325 MG 1.6 % malignancy risk per [address]   Repeat CT chest in 1 year to follow stability   Continue SLE treatments per Rheumatology  Continue Xopenex PRN for asthma   Do recommend weight loss.      Will see Kolby [place] in: 1 year with CT and PFT     Electronically signed by NAHID Good CNP on 1/13/2020 at 8:51 AM

## 2020-01-20 ENCOUNTER — CARE COORDINATION (OUTPATIENT)
Dept: CARE COORDINATION | Age: 40
End: 2020-01-20

## 2020-01-21 ENCOUNTER — PATIENT MESSAGE (OUTPATIENT)
Dept: FAMILY MEDICINE CLINIC | Age: 40
End: 2020-01-21

## 2020-01-21 NOTE — CARE COORDINATION
Ambulatory Care Coordination Note  1/21/2020  CM Risk Score: 3  Charlson 10 Year Mortality Risk Score: 47%     ACC: Siobhan Moreno, RN    Summary Note: Patient returned my call for continued Care Coordination follow up and education re: the management of her diabetes and healthcare needs. Patient reported she has been doing \"ok. \"  Patient stated she continues to have c/o intermittent cold like symptoms but denied any need for evaluation. Patient was encouraged to call with any change/worsening of symptoms. ACM reviewed availability of same day appointments, urgent care/walk in clinic options, and on call availability with patient and she verbalized understanding. Patient was reminded of the importance of early symptom recognition. Patient stated BS have been doing better s/p recent stopping of Neurontin, as she did have c/o elevated BS after medication was started by rheumatology. Patient reported BS have now been in the 110-185 range. Patient denied any c/o hypoglycemia or hyperglycemia symptoms being present. DM education and zone information reviewed with patient. DM zone handout mailed for future reference. DM diet education also reviewed including importance of diet adherence. ACM discussed keto diet with patient per her request and patient was reminded to work to follow a healthy diet and portion sizes. ACM also reviewed the importance of routine eating and not skipping meals to assist with BS control. Patient acknowledged understanding. Patient was also educated on the importance of routine activity and exercise and how this helps with improvement of BS readings as well as overall health. Patient verbalized understanding. Fall prevention and home safety education was also reviewed with patient. Patient shared she is interested in mammogram as her sister was recently diagnosised with breast cancer - will f/u with PCP.   Patient denied any other questions, concerns, or needs and she was encouraged to call with any that may develop. Actions:   - Reviewed DM education   - Reviewed importance of early symptom recognition   - Reviewed DM diet education and importance of adherence  - Reviewed importance of not skipping meals and eating routinely  - Reviewed importance of staying active and routine exercise  - Reviewed and mailed DM zone education   - Reviewed fall prevention/home safety education   - Reviewed same day appointment, urgent care/walk in clinic, and after hour on call resources  - Monitored for additional needs          Plan of Care:   - Continue with Care Coordination f/u for continued assistance with DM management and utilization resources  - Complete DM education - In Process   - Complete Fall Prevention/Home safety education - In Process  - Complete Right Care Right Place  Right Time education - Completed  - F/u on Mammogram   - F/u on [address]  - Patient is current with flu/pneumonia vaccines  - Patient is due for Medicare AWV  - Monitor for additional needs  - Monitor for readiness to discharge from Care Coordination    Care Coordination Interventions    Program Enrollment:  Rising Risk  Referral from Primary Care Provider:  No  Suggested Interventions and Community Resources  Diabetes Education:  Completed  Fall Risk Prevention:  Completed  Medication Assistance Program:  Declined (Comment: Pt. denied any need at this time)  Medi Set or Pill Pack:  Completed (Comment: Mom manages mediset weekly for patient)  Pharmacist:  Declined (Comment: Pt. declined as she receives this service thru her Ins. Co[employer] )  Transportation Support:  Declined         Goals Addressed                 This Visit's Progress       Care Coordination     Conditions and Symptoms   No change     I will schedule office visits, as directed by my provider. I will keep my appointment or reschedule if I have to cancel.   I will notify my provider of any barriers to my plan of Continuous Blood Gluc Sensor ([address] [place] [address]) MISC 1 Units by Does not apply route daily 9/30/19   Raul Gomez MD   Continuous Blood Gluc Sensor (FREESTYLE SANDRA 14 DAY SENSOR) MISC 1 Units by Does not apply route every 14 days 9/30/19   Raul Gomez MD   fluticasone (FLONASE) 50 MCG/ACT nasal spray 2 sprays by Each Nostril route daily 2 Sprays in each nostril 9/12/19   Raul Gomez MD   atorvastatin (LIPITOR) 80 MG tablet Take 1 tablet by mouth daily 9/3/19   Raul Gomez MD   levocetirizine (XYZAL) 5 MG tablet TAKE ONE TABLET BY MOUTH NIGHTLY 7/30/19   Raul Gomez MD   Magic Mouthwash (MIRACLE MOUTHWASH) Swish and spit 5 mLs 4 times daily as needed for Irritation Equal parts viscous lidocaine, diphenhydramine, nystatin and MOM 7/17/19   Raul Gomez MD   Continuous Blood Gluc Sensor ([address] [place] [address]) MISC 1 kit by Does not apply route 2 times daily Include sensors for a year  Dx:  E11.9, and lupus 7/17/19   Raul Gomez MD   sucralfate (CARAFATE) 1 GM tablet TAKE 1 TABLET BY MOUTH 4 TIMES DAILY 6/3/19   Raul Gomez MD   DULoxetine (CYMBALTA) 60 MG extended release capsule Take 1 capsule by mouth daily 6/3/19   Raul Gomez MD   sodium chloride (OCEAN) 0.65 % nasal spray 1 spray by Nasal route as needed for Congestion 5/1/19   Raul Gomez MD   NEXIUM 40 MG delayed release capsule TAKE 1 CAPSULE BY MOUTH ONCE DAILY 4/29/19   Raul Gomez MD   levalbuterol [facility]) 45 MCG/ACT inhaler Inhale 1-2 puffs into the lungs every 4 hours as needed for Wheezing or Shortness of Breath 1/3/19   NAHID Townsend CNP   levalbuterol (XOPENEX) 0.63 MG/3ML nebulization Take 3 mLs by nebulization every 6 hours as needed for Wheezing or Shortness of Breath 12/6/18   NAHID Townsend CNP   Respiratory Therapy Supplies (NEBULIZER AIR TUBE/PLUGS) MISC 1 kit by Does not apply route every 6 hours as needed (Wheezing/shortness of breath) Please provide nebulizer kit and supplies. 12/6/18   NAHID Cho CNP   ondansetron (ZOFRAN ODT) 4 MG disintegrating tablet Take 1 tablet by mouth every 8 hours as needed for Nausea or Vomiting 3/27/18   Melvin Dixon MD   EQ PAIN RELIEVER 325 MG tablet TAKE TWO TABLETS BY MOUTH EVERY 4 HOURS AS NEEDED FOR PAIN AND FEVER 12/4/17   Abe Doll MD   Amitriptyline HCl (ELAVIL PO) Take 25 mg by mouth nightly     Historical Provider, MD   aspirin (LORENZO ASPIRIN) 325 MG tablet Take 1 tablet by mouth daily 9/11/17   Abe Dlol MD   promethazine (PHENERGAN) 25 MG tablet Take 1 tablet by mouth every 6 hours as needed for Nausea 2/1/17   Abe Doll MD   diclofenac sodium (VOLTAREN) 1 % GEL Apply 2 g topically 4 times daily as needed for Pain (topical)     Historical Provider, MD       Future Appointments   Date Time Provider Herbert Saavedrai   2/3/2020 11:15 AM MD Kameron Duffy [facility]  [address]   4/15/2020  8:00 AM Yuri Levin, DO [facility] [address]   1/4/2021 10:00 AM STR CT IMAGING RM1  [facility] STRZ OUT EXP STR Radiolog   1/4/2021 10:30 AM STR PULMONARY FUNCTION ROOM 1 [facility]   1/13/2021  9:00 AM NAHID Cho CNP Pulm Med [address] [place] [address]     ,   Diabetes Assessment    Meal Planning:  Avoidance of concentrated sweets, Carb counting   How often do you test your blood sugar?:  Meals, Bedtime   Do you have barriers with adherence to non-pharmacologic self-management interventions?  (Nutrition/Exercise/Self-Monitoring):  No   Have you ever had to go to the ED for symptoms of low blood sugar?:  No       No patient-reported symptoms   Do you have hyperglycemia symptoms?:  No   Do you have hypoglycemia symptoms?:  No   Last Blood Sugar Value:  110   Blood Sugar Monitoring Regimen:  Before Meals, At Bedtime   Blood Sugar Trends:  Fluctuating      ,   General Assessment    Do you have any symptoms that are causing concern?:  Yes  Progression since Onset:  Unchanged, Intermittent - Waxing/Waning  Reported Symptoms: Congestion, Cough      and Care Coordination Episodes    Type: Amb Care Coordination  Episode: Rising Risk   Noted: 8/16/2019  Comments: DM - utilization

## 2020-01-24 ENCOUNTER — HOSPITAL ENCOUNTER (OUTPATIENT)
Dept: MAMMOGRAPHY | Age: 40
Discharge: HOME | End: 2020-01-24
Payer: MEDICARE

## 2020-01-24 PROCEDURE — 77063 BREAST TOMOSYNTHESIS BI: CPT

## 2020-01-30 RX ORDER — CYCLOBENZAPRINE HCL 5 MG
5 TABLET ORAL 3 TIMES DAILY PRN
Qty: 90 TABLET | Refills: 5 | Status: SHIPPED | OUTPATIENT
Start: 2020-01-30 | End: 2020-04-25 | Stop reason: SDUPTHER

## 2020-01-30 RX ORDER — LEFLUNOMIDE 20 MG/1
20 TABLET ORAL DAILY
Qty: 30 TABLET | Refills: 0 | Status: SHIPPED | OUTPATIENT
Start: 2020-01-30 | End: 2020-04-16

## 2020-02-03 ENCOUNTER — OFFICE VISIT (OUTPATIENT)
Dept: FAMILY MEDICINE CLINIC | Age: 40
End: 2020-02-03
Payer: MEDICARE

## 2020-02-03 ENCOUNTER — HOSPITAL ENCOUNTER (OUTPATIENT)
Age: 40
Discharge: HOME | End: 2020-02-03
Payer: MEDICARE

## 2020-02-03 ENCOUNTER — HOSPITAL ENCOUNTER (OUTPATIENT)
Dept: WOMENS IMAGING | Age: 40
Discharge: HOME | End: 2020-02-03
Payer: MEDICARE

## 2020-02-03 VITALS
WEIGHT: 228 LBS | RESPIRATION RATE: 12 BRPM | TEMPERATURE: 96.7 F | DIASTOLIC BLOOD PRESSURE: 60 MMHG | HEART RATE: 80 BPM | SYSTOLIC BLOOD PRESSURE: 128 MMHG | BODY MASS INDEX: 40.39 KG/M2

## 2020-02-03 DIAGNOSIS — Z51.81 MEDICATION MONITORING ENCOUNTER: ICD-10-CM

## 2020-02-03 LAB
ALBUMIN SERPL-MCNC: 4.5 G/DL (ref 3.5–5.1)
ALP BLD-CCNC: 163 U/L (ref 38–126)
ALT SERPL-CCNC: 63 U/L (ref 11–66)
ANION GAP SERPL CALCULATED.3IONS-SCNC: 16 MEQ/L (ref 8–16)
AST SERPL-CCNC: 45 U/L (ref 5–40)
BASOPHILS # BLD: 0.3 %
BASOPHILS ABSOLUTE: 0 THOU/MM3 (ref 0–0.1)
BILIRUB SERPL-MCNC: 0.4 MG/DL (ref 0.3–1.2)
BUN BLDV-MCNC: 12 MG/DL (ref 7–22)
C-REACTIVE PROTEIN: 1.73 MG/DL (ref 0–1)
CALCIUM SERPL-MCNC: 9.4 MG/DL (ref 8.5–10.5)
CHLORIDE BLD-SCNC: 102 MEQ/L (ref 98–111)
CO2: 23 MEQ/L (ref 23–33)
CREAT SERPL-MCNC: 0.7 MG/DL (ref 0.4–1.2)
EOSINOPHIL # BLD: 0.8 %
EOSINOPHILS ABSOLUTE: 0.1 THOU/MM3 (ref 0–0.4)
ERYTHROCYTE [DISTWIDTH] IN BLOOD BY AUTOMATED COUNT: 14.4 % (ref 11.5–14.5)
ERYTHROCYTE [DISTWIDTH] IN BLOOD BY AUTOMATED COUNT: 43.7 FL (ref 35–45)
GFR SERPL CREATININE-BSD FRML MDRD: > 90 ML/MIN/1.73M2
GLUCOSE BLD-MCNC: 185 MG/DL (ref 70–108)
HCT VFR BLD CALC: 44.7 % (ref 37–47)
HEMOGLOBIN: 13.5 GM/DL (ref 12–16)
IMMATURE GRANS (ABS): 0.01 THOU/MM3 (ref 0–0.07)
IMMATURE GRANULOCYTES: 0.1 %
INFLUENZA VIRUS A RNA: NEGATIVE
INFLUENZA VIRUS B RNA: NEGATIVE
LYMPHOCYTES # BLD: 13.1 %
LYMPHOCYTES ABSOLUTE: 1.5 THOU/MM3 (ref 1–4.8)
MCH RBC QN AUTO: 25.4 PG (ref 26–33)
MCHC RBC AUTO-ENTMCNC: 30.2 GM/DL (ref 32.2–35.5)
MCV RBC AUTO: 84.2 FL (ref 81–99)
MONOCYTES # BLD: 3.7 %
MONOCYTES ABSOLUTE: 0.4 THOU/MM3 (ref 0.4–1.3)
NUCLEATED RED BLOOD CELLS: 0 /100 WBC
PLATELET # BLD: 267 THOU/MM3 (ref 130–400)
PMV BLD AUTO: 11.3 FL (ref 9.4–12.4)
POTASSIUM SERPL-SCNC: 4.1 MEQ/L (ref 3.5–5.2)
RBC # BLD: 5.31 MILL/MM3 (ref 4.2–5.4)
SEDIMENTATION RATE, ERYTHROCYTE: 11 MM/HR (ref 0–20)
SEG NEUTROPHILS: 82 %
SEGMENTED NEUTROPHILS ABSOLUTE COUNT: 9.3 THOU/MM3 (ref 1.8–7.7)
SODIUM BLD-SCNC: 141 MEQ/L (ref 135–145)
TOTAL PROTEIN: 7.7 G/DL (ref 6.1–8)
WBC # BLD: 11.4 THOU/MM3 (ref 4.8–10.8)

## 2020-02-03 PROCEDURE — 80053 COMPREHEN METABOLIC PANEL: CPT

## 2020-02-03 PROCEDURE — G0279 TOMOSYNTHESIS, MAMMO: HCPCS

## 2020-02-03 PROCEDURE — 99215 OFFICE O/P EST HI 40 MIN: CPT | Performed by: FAMILY MEDICINE

## 2020-02-03 PROCEDURE — 85025 COMPLETE CBC W/AUTO DIFF WBC: CPT

## 2020-02-03 PROCEDURE — 87502 INFLUENZA DNA AMP PROBE: CPT | Performed by: FAMILY MEDICINE

## 2020-02-03 PROCEDURE — 36415 COLL VENOUS BLD VENIPUNCTURE: CPT

## 2020-02-03 PROCEDURE — 86140 C-REACTIVE PROTEIN: CPT

## 2020-02-03 PROCEDURE — 85651 RBC SED RATE NONAUTOMATED: CPT

## 2020-02-03 RX ORDER — CLOTRIMAZOLE AND BETAMETHASONE DIPROPIONATE 10; .64 MG/G; MG/G
CREAM TOPICAL
Qty: 45 G | Refills: 2 | Status: SHIPPED | OUTPATIENT
Start: 2020-02-03 | End: 2020-08-30 | Stop reason: SDUPTHER

## 2020-02-03 ASSESSMENT — PATIENT HEALTH QUESTIONNAIRE - PHQ9
SUM OF ALL RESPONSES TO PHQ QUESTIONS 1-9: 0
1. LITTLE INTEREST OR PLEASURE IN DOING THINGS: 0
SUM OF ALL RESPONSES TO PHQ QUESTIONS 1-9: 0
SUM OF ALL RESPONSES TO PHQ9 QUESTIONS 1 & 2: 0
2. FEELING DOWN, DEPRESSED OR HOPELESS: 0

## 2020-02-03 NOTE — PROGRESS NOTES
complains of heartburn. This has been associated with early satiety and heartburn. She denies no other symptoms. Symptoms have been present for several years. She denies dysphagia. She has not lost weight. She denies melena, hematochezia, hematemesis, and coffee ground emesis. Medical therapy in the past has included proton pump inhibitors. Still seeing neurology ( Dr Dago Arrington) for seizures. They are still occurring a few times per week. She was thinking about stopping all her medications and restarting \"fresh\". She is highly encouraged NOT to do this for fear of intractable seizure with brain damage. The seizures are usually followed by a headache. 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 She had an \"episode\" in my office in early 2018, she was started on aspirin 325 mg and went from having 8-9 episodes a day to 4 episodes every 2 months. Now also on plavix as well. Migraines are controlled with prn medications less then 2 per month. Fibromyalgia and Lupus and lupus arthritis is under the care of Rheumatology in [facility] and formerly also a patient of Dr Jackie Mcintyre who is no longer in practice. Allergies are well controlled on current medications. Reviewed chart forpast medical history , surgical history , allergies, social history , family history and medications.     Health Maintenance   Topic Date Due    Diabetic retinal exam  08/10/1989    Hepatitis B vaccine (1 of 3 - Risk 3-dose series) 08/10/1998    Annual Wellness Visit (AWV)  05/29/2019    Pneumococcal 0-64 years Vaccine (3 of 3 - PPSV23) 11/01/2019    Lipid screen  12/18/2019    DTaP/Tdap/Td vaccine (1 - Tdap) 09/26/2021 (Originally 8/10/1990)    Diabetic microalbuminuria test  04/11/2020    Diabetic foot exam  07/17/2020    A1C test (Diabetic or Prediabetic)  12/03/2020    Flu vaccine  Completed    HIV screen  Discontinued       Subjective: Constitutional:Negative for fever, chills, diaphoresis, activity change, appetite change and fatigue. HENT: Negative for hearing loss, ear pain, congestion, sore throat, rhinorrhea, postnasal drip and ear discharge. Eyes: Negative for photophobia and visual disturbance. Respiratory: Negative for cough, chest tightness, shortness of breath and wheezing. Cardiovascular: Negative for chest pain and leg swelling. Gastrointestinal: Negative for nausea, vomiting, abdominal pain, diarrhea and constipation. Genitourinary: Negative for dysuria, urgency and frequency. Neurological: Negative for weakness, light-headedness and headaches. Psychiatric/Behavioral: Negative for sleep disturbance. Objective:     Vitals:    02/03/20 1108   BP: 128/60   Site: Left Upper Arm   Position: Sitting   Cuff Size: Large Adult   Pulse: 80   Resp: 12   Temp: 96.7 °F (35.9 °C)   TempSrc: Temporal   Weight: 228 lb (103.4 kg)     Wt Readings from Last 3 Encounters:   02/03/20 228 lb (103.4 kg)   01/13/20 235 lb 9.6 oz (106.9 kg)   01/07/20 233 lb 9.6 oz (106 kg)       Physical Exam   Constitutional: Vital signs are normal. She appears well-developed and well-nourished. She is active. HENT:   Head: Normocephalic and atraumatic. Right Ear: Tympanic membrane, external ear and ear canal normal. No drainage or tenderness. Left Ear: Tympanic membrane, external ear and ear canal normal. No drainage or tenderness. Nose: Nose normal. No mucosal edema or rhinorrhea. Mouth/Throat: Uvula is midline, oropharynx is clear and moist and mucous membranes are normal. Mucous membranes are not pale. Normal dentition. No posterior oropharyngeal edema or posterior oropharyngeal erythema. Eyes: Lids are normal. Right eye exhibits no chemosis and no discharge. Left eye exhibits no chemosis and no drainage. Right conjunctiva has no hemorrhage. Left conjunctiva has no hemorrhage. Right eye exhibits normal extraocular motion.  Left eye

## 2020-02-04 ENCOUNTER — CARE COORDINATION (OUTPATIENT)
Dept: CARE COORDINATION | Age: 40
End: 2020-02-04

## 2020-02-04 NOTE — CARE COORDINATION
Coordination Interventions    Program Enrollment:  Rising Risk  Referral from Primary Care Provider:  No  Suggested Interventions and Community Resources  Diabetes Education:  Completed  Fall Risk Prevention:  Completed  Medication Assistance Program:  Declined (Comment: Pt. denied any need at this time)  Medi Set or Pill Pack:  Completed (Comment: Mom manages mediset weekly for patient)  Pharmacist:  Declined (Comment: Pt. declined as she receives this service thru her Ins[employer] )  Transportation Support:  Declined  Zone Management Tools:  Completed         Goals Addressed                 This Visit's Progress       Care Coordination     Conditions and Symptoms   On track     I will schedule office visits, as directed by my provider. I will keep my appointment or reschedule if I have to cancel. I will notify my provider of any barriers to my plan of care. I will notify my provider of any symptoms that indicate a worsening of my condition. Barriers: overwhelmed by complexity of regimen, stress and lack of education  Plan for overcoming my barriers: Continued support and education   Confidence: 8/10  Anticipated Goal Completion Date: 11/30/19  Update:  Continue to work on DM education and importance of f/u and disease control. New goal date: 2/20/20              Prior to Admission medications    Medication Sig Start Date End Date Taking? Authorizing Provider   clotrimazole-betamethasone (LOTRISONE) 1-0.05 % cream Apply topically 2 times daily.  2/3/20   Anurag Velazquez MD   Dulaglutide 0.75 MG/0.5ML SOPN Inject 0.75 mg into the skin once a week 2/3/20   Anurag Velazquez MD   cyclobenzaprine (FLEXERIL) 5 MG tablet Take 1 tablet by mouth 3 times daily as needed for Muscle spasms 1/30/20 4/29/20  NAHID Munoz CNP   leflunomide (ARAVA) 20 MG tablet Take 1 tablet by mouth daily 1/30/20   NAHID Munoz CNP   gabapentin (NEURONTIN) 300 MG capsule Take 300mg qhs (1 tablet) nightly for 3 TIMES DAILY 6/3/19   Kari Coker MD   DULoxetine (CYMBALTA) 60 MG extended release capsule Take 1 capsule by mouth daily 6/3/19   Kari Coker MD   sodium chloride (OCEAN) 0.65 % nasal spray 1 spray by Nasal route as needed for Congestion 5/1/19   Kari Coker MD   NEXIUM 40 MG delayed release capsule TAKE 1 CAPSULE BY MOUTH ONCE DAILY 4/29/19   Kari Coker MD   levalbuterol [facility]) 45 MCG/ACT inhaler Inhale 1-2 puffs into the lungs every 4 hours as needed for Wheezing or Shortness of Breath 1/3/19   Claudell Floss, APRN - CNP   levalbuterol (XOPENEX) 0.63 MG/3ML nebulization Take 3 mLs by nebulization every 6 hours as needed for Wheezing or Shortness of Breath 12/6/18   Claudell Floss, APRN - CNP   Respiratory Therapy Supplies (NEBULIZER AIR TUBE/PLUGS) MISC 1 kit by Does not apply route every 6 hours as needed (Wheezing/shortness of breath) Please provide nebulizer kit and supplies.  12/6/18   NAHID Cho CNP   EQ PAIN RELIEVER 325 MG tablet TAKE TWO TABLETS BY MOUTH EVERY 4 HOURS AS NEEDED FOR PAIN AND FEVER 12/4/17   Kari Coker MD   Amitriptyline HCl (ELAVIL PO) Take 25 mg by mouth nightly     Historical Provider, MD   aspirin (LORENZO ASPIRIN) 325 MG tablet Take 1 tablet by mouth daily 9/11/17   Kari Coker MD   promethazine (PHENERGAN) 25 MG tablet Take 1 tablet by mouth every 6 hours as needed for Nausea 2/1/17   Kari Coker MD   diclofenac sodium (VOLTAREN) 1 % GEL Apply 2 g topically 4 times daily as needed for Pain (topical)     Historical Provider, MD       Future Appointments   Date Time Provider Herbert Portillo   2/13/2020  8:00 AM [facility] MAMMOGRAPHY ROOM 4 STEREOTATIC PROCEDURE ROOM STRZ WOMEN STR Radiolog   2/13/2020  9:30 AM [facility] MAMMOGRAPHY ROOM 4 STEREOTATIC PROCEDURE ROOM STRZ WOMEN STR Radiolog   4/15/2020  8:00 AM DO ANGEL DeckerX [facility] [place]   5/4/2020  9:30 AM MD Kameron Perez [facility]NA [place] [facility]   1/4/2021 10:00 AM STR CT IMAGING RM1  [facility] STRZ

## 2020-02-24 ENCOUNTER — HOSPITAL ENCOUNTER (OUTPATIENT)
Age: 40
Discharge: HOME | End: 2020-02-24
Payer: MEDICARE

## 2020-02-24 DIAGNOSIS — Z51.81 MEDICATION MONITORING ENCOUNTER: ICD-10-CM

## 2020-02-24 LAB
ALBUMIN SERPL-MCNC: 4.2 G/DL (ref 3.5–5.1)
ALP BLD-CCNC: 157 U/L (ref 38–126)
ALT SERPL-CCNC: 45 U/L (ref 11–66)
ANION GAP SERPL CALCULATED.3IONS-SCNC: 15 MEQ/L (ref 8–16)
AST SERPL-CCNC: 37 U/L (ref 5–40)
BASOPHILS # BLD: 0.6 %
BASOPHILS ABSOLUTE: 0 THOU/MM3 (ref 0–0.1)
BILIRUB SERPL-MCNC: 0.2 MG/DL (ref 0.3–1.2)
BUN BLDV-MCNC: 5 MG/DL (ref 7–22)
C-REACTIVE PROTEIN: 0.3 MG/DL (ref 0–1)
CALCIUM SERPL-MCNC: 9 MG/DL (ref 8.5–10.5)
CHLORIDE BLD-SCNC: 104 MEQ/L (ref 98–111)
CO2: 24 MEQ/L (ref 23–33)
CREAT SERPL-MCNC: 0.6 MG/DL (ref 0.4–1.2)
EOSINOPHIL # BLD: 3.6 %
EOSINOPHILS ABSOLUTE: 0.2 THOU/MM3 (ref 0–0.4)
ERYTHROCYTE [DISTWIDTH] IN BLOOD BY AUTOMATED COUNT: 14.1 % (ref 11.5–14.5)
ERYTHROCYTE [DISTWIDTH] IN BLOOD BY AUTOMATED COUNT: 43.1 FL (ref 35–45)
GFR SERPL CREATININE-BSD FRML MDRD: > 90 ML/MIN/1.73M2
GLUCOSE BLD-MCNC: 131 MG/DL (ref 70–108)
HCT VFR BLD CALC: 42.4 % (ref 37–47)
HEMOGLOBIN: 13 GM/DL (ref 12–16)
IMMATURE GRANS (ABS): 0.02 THOU/MM3 (ref 0–0.07)
IMMATURE GRANULOCYTES: 0.3 %
LYMPHOCYTES # BLD: 36.3 %
LYMPHOCYTES ABSOLUTE: 2.4 THOU/MM3 (ref 1–4.8)
MCH RBC QN AUTO: 26 PG (ref 26–33)
MCHC RBC AUTO-ENTMCNC: 30.7 GM/DL (ref 32.2–35.5)
MCV RBC AUTO: 84.8 FL (ref 81–99)
MONOCYTES # BLD: 8.1 %
MONOCYTES ABSOLUTE: 0.5 THOU/MM3 (ref 0.4–1.3)
NUCLEATED RED BLOOD CELLS: 0 /100 WBC
PLATELET # BLD: 251 THOU/MM3 (ref 130–400)
PMV BLD AUTO: 11.6 FL (ref 9.4–12.4)
POTASSIUM SERPL-SCNC: 4.2 MEQ/L (ref 3.5–5.2)
RBC # BLD: 5 MILL/MM3 (ref 4.2–5.4)
SEDIMENTATION RATE, ERYTHROCYTE: 11 MM/HR (ref 0–20)
SEG NEUTROPHILS: 51.1 %
SEGMENTED NEUTROPHILS ABSOLUTE COUNT: 3.4 THOU/MM3 (ref 1.8–7.7)
SODIUM BLD-SCNC: 143 MEQ/L (ref 135–145)
TOTAL PROTEIN: 6.8 G/DL (ref 6.1–8)
WBC # BLD: 6.6 THOU/MM3 (ref 4.8–10.8)

## 2020-02-24 PROCEDURE — 80053 COMPREHEN METABOLIC PANEL: CPT

## 2020-02-24 PROCEDURE — 36415 COLL VENOUS BLD VENIPUNCTURE: CPT

## 2020-02-24 PROCEDURE — 85025 COMPLETE CBC W/AUTO DIFF WBC: CPT

## 2020-02-24 PROCEDURE — 86140 C-REACTIVE PROTEIN: CPT

## 2020-02-24 PROCEDURE — 85651 RBC SED RATE NONAUTOMATED: CPT

## 2020-02-26 ENCOUNTER — HOSPITAL ENCOUNTER (OUTPATIENT)
Dept: WOMENS IMAGING | Age: 40
Discharge: HOME | End: 2020-02-26
Payer: MEDICARE

## 2020-02-26 PROCEDURE — 77065 DX MAMMO INCL CAD UNI: CPT

## 2020-02-26 PROCEDURE — 88305 TISSUE EXAM BY PATHOLOGIST: CPT

## 2020-02-26 PROCEDURE — 2720000010 HC SURG SUPPLY STERILE

## 2020-02-26 PROCEDURE — 19081 BX BREAST 1ST LESION STRTCTC: CPT

## 2020-02-26 PROCEDURE — 19082 BX BREAST ADD LESION STRTCTC: CPT

## 2020-02-26 PROCEDURE — A4648 IMPLANTABLE TISSUE MARKER: HCPCS

## 2020-02-26 PROCEDURE — 2709999900 HC NON-CHARGEABLE SUPPLY

## 2020-02-27 RX ORDER — TRAZODONE HYDROCHLORIDE 50 MG/1
150 TABLET ORAL NIGHTLY
Qty: 90 TABLET | Refills: 5 | Status: SHIPPED | OUTPATIENT
Start: 2020-02-27 | End: 2020-07-06 | Stop reason: SDUPTHER

## 2020-02-28 RX ORDER — POTASSIUM CHLORIDE 750 MG/1
10 TABLET, EXTENDED RELEASE ORAL DAILY
Qty: 30 TABLET | Refills: 5 | Status: SHIPPED | OUTPATIENT
Start: 2020-02-28 | End: 2020-07-06 | Stop reason: SDUPTHER

## 2020-03-06 ENCOUNTER — PATIENT MESSAGE (OUTPATIENT)
Dept: RHEUMATOLOGY | Age: 40
End: 2020-03-06

## 2020-03-06 NOTE — TELEPHONE ENCOUNTER
From: Cammie Birmingham  To: Jacqui SILVER DO  Sent: 3/6/2020 11:40 AM EST  Subject: Non-Urgent Medical Question    Since you stopped my arava I have been feeling more run down and have had more pain. Is there something else we could try?

## 2020-03-09 RX ORDER — AZATHIOPRINE 50 MG/1
TABLET ORAL
Qty: 60 TABLET | Refills: 0 | Status: SHIPPED | OUTPATIENT
Start: 2020-03-09 | End: 2020-04-16 | Stop reason: SDUPTHER

## 2020-03-09 NOTE — TELEPHONE ENCOUNTER
Diagnosis Orders   1. Lupus arthritis (HCC)  azaTHIOprine (IMURAN) 50 MG tablet   2. LFT elevation     3. Medication monitoring encounter  azaTHIOprine (IMURAN) 50 MG tablet     Med monitoring q 4 weeks with Azathioprine initiation.

## 2020-03-10 ENCOUNTER — TELEPHONE (OUTPATIENT)
Dept: FAMILY MEDICINE CLINIC | Age: 40
End: 2020-03-10

## 2020-04-16 ENCOUNTER — HOSPITAL ENCOUNTER (OUTPATIENT)
Age: 40
Discharge: HOME | End: 2020-04-16
Payer: MEDICARE

## 2020-04-16 DIAGNOSIS — M32.9 LUPUS ARTHRITIS (HCC): ICD-10-CM

## 2020-04-16 DIAGNOSIS — E11.69 TYPE 2 DIABETES MELLITUS WITH OTHER SPECIFIED COMPLICATION, WITHOUT LONG-TERM CURRENT USE OF INSULIN (HCC): ICD-10-CM

## 2020-04-16 DIAGNOSIS — G89.29 CHRONIC BILATERAL LOW BACK PAIN WITHOUT SCIATICA: ICD-10-CM

## 2020-04-16 DIAGNOSIS — M79.7 FIBROMYALGIA: ICD-10-CM

## 2020-04-16 DIAGNOSIS — M54.50 CHRONIC BILATERAL LOW BACK PAIN WITHOUT SCIATICA: ICD-10-CM

## 2020-04-16 DIAGNOSIS — Z51.81 MEDICATION MONITORING ENCOUNTER: ICD-10-CM

## 2020-04-16 LAB
ALBUMIN SERPL-MCNC: 4.4 G/DL (ref 3.5–5.1)
ALP BLD-CCNC: 165 U/L (ref 38–126)
ALT SERPL-CCNC: 26 U/L (ref 11–66)
ANION GAP SERPL CALCULATED.3IONS-SCNC: 15 MEQ/L (ref 8–16)
AST SERPL-CCNC: 32 U/L (ref 5–40)
AVERAGE GLUCOSE: 153 MG/DL (ref 70–126)
BASOPHILS # BLD: 0.7 %
BASOPHILS ABSOLUTE: 0.1 THOU/MM3 (ref 0–0.1)
BILIRUB SERPL-MCNC: 0.3 MG/DL (ref 0.3–1.2)
BUN BLDV-MCNC: 8 MG/DL (ref 7–22)
C-REACTIVE PROTEIN: 0.47 MG/DL (ref 0–1)
CALCIUM SERPL-MCNC: 9.6 MG/DL (ref 8.5–10.5)
CHLORIDE BLD-SCNC: 101 MEQ/L (ref 98–111)
CHOLESTEROL, TOTAL: 156 MG/DL (ref 100–199)
CO2: 25 MEQ/L (ref 23–33)
CREAT SERPL-MCNC: 0.6 MG/DL (ref 0.4–1.2)
EOSINOPHIL # BLD: 3.6 %
EOSINOPHILS ABSOLUTE: 0.3 THOU/MM3 (ref 0–0.4)
ERYTHROCYTE [DISTWIDTH] IN BLOOD BY AUTOMATED COUNT: 14.3 % (ref 11.5–14.5)
ERYTHROCYTE [DISTWIDTH] IN BLOOD BY AUTOMATED COUNT: 42.5 FL (ref 35–45)
GFR SERPL CREATININE-BSD FRML MDRD: > 90 ML/MIN/1.73M2
GLUCOSE BLD-MCNC: 118 MG/DL (ref 70–108)
HBA1C MFR BLD: 7.1 % (ref 4.4–6.4)
HCT VFR BLD CALC: 41.4 % (ref 37–47)
HDLC SERPL-MCNC: 35 MG/DL
HEMOGLOBIN: 12.7 GM/DL (ref 12–16)
IMMATURE GRANS (ABS): 0.02 THOU/MM3 (ref 0–0.07)
IMMATURE GRANULOCYTES: 0.3 %
LDL CHOLESTEROL CALCULATED: 97 MG/DL
LYMPHOCYTES # BLD: 36.4 %
LYMPHOCYTES ABSOLUTE: 2.7 THOU/MM3 (ref 1–4.8)
MCH RBC QN AUTO: 25.5 PG (ref 26–33)
MCHC RBC AUTO-ENTMCNC: 30.7 GM/DL (ref 32.2–35.5)
MCV RBC AUTO: 83.1 FL (ref 81–99)
MONOCYTES # BLD: 5.8 %
MONOCYTES ABSOLUTE: 0.4 THOU/MM3 (ref 0.4–1.3)
NUCLEATED RED BLOOD CELLS: 0 /100 WBC
PLATELET # BLD: 281 THOU/MM3 (ref 130–400)
PMV BLD AUTO: 10.8 FL (ref 9.4–12.4)
POTASSIUM SERPL-SCNC: 3.9 MEQ/L (ref 3.5–5.2)
RBC # BLD: 4.98 MILL/MM3 (ref 4.2–5.4)
SEDIMENTATION RATE, ERYTHROCYTE: 11 MM/HR (ref 0–20)
SEG NEUTROPHILS: 53.2 %
SEGMENTED NEUTROPHILS ABSOLUTE COUNT: 3.9 THOU/MM3 (ref 1.8–7.7)
SODIUM BLD-SCNC: 141 MEQ/L (ref 135–145)
T4 FREE: 1.08 NG/DL (ref 0.93–1.76)
TOTAL PROTEIN: 7 G/DL (ref 6.1–8)
TRIGL SERPL-MCNC: 119 MG/DL (ref 0–199)
TSH SERPL DL<=0.05 MIU/L-ACNC: 5.6 UIU/ML (ref 0.4–4.2)
WBC # BLD: 7.4 THOU/MM3 (ref 4.8–10.8)

## 2020-04-16 PROCEDURE — 85651 RBC SED RATE NONAUTOMATED: CPT

## 2020-04-16 PROCEDURE — 84439 ASSAY OF FREE THYROXINE: CPT

## 2020-04-16 PROCEDURE — 36415 COLL VENOUS BLD VENIPUNCTURE: CPT

## 2020-04-16 PROCEDURE — 80053 COMPREHEN METABOLIC PANEL: CPT

## 2020-04-16 PROCEDURE — 80061 LIPID PANEL: CPT

## 2020-04-16 PROCEDURE — 83036 HEMOGLOBIN GLYCOSYLATED A1C: CPT

## 2020-04-16 PROCEDURE — 86140 C-REACTIVE PROTEIN: CPT

## 2020-04-16 PROCEDURE — 84443 ASSAY THYROID STIM HORMONE: CPT

## 2020-04-16 PROCEDURE — 85025 COMPLETE CBC W/AUTO DIFF WBC: CPT

## 2020-04-16 RX ORDER — AZATHIOPRINE 50 MG/1
50 TABLET ORAL 2 TIMES DAILY
Qty: 60 TABLET | Refills: 0 | Status: SHIPPED | OUTPATIENT
Start: 2020-04-16 | End: 2020-04-17 | Stop reason: SDUPTHER

## 2020-04-17 ENCOUNTER — TELEPHONE (OUTPATIENT)
Dept: RHEUMATOLOGY | Age: 40
End: 2020-04-17

## 2020-04-17 RX ORDER — AZATHIOPRINE 50 MG/1
50 TABLET ORAL 2 TIMES DAILY
Qty: 60 TABLET | Refills: 0 | Status: SHIPPED | OUTPATIENT
Start: 2020-04-17 | End: 2020-05-15 | Stop reason: SDUPTHER

## 2020-04-27 RX ORDER — CYCLOBENZAPRINE HCL 5 MG
5 TABLET ORAL 3 TIMES DAILY PRN
Qty: 90 TABLET | Refills: 5 | Status: SHIPPED | OUTPATIENT
Start: 2020-04-27 | End: 2020-07-26

## 2020-04-27 RX ORDER — SUCRALFATE 1 G/1
1 TABLET ORAL 4 TIMES DAILY
Qty: 120 TABLET | Refills: 11 | Status: SHIPPED | OUTPATIENT
Start: 2020-04-27 | End: 2020-06-30 | Stop reason: SDUPTHER

## 2020-04-27 RX ORDER — IBUPROFEN 600 MG/1
600 TABLET ORAL EVERY 8 HOURS PRN
Qty: 42 TABLET | Refills: 5 | Status: SHIPPED | OUTPATIENT
Start: 2020-04-27 | End: 2020-08-30 | Stop reason: SDUPTHER

## 2020-05-14 ENCOUNTER — HOSPITAL ENCOUNTER (OUTPATIENT)
Age: 40
Discharge: HOME | End: 2020-05-14
Payer: MEDICARE

## 2020-05-14 DIAGNOSIS — Z51.81 MEDICATION MONITORING ENCOUNTER: ICD-10-CM

## 2020-05-14 DIAGNOSIS — M32.9 LUPUS ARTHRITIS (HCC): ICD-10-CM

## 2020-05-14 LAB
ALBUMIN SERPL-MCNC: 4.6 G/DL (ref 3.5–5.1)
ALP BLD-CCNC: 158 U/L (ref 38–126)
ALT SERPL-CCNC: 28 U/L (ref 11–66)
ANION GAP SERPL CALCULATED.3IONS-SCNC: 12 MEQ/L (ref 8–16)
AST SERPL-CCNC: 29 U/L (ref 5–40)
BASOPHILS # BLD: 0.5 %
BASOPHILS ABSOLUTE: 0 THOU/MM3 (ref 0–0.1)
BILIRUB SERPL-MCNC: 0.3 MG/DL (ref 0.3–1.2)
BUN BLDV-MCNC: 12 MG/DL (ref 7–22)
C-REACTIVE PROTEIN: 0.26 MG/DL (ref 0–1)
CALCIUM SERPL-MCNC: 9.6 MG/DL (ref 8.5–10.5)
CHLORIDE BLD-SCNC: 101 MEQ/L (ref 98–111)
CO2: 25 MEQ/L (ref 23–33)
CREAT SERPL-MCNC: 0.7 MG/DL (ref 0.4–1.2)
EOSINOPHIL # BLD: 2.4 %
EOSINOPHILS ABSOLUTE: 0.2 THOU/MM3 (ref 0–0.4)
ERYTHROCYTE [DISTWIDTH] IN BLOOD BY AUTOMATED COUNT: 14.4 % (ref 11.5–14.5)
ERYTHROCYTE [DISTWIDTH] IN BLOOD BY AUTOMATED COUNT: 42.9 FL (ref 35–45)
GFR SERPL CREATININE-BSD FRML MDRD: > 90 ML/MIN/1.73M2
GLUCOSE BLD-MCNC: 126 MG/DL (ref 70–108)
HCT VFR BLD CALC: 41.4 % (ref 37–47)
HEMOGLOBIN: 12.7 GM/DL (ref 12–16)
IMMATURE GRANS (ABS): 0.03 THOU/MM3 (ref 0–0.07)
IMMATURE GRANULOCYTES: 0.3 %
LYMPHOCYTES # BLD: 31.1 %
LYMPHOCYTES ABSOLUTE: 2.7 THOU/MM3 (ref 1–4.8)
MCH RBC QN AUTO: 25.5 PG (ref 26–33)
MCHC RBC AUTO-ENTMCNC: 30.7 GM/DL (ref 32.2–35.5)
MCV RBC AUTO: 83.1 FL (ref 81–99)
MONOCYTES # BLD: 6.7 %
MONOCYTES ABSOLUTE: 0.6 THOU/MM3 (ref 0.4–1.3)
NUCLEATED RED BLOOD CELLS: 0 /100 WBC
PLATELET # BLD: 308 THOU/MM3 (ref 130–400)
PMV BLD AUTO: 10.8 FL (ref 9.4–12.4)
POTASSIUM SERPL-SCNC: 3.9 MEQ/L (ref 3.5–5.2)
RBC # BLD: 4.98 MILL/MM3 (ref 4.2–5.4)
SEDIMENTATION RATE, ERYTHROCYTE: 8 MM/HR (ref 0–20)
SEG NEUTROPHILS: 59 %
SEGMENTED NEUTROPHILS ABSOLUTE COUNT: 5.2 THOU/MM3 (ref 1.8–7.7)
SODIUM BLD-SCNC: 138 MEQ/L (ref 135–145)
TOTAL PROTEIN: 7 G/DL (ref 6.1–8)
WBC # BLD: 8.8 THOU/MM3 (ref 4.8–10.8)

## 2020-05-14 PROCEDURE — 85025 COMPLETE CBC W/AUTO DIFF WBC: CPT

## 2020-05-14 PROCEDURE — 86140 C-REACTIVE PROTEIN: CPT

## 2020-05-14 PROCEDURE — 85651 RBC SED RATE NONAUTOMATED: CPT

## 2020-05-14 PROCEDURE — 36415 COLL VENOUS BLD VENIPUNCTURE: CPT

## 2020-05-14 PROCEDURE — 80053 COMPREHEN METABOLIC PANEL: CPT

## 2020-05-15 RX ORDER — AZATHIOPRINE 50 MG/1
50 TABLET ORAL 2 TIMES DAILY
Qty: 60 TABLET | Refills: 0 | Status: SHIPPED | OUTPATIENT
Start: 2020-05-15 | End: 2020-06-15 | Stop reason: SDUPTHER

## 2020-06-03 ENCOUNTER — PATIENT MESSAGE (OUTPATIENT)
Dept: RHEUMATOLOGY | Age: 40
End: 2020-06-03

## 2020-06-03 RX ORDER — PREDNISONE 1 MG/1
5 TABLET ORAL DAILY
Qty: 30 TABLET | Refills: 3 | Status: SHIPPED | OUTPATIENT
Start: 2020-06-03 | End: 2020-08-13 | Stop reason: SDUPTHER

## 2020-06-03 RX ORDER — DULOXETIN HYDROCHLORIDE 60 MG/1
60 CAPSULE, DELAYED RELEASE ORAL DAILY
Qty: 90 CAPSULE | Refills: 5 | Status: SHIPPED | OUTPATIENT
Start: 2020-06-03 | End: 2020-06-30 | Stop reason: SDUPTHER

## 2020-06-13 ENCOUNTER — HOSPITAL ENCOUNTER (OUTPATIENT)
Age: 40
Discharge: HOME | End: 2020-06-13
Payer: MEDICARE

## 2020-06-13 DIAGNOSIS — Z51.81 MEDICATION MONITORING ENCOUNTER: ICD-10-CM

## 2020-06-13 DIAGNOSIS — M32.9 LUPUS ARTHRITIS (HCC): ICD-10-CM

## 2020-06-13 LAB
ALBUMIN SERPL-MCNC: 4.2 G/DL (ref 3.5–5.1)
ALP BLD-CCNC: 156 U/L (ref 38–126)
ALT SERPL-CCNC: 30 U/L (ref 11–66)
ANION GAP SERPL CALCULATED.3IONS-SCNC: 14 MEQ/L (ref 8–16)
AST SERPL-CCNC: 35 U/L (ref 5–40)
BASOPHILS # BLD: 0.4 %
BASOPHILS ABSOLUTE: 0 THOU/MM3 (ref 0–0.1)
BILIRUB SERPL-MCNC: 0.4 MG/DL (ref 0.3–1.2)
BUN BLDV-MCNC: 8 MG/DL (ref 7–22)
C-REACTIVE PROTEIN: 0.74 MG/DL (ref 0–1)
CALCIUM SERPL-MCNC: 9.3 MG/DL (ref 8.5–10.5)
CHLORIDE BLD-SCNC: 103 MEQ/L (ref 98–111)
CO2: 23 MEQ/L (ref 23–33)
CREAT SERPL-MCNC: 0.6 MG/DL (ref 0.4–1.2)
EOSINOPHIL # BLD: 2.9 %
EOSINOPHILS ABSOLUTE: 0.2 THOU/MM3 (ref 0–0.4)
ERYTHROCYTE [DISTWIDTH] IN BLOOD BY AUTOMATED COUNT: 14.8 % (ref 11.5–14.5)
ERYTHROCYTE [DISTWIDTH] IN BLOOD BY AUTOMATED COUNT: 44.7 FL (ref 35–45)
GFR SERPL CREATININE-BSD FRML MDRD: > 90 ML/MIN/1.73M2
GLUCOSE BLD-MCNC: 163 MG/DL (ref 70–108)
HCT VFR BLD CALC: 43.9 % (ref 37–47)
HEMOGLOBIN: 13.5 GM/DL (ref 12–16)
IMMATURE GRANS (ABS): 0.03 THOU/MM3 (ref 0–0.07)
IMMATURE GRANULOCYTES: 0.4 %
LYMPHOCYTES # BLD: 32.1 %
LYMPHOCYTES ABSOLUTE: 2.6 THOU/MM3 (ref 1–4.8)
MCH RBC QN AUTO: 25.9 PG (ref 26–33)
MCHC RBC AUTO-ENTMCNC: 30.8 GM/DL (ref 32.2–35.5)
MCV RBC AUTO: 84.1 FL (ref 81–99)
MONOCYTES # BLD: 7.4 %
MONOCYTES ABSOLUTE: 0.6 THOU/MM3 (ref 0.4–1.3)
NUCLEATED RED BLOOD CELLS: 0 /100 WBC
PLATELET # BLD: 272 THOU/MM3 (ref 130–400)
PMV BLD AUTO: 10.9 FL (ref 9.4–12.4)
POTASSIUM SERPL-SCNC: 3.9 MEQ/L (ref 3.5–5.2)
RBC # BLD: 5.22 MILL/MM3 (ref 4.2–5.4)
SEDIMENTATION RATE, ERYTHROCYTE: 10 MM/HR (ref 0–20)
SEG NEUTROPHILS: 56.8 %
SEGMENTED NEUTROPHILS ABSOLUTE COUNT: 4.5 THOU/MM3 (ref 1.8–7.7)
SODIUM BLD-SCNC: 140 MEQ/L (ref 135–145)
TOTAL PROTEIN: 6.6 G/DL (ref 6.1–8)
WBC # BLD: 8 THOU/MM3 (ref 4.8–10.8)

## 2020-06-13 PROCEDURE — 86140 C-REACTIVE PROTEIN: CPT

## 2020-06-13 PROCEDURE — 85651 RBC SED RATE NONAUTOMATED: CPT

## 2020-06-13 PROCEDURE — 36415 COLL VENOUS BLD VENIPUNCTURE: CPT

## 2020-06-13 PROCEDURE — 85025 COMPLETE CBC W/AUTO DIFF WBC: CPT

## 2020-06-13 PROCEDURE — 80053 COMPREHEN METABOLIC PANEL: CPT

## 2020-06-15 RX ORDER — AZATHIOPRINE 50 MG/1
50 TABLET ORAL 2 TIMES DAILY
Qty: 60 TABLET | Refills: 0 | Status: SHIPPED | OUTPATIENT
Start: 2020-06-15 | End: 2020-06-17

## 2020-06-16 RX ORDER — AZATHIOPRINE 50 MG/1
50 TABLET ORAL 2 TIMES DAILY
Qty: 60 TABLET | Refills: 0 | OUTPATIENT
Start: 2020-06-16

## 2020-06-17 RX ORDER — AZATHIOPRINE 50 MG/1
TABLET ORAL
Qty: 60 TABLET | Refills: 0 | Status: SHIPPED | OUTPATIENT
Start: 2020-06-17 | End: 2020-08-10 | Stop reason: SDUPTHER

## 2020-06-17 NOTE — TELEPHONE ENCOUNTER
DOLV: 01/07/20  DONV: 06/29/20  LAST LAB DRAW: 06/13/20    Lab Results   Component Value Date     06/13/2020    K 3.9 06/13/2020     06/13/2020    CO2 23 06/13/2020    BUN 8 06/13/2020    CREATININE 0.6 06/13/2020    GLUCOSE 163 (H) 06/13/2020    CALCIUM 9.3 06/13/2020    PROT 6.6 06/13/2020    LABALBU 4.2 06/13/2020    BILITOT 0.4 06/13/2020    ALKPHOS 156 (H) 06/13/2020    AST 35 06/13/2020    ALT 30 06/13/2020    LABGLOM >90 06/13/2020       Recent Labs     06/13/20  0834   WBC 8.0   HGB 13.5   HCT 43.9   MCV 84.1          Lab Results   Component Value Date    SEDRATE 10 06/13/2020       Lab Results   Component Value Date    CRP 0.74 06/13/2020

## 2020-06-29 ENCOUNTER — HOSPITAL ENCOUNTER (OUTPATIENT)
Age: 40
Discharge: HOME | End: 2020-06-29
Payer: MEDICARE

## 2020-06-29 ENCOUNTER — OFFICE VISIT (OUTPATIENT)
Dept: RHEUMATOLOGY | Age: 40
End: 2020-06-29
Payer: MEDICARE

## 2020-06-29 VITALS
HEIGHT: 63 IN | BODY MASS INDEX: 41.96 KG/M2 | SYSTOLIC BLOOD PRESSURE: 130 MMHG | WEIGHT: 236.8 LBS | OXYGEN SATURATION: 97 % | TEMPERATURE: 97.6 F | DIASTOLIC BLOOD PRESSURE: 80 MMHG | HEART RATE: 99 BPM

## 2020-06-29 DIAGNOSIS — D80.1 HYPOGAMMAGLOBULINEMIA (HCC): ICD-10-CM

## 2020-06-29 PROCEDURE — 82784 ASSAY IGA/IGD/IGG/IGM EACH: CPT

## 2020-06-29 PROCEDURE — 99214 OFFICE O/P EST MOD 30 MIN: CPT | Performed by: INTERNAL MEDICINE

## 2020-06-29 PROCEDURE — 36415 COLL VENOUS BLD VENIPUNCTURE: CPT

## 2020-06-29 PROCEDURE — 82787 IGG 1 2 3 OR 4 EACH: CPT

## 2020-06-29 ASSESSMENT — ENCOUNTER SYMPTOMS
COUGH: 0
NAUSEA: 0
CONSTIPATION: 0
ANAL BLEEDING: 0
SHORTNESS OF BREATH: 0
DIARRHEA: 0
WHEEZING: 0
ABDOMINAL PAIN: 0
EYE PAIN: 0
BLOOD IN STOOL: 0
EYES NEGATIVE: 1
EYE REDNESS: 0
COLOR CHANGE: 1
VOMITING: 0

## 2020-06-29 NOTE — PROGRESS NOTES
History     Tobacco History     Smoking Status  Never Smoker    Smokeless Tobacco Use  Never Used          Alcohol History     Alcohol Use Status  No          Drug Use     Drug Use Status  No          Sexual Activity     Sexually Active  Not Currently                ALLERGIES     Allergies   Allergen Reactions    Cayenne Swelling     Tongue and throat swelling    Codeine Rash    Tape Joelle Chato Tape]      Tears skin    Amoxicillin Other (See Comments)    Sulfa Antibiotics Other (See Comments)     Can not take medication due to illness       CURRENT MEDICATIONS      Current Outpatient Medications   Medication Sig Dispense Refill    azaTHIOprine (IMURAN) 50 MG tablet Take 1 tablet by mouth twice daily 60 tablet 0    DULoxetine (CYMBALTA) 60 MG extended release capsule Take 1 capsule by mouth daily 90 capsule 5    predniSONE (DELTASONE) 5 MG tablet Take 1 tablet by mouth daily 30 tablet 3    ibuprofen (ADVIL;MOTRIN) 600 MG tablet Take 1 tablet by mouth every 8 hours as needed for Pain 42 tablet 5    cyclobenzaprine (FLEXERIL) 5 MG tablet Take 1 tablet by mouth 3 times daily as needed for Muscle spasms 90 tablet 5    sucralfate (CARAFATE) 1 GM tablet Take 1 tablet by mouth 4 times daily 120 tablet 11    potassium chloride (KLOR-CON M10) 10 MEQ extended release tablet Take 1 tablet by mouth daily 30 tablet 5    traZODone (DESYREL) 50 MG tablet Take 3 tablets by mouth nightly 90 tablet 5    clotrimazole-betamethasone (LOTRISONE) 1-0.05 % cream Apply topically 2 times daily. 45 g 2    Dulaglutide 0.75 MG/0.5ML SOPN Inject 0.75 mg into the skin once a week 5 pen 11    metFORMIN (GLUCOPHAGE) 500 MG tablet Take 1 tablet by mouth 2 times daily (with meals) 60 tablet 11    verapamil (CALAN SR) 240 MG extended release tablet Take 1 tablet by mouth daily 90 tablet 3    triamcinolone (KENALOG) 0.1 % cream Apply topically 2 times daily Apply topically 2 times daily.  1 Tube 5    montelukast (SINGULAIR) 10 MG tablet Take 1 tablet by mouth nightly 30 tablet 5    magnesium oxide (MAG-OX) 400 (241.3 Mg) MG TABS tablet Take 1 tablet by mouth daily 90 tablet 5    clopidogrel (PLAVIX) 75 MG tablet TAKE 1 TABLET BY MOUTH ONCE DAILY 90 tablet 3    Continuous Blood Gluc Sensor (FREESTYLE SANDRA SENSOR SYSTEM) MIS 1 Units by Does not apply route daily 1 each 0    Continuous Blood Gluc Sensor (FREESTYLE SANDRA 14 DAY SENSOR) MIS 1 Units by Does not apply route every 14 days 7 each 3    fluticasone (FLONASE) 50 MCG/ACT nasal spray 2 sprays by Each Nostril route daily 2 Sprays in each nostril 1 Bottle 1    atorvastatin (LIPITOR) 80 MG tablet Take 1 tablet by mouth daily 90 tablet 3    levocetirizine (XYZAL) 5 MG tablet TAKE ONE TABLET BY MOUTH NIGHTLY 30 tablet 11    Continuous Blood Gluc Sensor (FREESTYLE SANDRA SENSOR SYSTEM) MIS 1 kit by Does not apply route 2 times daily Include sensors for a year  Dx:  E11.9, and lupus 1 each 0    sodium chloride (OCEAN) 0.65 % nasal spray 1 spray by Nasal route as needed for Congestion 1 Bottle 3    NEXIUM 40 MG delayed release capsule TAKE 1 CAPSULE BY MOUTH ONCE DAILY 30 capsule 5    levalbuterol (XOPENEX HFA) 45 MCG/ACT inhaler Inhale 1-2 puffs into the lungs every 4 hours as needed for Wheezing or Shortness of Breath 1 Inhaler 3    levalbuterol (XOPENEX) 0.63 MG/3ML nebulization Take 3 mLs by nebulization every 6 hours as needed for Wheezing or Shortness of Breath 270 mL 5    Respiratory Therapy Supplies (NEBULIZER AIR TUBE/PLUGS) MISC 1 kit by Does not apply route every 6 hours as needed (Wheezing/shortness of breath) Please provide nebulizer kit and supplies.  1 each 0    EQ PAIN RELIEVER 325 MG tablet TAKE TWO TABLETS BY MOUTH EVERY 4 HOURS AS NEEDED FOR PAIN AND FEVER 120 tablet 3    Amitriptyline HCl (ELAVIL PO) Take 25 mg by mouth nightly       aspirin (LORENZO ASPIRIN) 325 MG tablet Take 1 tablet by mouth daily 30 tablet 3    promethazine (PHENERGAN) 25 MG tablet Take 1 tablet by mouth every 6 hours as needed for Nausea 90 tablet 11    diclofenac sodium (VOLTAREN) 1 % GEL Apply 2 g topically 4 times daily as needed for Pain (topical)        No current facility-administered medications for this visit. PHYSICAL EXAMINATION / OBJECTIVE     Objective:  /80 (Site: Left Upper Arm, Position: Sitting, Cuff Size: Large Adult)   Pulse 99   Temp 97.6 °F (36.4 °C)   Ht 5' 2.99\" (1.6 m)   Wt 236 lb 12.8 oz (107.4 kg)   SpO2 97%   BMI 41.96 kg/m²     Physical Exam  Constitutional:       General: She is not in acute distress. Appearance: She is well-developed. She is not diaphoretic. HENT:      Head: Normocephalic and atraumatic. Eyes:      General: No scleral icterus. Conjunctiva/sclera: Conjunctivae normal.   Neck:      Musculoskeletal: Normal range of motion and neck supple. Cardiovascular:      Rate and Rhythm: Normal rate and regular rhythm. Heart sounds: Normal heart sounds. Pulmonary:      Effort: Pulmonary effort is normal. No respiratory distress. Breath sounds: Normal breath sounds. No wheezing or rales. Lymphadenopathy:      Cervical: No cervical adenopathy. Skin:     General: Skin is warm and dry. Comments: 2 small 1 mm scabbed lesions along the crown of the scalp. Neurological:      Mental Status: She is alert and oriented to person, place, and time. Psychiatric:         Behavior: Behavior normal.         Musculoskeletal:    Normal gait. Strength 4+/5 in biceps, triceps, hips, knees,  stregnth    Upper extremities:   Shoulders: ROM intact, + tender bilat, + jenelle, lift off. Elbows:     Tender left, + tinel bilat. phalen bilat. Wrists       Tender bilat. No synovitis./ deformities. Hands:      Tender scattered PIPs. NO synovitis. Lower extremities:  Hips:     Tender latearl hips bilat. Knees : Tender bilat - inner knees. Ankles: non-tender, bilat. Feet:     Tender mid foot, MTPs  - left .  No Swelling   Spine: Arava, Methotrexate + arava -- some relief. With sicca sx's. Reported raynauds . Mild joint swelling, warmth noted on exam. Last [facility] w/ diagnosis of inflammatory poly-arthropathy    - repeat testing w/ EVELIN :1:80 dense fine speckled. - azathiorpine 50mg bid. - prednisone 5mg daily  - if stable at the next evaluation will attempt to wean the steroids     Fibromyalgia - active. - phys therapy referral for graded exercise program.   - cymbalta       Chronic low back pain. - worsened. - June 2019 CT with sclerosis around bilateral SI joints given the inflammatory arthritis and inflammatory back pain sx's  MRI of pelvis. - w/o evidence of inflammatino - results discussed with patient. Carpal tunnel:  Bilateral   - + tinel bilateral,   intermittent numbness/tingling   - encourage wrist splinting.    - declined EMG/NCV evaluation      Cubital  tunnel:  bilateral  - + tinel bilateral,  intermittent numbness/tingling bilateral hands. - encourage elbow  splinting.    - declined EMG/NCV evaluation      Immunoglobulin def. - IgG, IgM def    - report recurrent sinus infection greatest in the winters   - referral to allergy/immunology for further evaluation given reported recurrent sinus infections    - quant immunoglobulins with IgG subclasses ordered. Enc for vaccination    - prevnar 13 -- sept 2019    Medication monitoring q 8 weeks x 1 then q 12 weeks.   -labs from June 2020 discussed with the patient. No follow-ups on file. Electronically signed by Ricky Baumann DO on 6/29/2020 at 7:46 AM    New Prescriptions    No medications on file       Thank you for allowing me to participate in the care of this patient. Please call if there are any questions.

## 2020-06-30 RX ORDER — SUCRALFATE 1 G/1
1 TABLET ORAL 4 TIMES DAILY
Qty: 120 TABLET | Refills: 11 | Status: SHIPPED | OUTPATIENT
Start: 2020-06-30 | End: 2021-09-01

## 2020-06-30 RX ORDER — MONTELUKAST SODIUM 10 MG/1
10 TABLET ORAL NIGHTLY
Qty: 30 TABLET | Refills: 5 | Status: SHIPPED | OUTPATIENT
Start: 2020-06-30 | End: 2020-06-30

## 2020-07-01 LAB
IGA: 97 MG/DL (ref 70–400)
IGG: 579 MG/DL (ref 700–1600)
IGM: < 25 MG/DL (ref 40–230)

## 2020-07-02 LAB — IGG SUBCLASSES: NORMAL

## 2020-07-06 RX ORDER — POTASSIUM CHLORIDE 750 MG/1
10 TABLET, EXTENDED RELEASE ORAL DAILY
Qty: 90 TABLET | Refills: 3 | Status: SHIPPED | OUTPATIENT
Start: 2020-07-06 | End: 2021-06-28

## 2020-07-06 RX ORDER — TRAZODONE HYDROCHLORIDE 50 MG/1
150 TABLET ORAL NIGHTLY
Qty: 90 TABLET | Refills: 5 | Status: SHIPPED | OUTPATIENT
Start: 2020-07-06 | End: 2021-03-01

## 2020-07-08 ENCOUNTER — OFFICE VISIT (OUTPATIENT)
Dept: ALLERGY | Age: 40
End: 2020-07-08
Payer: MEDICARE

## 2020-07-08 ENCOUNTER — HOSPITAL ENCOUNTER (OUTPATIENT)
Age: 40
Discharge: HOME | End: 2020-07-08
Payer: MEDICARE

## 2020-07-08 VITALS
SYSTOLIC BLOOD PRESSURE: 122 MMHG | DIASTOLIC BLOOD PRESSURE: 80 MMHG | BODY MASS INDEX: 41.96 KG/M2 | TEMPERATURE: 97.3 F | HEART RATE: 72 BPM | HEIGHT: 63 IN | RESPIRATION RATE: 16 BRPM | WEIGHT: 236.8 LBS

## 2020-07-08 DIAGNOSIS — R76.8 LOW SERUM IGG FOR AGE: ICD-10-CM

## 2020-07-08 DIAGNOSIS — D83.9 CVID (COMMON VARIABLE IMMUNODEFICIENCY) (HCC): ICD-10-CM

## 2020-07-08 DIAGNOSIS — Z91.018 FOOD ALLERGY: ICD-10-CM

## 2020-07-08 DIAGNOSIS — R76.8 LOW SERUM IGM FOR AGE: ICD-10-CM

## 2020-07-08 DIAGNOSIS — R05.9 COUGH: ICD-10-CM

## 2020-07-08 PROCEDURE — 86003 ALLG SPEC IGE CRUDE XTRC EA: CPT

## 2020-07-08 PROCEDURE — 99214 OFFICE O/P EST MOD 30 MIN: CPT | Performed by: NURSE PRACTITIONER

## 2020-07-08 PROCEDURE — 87389 HIV-1 AG W/HIV-1&-2 AB AG IA: CPT

## 2020-07-08 PROCEDURE — 86255 FLUORESCENT ANTIBODY SCREEN: CPT

## 2020-07-08 PROCEDURE — 86317 IMMUNOASSAY INFECTIOUS AGENT: CPT

## 2020-07-08 PROCEDURE — 36415 COLL VENOUS BLD VENIPUNCTURE: CPT

## 2020-07-08 PROCEDURE — 86162 COMPLEMENT TOTAL (CH50): CPT

## 2020-07-08 ASSESSMENT — ENCOUNTER SYMPTOMS
SHORTNESS OF BREATH: 1
TROUBLE SWALLOWING: 0
STRIDOR: 0
APNEA: 0
FACIAL SWELLING: 0
ABDOMINAL PAIN: 0
EYES NEGATIVE: 1
COLOR CHANGE: 0
DIARRHEA: 0
SINUS PAIN: 1
SORE THROAT: 0
NAUSEA: 1
VOMITING: 0
VOICE CHANGE: 0
RHINORRHEA: 0
CHEST TIGHTNESS: 0
SINUS PRESSURE: 1
COUGH: 1
CHOKING: 0
WHEEZING: 0

## 2020-07-08 NOTE — PROGRESS NOTES
Allergy & Asthma   [address] [facility], [address]  [place]:[phone]  Fax: [phone]          Chief Complaint:   Chief Complaint   Patient presents with    New Patient     Here for hypogammaglobulinemia HCC       HISTORY OF PRESENT ILLNESS: NEW PATIENT TO PRACTICE   25-year-old  female here today with chronic medical conditions. She would like to be evaluated today for chronic variable immunodeficiency. Patient states that she has been evaluated for immunodeficiency for several years. She reports that she has had low IgG and IgM titers. Patient gets chronic upper respiratory infections greater than 10 times in the last year. She tries not to go on antibiotics is much as possible because she has been treated some a times in years past.  Severity of her immunodeficiency is severe. Patient states that she is tired of being sick all the time and would like to feel better. Her other comorbid conditions include asthma, TIA and stroke, lupus, fibromyalgia, arthritis. Patient states that her eyes itch burn anterior. She also has discharge from her eyes. She has a fullness and popping in her ears. She sneezes a lot her nose constantly runs and is stuffy. She complains of itchy sore throat constantly with has to clear her throat often. She has a chronic cough in her lungs as well as a pulmonary nodule. Patient has a history of asthma. She gets short of breath with exercise. She does have chronic headaches and migraines. Patient states that she has symptoms daily. They are worse also summer winter spring and fall. Patient states that she gets sinus infections a lot and she has to constantly on antibiotics which is not healthy for her. It also flares of her asthma. She has history of high blood pressure, reflux, diabetes, arthritis, high cholesterol, stomach ulcer and a hiatal hernia. She had a hysterectomy in 2010.   She does remember getting pneumococcal 23 with titers checked and knows that she has not responded to the titers this was back in 2014. Patient does have a dog. She has had a greater for 10 years. She is currently disabled. She is lived in her home for 8 years. Other symptoms patient has does not sweats, loss of appetite, dry mouth, dry eyes, trouble hearing, ringing in the ears, weakness and clumsiness, tingling and numbness in her extremities, cold and heat intolerance, nausea and vomiting, diarrhea, heartburn, ankle swelling, painful and swollen joints, muscle tenderness and muscle weakness, and she is postmenopausal      Patient here with immunodeficiency. Reports chronic infections. During last 12 months she has had at least 10 upper respiratory incfections, 5-6 were treated with antibiotics. Review of Systems:  Review of Systems   Constitutional: Positive for fatigue. Negative for activity change, appetite change, chills, diaphoresis, fever and unexpected weight change. HENT: Positive for postnasal drip, sinus pressure and sinus pain. Negative for congestion, dental problem, ear discharge, ear pain, facial swelling, hearing loss, mouth sores, nosebleeds, rhinorrhea, sneezing, sore throat, tinnitus, trouble swallowing and voice change. Eyes: Negative. Negative for visual disturbance. Respiratory: Positive for cough and shortness of breath. Negative for apnea, choking, chest tightness, wheezing and stridor. Cardiovascular: Negative. Negative for chest pain, palpitations and leg swelling. Gastrointestinal: Positive for nausea. Negative for abdominal pain, diarrhea and vomiting. Endocrine: Negative. Negative for cold intolerance, heat intolerance, polydipsia and polyuria. Genitourinary: Negative. Negative for difficulty urinating, dysuria, enuresis, hematuria and urgency. Musculoskeletal: Positive for arthralgias, gait problem, joint swelling and myalgias. Negative for neck pain and neck stiffness.    Skin: Negative for color change and rash. Allergic/Immunologic: Positive for environmental allergies and immunocompromised state. Neurological: Positive for seizures, weakness and headaches. Negative for dizziness, syncope, facial asymmetry, speech difficulty and light-headedness. Hematological: Negative. Negative for adenopathy. Does not bruise/bleed easily. Psychiatric/Behavioral: Negative. Negative for confusion and sleep disturbance. The patient is not nervous/anxious. All other systems reviewed and are negative. Past Medical History:    Past Medical History:   Diagnosis Date    Asthma     Diabetes mellitus ([address] 6/8/2018    Fibromyalgia     GERD (gastroesophageal reflux disease)     Hyperlipidemia     [facility]matosus[facility]     Lupus arthritis [facility])     [facility]    Migraine     Plaquenil adverse reaction in therapeutic use 5/2016    changes in the eyes    TIA (transient ischemic attack)     8/2016       Past Surgical History:    Past Surgical History:   Procedure Laterality Date    HYSTERECTOMY  2009    Total, endometriosis    LAPAROSCOPY      SHOULDER ARTHROSCOPY Left 08/20/2015    Debridement of rotator cuff and bone spurs       Family History:   Family History   Problem Relation Age of Onset    High Blood Pressure Mother     Rheum Arthritis Mother     Osteoporosis Mother     Diabetes Father     High Blood Pressure Father     Arthritis Father         gout    Diabetes Sister     Other Sister         Crest Syndrome    Breast Cancer Sister 54    Diabetes Brother     High Cholesterol Brother     Breast Cancer Maternal Cousin 20       Social History:   Social History     Tobacco Use    Smoking status: Never Smoker    Smokeless tobacco: Never Used   Substance Use Topics    Alcohol use: No     Alcohol/week: 0.0 standard drinks        Allergies:     Allergies   Allergen Reactions    Cayenne Swelling     Tongue and throat swelling    Codeine Rash    Tape Benna Hazard Tape]      Tears skin    Amoxicillin Other (See Comments)    Sulfa Antibiotics Other (See Comments)     Can not take medication due to illness       Current Medications:   Prior to Visit Medications    Medication Sig Taking? Authorizing Provider   Tdap (ADACEL) 5-2-15.5 LF-MCG/0.5 injection Inject 0.5 mLs into the muscle once for 1 dose Yes NAHID Rudolph CNP   pneumococcal polyvalent (PNEUMOVAX 23) 25 MCG/0.5ML inj Inject 0.5 mLs into the muscle once for 1 dose Yes NAHID Rudolph CNP   potassium chloride (KLOR-CON M10) 10 MEQ extended release tablet Take 1 tablet by mouth daily Yes Roopa Nicole MD   traZODone (DESYREL) 50 MG tablet Take 3 tablets by mouth nightly Yes Roopa Nicole MD   sucralfate (CARAFATE) 1 GM tablet Take 1 tablet by mouth 4 times daily Yes Roopa Nicole MD   DULoxetine (CYMBALTA) 30 MG extended release capsule Take 1 capsule by mouth daily Yes Kaelyn Bradley DO   montelukast (SINGULAIR) 10 MG tablet Take 1 tablet by mouth nightly Yes Roopa Nicole MD   azaTHIOprine (IMURAN) 50 MG tablet Take 1 tablet by mouth twice daily Yes NAHID Durant CNP   predniSONE (DELTASONE) 5 MG tablet Take 1 tablet by mouth daily Yes NAHID Durant CNP   ibuprofen (ADVIL;MOTRIN) 600 MG tablet Take 1 tablet by mouth every 8 hours as needed for Pain Yes Roopa Nicole MD   cyclobenzaprine (FLEXERIL) 5 MG tablet Take 1 tablet by mouth 3 times daily as needed for Muscle spasms Yes NAHID Durant CNP   clotrimazole-betamethasone (LOTRISONE) 1-0.05 % cream Apply topically 2 times daily.  Yes Roopa Nicole MD   Dulaglutide 0.75 MG/0.5ML SOPN Inject 0.75 mg into the skin once a week Yes Roopa Nicole MD   metFORMIN (GLUCOPHAGE) 500 MG tablet Take 1 tablet by mouth 2 times daily (with meals) Yes Roopa Nicole MD   verapamil (CALAN SR) 240 MG extended release tablet Take 1 tablet by mouth daily Yes Roopa Nicole MD   triamcinolone (KENALOG) 0.1 % cream Apply topically 2 times daily Apply topically 2 times daily. Yes Layla Craig MD   magnesium oxide (MAG-OX) 400 (241.3 Mg) MG TABS tablet Take 1 tablet by mouth daily Yes Layla Craig MD   clopidogrel (PLAVIX) 75 MG tablet TAKE 1 TABLET BY MOUTH ONCE DAILY Yes Layla Craig MD   Continuous Blood Gluc Sensor ([address] [place] [address]) MISC 1 Units by Does not apply route daily Yes Layla Craig MD   Continuous Blood Gluc Sensor (FREESTYLE SANDRA 14 DAY SENSOR) MISC 1 Units by Does not apply route every 14 days Yes Layla Craig MD   fluticasone (FLONASE) 50 MCG/ACT nasal spray 2 sprays by Each Nostril route daily 2 Sprays in each nostril Yes Layla Craig MD   atorvastatin (LIPITOR) 80 MG tablet Take 1 tablet by mouth daily Yes Layla Craig MD   levocetirizine (XYZAL) 5 MG tablet TAKE ONE TABLET BY MOUTH NIGHTLY Yes Layla Craig MD   Continuous Blood Gluc Sensor ([address] [place] [address]) MISC 1 kit by Does not apply route 2 times daily Include sensors for a year  Dx:  E11.9, and lupus Yes Layla Craig MD   sodium chloride (OCEAN) 0.65 % nasal spray 1 spray by Nasal route as needed for Congestion Yes Layla Craig MD   NEXIUM 40 MG delayed release capsule TAKE 1 CAPSULE BY MOUTH ONCE DAILY Yes Layla Craig MD   levalbuterol (XOPENEX HFA) 45 MCG/ACT inhaler Inhale 1-2 puffs into the lungs every 4 hours as needed for Wheezing or Shortness of Breath Yes NAHID Cho CNP   levalbuterol (XOPENEX) 0.63 MG/3ML nebulization Take 3 mLs by nebulization every 6 hours as needed for Wheezing or Shortness of Breath Yes NAHID Cho CNP   Respiratory Therapy Supplies (NEBULIZER AIR TUBE/PLUGS) MISC 1 kit by Does not apply route every 6 hours as needed (Wheezing/shortness of breath) Please provide nebulizer kit and supplies.  Yes NAHID Tavares CNP   EQ PAIN RELIEVER 325 MG tablet TAKE TWO TABLETS BY MOUTH EVERY 4 HOURS AS NEEDED FOR PAIN AND FEVER Yes Layla Craig MD   Amitriptyline HCl (ELAVIL PO) Take 25 mg by mouth nightly  Yes Historical Provider, MD   aspirin (LORENZO ASPIRIN) 325 MG tablet Take 1 tablet by mouth daily Yes Reno Fry MD   promethazine (PHENERGAN) 25 MG tablet Take 1 tablet by mouth every 6 hours as needed for Nausea Yes Reno Fry MD   diclofenac sodium (VOLTAREN) 1 % GEL Apply 2 g topically 4 times daily as needed for Pain (topical)  Yes Historical Provider, MD       Vitals:  Vitals:    07/08/20 0806   BP: 122/80   Pulse: 72   Resp: 16   Temp: 97.3 °F (36.3 °C)       236 lb 12.8 oz (107.4 kg)           Physical Exam:  Physical Exam  Vitals signs and nursing note reviewed. Constitutional:       Appearance: Normal appearance. She is normal weight. HENT:      Head: Normocephalic and atraumatic. Right Ear: Tympanic membrane, ear canal and external ear normal.      Left Ear: Tympanic membrane, ear canal and external ear normal.      Nose: Nose normal.      Mouth/Throat:      Mouth: Mucous membranes are moist.      Pharynx: Oropharynx is clear. Eyes:      Extraocular Movements: Extraocular movements intact. Conjunctiva/sclera: Conjunctivae normal.      Pupils: Pupils are equal, round, and reactive to light. Neck:      Musculoskeletal: Normal range of motion and neck supple. Cardiovascular:      Rate and Rhythm: Normal rate and regular rhythm. Pulses: Normal pulses. Heart sounds: Normal heart sounds. Pulmonary:      Effort: Pulmonary effort is normal.      Breath sounds: Normal breath sounds. Musculoskeletal: Normal range of motion. Skin:     General: Skin is warm and dry. Capillary Refill: Capillary refill takes less than 2 seconds. Neurological:      General: No focal deficit present. Mental Status: She is alert and oriented to person, place, and time. Mental status is at baseline. Psychiatric:         Mood and Affect: Mood normal.         Behavior: Behavior normal.         Thought Content:  Thought content normal. Judgment: Judgment normal.           DATA:  Lab Review:   Results for orders placed or performed during the hospital encounter of 06/29/20   Immunoglobulin G Subclasses   Result Value Ref Range    IGG SUBCLASSES SEE BELOW    IgA   Result Value Ref Range    IgA 97 70 - 400 mg/dL   IgM   Result Value Ref Range    IgM < 25 (L) 40 - 230 mg/dL   IgG   Result Value Ref Range    IgG 579 (L) 700 - 1600 mg/dL         Data from outside facility:yes, Reviewed nots from Dr. Didi James patient with lupus arthritis, low back pain, carpal tunnel, and documented frequent sinus infections. PROCEDURES:        Skin Testing performed on: consider in future following CVID testing  Assessment/Plan   Susan Christopher was seen today for new patient. 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 pneumoniae, 23 Serotypes; Future  -     S. pneumoniae, 23 Serotypes; Future  -     pneumococcal polyvalent (PNEUMOVAX 23) 25 MCG/0.5ML inj; Inject 0.5 mLs into the muscle once for 1 dose  -     HIV Screen; Future  -     Hemophilus Influenza B AB; Future  -     Complement, Total; Future  -     AH50; Future  -     Anti-Neutrophilic Cytoplasmic Antibody; Future    [facility]  -     Diphtheria / Tetanus Antibody Panel; Future  -     Diphtheria / Tetanus Antibody Panel; Future  -     Tdap (ADACEL) 5-2-15.5 LF-MCG/0.5 injection; Inject 0.5 mLs into the muscle once for 1 dose  -     S. pneumoniae, 23 Serotypes; Future  -     S. 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 pneumoniae, 23 Serotypes; Future  -     S. pneumoniae, 23 Serotypes; Future  -     pneumococcal polyvalent (PNEUMOVAX 23) 25 MCG/0.5ML inj; Inject 0.5 mLs into the muscle once for 1 dose  -     HIV Screen; Future  -     Hemophilus Influenza B AB; Future  -     Complement, Total; Future  -     AH50; Future  -     Anti-Neutrophilic Cytoplasmic Antibody; Future    Food allergy  -     HIV Screen; Future  -     Miscellaneous Sendout 1; Future    Immunodeficiency (HCC)    Cough   -     HIV Screen; Future        Return in about 8 weeks (around 9/2/2020) for Lab review, Immunodeficiency management, CVID MGMT. Patient to get pre-prevnar 23 and tdap serological  testing  Patient to get Prevnar 13 along with Tdap  Patient will have post serological testing for C VID    Total time spent with patient greater than 45 minutes with at least 50% being in education.   (Please note that portions of this note may have been completed with a voice recognition program.  Efforts were made to edit the dictation but occasionally words are mis-transcribed.)        Signed:   NAHID Evans CNP  7/8/2020  10:14 AM

## 2020-07-10 LAB — HIV-2 AB: NEGATIVE

## 2020-07-11 LAB
COMPLEMENT TOTAL (CH50): > 95 U/ML (ref 38.7–89.9)
DIPHTHERIA ANTIBODY: NORMAL
Lab: NORMAL
NEUTROPHIL CYTOPLASMIC AB IGG: NORMAL

## 2020-07-12 LAB
COMPLEMENT ACTIVITY, ALTERNATIVE PATHWAY: 163 % NORMAL
MISC. #1 REFERENCE GROUP TEST: NORMAL

## 2020-07-15 LAB
ALLERGEN INTERPRETATION/SCORE: NORMAL
MISC. #1 REFERENCE GROUP TEST: NORMAL

## 2020-08-07 ENCOUNTER — HOSPITAL ENCOUNTER (OUTPATIENT)
Age: 40
Discharge: HOME | End: 2020-08-07
Payer: MEDICARE

## 2020-08-07 DIAGNOSIS — M32.9 LUPUS ARTHRITIS (HCC): ICD-10-CM

## 2020-08-07 DIAGNOSIS — G89.29 CHRONIC BILATERAL LOW BACK PAIN WITHOUT SCIATICA: ICD-10-CM

## 2020-08-07 DIAGNOSIS — M79.7 FIBROMYALGIA: ICD-10-CM

## 2020-08-07 DIAGNOSIS — M54.50 CHRONIC BILATERAL LOW BACK PAIN WITHOUT SCIATICA: ICD-10-CM

## 2020-08-07 LAB
ALBUMIN SERPL-MCNC: 4.3 G/DL (ref 3.5–5.1)
ALP BLD-CCNC: 161 U/L (ref 38–126)
ALT SERPL-CCNC: 30 U/L (ref 11–66)
ANION GAP SERPL CALCULATED.3IONS-SCNC: 12 MEQ/L (ref 8–16)
AST SERPL-CCNC: 39 U/L (ref 5–40)
BASOPHILS # BLD: 0.6 %
BASOPHILS ABSOLUTE: 0 THOU/MM3 (ref 0–0.1)
BILIRUB SERPL-MCNC: 0.5 MG/DL (ref 0.3–1.2)
BUN BLDV-MCNC: 8 MG/DL (ref 7–22)
C-REACTIVE PROTEIN: 0.2 MG/DL (ref 0–1)
CALCIUM SERPL-MCNC: 9.6 MG/DL (ref 8.5–10.5)
CHLORIDE BLD-SCNC: 100 MEQ/L (ref 98–111)
CO2: 26 MEQ/L (ref 23–33)
CREAT SERPL-MCNC: 0.6 MG/DL (ref 0.4–1.2)
EOSINOPHIL # BLD: 2.5 %
EOSINOPHILS ABSOLUTE: 0.2 THOU/MM3 (ref 0–0.4)
ERYTHROCYTE [DISTWIDTH] IN BLOOD BY AUTOMATED COUNT: 14.7 % (ref 11.5–14.5)
ERYTHROCYTE [DISTWIDTH] IN BLOOD BY AUTOMATED COUNT: 44.6 FL (ref 35–45)
GFR SERPL CREATININE-BSD FRML MDRD: > 90 ML/MIN/1.73M2
GLUCOSE BLD-MCNC: 136 MG/DL (ref 70–108)
HCT VFR BLD CALC: 42.5 % (ref 37–47)
HEMOGLOBIN: 13.1 GM/DL (ref 12–16)
IMMATURE GRANS (ABS): 0.01 THOU/MM3 (ref 0–0.07)
IMMATURE GRANULOCYTES: 0.1 %
LYMPHOCYTES # BLD: 29.4 %
LYMPHOCYTES ABSOLUTE: 2 THOU/MM3 (ref 1–4.8)
MCH RBC QN AUTO: 26 PG (ref 26–33)
MCHC RBC AUTO-ENTMCNC: 30.8 GM/DL (ref 32.2–35.5)
MCV RBC AUTO: 84.5 FL (ref 81–99)
MONOCYTES # BLD: 5.7 %
MONOCYTES ABSOLUTE: 0.4 THOU/MM3 (ref 0.4–1.3)
NUCLEATED RED BLOOD CELLS: 0 /100 WBC
PLATELET # BLD: 279 THOU/MM3 (ref 130–400)
PMV BLD AUTO: 10.9 FL (ref 9.4–12.4)
POTASSIUM SERPL-SCNC: 3.8 MEQ/L (ref 3.5–5.2)
RBC # BLD: 5.03 MILL/MM3 (ref 4.2–5.4)
SEDIMENTATION RATE, ERYTHROCYTE: 11 MM/HR (ref 0–20)
SEG NEUTROPHILS: 61.7 %
SEGMENTED NEUTROPHILS ABSOLUTE COUNT: 4.3 THOU/MM3 (ref 1.8–7.7)
SODIUM BLD-SCNC: 138 MEQ/L (ref 135–145)
TOTAL PROTEIN: 7 G/DL (ref 6.1–8)
WBC # BLD: 6.9 THOU/MM3 (ref 4.8–10.8)

## 2020-08-07 PROCEDURE — 85651 RBC SED RATE NONAUTOMATED: CPT

## 2020-08-07 PROCEDURE — 36415 COLL VENOUS BLD VENIPUNCTURE: CPT

## 2020-08-07 PROCEDURE — 86140 C-REACTIVE PROTEIN: CPT

## 2020-08-07 PROCEDURE — 85025 COMPLETE CBC W/AUTO DIFF WBC: CPT

## 2020-08-07 PROCEDURE — 80053 COMPREHEN METABOLIC PANEL: CPT

## 2020-08-10 RX ORDER — AZATHIOPRINE 50 MG/1
TABLET ORAL
Qty: 60 TABLET | Refills: 2 | Status: SHIPPED | OUTPATIENT
Start: 2020-08-10 | End: 2020-10-22 | Stop reason: SDUPTHER

## 2020-08-17 RX ORDER — PREDNISONE 1 MG/1
5 TABLET ORAL DAILY
Qty: 30 TABLET | Refills: 3 | Status: SHIPPED | OUTPATIENT
Start: 2020-08-17 | End: 2021-06-30

## 2020-08-31 ENCOUNTER — TELEPHONE (OUTPATIENT)
Dept: FAMILY MEDICINE CLINIC | Age: 40
End: 2020-08-31

## 2020-08-31 RX ORDER — CLOTRIMAZOLE AND BETAMETHASONE DIPROPIONATE 10; .64 MG/G; MG/G
CREAM TOPICAL
Qty: 45 G | Refills: 0 | Status: SHIPPED | OUTPATIENT
Start: 2020-08-31 | End: 2020-09-01 | Stop reason: SDUPTHER

## 2020-08-31 RX ORDER — IBUPROFEN 600 MG/1
600 TABLET ORAL EVERY 8 HOURS PRN
Qty: 42 TABLET | Refills: 0 | Status: SHIPPED | OUTPATIENT
Start: 2020-08-31 | End: 2021-05-12

## 2020-08-31 NOTE — TELEPHONE ENCOUNTER
Catalina Batres scheduled an appointment for 9/2/20. She had labs that are likely overdue and is asking if Dr Bradley Helton wants her to get those done prior or wait until her appointment at this point? Please advise through her MyChart.     DOLV  2/3/20

## 2020-09-01 RX ORDER — CLOTRIMAZOLE AND BETAMETHASONE DIPROPIONATE 10; .64 MG/G; MG/G
CREAM TOPICAL
Qty: 45 G | Refills: 0 | Status: SHIPPED | OUTPATIENT
Start: 2020-09-01 | End: 2021-03-17

## 2020-09-08 ENCOUNTER — TELEPHONE (OUTPATIENT)
Dept: FAMILY MEDICINE CLINIC | Age: 40
End: 2020-09-08

## 2020-09-08 NOTE — LETTER
[address] [facility] [address]  Phone: [phone]  Fax: [phone]    Marco Antonio Guajardo MD        September 8, 2020    [place] [address]      To Whom It May Concern,    It is in my medical opinion that patient Melanie العراقي (1980) is not a good candidate for jury duty and should be excused due to her ongoing medical condition. If you have any questions or concerns, please don't hesitate to call.     Sincerely,                Marco Antonio Guajardo MD

## 2020-09-12 ENCOUNTER — HOSPITAL ENCOUNTER (OUTPATIENT)
Age: 40
Discharge: HOME | End: 2020-09-12
Payer: MEDICARE

## 2020-09-12 DIAGNOSIS — R76.8 LOW SERUM IGM FOR AGE: ICD-10-CM

## 2020-09-12 DIAGNOSIS — D83.9 CVID (COMMON VARIABLE IMMUNODEFICIENCY) (HCC): ICD-10-CM

## 2020-09-12 DIAGNOSIS — R76.8 LOW SERUM IGG FOR AGE: ICD-10-CM

## 2020-09-12 DIAGNOSIS — E11.69 TYPE 2 DIABETES MELLITUS WITH OTHER SPECIFIED COMPLICATION, WITHOUT LONG-TERM CURRENT USE OF INSULIN (HCC): ICD-10-CM

## 2020-09-12 LAB
ANION GAP SERPL CALCULATED.3IONS-SCNC: 12 MEQ/L (ref 8–16)
AVERAGE GLUCOSE: 141 MG/DL (ref 70–126)
BUN BLDV-MCNC: 9 MG/DL (ref 7–22)
CALCIUM SERPL-MCNC: 9.5 MG/DL (ref 8.5–10.5)
CHLORIDE BLD-SCNC: 105 MEQ/L (ref 98–111)
CO2: 25 MEQ/L (ref 23–33)
CREAT SERPL-MCNC: 0.7 MG/DL (ref 0.4–1.2)
GFR SERPL CREATININE-BSD FRML MDRD: > 90 ML/MIN/1.73M2
GLUCOSE BLD-MCNC: 142 MG/DL (ref 70–108)
HBA1C MFR BLD: 6.7 % (ref 4.4–6.4)
POTASSIUM SERPL-SCNC: 4 MEQ/L (ref 3.5–5.2)
SODIUM BLD-SCNC: 142 MEQ/L (ref 135–145)

## 2020-09-12 PROCEDURE — 36415 COLL VENOUS BLD VENIPUNCTURE: CPT

## 2020-09-12 PROCEDURE — 83036 HEMOGLOBIN GLYCOSYLATED A1C: CPT

## 2020-09-12 PROCEDURE — 80048 BASIC METABOLIC PNL TOTAL CA: CPT

## 2020-09-12 PROCEDURE — 86317 IMMUNOASSAY INFECTIOUS AGENT: CPT

## 2020-09-16 LAB — DIPHTHERIA ANTIBODY: NORMAL

## 2020-09-17 LAB — MISC. #1 REFERENCE GROUP TEST: NORMAL

## 2020-09-30 ENCOUNTER — OFFICE VISIT (OUTPATIENT)
Dept: ALLERGY | Age: 40
End: 2020-09-30
Payer: MEDICARE

## 2020-09-30 VITALS
BODY MASS INDEX: 41.63 KG/M2 | RESPIRATION RATE: 16 BRPM | WEIGHT: 235 LBS | DIASTOLIC BLOOD PRESSURE: 74 MMHG | SYSTOLIC BLOOD PRESSURE: 116 MMHG | TEMPERATURE: 97 F | HEART RATE: 70 BPM

## 2020-09-30 PROCEDURE — 99214 OFFICE O/P EST MOD 30 MIN: CPT | Performed by: NURSE PRACTITIONER

## 2020-09-30 RX ORDER — DULOXETIN HYDROCHLORIDE 60 MG/1
60 CAPSULE, DELAYED RELEASE ORAL DAILY
COMMUNITY
Start: 2020-09-03 | End: 2021-08-02

## 2020-09-30 RX ORDER — POTASSIUM CHLORIDE 750 MG/1
10 TABLET, FILM COATED, EXTENDED RELEASE ORAL DAILY
COMMUNITY
Start: 2020-07-07 | End: 2020-10-05 | Stop reason: SDUPTHER

## 2020-09-30 RX ORDER — CYCLOBENZAPRINE HCL 5 MG
5 TABLET ORAL 3 TIMES DAILY PRN
COMMUNITY
Start: 2020-07-30 | End: 2020-11-27 | Stop reason: SDUPTHER

## 2020-09-30 ASSESSMENT — ENCOUNTER SYMPTOMS
EYE REDNESS: 1
NAUSEA: 0
RHINORRHEA: 1
SHORTNESS OF BREATH: 0
SINUS PRESSURE: 1
COUGH: 0
CHOKING: 0
DIARRHEA: 0

## 2020-09-30 NOTE — PROGRESS NOTES
[email or web address]    Allergy & Asthma   [address] [facility], [address]  Ph:   [phone]  Fax:[phone]    Provider:  Dr. Evelyn Viera:   Chief Complaint   Patient presents with    Follow-up     Patient is here for 8 week lab review and immunodefiency management           HISTORY OF PRESENT ILLNESS: ESTABLISHED PATIENT HERE FOR EVALUATION   56-year-old  female here today with chronic medical conditions. She would like to be evaluated today for chronic variable immunodeficiency. Patient states that she has been evaluated for immunodeficiency for several years. She reports that she has had low IgG and IgM titers. Patient gets chronic upper respiratory infections greater than 10 times in the last year. She tries not to go on antibiotics is much as possible because she has been treated some a times in years past.  Severity of her immunodeficiency is severe. Patient states that she is tired of being sick all the time and would like to feel better. Her other comorbid conditions include asthma, TIA and stroke, lupus, fibromyalgia, arthritis. Patient states that her eyes itch burn anterior. She also has discharge from her eyes. She has a fullness and popping in her ears. She sneezes a lot her nose constantly runs and is stuffy. She complains of itchy sore throat constantly with has to clear her throat often. She has a chronic cough in her lungs as well as a pulmonary nodule. Patient has a history of asthma. She gets short of breath with exercise. She does have chronic headaches and migraines. Patient states that she has symptoms daily. They are worse also summer winter spring and fall. Patient states that she gets sinus infections a lot and she has to constantly on antibiotics which is not healthy for her. It also flares of her asthma.   She has history of high blood pressure, reflux, diabetes, arthritis, high cholesterol, stomach ulcer and a hiatal hernia. She had a hysterectomy in 2010. She does remember getting pneumococcal 23 with titers checked and knows that she has not responded to the titers this was back in 2014. Patient does have a dog. She has had a greater for 10 years. She is currently disabled. She is lived in her home for 8 years. Other symptoms patient has does not sweats, loss of appetite, dry mouth, dry eyes, trouble hearing, ringing in the ears, weakness and clumsiness, tingling and numbness in her extremities, cold and heat intolerance, nausea and vomiting, diarrhea, heartburn, ankle swelling, painful and swollen joints, muscle tenderness and muscle weakness, and she is postmenopausal           Review of Systems:  Review of Systems   Constitutional: Positive for fatigue. Frequent respiratory infections > 10 in last year patient reported   HENT: Positive for congestion, postnasal drip, rhinorrhea and sinus pressure. Eyes: Positive for redness. Respiratory: Negative for cough, choking and shortness of breath. Cardiovascular: Negative for chest pain and leg swelling. Gastrointestinal: Negative for diarrhea and nausea. Allergic/Immunologic: Positive for environmental allergies and immunocompromised state. All other systems reviewed and are negative.         Past MedicalHistory:    Past Medical History:   Diagnosis Date    Asthma     Diabetes mellitus ([address] 6/8/2018    Fibromyalgia     GERD (gastroesophageal reflux disease)     Hyperlipidemia     Lup[facility] erythematosus) [facility])     Lupus arthritis [facility])     [facility]    Migraine     Plaquenil adverse reaction in therapeutic use 5/2016    changes in the eyes    TIA (transient ischemic attack)     8/2016       Past Surgical History:  Past Surgical History:   Procedure Laterality Date    HYSTERECTOMY  2009    Total, endometriosis    LAPAROSCOPY      SHOULDER ARTHROSCOPY Left 08/20/2015    Debridement of rotator cuff and bone spurs nightly, Disp: 90 tablet, Rfl: 5    sucralfate (CARAFATE) 1 GM tablet, Take 1 tablet by mouth 4 times daily, Disp: 120 tablet, Rfl: 11    DULoxetine (CYMBALTA) 30 MG extended release capsule, Take 1 capsule by mouth daily, Disp: 90 capsule, Rfl: 1    montelukast (SINGULAIR) 10 MG tablet, Take 1 tablet by mouth nightly, Disp: 30 tablet, Rfl: 5    Dulaglutide 0.75 MG/0.5ML SOPN, Inject 0.75 mg into the skin once a week, Disp: 5 pen, Rfl: 11    metFORMIN (GLUCOPHAGE) 500 MG tablet, Take 1 tablet by mouth 2 times daily (with meals), Disp: 60 tablet, Rfl: 11    verapamil (CALAN SR) 240 MG extended release tablet, Take 1 tablet by mouth daily, Disp: 90 tablet, Rfl: 3    triamcinolone (KENALOG) 0.1 % cream, Apply topically 2 times daily Apply topically 2 times daily. , Disp: 1 Tube, Rfl: 5    magnesium oxide (MAG-OX) 400 (241.3 Mg) MG TABS tablet, Take 1 tablet by mouth daily, Disp: 90 tablet, Rfl: 5    clopidogrel (PLAVIX) 75 MG tablet, TAKE 1 TABLET BY MOUTH ONCE DAILY, Disp: 90 tablet, Rfl: 3    Continuous Blood Gluc Sensor (FREESTYLE SANDRA SENSOR SYSTEM) MISC, 1 Units by Does not apply route daily, Disp: 1 each, Rfl: 0    fluticasone (FLONASE) 50 MCG/ACT nasal spray, 2 sprays by Each Nostril route daily 2 Sprays in each nostril, Disp: 1 Bottle, Rfl: 1    atorvastatin (LIPITOR) 80 MG tablet, Take 1 tablet by mouth daily, Disp: 90 tablet, Rfl: 3    levocetirizine (XYZAL) 5 MG tablet, TAKE ONE TABLET BY MOUTH NIGHTLY, Disp: 30 tablet, Rfl: 11    Continuous Blood Gluc Sensor (FREESTYLE SANDRA SENSOR SYSTEM) MISC, 1 kit by Does not apply route 2 times daily Include sensors for a year  Dx:  E11.9, and lupus, Disp: 1 each, Rfl: 0    sodium chloride (OCEAN) 0.65 % nasal spray, 1 spray by Nasal route as needed for Congestion, Disp: 1 Bottle, Rfl: 3    NEXIUM 40 MG delayed release capsule, TAKE 1 CAPSULE BY MOUTH ONCE DAILY, Disp: 30 capsule, Rfl: 5    levalbuterol (XOPENEX HFA) 45 MCG/ACT inhaler, Inhale 1-2 puffs into the lungs every 4 hours as needed for Wheezing or Shortness of Breath, Disp: 1 Inhaler, Rfl: 3    levalbuterol (XOPENEX) 0.63 MG/3ML nebulization, Take 3 mLs by nebulization every 6 hours as needed for Wheezing or Shortness of Breath, Disp: 270 mL, Rfl: 5    Respiratory Therapy Supplies (NEBULIZER AIR TUBE/PLUGS) MISC, 1 kit by Does not apply route every 6 hours as needed (Wheezing/shortness of breath) Please provide nebulizer kit and supplies. , Disp: 1 each, Rfl: 0    EQ PAIN RELIEVER 325 MG tablet, TAKE TWO TABLETS BY MOUTH EVERY 4 HOURS AS NEEDED FOR PAIN AND FEVER, Disp: 120 tablet, Rfl: 3    Amitriptyline HCl (ELAVIL PO), Take 25 mg by mouth nightly , Disp: , Rfl:     aspirin (LORENZO ASPIRIN) 325 MG tablet, Take 1 tablet by mouth daily, Disp: 30 tablet, Rfl: 3    promethazine (PHENERGAN) 25 MG tablet, Take 1 tablet by mouth every 6 hours as needed for Nausea, Disp: 90 tablet, Rfl: 11    diclofenac sodium (VOLTAREN) 1 % GEL, Apply 2 g topically 4 times daily as needed for Pain (topical) , Disp: , Rfl:       Physical Exam:      Vitals:    Vitals:    09/30/20 0904   BP: 116/74   Pulse: 70   Resp: 16   Temp: 97 °F (36.1 °C)       235 lb (106.6 kg)       Temp: 97 °F (36.1 °C) I @FLOWSTAT(6)@ IPulse: 70 I @FLOWSTAT(8)@ I BP: 116/74 I @MOPLPI(36)@; @HSEUKA(14)@ I Resp: 16 I @FLOWSTAT(9)@ I   I @FLOWSTAT(10)@ I   I   I   I Facility age limit for growth percentiles is 20 years. I     Facility age limit for growth percentiles is 20 years. Facility age limit for growth percentiles is 20 years. Facility age limit for growth percentiles is 20 years. Facility age limit for growth percentiles is 20 years. Physical Exam:    Physical Exam  Vitals signs and nursing note reviewed. Constitutional:       Appearance: Normal appearance. She is obese. HENT:      Head: Normocephalic and atraumatic.       Right Ear: Tympanic membrane, ear canal and external ear normal.      Left Ear: Tympanic membrane, ear 06/29/2020 10:30 AM 97 70 - 400 mg/dL Final     Comment:     [facility] [address] [facility], [address] [place] [address] [phone]      IgM   Date/Time Value Ref Range Status   06/29/2020 10:30 AM < 25 (L) 40 - 230 mg/dL Final     Comment:     [facility] [address] [facility], [address] [place] [address] [phone]       No results found for: EVELIN   No results found for: RF       No results found for this or any previous visit. No results found for this or any previous visit. PROCEDURES:        Skin Testing performed on: pending    Assessment/Orders:    Diagnosis Orders   1. Chronic pansinusitis  CT SINUS WO CONTRAST    ALLERGEN INHALANT [place] COMP 1    MISCELLANEOUS TESTING    Culture, Aerobic and Anaerobic   2. Food allergy  [facility]    Common Food Allergen Profile    Miscellaneous Sendout 1    MISCELLANEOUS TESTING   3. Non-seasonal allergic rhinitis, unspecified trigger  ALLERGEN INHALANT [place] COMP 1    IgE    IgM    IgA    IgG   4. CVID (common variable immunodeficiency) (HCC)  CBC Auto Differential    GFR Calculated    Comprehensive Metabolic Panel    IgG   5. Low serum IgG for age     10. Low serum IgM for age         Plan:  Follow Up:4 weeks    Patient received Gamunex monthly 20 g IV  Patient to premedicate with 50 of Benadryl and 650 mg of acetaminophen  Patient will have monthly labs including CBC with differential, CMP, GFR  Patient to have IgG every 3 months  Patient to have labs today  Risks and benefits of receiving IVIG therapy explained to patient including anaphylaxis  Patient is elected to receive IVIG therapy. We will go ahead and send orders to outpatient nursing and will start a prior authorization  Patient to have a culture of sinuses.   She has had greater than 10 sinusitis infections over the last year patient reported  Patient to have CT scan of her sinuses    Total time sent with patient 30 minutes with greater than 50% in patient education    (Please note that portions of this note may have been completed with a voice recognition program.  Efforts were made to edit the dictation but occasionally words are mis-transcribed.)         Signed:  NAHID Mejia CNP  9/30/2020  11:40 AM

## 2020-09-30 NOTE — PATIENT INSTRUCTIONS
Patient Education        immune globulin (intravenous and subcutaneous)  Pronunciation:  aide ROMEOMICHELLE olivares  Brand:  Gammagard Liquid, Gammaked, Gamunex-C  What is the most important information I should know about immune globulin? This medicine can cause blood clots. The risk is highest in older adults or in people who have had blood clots, heart problems, or blood circulation problems. Blood clots are also more likely during long-term bedrest, while using birth control pills or hormone replacement therapy, or while having a central intravenous (IV) catheter in place. Call your doctor at once if you have chest pain, trouble breathing, fast heartbeats, numbness or weakness, or swelling and warmth or discoloration in an arm or leg. This medicine can also harm your kidneys, especially if you have kidney disease or if you also use certain medicines. Tell your doctor right away if you have signs of kidney problems, such as swelling, rapid weight gain, and little or no urination. What is immune globulin? Immune globulin is a sterile solution made from human plasma. It contains antibodies that protect you against infection from various diseases. Immune globulin intravenous and subcutaneous (for injection into a vein or under the skin) is used to treat primary immunodeficiency. Immune globulin is also used to increase platelets (blood clotting cells) in people with idiopathic thrombocytopenic purpura. Immune globulin is also used to treat certain debilitating nerve disorders that cause muscle weakness and can affect daily activities. Immune globulin may also be used for purposes not listed in this medication guide. What should I discuss with my healthcare provider before using immune globulin? You should not use this medicine if:  · you have had an allergic reaction to an immune globulin or blood product; or  · you have immune globulin A (IgA) deficiency with antibody to IgA.   Immune globulin can cause blood your pharmacist for new medicine. Do not shake the medication bottle or you may ruin the medicine. Immune globulin must be given slowly. You may need to use several catheters to inject this medicine into different body areas at the same time. Your healthcare provider will show you the best places on your body to inject the medication. Keep a diary of the days and times you gave the injection and where you injected it on your body. Drink plenty of liquids  while you are using this medicine to help improve your blood flow and keep your kidneys working properly. You may need frequent blood or urine tests. This medicine can affect the results of certain medical tests. Tell any doctor who treats you that you are using immune globulin. Store this medicine in its original carton in the refrigerator. Do not freeze immune globulin, and throw the medicine away if it has frozen. Take the medicine out of the refrigerator and let it reach room temperature for up to 1 hour before injecting your dose. You may also store immune globulin at room temperature. You will need to use immune globulin within a certain number months. This will depend on the how you store the medicine (at room temperature, or in a refrigerator). Carefully follow the storage instructions provided with your medicine. Do not use the medicine after the expiration date on the label has passed. Each vial (bottle) is for one use only. Throw it away after one use, even if there is still medicine left inside. Use disposable injection items (needle, catheter, tubing) only once and then place them in a puncture-proof \"sharps\" container. Follow state or local laws about how to dispose of this container. Keep it out of the reach of children and pets. What happens if I miss a dose? Call your doctor for instructions if you miss a dose. What happens if I overdose? Seek emergency medical attention or call the Poison Help line at 1-677.685.4965.   What should I avoid while using immune globulin? Do not receive a \"live\" vaccine while using immune globulin. The vaccine may not work as well and may not fully protect you from disease. Live vaccines include measles, mumps, rubella (MMR), rotavirus, typhoid, yellow fever, varicella (chickenpox), zoster (shingles), and nasal flu (influenza) vaccine. What are the possible side effects of immune globulin? Get emergency medical help if you have signs of an allergic reaction: hives; wheezing, difficulty breathing; dizziness, feeling like you might pass out; swelling of your face, lips, tongue, or throat. Some side effects may occur during the injection. Tell your caregiver if you feel light-headed, itchy, chilled, sweaty, or have chest discomfort, fast heartbeats, severe headache, or pounding in your neck or ears. Call your doctor at once if you have:  · a blood cell disorder --pale or yellowed skin, dark colored urine, fever, confusion or weakness;  · dehydration symptoms --feeling very thirsty or hot, being unable to urinate, heavy sweating, or hot and dry skin;  · kidney problems --little or no urination, swelling, rapid weight gain, feeling short of breath;  · lung problems --chest pain, wheezing, trouble breathing, blue colored lips, fingers, or toes;  · signs of a new infection --fever with a severe headache, neck stiffness, eye pain, and increased sensitivity to light; or  · signs of a blood clot --shortness of breath, chest pain with deep breathing, rapid heart rate, numbness or weakness on one side of the body, swelling and warmth or discoloration in an arm or leg.   Common side effects may include:  · runny or stuffy nose, sinus pain, cough, sore throat;  · fever, chills, weakness;  · headache, back pain, muscle or joint pain;  · dizziness, tiredness, depressed mood;  · swelling in your hands or feet;  · skin rash, redness, or bruising;  · blisters or ulcers in your mouth, red or swollen gums, trouble swallowing;  · nausea, diarrhea, stomach pain, upset stomach;  · increased blood pressure; or  · redness, swelling, or itching where an injection was given. This is not a complete list of side effects and others may occur. Call your doctor for medical advice about side effects. You may report side effects to FDA at 8-844-FDA-2660. What other drugs will affect immune globulin? Immune globulin can harm your kidneys, especially if you also use certain medicines for infections, cancer, osteoporosis, organ transplant rejection, bowel disorders, or pain or arthritis (including aspirin, Tylenol, Advil, and Aleve). Other drugs may affect immune globulin, including prescription and over-the-counter medicines, vitamins, and herbal products. Tell your doctor about all your current medicines and any medicine you start or stop using. Where can I get more information? Your doctor or pharmacist can provide more information about immune globulin. Remember, keep this and all other medicines out of the reach of children, never share your medicines with others, and use this medication only for the indication prescribed. Every effort has been made to ensure that the information provided by [address] is accurate, up-to-date, and complete, but no guarantee is made to that effect. Drug information contained herein may be time sensitive. [facility] information has been compiled for use by healthcare practitioners and consumers in the [facility] and therefore [facility] does not warrant that uses outside of the [facility] are appropriate, unless specifically indicated otherwise. [facility]'s drug information does not endorse drugs, diagnose patients or recommend therapy.  [facility] drug information is an informational resource designed to assist licensed healthcare practitioners in caring for their patients and/or to serve consumers viewing this service as a supplement to, and not a substitute for, the expertise, skill, knowledge and judgment of healthcare practitioners. The absence of a warning for a given drug or drug combination in no way should be construed to indicate that the drug or drug combination is safe, effective or appropriate for any given patient. [facility] does not assume any responsibility for any aspect of healthcare administered with the aid of information [facility] provides. The information contained herein is not intended to cover all possible uses, directions, precautions, warnings, drug interactions, allergic reactions, or adverse effects. If you have questions about the drugs you are taking, check with your doctor, nurse or pharmacist.  Copyright 7721-9662 [address] [place] [address] Dr IPNZON. Version: 4.01. Revision date: 1/3/2020. Care instructions adapted under license by [facility] If you have questions about a medical condition or this instruction, always ask your healthcare professional. [address] any warranty or liability for your use of this information.

## 2020-10-01 RX ORDER — ATORVASTATIN CALCIUM 80 MG/1
80 TABLET, FILM COATED ORAL DAILY
Qty: 90 TABLET | Refills: 3 | Status: SHIPPED | OUTPATIENT
Start: 2020-10-01 | End: 2021-09-29 | Stop reason: SDUPTHER

## 2020-10-02 ENCOUNTER — TELEPHONE (OUTPATIENT)
Dept: ENT CLINIC | Age: 40
End: 2020-10-02

## 2020-10-02 NOTE — TELEPHONE ENCOUNTER
Received a call from [facility] at [place] [phone] regarding the PA for patient's Gamunex-C. The PA has been denied. We can either do a peer to peer at [phone], option 5, or fax an appeal to [employer]. Please advise.

## 2020-10-02 NOTE — TELEPHONE ENCOUNTER
Bertha Frankel with [facility] pharmacy called and wanted an updated on patient's status for prior auth. Bertha Frankel also called 09/30/2020 and encounter was given to allergy cma. Bertha Frankel would like a call back on Monday to discuss this.

## 2020-10-03 ENCOUNTER — HOSPITAL ENCOUNTER (OUTPATIENT)
Age: 40
Discharge: HOME | End: 2020-10-03
Payer: MEDICARE

## 2020-10-03 DIAGNOSIS — D83.9 CVID (COMMON VARIABLE IMMUNODEFICIENCY) (HCC): ICD-10-CM

## 2020-10-03 DIAGNOSIS — J32.4 CHRONIC PANSINUSITIS: ICD-10-CM

## 2020-10-03 DIAGNOSIS — J30.89 NON-SEASONAL ALLERGIC RHINITIS, UNSPECIFIED TRIGGER: ICD-10-CM

## 2020-10-03 DIAGNOSIS — Z91.018 FOOD ALLERGY: ICD-10-CM

## 2020-10-03 LAB
ALBUMIN SERPL-MCNC: 4.5 G/DL (ref 3.5–5.1)
ALP BLD-CCNC: 163 U/L (ref 38–126)
ALT SERPL-CCNC: 28 U/L (ref 11–66)
ANION GAP SERPL CALCULATED.3IONS-SCNC: 13 MEQ/L (ref 8–16)
AST SERPL-CCNC: 29 U/L (ref 5–40)
BASOPHILS # BLD: 0.6 %
BASOPHILS ABSOLUTE: 0 THOU/MM3 (ref 0–0.1)
BILIRUB SERPL-MCNC: 0.6 MG/DL (ref 0.3–1.2)
BUN BLDV-MCNC: 9 MG/DL (ref 7–22)
C-REACTIVE PROTEIN: 0.22 MG/DL (ref 0–1)
CALCIUM SERPL-MCNC: 9.5 MG/DL (ref 8.5–10.5)
CHLORIDE BLD-SCNC: 102 MEQ/L (ref 98–111)
CO2: 24 MEQ/L (ref 23–33)
CREAT SERPL-MCNC: 0.7 MG/DL (ref 0.4–1.2)
EOSINOPHIL # BLD: 2.5 %
EOSINOPHILS ABSOLUTE: 0.2 THOU/MM3 (ref 0–0.4)
ERYTHROCYTE [DISTWIDTH] IN BLOOD BY AUTOMATED COUNT: 14.5 % (ref 11.5–14.5)
ERYTHROCYTE [DISTWIDTH] IN BLOOD BY AUTOMATED COUNT: 44.9 FL (ref 35–45)
GFR SERPL CREATININE-BSD FRML MDRD: > 90 ML/MIN/1.73M2
GLUCOSE BLD-MCNC: 176 MG/DL (ref 70–108)
HCT VFR BLD CALC: 42.9 % (ref 37–47)
HEMOGLOBIN: 13.3 GM/DL (ref 12–16)
IMMATURE GRANS (ABS): 0.02 THOU/MM3 (ref 0–0.07)
IMMATURE GRANULOCYTES: 0.3 %
LYMPHOCYTES # BLD: 28.4 %
LYMPHOCYTES ABSOLUTE: 2 THOU/MM3 (ref 1–4.8)
MCH RBC QN AUTO: 26.7 PG (ref 26–33)
MCHC RBC AUTO-ENTMCNC: 31 GM/DL (ref 32.2–35.5)
MCV RBC AUTO: 86 FL (ref 81–99)
MONOCYTES # BLD: 6.4 %
MONOCYTES ABSOLUTE: 0.5 THOU/MM3 (ref 0.4–1.3)
NUCLEATED RED BLOOD CELLS: 0 /100 WBC
PLATELET # BLD: 256 THOU/MM3 (ref 130–400)
PMV BLD AUTO: 10.8 FL (ref 9.4–12.4)
POTASSIUM SERPL-SCNC: 3.6 MEQ/L (ref 3.5–5.2)
RBC # BLD: 4.99 MILL/MM3 (ref 4.2–5.4)
SEDIMENTATION RATE, ERYTHROCYTE: 5 MM/HR (ref 0–20)
SEG NEUTROPHILS: 61.8 %
SEGMENTED NEUTROPHILS ABSOLUTE COUNT: 4.4 THOU/MM3 (ref 1.8–7.7)
SODIUM BLD-SCNC: 139 MEQ/L (ref 135–145)
TOTAL PROTEIN: 6.9 G/DL (ref 6.1–8)
WBC # BLD: 7.2 THOU/MM3 (ref 4.8–10.8)

## 2020-10-03 PROCEDURE — 36415 COLL VENOUS BLD VENIPUNCTURE: CPT

## 2020-10-03 PROCEDURE — 86140 C-REACTIVE PROTEIN: CPT

## 2020-10-03 PROCEDURE — 86003 ALLG SPEC IGE CRUDE XTRC EA: CPT

## 2020-10-03 PROCEDURE — 82784 ASSAY IGA/IGD/IGG/IGM EACH: CPT

## 2020-10-03 PROCEDURE — 85651 RBC SED RATE NONAUTOMATED: CPT

## 2020-10-03 PROCEDURE — 80053 COMPREHEN METABOLIC PANEL: CPT

## 2020-10-03 PROCEDURE — 85025 COMPLETE CBC W/AUTO DIFF WBC: CPT

## 2020-10-03 PROCEDURE — 82785 ASSAY OF IGE: CPT

## 2020-10-04 LAB
IGA: 96 MG/DL (ref 70–400)
IGE: 2 IU/ML
IGM: < 25 MG/DL (ref 40–230)

## 2020-10-05 ENCOUNTER — OFFICE VISIT (OUTPATIENT)
Dept: FAMILY MEDICINE CLINIC | Age: 40
End: 2020-10-05
Payer: MEDICARE

## 2020-10-05 ENCOUNTER — TELEPHONE (OUTPATIENT)
Dept: ENT CLINIC | Age: 40
End: 2020-10-05

## 2020-10-05 VITALS
DIASTOLIC BLOOD PRESSURE: 84 MMHG | WEIGHT: 231.8 LBS | SYSTOLIC BLOOD PRESSURE: 130 MMHG | TEMPERATURE: 97.2 F | HEART RATE: 103 BPM | RESPIRATION RATE: 18 BRPM | HEIGHT: 63 IN | BODY MASS INDEX: 41.07 KG/M2 | OXYGEN SATURATION: 97 %

## 2020-10-05 LAB — IGG: 586 MG/DL (ref 700–1600)

## 2020-10-05 PROCEDURE — 99214 OFFICE O/P EST MOD 30 MIN: CPT | Performed by: FAMILY MEDICINE

## 2020-10-05 RX ORDER — CLINDAMYCIN HYDROCHLORIDE 300 MG/1
300 CAPSULE ORAL 4 TIMES DAILY
Qty: 40 CAPSULE | Refills: 0 | Status: SHIPPED | OUTPATIENT
Start: 2020-10-05 | End: 2020-10-15

## 2020-10-05 RX ORDER — GREEN TEA/HOODIA GORDONII 315-12.5MG
1 CAPSULE ORAL DAILY
Qty: 30 TABLET | Refills: 0 | Status: SHIPPED | OUTPATIENT
Start: 2020-10-05 | End: 2020-10-14

## 2020-10-05 RX ORDER — BLOOD-GLUCOSE SENSOR
1 EACH MISCELLANEOUS
Qty: 3 EACH | Refills: 11 | Status: SHIPPED | OUTPATIENT
Start: 2020-10-05 | End: 2021-11-15

## 2020-10-05 RX ORDER — BLOOD-GLUCOSE,RECEIVER,CONT
1 EACH MISCELLANEOUS DAILY
Qty: 1 DEVICE | Refills: 0 | Status: SHIPPED | OUTPATIENT
Start: 2020-10-05 | End: 2021-11-15

## 2020-10-05 RX ORDER — ACETAMINOPHEN 325 MG/1
650 TABLET ORAL 3 TIMES DAILY
Qty: 120 TABLET | Refills: 3 | COMMUNITY
Start: 2020-10-05 | End: 2020-11-30

## 2020-10-05 NOTE — TELEPHONE ENCOUNTER
I called and informed the patient that her culture grew staph. Barbara sent in Clindamycin and probiotics to her pharmacy. If she has any diarrhea while taking the antibiotics to be sure to contact our office. The patient verbalized understanding.

## 2020-10-05 NOTE — RESULT ENCOUNTER NOTE
Left  for notifying her of the results of her sinus culture and that we sent an antibiotic and probiotics to the pharmacy negative...

## 2020-10-05 NOTE — PROGRESS NOTES
[address] [place] [id number]  Dept: [phone]  Dept Fax: [phone]  Loc: [phone]    Ilan Bowman is a 38 y.o. female who presents today for:  Chief Complaint   Patient presents with    Follow-up           HPI:     HPI    Diabetes Mellitus: Patient presents for follow up of diabetes. Symptoms: paresthesia of the feet and visual disturbances. Symptoms have gradually worsened. Patient denies increase appetite, polydipsia and weight loss. Evaluation to date has been included: fasting blood sugar, fasting lipid panel, hemoglobin A1C and microalbuminuria. Home sugars: 105-150 am and 150-250 pm. Treatment to date: Continued metformin which has been Yes:  , which has been somewhat effective. Lab Results   Component Value Date    LABA1C 6.7 (H) 09/12/2020     No results found for: EAG    Hyperlipidemia: Patient presents with hyperlipidemia. She was tested because screening. Her last labs showed   Lab Results   Component Value Date    CHOL 156 04/16/2020    CHOL 135 12/18/2018    CHOL 263 (H) 08/08/2018     Lab Results   Component Value Date    TRIG 119 04/16/2020    TRIG 121 12/18/2018    TRIG 237 (H) 08/08/2018     Lab Results   Component Value Date    HDL 35 04/16/2020    HDL 32 12/18/2018    HDL 28 08/08/2018     Lab Results   Component Value Date    LDLCALC 97 04/16/2020    LDLCALC 79 12/18/2018    [address] 08/08/2018     No results found for: LABVLDL, VLDL  No results found for: CHOLHDLRATIO  There is a family history of hyperlipidemia. There is not a family history of early ischemia heart disease. GERD: Lili complains of heartburn. This has been associated with early satiety and heartburn. She denies no other symptoms. Symptoms have been present for several years. She denies dysphagia. She has not lost weight. She denies melena, hematochezia, hematemesis, and coffee ground emesis.  Medical therapy in the past has included proton pump inhibitors. Still seeing neurology ( Dr Milton Allen) for seizures. They are still occurring a few times per week. She was thinking about stopping all her medications and restarting \"fresh\". She is highly encouraged NOT to do this for fear of intractable seizure with brain damage. The seizures are usually followed by a headache. 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 She had an \"episode\" in my office in early 2018, she was started on aspirin 325 mg and went from having 8-9 episodes a day to 4 episodes every 2 months. Now also on plavix as well. Migraines are controlled with prn medications less then 2 per month. Fibromyalgia and Lupus and lupus arthritis is under the care of Rheumatology in [place] and formerly also a patient of Dr Bandar Fagan who is no longer in practice. Allergies are well controlled on current medications. Reviewed chart forpast medical history , surgical history , allergies, social history , family history and medications. Health Maintenance   Topic Date Due    Hepatitis B vaccine (1 of 3 - Risk 3-dose series) 08/10/1998    Annual Wellness Visit (AWV)  05/29/2019    Diabetic microalbuminuria test  04/11/2020    Diabetic foot exam  07/17/2020    Diabetic retinal exam  02/25/2021    Breast cancer screen  02/26/2021    Lipid screen  04/16/2021    A1C test (Diabetic or Prediabetic)  09/12/2021    Statin Therapy  10/01/2021    DTaP/Tdap/Td vaccine (2 - Td) 07/11/2030    Flu vaccine  Completed    Pneumococcal 0-64 years Vaccine  Completed    Hepatitis A vaccine  Aged Out    Hib vaccine  Aged Out    Meningococcal (ACWY) vaccine  Aged Out    HIV screen  Discontinued       Subjective:      Constitutional:Negative for fever, chills, diaphoresis, activity change, appetite change and fatigue.    HENT: Negative for hearing loss, ear pain, congestion, sore throat, rhinorrhea, postnasal drip and ear discharge. Eyes: Negative for photophobia and visual disturbance. Respiratory: Negative for cough, chest tightness, shortness of breath and wheezing. Cardiovascular: Negative for chest pain and leg swelling. Gastrointestinal: Negative for nausea, vomiting, abdominal pain, diarrhea and constipation. Genitourinary: Negative for dysuria, urgency and frequency. Neurological: Negative for weakness, light-headedness and headaches. Psychiatric/Behavioral: Negative for sleep disturbance.      :     Vitals:    10/05/20 1040   BP: 130/84   Site: Left Upper Arm   Position: Sitting   Cuff Size: Large Adult   Pulse: 103   Resp: 18   Temp: 97.2 °F (36.2 °C)   TempSrc: Temporal   SpO2: 97%   Weight: 231 lb 12.8 oz (105.1 kg)   Height: 5' 2.99\" (1.6 m)     Wt Readings from Last 3 Encounters:   10/05/20 231 lb 12.8 oz (105.1 kg)   09/30/20 235 lb (106.6 kg)   07/08/20 236 lb 12.8 oz (107.4 kg)       Physical Exam  Physical Exam   Constitutional: Vital signs are normal. She appears well-developed and well-nourished. She is active. HENT:   Head: Normocephalic and atraumatic. Right Ear: Tympanic membrane, external ear and ear canal normal. No drainage or tenderness. Left Ear: Tympanic membrane, external ear and ear canal normal. No drainage or tenderness. Nose: Nose normal. No mucosal edema or rhinorrhea. Mouth/Throat: Uvula is midline, oropharynx is clear and moist and mucous membranes are normal. Mucous membranes are not pale. Normal dentition. No posterior oropharyngeal edema or posterior oropharyngeal erythema. Eyes: Lids are normal. Right eye exhibits no chemosis and no discharge. Left eye exhibits no chemosis and no drainage. Right conjunctiva has no hemorrhage. Left conjunctiva has no hemorrhage. Right eye exhibits normal extraocular motion. Left eye exhibits normal extraocular motion. Right pupil is round and reactive.  Left pupil is round and reactive. Pupils are equal.   Cardiovascular: Normal rate, regular rhythm, S1 normal, S2 normal and normal heart sounds. Exam reveals no gallop. No murmur heard. Pulmonary/Chest: Effort normal and breath sounds normal. No respiratory distress. She has no wheezes. She has no rhonchi. She has no rales. Abdominal: Soft. Normal appearance and bowel sounds are normal. She exhibits no distension and no mass. There is no hepatosplenomegaly. No tenderness. She has no rigidity, no rebound and no guarding. No hernia. Musculoskeletal:        Right lower leg: She exhibits no edema. Left lower leg: She exhibits no edema. Neurological: She is alert. Assessment/Plan   Sorin Boyd was seen today for follow-up. Diagnoses and all orders for this visit:    Type 2 diabetes mellitus with other specified complication, without long-term current use of insulin ([facility]  -     Basic Metabolic Panel; Future  -     Hemoglobin A1C; Future  -     Continuous Blood Gluc Sensor (DEXCOM G4 SENSOR) MISC; 1 Units by Does not apply route every 7 days Each unit is every 10 days  -     Continuous Blood Gluc  ([address] [place] [address]) APPLE; 1 Units by Does not apply route daily    Pure hypercholesterolemia    Gastroesophageal reflux disease without esophagitis    Fibromyalgia  -     acetaminophen (AMINOFEN) 325 MG tablet; Take 2 tablets by mouth 3 times daily for 14 days    Morbid obesity due to excess calories (HCC)    Seasonal allergies    TIA (transient ischemic attack)    Cerebrovascular accident (CVA), unspecified mechanism ([address]    Seizure disorder ([facility] [address]    Lupus arthritis ([facility] [address]    Obesity (BMI 30-39. 9)    Hypomagnesemia    Elevated TSH  -     TSH; Future  -     T4; Future    Lung nodule, solitary  -     CT CHEST W CONTRAST;  Future    Other migraine without status migrainosus, not intractable    Moderate persistent asthma without complication    No change to medication   Continue healthy diet and exercise  Yearly eye exam  Daily foot inspection  Yearly flu shot  Monitor glucose regularly  Daily aspirin  Regular labs : A1c quarterly, lipids every 6 months and BMP quaterly    Discussed use, benefit, and side effectsof prescribed medications. All patient questions answered. Pt voiced understanding. Reviewed health maintenance. Instructed to continue current medications, diet and exercise. Patient agreed with treatment plan. Followup as directed.      Electronically signed by Nneka Olsen MD

## 2020-10-05 NOTE — PROGRESS NOTES
Patient with heavy growth of staph aureus infection of the sinuses. Will place on clindamycin which has demonstrated to be effective against staph aureus according to sensitivity of culture. I have asked medical assistant to notify patient. Also sent in probiotics.   Patient will need to stop antibiotics if she does develop diarrhea and notify our office

## 2020-10-06 LAB
AEROBIC CULTURE: ABNORMAL
AEROBIC CULTURE: ABNORMAL
ANAEROBIC CULTURE: ABNORMAL
GRAM STAIN RESULT: ABNORMAL
ORGANISM: ABNORMAL

## 2020-10-07 LAB
ALLERGEN BARLEY IGE: < 0.1 KU/L
ALLERGEN BEEF: < 0.1 KU/L
ALLERGEN BERMUDA GRASS IGE: < 0.1 KU/L
ALLERGEN BIRCH IGE: < 0.1 KU/L
ALLERGEN BOX ELDER: < 0.1 KU/L
ALLERGEN CABBAGE IGE: < 0.1 KU/L
ALLERGEN CARROT IGE: < 0.1 KU/L
ALLERGEN CAT DANDER IGE: < 0.1 KU/L
ALLERGEN CHICKEN IGE: < 0.1 KU/L
ALLERGEN CODFISH IGE: < 0.1 KU/L
ALLERGEN COMMON SHORT RAGWEED IGE: < 0.1 KU/L
ALLERGEN CORN IGE: < 0.1 KU/L
ALLERGEN COTTONWOOD: < 0.1 KU/L
ALLERGEN CRAB IGE: < 0.1 KU/L
ALLERGEN D. FARINAE (DUST MITE): < 0.1 KU/L
ALLERGEN DOG DANDER IGE: < 0.1 KU/L
ALLERGEN EGG WHITE IGE: < 0.1 KU/L
ALLERGEN ELM IGE: < 0.1 KU/L
ALLERGEN FUNGI/MOLD A. ALTERNATA IGE: < 0.1 KU/L
ALLERGEN FUNGI/MOLD A. FUMIGATUS IGE: < 0.1 KU/L
ALLERGEN FUNGI/MOLD M.RACEMOSUS IGE: < 0.1 KU/L
ALLERGEN FUNGI/MOLD P. NOTATUM IGE: < 0.1 KU/L
ALLERGEN GERMAN COCKROACH IGE: < 0.1 KU/L
ALLERGEN GRAPE IGE: < 0.1 KU/L
ALLERGEN INTERPRETATION/SCORE: NORMAL
ALLERGEN LETTUCE IGE: < 0.1 KU/L
ALLERGEN MILK IGE: < 0.1 KU/L
ALLERGEN MITE DUST PTERONYSSINUS IGE: < 0.1 KU/L
ALLERGEN MOUNTAIN CEDAR: < 0.1 KU/L
ALLERGEN MOUSE EPITHELIA IGE: < 0.1 KU/L
ALLERGEN NAVY BEAN: < 0.1 KU/L
ALLERGEN OAT: < 0.1 KU/L
ALLERGEN ORANGE IGE: < 0.1 KU/L
ALLERGEN PECAN TREE IGE: < 0.1 KU/L
ALLERGEN PEPPER C. ANNUUM IGE: < 0.1 KU/L
ALLERGEN PORK: < 0.1 KU/L
ALLERGEN POTATO IGE: < 0.1 KU/L
ALLERGEN RICE IGE: < 0.1 KU/L
ALLERGEN RUSSIAN THISTLE IGE: < 0.1 KU/L
ALLERGEN RYE IGE: < 0.1 KU/L
ALLERGEN SHEEP SORREL (W18) IGE: < 0.1 KU/L
ALLERGEN SHRIMP IGE: < 0.1 KU/L
ALLERGEN SOYBEAN IGE: < 0.1 KU/L
ALLERGEN TIMOTHY GRASS: < 0.1 KU/L
ALLERGEN TOMATO IGE: < 0.1 KU/L
ALLERGEN TREE SYCAMORE: < 0.1 KU/L
ALLERGEN TUNA IGE: < 0.1 KU/L
ALLERGEN WALNUT TREE IGE: < 0.1 KU/L
ALLERGEN WEED, PIGWEED IGE: < 0.1 KU/L
ALLERGEN WHEAT IGE: < 0.1 KU/L
ALLERGEN WHITE ASH: < 0.1 KU/L
ALLERGEN WHITE MULBERRY TREE, IGE: < 0.1 KU/L
ALLERGEN, FUNGI/MOLD: < 0.1 KU/L
ALLERGEN, TREE, OAK: < 0.1 KU/L
IMMUNOGLOBULIN E: 4 KU/L

## 2020-10-08 ENCOUNTER — HOSPITAL ENCOUNTER (OUTPATIENT)
Dept: CT IMAGING | Age: 40
Discharge: HOME | End: 2020-10-08
Payer: MEDICARE

## 2020-10-08 PROCEDURE — 70486 CT MAXILLOFACIAL W/O DYE: CPT

## 2020-10-12 ENCOUNTER — HOSPITAL ENCOUNTER (EMERGENCY)
Age: 40
Discharge: HOME | End: 2020-10-12
Attending: FAMILY MEDICINE
Payer: MEDICARE

## 2020-10-12 ENCOUNTER — TELEPHONE (OUTPATIENT)
Dept: FAMILY MEDICINE CLINIC | Age: 40
End: 2020-10-12

## 2020-10-12 VITALS
WEIGHT: 231 LBS | OXYGEN SATURATION: 97 % | TEMPERATURE: 98.8 F | HEART RATE: 80 BPM | HEIGHT: 63 IN | DIASTOLIC BLOOD PRESSURE: 94 MMHG | SYSTOLIC BLOOD PRESSURE: 128 MMHG | BODY MASS INDEX: 40.93 KG/M2 | RESPIRATION RATE: 16 BRPM

## 2020-10-12 PROBLEM — D83.9 COMMON VARIABLE IMMUNODEFICIENCY (HCC): Status: ACTIVE | Noted: 2020-10-12

## 2020-10-12 PROCEDURE — 99282 EMERGENCY DEPT VISIT SF MDM: CPT

## 2020-10-12 RX ORDER — SODIUM CHLORIDE 0.9 % (FLUSH) 0.9 %
10 SYRINGE (ML) INJECTION PRN
Status: CANCELLED | OUTPATIENT
Start: 2020-10-15

## 2020-10-12 RX ORDER — SODIUM CHLORIDE 9 MG/ML
INJECTION, SOLUTION INTRAVENOUS CONTINUOUS
Status: CANCELLED | OUTPATIENT
Start: 2020-10-15

## 2020-10-12 RX ORDER — FLUTICASONE PROPIONATE 50 MCG
2 SPRAY, SUSPENSION (ML) NASAL DAILY
Qty: 1 BOTTLE | Refills: 0 | Status: SHIPPED | OUTPATIENT
Start: 2020-10-12 | End: 2021-11-15

## 2020-10-12 RX ORDER — SODIUM CHLORIDE 0.9 % (FLUSH) 0.9 %
5 SYRINGE (ML) INJECTION PRN
Status: CANCELLED | OUTPATIENT
Start: 2020-10-15

## 2020-10-12 RX ORDER — DIPHENHYDRAMINE HCL 25 MG
25 TABLET ORAL ONCE
Status: CANCELLED | OUTPATIENT
Start: 2020-10-15

## 2020-10-12 RX ORDER — ACETAMINOPHEN 325 MG/1
650 TABLET ORAL ONCE
Status: CANCELLED | OUTPATIENT
Start: 2020-10-15

## 2020-10-12 RX ORDER — DIPHENHYDRAMINE HYDROCHLORIDE 50 MG/ML
50 INJECTION INTRAMUSCULAR; INTRAVENOUS ONCE
Status: CANCELLED | OUTPATIENT
Start: 2020-10-15

## 2020-10-12 RX ORDER — METHYLPREDNISOLONE SODIUM SUCCINATE 125 MG/2ML
125 INJECTION, POWDER, LYOPHILIZED, FOR SOLUTION INTRAMUSCULAR; INTRAVENOUS ONCE
Status: CANCELLED | OUTPATIENT
Start: 2020-10-15

## 2020-10-12 RX ORDER — EPINEPHRINE 1 MG/ML
0.3 INJECTION, SOLUTION, CONCENTRATE INTRAVENOUS PRN
Status: CANCELLED | OUTPATIENT
Start: 2020-10-15

## 2020-10-12 ASSESSMENT — ENCOUNTER SYMPTOMS
EYE REDNESS: 0
SORE THROAT: 0
NAUSEA: 0
SINUS PAIN: 1
SINUS PRESSURE: 1
SHORTNESS OF BREATH: 0
COUGH: 0
VOMITING: 0
EYE DISCHARGE: 0

## 2020-10-12 NOTE — TELEPHONE ENCOUNTER
Pt called stating she has had a sinus infection since Monday 10/05/20. She has been taking clindamycin. Pt feels she is getting worse not any better. She was wanting to be seen for an appt. Pt rosy [phone]    Called pt back - offered her 10:45 same day. Pt denied and stated she will just go to urgent care instead.

## 2020-10-12 NOTE — ED NOTES
Pt resting, resp even and unlabored, skin pink, warm and dry. Pt given discharge instructions and verbalizes understanding, pt released.      Roderick Rahman RN  10/12/20 2043

## 2020-10-12 NOTE — ED NOTES
Pt complains of having a staph sinus infection and is currently taking clindamycin without relief. Pt complains of facial sinus pressure, pt denies fever, cough, nausea or vomiting. Pt alert, resp even and unlabored, skin pink, warm and dry.      Ledy Savage RN  10/12/20 2409

## 2020-10-13 ENCOUNTER — CARE COORDINATION (OUTPATIENT)
Dept: CARE COORDINATION | Age: 40
End: 2020-10-13

## 2020-10-13 ENCOUNTER — PATIENT MESSAGE (OUTPATIENT)
Dept: ALLERGY | Age: 40
End: 2020-10-13

## 2020-10-13 SDOH — SOCIAL STABILITY: SOCIAL NETWORK: HOW OFTEN DO YOU ATTEND CHURCH OR RELIGIOUS SERVICES?: NEVER

## 2020-10-13 SDOH — ECONOMIC STABILITY: FOOD INSECURITY: WITHIN THE PAST 12 MONTHS, THE FOOD YOU BOUGHT JUST DIDN'T LAST AND YOU DIDN'T HAVE MONEY TO GET MORE.: NEVER TRUE

## 2020-10-13 SDOH — ECONOMIC STABILITY: TRANSPORTATION INSECURITY
IN THE PAST 12 MONTHS, HAS THE LACK OF TRANSPORTATION KEPT YOU FROM MEDICAL APPOINTMENTS OR FROM GETTING MEDICATIONS?: NO

## 2020-10-13 SDOH — SOCIAL STABILITY: SOCIAL NETWORK: HOW OFTEN DO YOU ATTENT MEETINGS OF THE CLUB OR ORGANIZATION YOU BELONG TO?: NEVER

## 2020-10-13 SDOH — HEALTH STABILITY: MENTAL HEALTH
STRESS IS WHEN SOMEONE FEELS TENSE, NERVOUS, ANXIOUS, OR CAN'T SLEEP AT NIGHT BECAUSE THEIR MIND IS TROUBLED. HOW STRESSED ARE YOU?: ONLY A LITTLE

## 2020-10-13 SDOH — ECONOMIC STABILITY: FOOD INSECURITY: WITHIN THE PAST 12 MONTHS, YOU WORRIED THAT YOUR FOOD WOULD RUN OUT BEFORE YOU GOT MONEY TO BUY MORE.: NEVER TRUE

## 2020-10-13 SDOH — ECONOMIC STABILITY: TRANSPORTATION INSECURITY
IN THE PAST 12 MONTHS, HAS LACK OF TRANSPORTATION KEPT YOU FROM MEETINGS, WORK, OR FROM GETTING THINGS NEEDED FOR DAILY LIVING?: NO

## 2020-10-13 SDOH — HEALTH STABILITY: PHYSICAL HEALTH: ON AVERAGE, HOW MANY DAYS PER WEEK DO YOU ENGAGE IN MODERATE TO STRENUOUS EXERCISE (LIKE A BRISK WALK)?: 6 DAYS

## 2020-10-13 SDOH — HEALTH STABILITY: PHYSICAL HEALTH: ON AVERAGE, HOW MANY MINUTES DO YOU ENGAGE IN EXERCISE AT THIS LEVEL?: 20 MIN

## 2020-10-13 SDOH — ECONOMIC STABILITY: INCOME INSECURITY: HOW HARD IS IT FOR YOU TO PAY FOR THE VERY BASICS LIKE FOOD, HOUSING, MEDICAL CARE, AND HEATING?: NOT VERY HARD

## 2020-10-13 SDOH — SOCIAL STABILITY: SOCIAL NETWORK
DO YOU BELONG TO ANY CLUBS OR ORGANIZATIONS SUCH AS CHURCH GROUPS UNIONS, FRATERNAL OR ATHLETIC GROUPS, OR SCHOOL GROUPS?: NO

## 2020-10-13 SDOH — SOCIAL STABILITY: SOCIAL NETWORK: ARE YOU MARRIED, WIDOWED, DIVORCED, SEPARATED, NEVER MARRIED, OR LIVING WITH A PARTNER?: NEVER MARRIED

## 2020-10-13 SDOH — SOCIAL STABILITY: SOCIAL NETWORK: HOW OFTEN DO YOU GET TOGETHER WITH FRIENDS OR RELATIVES?: MORE THAN THREE TIMES A WEEK

## 2020-10-13 SDOH — HEALTH STABILITY: MENTAL HEALTH: HOW OFTEN DO YOU HAVE A DRINK CONTAINING ALCOHOL?: NEVER

## 2020-10-13 SDOH — SOCIAL STABILITY: SOCIAL NETWORK
IN A TYPICAL WEEK, HOW MANY TIMES DO YOU TALK ON THE PHONE WITH FAMILY, FRIENDS, OR NEIGHBORS?: MORE THAN THREE TIMES A WEEK

## 2020-10-13 NOTE — TELEPHONE ENCOUNTER
From: Quoc Louie  To: Jazlyn Banerjee APRN - CNP  Sent: 10/13/2020 9:00 AM EDT  Subject: Visit Follow-Up Question    My antibiotic runs out tomorrow and I am a lot worse than when you saw me. My ears and throat are hurting so bad. Im more congested. My face pain is worse. I can hardly talk. I went to urgent care yesterday and he said it will take a while to get rid of this. But to let you know incase you want to extend my antibiotic.

## 2020-10-13 NOTE — ED PROVIDER NOTES
[facility]  eMERGENCY dEPARTMENT eNCOUnter          CHIEF COMPLAINT       Chief Complaint   Patient presents with    Sinusitis       Nurses Notes reviewed and I agree except as noted in the HPI. HISTORY OF PRESENT ILLNESS    Kisha Yueng is a 38 y.o. female who presents presents with maxillary sinus pain and upper dental pain. Patient who was recently seen by ENT and started on clindamycin for possible maxillary sinusitis presents today after approximately 1 weeks worth of clindamycin. The patient has noted continued pain she denies generalized fever. She denies any nausea or vomiting. She does admit to some bilateral ear pain as well as some congestion. REVIEW OF SYSTEMS     Review of Systems   Constitutional: Negative for chills and fever. HENT: Positive for congestion, ear pain, sinus pressure and sinus pain. Negative for sore throat. Eyes: Negative for discharge and redness. Respiratory: Negative for cough and shortness of breath. Gastrointestinal: Negative for nausea and vomiting. Psychiatric/Behavioral: Negative for agitation and behavioral problems. All other systems reviewed and are negative. PAST MEDICAL HISTORY    has a past medical history of Asthma, Diabetes mellitus ([address] Fibromyalgia, GERD (gastroesophageal reflux disease), Hyperlipidemia, Lupus (systemic lupus erythematosus) ([address] Lupus arthritis ([address] Migraine, Plaquenil adverse reaction in therapeutic use, and TIA (transient ischemic attack). SURGICAL HISTORY      has a past surgical history that includes Hysterectomy (2009); laparoscopy; and Shoulder arthroscopy (Left, 08/20/2015).     CURRENT MEDICATIONS       Discharge Medication List as of 10/12/2020 10:39 AM      CONTINUE these medications which have NOT CHANGED    Details   [facility] Starting Tue 10/6/2020, Disp-1 each,R-0, PrintDx:  Lupus arthritis,  Length 5 years      clindamycin (CLEOCIN) 300 MG capsule Take 1 capsule by mouth 4 times daily for 10 days, Disp-40 capsule,R-0Normal      Probiotic Acidophilus (FLORANEX) TABS Take 1 tablet by mouth daily, Disp-30 tablet,R-0Normal      !! acetaminophen (AMINOFEN) 325 MG tablet Take 2 tablets by mouth 3 times daily for 14 days, Disp-120 tablet,R-3OTC      !! Continuous Blood Gluc Sensor (DEXCOM G4 SENSOR) MISC 1 Units by Does not apply route every 7 days Each unit is every 10 days, Disp-3 each,R-11Normal      Continuous Blood Gluc  (DEXCOM G5 MOBILE ) APPLE 1 Units by Does not apply route daily, Disp-1 Device,R-0Normal      atorvastatin (LIPITOR) 80 MG tablet Take 1 tablet by mouth daily, Disp-90 tablet,R-3Normal      metFORMIN (GLUCOPHAGE) 500 MG tablet TAKE 1 TABLET BY MOUTH TWICE DAILY WITH  MEALS, Disp-180 tablet,R-1Normal      cyclobenzaprine (FLEXERIL) 5 MG tablet Take 5 mg by mouth 3 times daily as needed Historical Med      !! DULoxetine (CYMBALTA) 60 MG extended release capsule Take 60 mg by mouth dailyHistorical Med      Immune Globulin, Human, (GAMUNEX-C) 20 GM/200ML SOLN solution Infuse 20 g intravenously every 30 days, Disp-200 mL,R-11Normal      !! Continuous Blood Gluc Sensor (FREESTYLE SANDRA 14 DAY SENSOR) [facility] USE TO CHECK BLOOD GLUCOSE TWICE DAILY, Disp-3 each,R-11Normal      clotrimazole-betamethasone (LOTRISONE) 1-0.05 % cream Apply topically 2 times daily. , Disp-45 g,R-0, Normal      ibuprofen (ADVIL;MOTRIN) 600 MG tablet Take 1 tablet by mouth every 8 hours as needed for Pain, Disp-42 tablet,R-0Please consider 90 day supplies to promote better adherenceNormal      predniSONE (DELTASONE) 5 MG tablet Take 1 tablet by mouth daily, Disp-30 tablet,R-3Normal      azaTHIOprine (IMURAN) 50 MG tablet Take 1 tablet by mouth twice daily, Disp-60 tablet,R-2Normal      potassium chloride (KLOR-CON M10) 10 MEQ extended release tablet Take 1 tablet by mouth daily, Disp-90 tablet, R-3Please consider 90 day supplies to promote better adherenceNormal traZODone (DESYREL) 50 MG tablet Take 3 tablets by mouth nightly, Disp-90 tablet, R-5Please consider 90 day supplies to promote better adherenceNormal      sucralfate (CARAFATE) 1 GM tablet Take 1 tablet by mouth 4 times daily, Disp-120 tablet, R-11Please consider 90 day supplies to promote better adherenceNormal      !! DULoxetine (CYMBALTA) 30 MG extended release capsule Take 1 capsule by mouth daily, Disp-90 capsule, R-1Please consider 90 day supplies to promote better adherenceNormal      montelukast (SINGULAIR) 10 MG tablet Take 1 tablet by mouth nightly, Disp-30 tablet, R-5Normal      Dulaglutide 0.75 MG/0.5ML SOPN Inject 0.75 mg into the skin once a week, Disp-5 pen, R-11Normal      verapamil (CALAN SR) 240 MG extended release tablet Take 1 tablet by mouth daily, Disp-90 tablet, R-3Normal      triamcinolone (KENALOG) 0.1 % cream Apply topically 2 times daily Apply topically 2 times daily. , Topical, 2 TIMES DAILY Starting Fri 12/20/2019, Disp-1 Tube, R-5, Normal      magnesium oxide (MAG-OX) 400 (241.3 Mg) MG TABS tablet Take 1 tablet by mouth daily, Disp-90 tablet, R-5Please consider 90 day supplies to promote better adherenceNormal      clopidogrel (PLAVIX) 75 MG tablet TAKE 1 TABLET BY MOUTH ONCE DAILY, Disp-90 tablet, R-3Normal      !! Continuous Blood Gluc Sensor (FREESTYLE SANDRA SENSOR SYSTEM) MISC 1 Units by Does not apply route daily, Disp-1 each, R-0Print      levocetirizine (XYZAL) 5 MG tablet TAKE ONE TABLET BY MOUTH NIGHTLY, Disp-30 tablet, R-11Normal      !!  Continuous Blood Gluc Sensor (FREESTYLE SANDRA SENSOR SYSTEM) MISC 1 kit by Does not apply route 2 times daily Include sensors for a year  Dx:  E11.9, and lupus, Disp-1 each, R-0Print      sodium chloride (OCEAN) 0.65 % nasal spray 1 spray by Nasal route as needed for Congestion, Disp-1 Bottle, R-3OTC      NEXIUM 40 MG delayed release capsule TAKE 1 CAPSULE BY MOUTH ONCE DAILY, Disp-30 capsule, R-5, DAWPlease consider 90 day supplies to mother; Other in her sister; Rheum Arthritis in her mother. SOCIAL HISTORY      reports that she has never smoked. She has never used smokeless tobacco. She reports that she does not drink alcohol or use drugs. PHYSICAL EXAM     INITIAL VITALS:  height is 5' 3\" (1.6 m) and weight is 231 lb (104.8 kg). Her oral temperature is 98.8 °F (37.1 °C). Her blood pressure is 128/94 (abnormal) and her pulse is 80. Her respiration is 16 and oxygen saturation is 97%. Physical Exam  Vitals signs and nursing note reviewed. Constitutional:       General: She is not in acute distress. Appearance: She is not ill-appearing. HENT:      Right Ear: Tympanic membrane normal.      Left Ear: Tympanic membrane normal.      Nose: Congestion present. No rhinorrhea. Comments: Maxillary sinus tenderness with palpation. Eyes:      Extraocular Movements: Extraocular movements intact. Conjunctiva/sclera: Conjunctivae normal.      Pupils: Pupils are equal, round, and reactive to light. Neck:      Musculoskeletal: Normal range of motion and neck supple. Lymphadenopathy:      Cervical: No cervical adenopathy. Neurological:      Mental Status: She is alert.    Psychiatric:         Mood and Affect: Mood normal.         Behavior: Behavior normal.         DIFFERENTIAL DIAGNOSIS:   Sinusitis, URI    DIAGNOSTIC RESULTS     EKG: All EKG's are interpreted by the Emergency Department Physician who either signs or Co-signs this chart in the absence of a cardiologist.      RADIOLOGY: non-plain film images(s) such as CT, Ultrasound and MRI are read by the radiologist.      LABS:   Labs Reviewed - No data to display    EMERGENCY DEPARTMENT COURSE:   Vitals:    Vitals:    10/12/20 1015   BP: (!) 128/94   Pulse: 80   Resp: 16   Temp: 98.8 °F (37.1 °C)   TempSrc: Oral   SpO2: 97%   Weight: 231 lb (104.8 kg)   Height: 5' 3\" (1.6 m)     On exam the patient does demonstrate some maxillary sinus tenderness bilaterally she also notes some upper dental pain. She was started on clindamycin approximately 7 days ago by ENT. She does note some ear pain. On exam there may be some fluid behind the ears but there is no erythema. No pain or tragal tenderness. At this time I will add Flonase to her clindamycin regimen. I do believe that this may be a little premature based on the fact that typical sinusitis regimens are 10 to 14 days. I have recommended further follow-up with ENT if her symptoms are not improving in the next 3 to 5 days. Care instructions were discussed. CRITICAL CARE:       CONSULTS:      PROCEDURES:  None    FINAL IMPRESSION      1. Acute maxillary sinusitis, recurrence not specified          DISPOSITION/PLAN   Home. Care instructions provided. Follow-up with ENT if symptoms are not improving over the next 3 to 5 days.     PATIENT REFERRED TO:  Lila Sánchez, [address]  [address] [place] [address]  [phone]    Call in 3 days  If symptoms worsen, As needed      DISCHARGE MEDICATIONS:  Discharge Medication List as of 10/12/2020 10:39 AM      START taking these medications    Details   fluticasone (FLONASE) 50 MCG/ACT nasal spray 2 sprays by Each Nostril route daily, Disp-1 Bottle,R-0Print             (Please note that portions of this note were completed with a voice recognition program.  Efforts were made to edit the dictations but occasionally words are mis-transcribed.)    Corrinne Plumber, MD Corrinne Plumber, MD  10/12/20 0504

## 2020-10-13 NOTE — CARE COORDINATION
Ambulatory Care Coordination Note  10/13/2020  CM Risk Score: 3  Charlson 10 Year Mortality Risk Score: 47%     ACC: Meredith Chou RN    Summary Note: Patient was called for Care Coordination enrollment and in f/u to recent [facility] ED visit. Care Coordination program was briefly reviewed with patient and initial eval information obtained. Patient stated she continues to not feel well d/t sinus congestion and infection. Patient denied any questions re: her discharge instructions. COVID-19 education was also reviewed with patient and she verbalized understanding. LOOP enrollment also completed. Patient/family/caregiver given information for [employer] and agrees to enroll - LOOP enrollment completed for Self Monitoring POC  Patient's preferred e-mail: [email or web address]    Patient's preferred phone number: [phone]      Based on Loop alert triggers, patient will be contacted by nurse care manager for worsening symptoms. Patient was educated on importance of early symptom recognition and signs/symptoms to call and report were reviewed. Patient verbalized understanding. Patient shared she continues to live with her mother who assists her as needed. Patient stated her mother provides all transportation to and from appointments and she denied any need for additional resources at this time. Patient manages her own medications but declined Med Red during this call d/t not feeling well. Patient denied any additional ADL/DME needs at this time and she was encouraged to call with any that may develop. Patient denied any other questions, concerns, or needs and she was encouraged to call with any that may develop. Patient verbalized agreement to AC following up with her again in the future. From CDC: Are you at higher risk for severe illness?  Wash your hands often.  Avoid close contact (6 feet, which is about two arm lengths) with people who are sick.    Put distance between yourself and other people if COVID-19 is spreading in your community.  Clean and disinfect frequently touched surfaces.  Avoid all cruise travel and non-essential air travel.      Call your healthcare professional if you have concerns about COVID-19 and your underlying condition or if you are sick. Actions:   - Reviewed Care Coordination program   - Completed initial eval and SDOH information   - Monitored for ADL, DME, and transportation needs  - Monitored for questions s/p recent [facility] visit  - Encouraged patient to schedule PCP f/u appointment - Declined assistance  - Reviewed COVID-19 education   - Enrolled patient in LOOP program   - Attempted [address] information reviewed and verified and ACP note completed  - Monitored for additional needs          Plan of Care:   - Continue Care Coordination f/u calls for assistance with the management of her DM and healthcare needs  - Complete DM education   - Review availability of same day appointments, urgent care/walk in clinic options, and after hours on call resources  - Educate on importance of early symptom recognition   - Complete Med Rec  - Educate on Flu vaccine  - Patient is current with Pneumonia vaccine  - Review Healthcare Decision Maker information (Completed) and interest in Living Will   - Patient is current with eye appointment  - A1C 6.7%  - Monitor for additional needs and readiness to discharge from Care Coordination       Ambulatory Care Coordination Assessment    Care Coordination Protocol  Program Enrollment:  Complex Care  Referral from Primary Care Provider:  No  Week 1 - Initial Assessment     Do you have all of your prescriptions and are they filled?:  Yes  Barriers to medication adherence:  Complexity of regimen  Are you able to afford your medications?:  Yes  How often do you have trouble taking your medications the way you have been told to take them?:  Sometimes I take them as prescribed.      Do you have Home O2 Therapy?:  No      Ability to seek help/take action for Emergent Urgent situations i.e. fire, crime, inclement weather or health crisis. :  Independent  Ability to ambulate to restroom:  Independent  Ability handle personal hygeine needs (bathing/dressing/grooming): Independent  Ability to manage Medications: Independent  Ability to prepare Food Preparation:  Independent  Ability to maintain home (clean home, laundry):  Needs Assistance  Ability to drive and/or has transportation:  Dependent  Ability to do shopping:  Needs Assistance  Ability to manage finances:  Needs Assistance  Is patient able to live independently?:  Yes     Current Housing:  Private Residence           Frequent urination at night?:  Yes  Do you use rails/bars?:  Yes  Do you have a non-slip tub mat?:  Yes     Are you experiencing loss of meaning?:  No  Are you experiencing loss of hope and peace?:  No     Thinking about your patient's physical health needs, are there any symptoms or problems (risk indicators) you are unsure about that require further investigation?:  Mild vague physical symptoms or problems; but do not impact on daily life or are not of concern to patient   Are the patients physical health problems impacting on their mental well-being?:  No identified areas of concern   Are there any problems with your patients lifestyle behaviors (alcohol, drugs, diet, exercise) that are impacting on physical or mental well-being?:  No identified areas of concern   Do you have any other concerns about your patients mental well-being?  How would you rate their severity and impact on the patient?:  Mild problems - don't interfere with function   How would you rate their home environment in terms of safety and stability (including domestic violence, insecure housing, neighbor harassment)?:  Safe, stable, but with some inconsistency   How do daily activities impact on the patient's well-being? (include current or anticipated unemployment, work, caregiving, access to transportation or other):  Some general dissatisfaction but no concern   How would you rate their social network (family, work, friends)?:  Adequate participation with social networks   How would you rate their financial resources (including ability to afford all required medical care)?:  Financially secure, resources adequate, no identified problems   How wells does the patient now understand their health and well-being (symptoms, signs or risk factors) and what they need to do to manage their health?:  Reasonable to good understanding but do not feel able to engage with advice at this time   How well do you think your patient can engage in healthcare discussions? (Barriers include language, deafness, aphasia, alcohol or drug problems, learning difficulties, concentration): Adequate communication, with or without minor barriers   Do other services need to be involved to help this patient?:  Other care/services not required at this time   Suggested Interventions and Community Resources  Diabetes Education: In Process    Disease Specific Clinic:  Not Started   Medication Assistance Program:  Declined   Medi Set or Pill Pack:  Declined   Pharmacist:  Not Started   Registered Dietician:  Not Started   Transportation Services:  Declined   Zone Management Tools: In Process         Set up/Review an Education Plan, Set up/Review Goals              Prior to Admission medications    Medication Sig Start Date End Date Taking?  Authorizing Provider   fluticasone (FLONASE) 50 MCG/ACT nasal spray 2 sprays by Each Nostril route daily 10/12/20   Juan Shelley MD   Handicap Placard MISC by Does not apply route Dx:  Lupus arthritis,  Length 5 years 10/6/20   Noelle Kirk MD   clindamycin (CLEOCIN) 300 MG capsule Take 1 capsule by mouth 4 times daily for 10 days  Patient not taking: Reported on 10/5/2020 10/5/20 10/15/20  Cydney Leonard, NAHID - CNP   Probiotic Acidophilus [facility]) TABS Take 1 tablet by mouth daily  Patient not taking: Reported on 10/5/2020 10/5/20 11/4/20  NAHID Freitas CNP   acetaminophen (AMINOFEN) 325 MG tablet Take 2 tablets by mouth 3 times daily for 14 days 10/5/20 10/19/20  Eric James MD   Continuous Blood Gluc Sensor (DEXCOM G4 SENSOR) MISC 1 Units by Does not apply route every 7 days Each unit is every 10 days 10/5/20   Eric James MD   Continuous Blood Gluc  (DEXCOM G5 MOBILE ) APPLE 1 Units by Does not apply route daily 10/5/20   Eric James MD   atorvastatin (LIPITOR) 80 MG tablet Take 1 tablet by mouth daily 10/1/20   Eric James MD   metFORMIN (GLUCOPHAGE) 500 MG tablet TAKE 1 TABLET BY MOUTH TWICE DAILY WITH  MEALS 10/1/20   Eric James MD   cyclobenzaprine (FLEXERIL) 5 MG tablet Take 5 mg by mouth 3 times daily as needed  7/30/20   Historical Provider, MD   DULoxetine (CYMBALTA) 60 MG extended release capsule Take 60 mg by mouth daily 9/3/20   Historical Provider, MD   Immune Globulin, Human, (GAMUNEX-C) 20 GM/200ML SOLN solution Infuse 20 g intravenously every 30 days  Patient not taking: Reported on 10/5/2020 9/30/20   NAHID Freitas CNP   Continuous Blood Gluc Sensor (FREESTYLE SANDRA 14 DAY SENSOR) [facility] USE TO CHECK BLOOD GLUCOSE TWICE DAILY 9/16/20   Eric James MD   clotrimazole-betamethasone (LOTRISONE) 1-0.05 % cream Apply topically 2 times daily.  9/1/20   Eric James MD   ibuprofen (ADVIL;MOTRIN) 600 MG tablet Take 1 tablet by mouth every 8 hours as needed for Pain  Patient not taking: Reported on 10/5/2020 8/31/20   Eric James MD   predniSONE (DELTASONE) 5 MG tablet Take 1 tablet by mouth daily 8/17/20   NAHID Garcia CNP   azaTHIOprine (IMURAN) 50 MG tablet Take 1 tablet by mouth twice daily 8/10/20   NAHID Garcia CNP   potassium chloride (KLOR-CON M10) 10 MEQ extended release tablet Take 1 tablet by mouth daily 7/6/20   Eric James MD   traZODone (DESYREL) 50 MG tablet Take 3 tablets by mouth nightly 7/6/20   Eliel Ellis MD   sucralfate (CARAFATE) 1 GM tablet Take 1 tablet by mouth 4 times daily 6/30/20   Eliel Ellis MD   DULoxetine (CYMBALTA) 30 MG extended release capsule Take 1 capsule by mouth daily 6/30/20   Trayton B DO Mirna   montelukast (SINGULAIR) 10 MG tablet Take 1 tablet by mouth nightly 6/30/20   Eliel Ellis MD   Dulaglutide 0.75 MG/0.5ML SOPN Inject 0.75 mg into the skin once a week 2/3/20   Eliel Ellis MD   verapamil (CALAN SR) 240 MG extended release tablet Take 1 tablet by mouth daily 12/30/19   Eliel Ellis MD   triamcinolone (KENALOG) 0.1 % cream Apply topically 2 times daily Apply topically 2 times daily.  12/20/19   Eliel Ellis MD   magnesium oxide (MAG-OX) 400 (241.3 Mg) MG TABS tablet Take 1 tablet by mouth daily 12/1/19   Eliel Ellis MD   clopidogrel (PLAVIX) 75 MG tablet TAKE 1 TABLET BY MOUTH ONCE DAILY 12/1/19   Eliel Ellis MD   Continuous Blood Gluc Sensor (FREESTYLE SANDRA SENSOR SYSTEM) MISC 1 Units by Does not apply route daily 9/30/19   Eliel Ellis MD   levocetirizine (XYZAL) 5 MG tablet TAKE ONE TABLET BY MOUTH NIGHTLY 7/30/19   Eliel Ellis MD   Continuous Blood Gluc Sensor (FREESTYLE SANDRA SENSOR SYSTEM) MISC 1 kit by Does not apply route 2 times daily Include sensors for a year  Dx:  E11.9, and lupus 7/17/19   Eliel Ellis MD   sodium chloride (OCEAN) 0.65 % nasal spray 1 spray by Nasal route as needed for Congestion 5/1/19   Eliel Ellis MD   NEXIUM 40 MG delayed release capsule TAKE 1 CAPSULE BY MOUTH ONCE DAILY 4/29/19   Eliel Ellis MD   levalbuterol [facility]) 45 MCG/ACT inhaler Inhale 1-2 puffs into the lungs every 4 hours as needed for Wheezing or Shortness of Breath  Patient not taking: Reported on 10/5/2020 1/3/19   Charmaine Tara, APRN - CNP   levalbuterol (XOPENEX) 0.63 MG/3ML nebulization Take 3 mLs by nebulization every 6 hours as needed for Wheezing or Shortness of Breath  Patient not taking: Reported on 10/5/2020 12/6/18   NAHID Frye CNP   Respiratory Therapy Supplies (NEBULIZER AIR TUBE/PLUGS) MISC 1 kit by Does not apply route every 6 hours as needed (Wheezing/shortness of breath) Please provide nebulizer kit and supplies. Patient not taking: Reported on 10/5/2020 12/6/18   NAHID Frye CNP   EQ PAIN RELIEVER 325 MG tablet TAKE TWO TABLETS BY MOUTH EVERY 4 HOURS AS NEEDED FOR PAIN AND FEVER  Patient not taking: Reported on 10/5/2020 12/4/17   Alfred Cervantes MD   Amitriptyline HCl (ELAVIL PO) Take 25 mg by mouth nightly     Historical Provider, MD   aspirin (LORENZO ASPIRIN) 325 MG tablet Take 1 tablet by mouth daily 9/11/17   Alfred Cervantes MD   promethazine (PHENERGAN) 25 MG tablet Take 1 tablet by mouth every 6 hours as needed for Nausea  Patient not taking: Reported on 10/5/2020 2/1/17   Alfred Cervantes MD   diclofenac sodium (VOLTAREN) 1 % GEL Apply 2 g topically 4 times daily as needed for Pain (topical)     Historical Provider, MD       Future Appointments   Date Time Provider Herbert Portillo   11/12/2020  9:00 AM NAHID Palma CNP [facility] [place] [place] [facility] [place]   12/29/2020  7:40 AM Erika Eisenberg DO SRPX Rheum P - [place]   1/4/2021 10:00 AM STR CT IMAGING RM1  OP EXPRESS STRZ OUT EXP STR Radiolog   1/4/2021 10:30 AM STR PULMONARY FUNCTION ROOM 1 [facility] [place] [facility]   1/13/2021  9:00 AM NAHID Frye CNP Pulm Med [facility] [place] [place] AM [place]   2/8/2021  9:00 AM Alfred [facility]ervantes [facility] [place] [facility]     ,   Diabetes Assessment    Meal Planning:  Avoidance of concentrated sweets, Carb counting   Do you have barriers with adherence to non-pharmacologic self-management interventions?  (Nutrition/Exercise/Self-Monitoring):  No   Have you ever had to go to the ED for symptoms of low blood sugar?:  No       No patient-reported symptoms      ,   General Assessment    Do you have any symptoms that are causing concern?:  Yes  Progression since Onset:  Unchanged  Reported Symptoms:  Congestion (Comment: sinus infection )      and Care Coordination Episodes    Type: Amb Care Coordination  Episode: Complex Care   Noted: 10/13/2020  Comments: Nedra Monroy - araceli referral - DM

## 2020-10-14 ENCOUNTER — CARE COORDINATION (OUTPATIENT)
Dept: CARE COORDINATION | Age: 40
End: 2020-10-14

## 2020-10-14 RX ORDER — TETRACYCLINE HYDROCHLORIDE 500 MG/1
500 CAPSULE ORAL 2 TIMES DAILY
Qty: 42 CAPSULE | Refills: 0 | Status: SHIPPED | OUTPATIENT
Start: 2020-10-14 | End: 2020-11-04

## 2020-10-14 RX ORDER — GREEN TEA/HOODIA GORDONII 315-12.5MG
1 CAPSULE ORAL DAILY
Qty: 30 TABLET | Refills: 1 | Status: SHIPPED | OUTPATIENT
Start: 2020-10-14 | End: 2020-11-13

## 2020-10-14 NOTE — PROGRESS NOTES
Patient complains that her sinus infection is still unresolved and actually is gotten worse. She is failed clindamycin despite labs demonstrating that there is a sensitivity. We will switch patient to tetracycline. Patient notified that she will be changing antibiotic and to make sure she takes tetracycline with food. She is also to avoid sunlight.   VM left for patient

## 2020-10-19 ENCOUNTER — CARE COORDINATION (OUTPATIENT)
Dept: CARE COORDINATION | Age: 40
End: 2020-10-19

## 2020-10-19 LAB
MISC REFERENCE: NORMAL

## 2020-10-19 NOTE — CARE COORDINATION
hypoglycemia/hyperglycemia  - Educated on importance of routine exercise  - Reviewed COVID-19 education   - Completed Med Rec  - Monitored for improvement of sinus symptoms  - Monitored for additional needs         Plan of Care:   - Continue Care Coordination f/u calls for assistance with the management of her DM and healthcare needs  - Complete DM education - In Process   - Review availability of same day appointments, urgent care/walk in clinic options, and after hours on call resources  - Educate on importance of early symptom recognition   - Complete Med Rec - Completed   - Educate on Flu vaccine - Completed  - Patient is current with Pneumonia vaccine  - Review Healthcare Decision Maker information (Completed) and interest in Living Will   - Patient is current with eye appointment  - A1C 6.7%  - Monitor for additional needs and readiness to discharge from [address] Coordination Interventions    Program Enrollment:  Complex Care  Referral from Primary Care Provider:  No  Suggested Interventions and Community Resources  Diabetes Education:  Completed (Comment: Oct. 2020)  Disease Specific Clinic:  Not Started  Medication Assistance Program:  Declined  Medi Set or Pill Pack:  Declined  Pharmacist:  Not Started  Registered Dietician:  Not Started  Transportation Support:  Declined  Zone Management Tools:  Completed (Comment: Oct. 2020)         Goals Addressed                 This Visit's Progress       Care Coordination     Conditions and Symptoms   No change     I will schedule office visits, as directed by my provider. I will keep my appointment or reschedule if I have to cancel. I will notify my provider of any barriers to my plan of care. I will follow my Zone Management tool to seek urgent or emergent care. I will notify my provider of any symptoms that indicate a worsening of my condition.     Barriers: lack of support, overwhelmed by complexity of regimen, stress, and lack of education  Plan for overcoming my barriers: Provide support and education to patient and monitor for resource needs  Confidence: 8/10  Anticipated Goal Completion Date: 2/25/2021              Prior to Admission medications    Medication Sig Start Date End Date Taking?  Authorizing Provider   tetracycline (ACHROMYCIN;SUMYCIN) 500 MG capsule Take 1 capsule by mouth 2 times daily for 21 days 10/14/20 11/4/20 Yes NAHID Cooney CNP   Probiotic Acidophilus [facility]) TABS Take 1 tablet by mouth daily 10/14/20 11/13/20 Yes NAHID Cooney CNP   fluticasone (FLONASE) 50 MCG/ACT nasal spray 2 sprays by Each Nostril route daily 10/12/20  Yes Shan Gordon MD   atorvastatin (LIPITOR) 80 MG tablet Take 1 tablet by mouth daily 10/1/20  Yes Ave Cai MD   metFORMIN (GLUCOPHAGE) 500 MG tablet TAKE 1 TABLET BY MOUTH TWICE DAILY WITH  MEALS 10/1/20  Yes Ave Cai MD   cyclobenzaprine (FLEXERIL) 5 MG tablet Take 5 mg by mouth 3 times daily as needed  7/30/20  Yes Historical Provider, MD   DULoxetine (CYMBALTA) 60 MG extended release capsule Take 60 mg by mouth daily 9/3/20  Yes Historical Provider, MD   ibuprofen (ADVIL;MOTRIN) 600 MG tablet Take 1 tablet by mouth every 8 hours as needed for Pain 8/31/20  Yes Ave Cai MD   predniSONE (DELTASONE) 5 MG tablet Take 1 tablet by mouth daily 8/17/20  Yes NAHID Meerdith CNP   azaTHIOprine (IMURAN) 50 MG tablet Take 1 tablet by mouth twice daily 8/10/20  Yes NAHID Meredith CNP   potassium chloride (KLOR-CON M10) 10 MEQ extended release tablet Take 1 tablet by mouth daily 7/6/20  Yes Ave Cai MD   traZODone (DESYREL) 50 MG tablet Take 3 tablets by mouth nightly 7/6/20  Yes Ave Cai MD   sucralfate (CARAFATE) 1 GM tablet Take 1 tablet by mouth 4 times daily 6/30/20  Yes Ave Cai MD   DULoxetine (CYMBALTA) 30 MG extended release capsule Take 1 capsule by mouth daily 6/30/20  Yes DO serge Martins (SINGULAIR) 10 MG tablet Take 1 tablet by mouth nightly 6/30/20  Yes Lissa Daniel MD   Dulaglutide 0.75 MG/0.5ML SOPN Inject 0.75 mg into the skin once a week 2/3/20  Yes Lissa Daniel MD   verapamil (CALAN SR) 240 MG extended release tablet Take 1 tablet by mouth daily 12/30/19  Yes Lissa Daniel MD   triamcinolone (KENALOG) 0.1 % cream Apply topically 2 times daily Apply topically 2 times daily.  12/20/19  Yes Lissa Daniel MD   magnesium oxide (MAG-OX) 400 (241.3 Mg) MG TABS tablet Take 1 tablet by mouth daily 12/1/19  Yes Lissa Daniel MD   clopidogrel (PLAVIX) 75 MG tablet TAKE 1 TABLET BY MOUTH ONCE DAILY 12/1/19  Yes Lissa Daniel MD   levocetirizine (XYZAL) 5 MG tablet TAKE ONE TABLET BY MOUTH NIGHTLY 7/30/19  Yes Lissa Daniel MD   sodium chloride (OCEAN) 0.65 % nasal spray 1 spray by Nasal route as needed for Congestion 5/1/19  Yes Lissa Daniel MD   NEXIUM 40 MG delayed release capsule TAKE 1 CAPSULE BY MOUTH ONCE DAILY 4/29/19  Yes Lissa Daniel MD   levalbuterol [facility]) 45 MCG/ACT inhaler Inhale 1-2 puffs into the lungs every 4 hours as needed for Wheezing or Shortness of Breath 1/3/19  Yes NAHID Cho CNP   levalbuterol (XOPENEX) 0.63 MG/3ML nebulization Take 3 mLs by nebulization every 6 hours as needed for Wheezing or Shortness of Breath 12/6/18  Yes NAHID Cho CNP   Amitriptyline HCl (ELAVIL PO) Take 25 mg by mouth nightly    Yes Pito Triplett MD   aspirin (LORENZO ASPIRIN) 325 MG tablet Take 1 tablet by mouth daily 9/11/17  Yes Lissa Daniel MD   promethazine (PHENERGAN) 25 MG tablet Take 1 tablet by mouth every 6 hours as needed for Nausea 2/1/17  Yes Lissa Daniel MD   diclofenac sodium (VOLTAREN) 1 % GEL Apply 2 g topically 4 times daily as needed for Pain (topical)    Yes Historical Provider, MD   Handicap Placard MISC by Does not apply route Dx:  Lupus arthritis,  Length 5 years 10/6/20   Lissa Daniel MD   acetaminophen (AMINOFEN) 325 MG tablet Take 2 tablets by mouth 3 times daily for 14 days 10/5/20 10/19/20  Domitila Beck MD   Continuous Blood Gluc Sensor (DEXCOM G4 SENSOR) MISC 1 Units by Does not apply route every 7 days Each unit is every 10 days 10/5/20   Domitila Beck MD   Continuous Blood Gluc  (DEXCOM G5 MOBILE ) APPLE 1 Units by Does not apply route daily 10/5/20   Domitila Beck MD   Immune Globulin, Human, (GAMUNEX-C) 20 GM/200ML SOLN solution Infuse 20 g intravenously every 30 days  Patient not taking: Reported on 10/5/2020 9/30/20   NAHID Jeff CNP   Continuous Blood Gluc Sensor (FREESTYLE SANDRA 14 DAY SENSOR) MISC USE TO CHECK BLOOD GLUCOSE TWICE DAILY 9/16/20   Domitila Beck MD   clotrimazole-betamethasone (LOTRISONE) 1-0.05 % cream Apply topically 2 times daily. 9/1/20   Domitila Beck MD   Continuous Blood Gluc Sensor ([facility]) MISC 1 Units by Does not apply route daily 9/30/19   Domitila Beck MD   Continuous Blood Gluc Sensor ([facility]) MISC 1 kit by Does not apply route 2 times daily Include sensors for a year  Dx:  E11.9, and lupus 7/17/19   Domitila Beck MD   Respiratory Therapy Supplies (NEBULIZER AIR TUBE/PLUGS) MISC 1 kit by Does not apply route every 6 hours as needed (Wheezing/shortness of breath) Please provide nebulizer kit and supplies.   Patient not taking: Reported on 10/5/2020 12/6/18   NAHID Gary CNP   EQ PAIN RELIEVER 325 MG tablet TAKE TWO TABLETS BY MOUTH EVERY 4 HOURS AS NEEDED FOR PAIN AND FEVER  Patient not taking: Reported on 10/5/2020 12/4/17   Domitila Beck MD       Future Appointments   Date Time Provider Herbert Portillo   11/12/2020  9:00 AM NAHID Jeff CNP SRPX [facility] [place]   12/29/2020  7:40 AM Bhavna Barnes DO SRPX Rheum [facility] - [place]   1/4/2021 10:00 AM STR CT IMAGING RM1  OP EXPRESS STRZ OUT EXP STR Radiolog   1/4/2021 10:30 AM [facility] PULMONARY FUNCTION ROOM 1 [facility] PFT [place] FRANCIS[place] II.R[place] [facility]   1/13/2021  9:00 AM Charmaine James, APRN - [facility]   2/8/2021  9:00 AM MD Kameron Stubbs  [facility]     ,   Diabetes Assessment    Meal Planning:  Avoidance of concentrated sweets, Carb counting   Do you have barriers with adherence to non-pharmacologic self-management interventions?  (Nutrition/Exercise/Self-Monitoring):  No   Have you ever had to go to the ED for symptoms of low blood sugar?:  No       No patient-reported symptoms   Do you have hyperglycemia symptoms?:  No   Do you have hypoglycemia symptoms?:  Yes   Frequency of Episodes:  2 Per:  Month   Last Blood Sugar Value:  90   Blood Sugar Trends:  No Change      ,   General Assessment    Do you have any symptoms that are causing concern?:  Yes  Progression since Onset:  Gradually Improving  Reported Symptoms:  Congestion, Cough      and Care Coordination Episodes    Type: Amb Care Coordination  Episode: Complex Care   Noted: 10/13/2020  Comments: [place] FRANCIS[place] II.[facility] - list referral - DM

## 2020-10-22 ENCOUNTER — HOSPITAL ENCOUNTER (OUTPATIENT)
Dept: NURSING | Age: 40
Discharge: HOME | End: 2020-10-22
Payer: MEDICARE

## 2020-10-22 VITALS
TEMPERATURE: 96.8 F | SYSTOLIC BLOOD PRESSURE: 126 MMHG | DIASTOLIC BLOOD PRESSURE: 66 MMHG | OXYGEN SATURATION: 95 % | BODY MASS INDEX: 41.63 KG/M2 | WEIGHT: 235 LBS | HEART RATE: 86 BPM | RESPIRATION RATE: 16 BRPM

## 2020-10-22 DIAGNOSIS — D83.9 COMMON VARIABLE IMMUNODEFICIENCY (HCC): Primary | ICD-10-CM

## 2020-10-22 LAB
ALBUMIN SERPL-MCNC: 4.4 G/DL (ref 3.5–5.1)
ALP BLD-CCNC: 173 U/L (ref 38–126)
ALT SERPL-CCNC: 20 U/L (ref 11–66)
ANION GAP SERPL CALCULATED.3IONS-SCNC: 17 MEQ/L (ref 8–16)
AST SERPL-CCNC: 25 U/L (ref 5–40)
BASOPHILS # BLD: 0.6 %
BASOPHILS ABSOLUTE: 0 THOU/MM3 (ref 0–0.1)
BILIRUB SERPL-MCNC: 0.6 MG/DL (ref 0.3–1.2)
BUN BLDV-MCNC: 15 MG/DL (ref 7–22)
CALCIUM SERPL-MCNC: 9.5 MG/DL (ref 8.5–10.5)
CHLORIDE BLD-SCNC: 101 MEQ/L (ref 98–111)
CO2: 24 MEQ/L (ref 23–33)
CREAT SERPL-MCNC: 0.6 MG/DL (ref 0.4–1.2)
EOSINOPHIL # BLD: 2.9 %
EOSINOPHILS ABSOLUTE: 0.2 THOU/MM3 (ref 0–0.4)
ERYTHROCYTE [DISTWIDTH] IN BLOOD BY AUTOMATED COUNT: 14.5 % (ref 11.5–14.5)
ERYTHROCYTE [DISTWIDTH] IN BLOOD BY AUTOMATED COUNT: 43.2 FL (ref 35–45)
GFR SERPL CREATININE-BSD FRML MDRD: > 90 ML/MIN/1.73M2
GLUCOSE BLD-MCNC: 119 MG/DL (ref 70–108)
HCT VFR BLD CALC: 41.2 % (ref 37–47)
HEMOGLOBIN: 13.4 GM/DL (ref 12–16)
IMMATURE GRANS (ABS): 0.02 THOU/MM3 (ref 0–0.07)
IMMATURE GRANULOCYTES: 0.2 %
LYMPHOCYTES # BLD: 27.5 %
LYMPHOCYTES ABSOLUTE: 2.3 THOU/MM3 (ref 1–4.8)
MCH RBC QN AUTO: 27.1 PG (ref 26–33)
MCHC RBC AUTO-ENTMCNC: 32.5 GM/DL (ref 32.2–35.5)
MCV RBC AUTO: 83.2 FL (ref 81–99)
MONOCYTES # BLD: 7.9 %
MONOCYTES ABSOLUTE: 0.7 THOU/MM3 (ref 0.4–1.3)
NUCLEATED RED BLOOD CELLS: 0 /100 WBC
PLATELET # BLD: 303 THOU/MM3 (ref 130–400)
PMV BLD AUTO: 10.8 FL (ref 9.4–12.4)
POTASSIUM SERPL-SCNC: 4.1 MEQ/L (ref 3.5–5.2)
RBC # BLD: 4.95 MILL/MM3 (ref 4.2–5.4)
SEG NEUTROPHILS: 60.9 %
SEGMENTED NEUTROPHILS ABSOLUTE COUNT: 5.1 THOU/MM3 (ref 1.8–7.7)
SODIUM BLD-SCNC: 142 MEQ/L (ref 135–145)
TOTAL PROTEIN: 6.9 G/DL (ref 6.1–8)
WBC # BLD: 8.3 THOU/MM3 (ref 4.8–10.8)

## 2020-10-22 PROCEDURE — 82784 ASSAY IGA/IGD/IGG/IGM EACH: CPT

## 2020-10-22 PROCEDURE — 6360000002 HC RX W HCPCS: Performed by: NURSE PRACTITIONER

## 2020-10-22 PROCEDURE — 80053 COMPREHEN METABOLIC PANEL: CPT

## 2020-10-22 PROCEDURE — 36415 COLL VENOUS BLD VENIPUNCTURE: CPT

## 2020-10-22 PROCEDURE — 85025 COMPLETE CBC W/AUTO DIFF WBC: CPT

## 2020-10-22 PROCEDURE — 6370000000 HC RX 637 (ALT 250 FOR IP): Performed by: NURSE PRACTITIONER

## 2020-10-22 PROCEDURE — 96413 CHEMO IV INFUSION 1 HR: CPT

## 2020-10-22 RX ORDER — SODIUM CHLORIDE 0.9 % (FLUSH) 0.9 %
10 SYRINGE (ML) INJECTION PRN
Status: CANCELLED | OUTPATIENT
Start: 2020-11-19

## 2020-10-22 RX ORDER — DIPHENHYDRAMINE HCL 25 MG
25 TABLET ORAL ONCE
Status: COMPLETED | OUTPATIENT
Start: 2020-10-22 | End: 2020-10-22

## 2020-10-22 RX ORDER — METHYLPREDNISOLONE SODIUM SUCCINATE 125 MG/2ML
125 INJECTION, POWDER, LYOPHILIZED, FOR SOLUTION INTRAMUSCULAR; INTRAVENOUS ONCE
Status: CANCELLED | OUTPATIENT
Start: 2020-11-19

## 2020-10-22 RX ORDER — DIPHENHYDRAMINE HCL 25 MG
25 TABLET ORAL ONCE
Status: CANCELLED | OUTPATIENT
Start: 2020-11-19

## 2020-10-22 RX ORDER — ACETAMINOPHEN 325 MG/1
650 TABLET ORAL ONCE
Status: CANCELLED | OUTPATIENT
Start: 2020-11-19

## 2020-10-22 RX ORDER — DIPHENHYDRAMINE HYDROCHLORIDE 50 MG/ML
50 INJECTION INTRAMUSCULAR; INTRAVENOUS ONCE
Status: CANCELLED | OUTPATIENT
Start: 2020-11-19

## 2020-10-22 RX ORDER — SODIUM CHLORIDE 9 MG/ML
INJECTION, SOLUTION INTRAVENOUS CONTINUOUS
Status: CANCELLED | OUTPATIENT
Start: 2020-11-19

## 2020-10-22 RX ORDER — AZATHIOPRINE 50 MG/1
TABLET ORAL
Qty: 60 TABLET | Refills: 2 | Status: SHIPPED | OUTPATIENT
Start: 2020-10-22 | End: 2020-11-30 | Stop reason: SDUPTHER

## 2020-10-22 RX ORDER — SODIUM CHLORIDE 0.9 % (FLUSH) 0.9 %
5 SYRINGE (ML) INJECTION PRN
Status: CANCELLED | OUTPATIENT
Start: 2020-11-19

## 2020-10-22 RX ORDER — ACETAMINOPHEN 325 MG/1
650 TABLET ORAL ONCE
Status: COMPLETED | OUTPATIENT
Start: 2020-10-22 | End: 2020-10-22

## 2020-10-22 RX ADMIN — DIPHENHYDRAMINE HCL 25 MG: 25 TABLET ORAL at 08:26

## 2020-10-22 RX ADMIN — ACETAMINOPHEN 650 MG: 325 TABLET ORAL at 08:26

## 2020-10-22 RX ADMIN — IMMUNE GLOBULIN (HUMAN) 20 G: 10 INJECTION INTRAVENOUS; SUBCUTANEOUS at 08:58

## 2020-10-22 ASSESSMENT — PAIN SCALES - GENERAL: PAINLEVEL_OUTOF10: 5

## 2020-10-22 ASSESSMENT — PAIN DESCRIPTION - DESCRIPTORS: DESCRIPTORS: ACHING

## 2020-10-22 ASSESSMENT — PAIN - FUNCTIONAL ASSESSMENT: PAIN_FUNCTIONAL_ASSESSMENT: 0-10

## 2020-10-22 NOTE — PROGRESS NOTES
1015 INFUSION COMPLETE TOLERATED WELL. HOME INSTRUCTIONS TO PT VERBALIZED UNDERSTANDING.   Harjeet Carranza

## 2020-10-22 NOTE — PROGRESS NOTES
0818 ALERT FEMALE ADMITTED FOR IVIG PROCEDURE REVIEWED WITH PT VERBALIZED UNDERSTANDING. Pt rights and responsibilities offered to pt to read.     _M___ Safety:       (Environmental)   [place] to environment   Ensure ID band is correct and in place/ allergy band as needed   Assess for fall risk   Initiate fall precautions as applicable (fall band, side rails, etc.)   Call light within reach   Bed in low position/ wheels locked    _M___ Pain:        Assess pain level and characteristics   Administer analgesics as ordered   Assess effectiveness of pain management and report to MD as needed    _M___ Knowledge Deficit:   Assess baseline knowledge   Provide teaching at level of understanding   Provide teaching via preferred learning method   Evaluate teaching effectiveness    _M___ Hemodynamic/Respiratory Status:        (Obtain weight/height   Assess vital signs/ LOC until patient meets discharge criteria   Monitor procedure site and notify MD of any issues    ___

## 2020-10-23 LAB — IGG: 587 MG/DL (ref 700–1600)

## 2020-10-26 ENCOUNTER — CARE COORDINATION (OUTPATIENT)
Dept: CARE COORDINATION | Age: 40
End: 2020-10-26

## 2020-10-26 NOTE — CARE COORDINATION
Attempted to reach patient for continued Care Coordination follow up and education. Patient was unavailable at the time of my call, and generic voicemail message was left asking patient to please return call to my direct number.   Miri Pearce RN Care Coordinator

## 2020-11-01 RX ORDER — AZATHIOPRINE 50 MG/1
TABLET ORAL
Qty: 60 TABLET | Refills: 2 | Status: CANCELLED | OUTPATIENT
Start: 2020-11-01

## 2020-11-02 ENCOUNTER — CARE COORDINATION (OUTPATIENT)
Dept: CARE COORDINATION | Age: 40
End: 2020-11-02

## 2020-11-02 LAB
FUNGUS IDENTIFIED: NORMAL
FUNGUS SMEAR: NORMAL

## 2020-11-02 NOTE — CARE COORDINATION
Attempted to reach patient for continued Care Coordination follow up and education. Patient was unavailable at the time of my call, and generic voicemail message was left asking patient to please return call to my direct number.   Maye Zabala RN Care Coordinator

## 2020-11-03 PROBLEM — I63.9 CEREBROVASCULAR ACCIDENT (CVA) (HCC): Status: RESOLVED | Noted: 2020-11-03 | Resolved: 2020-11-03

## 2020-11-03 RX ORDER — MONTELUKAST SODIUM 10 MG/1
10 TABLET ORAL NIGHTLY
Qty: 30 TABLET | Refills: 5 | Status: SHIPPED | OUTPATIENT
Start: 2020-11-03 | End: 2021-01-30 | Stop reason: SDUPTHER

## 2020-11-11 ENCOUNTER — CARE COORDINATION (OUTPATIENT)
Dept: CARE COORDINATION | Age: 40
End: 2020-11-11

## 2020-11-11 NOTE — CARE COORDINATION
Ambulatory Care Coordination Note  11/11/2020  CM Risk Score: 3  [place] 10 Year Mortality Risk Score: 47%     ACC: Rosario Betts RN    Summary Note: Patient returned My Chart message and stated she is feeling better and denied any current needs or questions - see My Chart encounter. Will continue to work to f/u with patient in the future. Plan of Care:   -[place] f/u calls for assistance with the management of her DM and healthcare needs  - Complete DM education - In Process   - Review availability of same day appointments, urgent care/walk in clinic options, and after hours on call resources  - Educate on importance of early symptom recognition   - Complete Med Rec - Completed   - Educate on Flu vaccine - Completed  - Patient is current with Pneumonia vaccine  - Review Healthcare Decision Maker information (Completed) and interest in Living Will   - Patient is current with eye appointment  - A1C 6.7%  - Monitor for additional needs and readiness to discharge from [address] [facility] Interventions    Program Enrollment:  Complex Care  Referral from Primary Care Provider:  No  Suggested Interventions and Community Resources  Diabetes Education:  Completed (Comment: Oct. 2020)  Disease Specific Clinic:  Not Started  Medication Assistance Program:  Declined  Medi Set or Pill Pack:  Declined  Pharmacist:  Not Started  Registered Dietician:  Not Started  Transportation Support:  Declined  Zone Management Tools:  Completed (Comment: Oct. 2020)         Goals Addressed    None         Prior to Admission medications    Medication Sig Start Date End Date Taking?  Authorizing Provider   montelukast (SINGULAIR) 10 MG tablet Take 1 tablet by mouth nightly 11/3/20   Anju Galvan MD   azaTHIOprine (IMURAN) 50 MG tablet Take 1 tablet by mouth twice daily 10/22/20   Rupinder Wood DO   Probiotic Acidophilus [facility]) TABS Take 1 tablet by mouth daily 10/14/20 11/13/20  Ledy Savage NAHID Magana CNP   fluticasone [facility]) 50 MCG/ACT nasal spray 2 sprays by Each Nostril route daily 10/12/20   Shan Gordon MD   Handicap Placard MISC by Does not apply route Dx:  Lupus arthritis,  Length 5 years 10/6/20   Ave Cai MD   acetaminophen (AMINOFEN) 325 MG tablet Take 2 tablets by mouth 3 times daily for 14 days 10/5/20 10/19/20  Ave Cai MD   Continuous Blood Gluc Sensor (DEXCOM G4 SENSOR) MISC 1 Units by Does not apply route every 7 days Each unit is every 10 days 10/5/20   Ave Cai MD   Continuous Blood Gluc  (DEXCOM G5 MOBILE ) APPLE 1 Units by Does not apply route daily 10/5/20   Ave Cai MD   atorvastatin (LIPITOR) 80 MG tablet Take 1 tablet by mouth daily 10/1/20   Ave Cai MD   metFORMIN (GLUCOPHAGE) 500 MG tablet TAKE 1 TABLET BY MOUTH TWICE DAILY WITH  MEALS 10/1/20   Ave Cai MD   cyclobenzaprine (FLEXERIL) 5 MG tablet Take 5 mg by mouth 3 times daily as needed  7/30/20   Historical Provider, MD   DULoxetine (CYMBALTA) 60 MG extended release capsule Take 60 mg by mouth daily 9/3/20   Historical Provider, MD   Immune Globulin, Human, (GAMUNEX-C) 20 GM/200ML SOLN solution Infuse 20 g intravenously every 30 days 9/30/20   NAHID Cooney CNP   Continuous Blood Gluc Sensor (FREESTYLE SANDRA 14 DAY SENSOR) [facility] USE TO CHECK BLOOD GLUCOSE TWICE DAILY 9/16/20   Ave Cai MD   clotrimazole-betamethasone (LOTRISONE) 1-0.05 % cream Apply topically 2 times daily.  9/1/20   Ave Cai MD   ibuprofen (ADVIL;MOTRIN) 600 MG tablet Take 1 tablet by mouth every 8 hours as needed for Pain 8/31/20   Ave Cai MD   predniSONE (DELTASONE) 5 MG tablet Take 1 tablet by mouth daily 8/17/20   NAHID Meredith CNP   potassium chloride (KLOR-CON M10) 10 MEQ extended release tablet Take 1 tablet by mouth daily 7/6/20   Ave Cai MD   traZODone (DESYREL) 50 MG tablet Take 3 tablets by mouth nightly 7/6/20   Ave Cai MD supplies. Patient not taking: Reported on 10/5/2020 12/6/18   NAHID Osorio CNP   EQ PAIN RELIEVER 325 MG tablet TAKE TWO TABLETS BY MOUTH EVERY 4 HOURS AS NEEDED FOR PAIN AND FEVER  Patient not taking: Reported on 10/5/2020 12/4/17   Maxim Gunter MD   Amitriptyline HCl (ELAVIL PO) Take 25 mg by mouth nightly     Historical Provider, MD   aspirin (LORENZO ASPIRIN) 325 MG tablet Take 1 tablet by mouth daily 9/11/17   Maxim Gunter MD   promethazine (PHENERGAN) 25 MG tablet Take 1 tablet by mouth every 6 hours as needed for Nausea 2/1/17   Maxim Gunter MD   diclofenac sodium (VOLTAREN) 1 % GEL Apply 2 g topically 4 times daily as needed for Pain (topical)     Historical Provider, MD       Future Appointments   Date Time Provider Herbert Portillo   11/12/2020  9:00 AM NAHID Thornton CNP SRPX ALLERGY [facility]   11/19/2020  8:30 AM STR EXAM ROOM 10 STRZ OP [address] HOD   12/29/2020  7:40 AM Radha March, DO SRPX Rheum [facility]   1/4/2021 10:00 AM STR CT IMAGING RM1  OP [facility] STRZ OUT EXP STR Radiolog   1/4/2021 10:30 AM STR PULMONARY FUNCTION ROOM 1 STRZ PFT [facility] HOD   1/13/2021  9:00 AM NAHID Osorio CNP Pulm Med [address]   2/8/2021  9:00 AM Maxim Gunter MD [facility]     ,   Diabetes Assessment    Meal Planning:  Avoidance of concentrated sweets, Carb counting   Do you have barriers with adherence to non-pharmacologic self-management interventions?  (Nutrition/Exercise/Self-Monitoring):  No   Have you ever had to go to the ED for symptoms of low blood sugar?:  No          ,   General Assessment        and Care Coordination Episodes    Type: Amb Care Coordination  Episode: Complex Care   Noted: 10/13/2020  Comments: [facility] - list referral - DM

## 2020-11-11 NOTE — CARE COORDINATION
Attempted to reach patient for continued Care Coordination follow up and education. Patient was unavailable at the time of my call, and generic voicemail message was left asking patient to please return call to my direct number. Follow up My Chart message also sent to patient. Will continue to work to f/u with patient in the future.   Zach Cornejo RN Care Coordinator

## 2020-11-12 ENCOUNTER — OFFICE VISIT (OUTPATIENT)
Dept: ALLERGY | Age: 40
End: 2020-11-12
Payer: MEDICARE

## 2020-11-12 VITALS
HEIGHT: 63 IN | TEMPERATURE: 97.3 F | DIASTOLIC BLOOD PRESSURE: 72 MMHG | SYSTOLIC BLOOD PRESSURE: 112 MMHG | WEIGHT: 232.9 LBS | HEART RATE: 82 BPM | RESPIRATION RATE: 14 BRPM | BODY MASS INDEX: 41.27 KG/M2

## 2020-11-12 PROCEDURE — 99215 OFFICE O/P EST HI 40 MIN: CPT | Performed by: NURSE PRACTITIONER

## 2020-11-12 RX ORDER — DIPHENHYDRAMINE HCL 25 MG
25 TABLET ORAL
Qty: 1 TABLET | Refills: 11
Start: 2020-11-12 | End: 2020-11-12

## 2020-11-12 RX ORDER — EPINEPHRINE 0.3 MG/.3ML
INJECTION SUBCUTANEOUS
Qty: 2 EACH | Refills: 0 | Status: SHIPPED | OUTPATIENT
Start: 2020-11-12

## 2020-11-12 ASSESSMENT — ENCOUNTER SYMPTOMS
TROUBLE SWALLOWING: 0
ABDOMINAL PAIN: 0
NAUSEA: 0
SORE THROAT: 0
COLOR CHANGE: 0
RHINORRHEA: 0
VOMITING: 0
CHEST TIGHTNESS: 0
COUGH: 0
VOICE CHANGE: 0
SINUS PRESSURE: 0
FACIAL SWELLING: 0
APNEA: 0
WHEEZING: 0
STRIDOR: 0
SHORTNESS OF BREATH: 0
CHOKING: 0
DIARRHEA: 0

## 2020-11-12 NOTE — PROGRESS NOTES
state that her mouth tingles and becomes numb and her tongue becomes numb whenever she eats cayenne pepper and she feels like that her tongue may swell up. She denies any difficulty breathing. She feels like she is sensitive to the food but not allergic        Review of Systems:  Review of Systems   Constitutional: Negative for activity change, appetite change, chills, diaphoresis, fatigue, fever and unexpected weight change. HENT: Negative for congestion, dental problem, ear discharge, ear pain, facial swelling, hearing loss, mouth sores, nosebleeds, postnasal drip, rhinorrhea, sinus pressure, sneezing, sore throat, tinnitus, trouble swallowing and voice change. Eyes: Negative for visual disturbance. Respiratory: Negative for apnea, cough, choking, chest tightness, shortness of breath, wheezing and stridor. Cardiovascular: Negative for chest pain, palpitations and leg swelling. Gastrointestinal: Negative for abdominal pain, diarrhea, nausea and vomiting. Endocrine: Negative for cold intolerance, heat intolerance, polydipsia and polyuria. Genitourinary: Negative for difficulty urinating, dysuria, enuresis, hematuria and urgency. Musculoskeletal: Negative for arthralgias, gait problem, neck pain and neck stiffness. Skin: Negative for color change and rash. Allergic/Immunologic: Positive for food allergies. Negative for environmental allergies and immunocompromised state. Neurological: Negative for dizziness, syncope, facial asymmetry, speech difficulty, light-headedness and headaches. Hematological: Negative for adenopathy. Does not bruise/bleed easily. Psychiatric/Behavioral: Negative for confusion and sleep disturbance. The patient is not nervous/anxious. All other systems reviewed and are negative.         Past MedicalHistory:    Past Medical History:   Diagnosis Date    Asthma     Diabetes mellitus ([facility] [address] 6/8/2018    Fibromyalgia     GERD (gastroesophageal reflux disease)     Hyperlipidemia     Lupus (systemic lupus erythematosus) (HCC)     Lupus arthritis [facility])     [facility]    Migraine     Plaquenil adverse reaction in therapeutic use 5/2016    changes in the eyes    TIA (transient ischemic attack)     8/2016       Past Surgical History:  Past Surgical History:   Procedure Laterality Date    HYSTERECTOMY  2009    Total, endometriosis    LAPAROSCOPY      SHOULDER ARTHROSCOPY Left 08/20/2015    Debridement of rotator cuff and bone spurs       Family History:   Family History   Problem Relation Age of Onset    High Blood Pressure Mother     Rheum Arthritis Mother     Osteoporosis Mother     Diabetes Father     High Blood Pressure Father     Arthritis Father         gout    Diabetes Sister     Other Sister         Crest Syndrome    Breast Cancer Sister 54    Diabetes Brother     High Cholesterol Brother     Breast Cancer Maternal Cousin 20       Social History:   Social History     Tobacco Use    Smoking status: Never Smoker    Smokeless tobacco: Never Used   Substance Use Topics    Alcohol use: No     Alcohol/week: 0.0 standard drinks     Frequency: Never     Binge frequency: Never        Allergies:  Cayenne; Codeine; Tape [adhesive tape];  Amoxicillin; and Sulfa antibiotics    CurrentMedications:     Current Outpatient Medications:     EPINEPHrine (EPIPEN 2-FERNANDO) 0.3 MG/0.3ML SOAJ injection, Inject one pen as directed STAT for allergic reaction, may disp generic NDC 58328-011-17, Disp: 2 each, Rfl: 0    montelukast (SINGULAIR) 10 MG tablet, Take 1 tablet by mouth nightly, Disp: 30 tablet, Rfl: 5    azaTHIOprine (IMURAN) 50 MG tablet, Take 1 tablet by mouth twice daily, Disp: 60 tablet, Rfl: 2    Probiotic Acidophilus (FLORANEX) TABS, Take 1 tablet by mouth daily, Disp: 30 tablet, Rfl: 1    fluticasone (FLONASE) 50 MCG/ACT nasal spray, 2 sprays by Each Nostril route daily, Disp: 1 Bottle, Rfl: 0    Handicap Placard MISC, by Does not apply route Dx:  Lupus arthritis,  Length 5 years, Disp: 1 each, Rfl: 0    Continuous Blood Gluc Sensor (DEXCOM G4 SENSOR) MISC, 1 Units by Does not apply route every 7 days Each unit is every 10 days, Disp: 3 each, Rfl: 11    Continuous Blood Gluc  (DEXCOM G5 MOBILE ) [facility], 1 Units by Does not apply route daily, Disp: 1 Device, Rfl: 0    atorvastatin (LIPITOR) 80 MG tablet, Take 1 tablet by mouth daily, Disp: 90 tablet, Rfl: 3    metFORMIN (GLUCOPHAGE) 500 MG tablet, TAKE 1 TABLET BY MOUTH TWICE DAILY WITH  MEALS, Disp: 180 tablet, Rfl: 1    cyclobenzaprine (FLEXERIL) 5 MG tablet, Take 5 mg by mouth 3 times daily as needed , Disp: , Rfl:     DULoxetine (CYMBALTA) 60 MG extended release capsule, Take 60 mg by mouth daily, Disp: , Rfl:     Immune Globulin, Human, (GAMUNEX-C) 20 GM/200ML SOLN solution, Infuse 20 g intravenously every 30 days, Disp: 200 mL, Rfl: 11    Continuous Blood Gluc Sensor (FREESTYLE SANDRA 14 DAY SENSOR) [facility], USE TO CHECK BLOOD GLUCOSE TWICE DAILY, Disp: 3 each, Rfl: 11    clotrimazole-betamethasone (LOTRISONE) 1-0.05 % cream, Apply topically 2 times daily. , Disp: 45 g, Rfl: 0    ibuprofen (ADVIL;MOTRIN) 600 MG tablet, Take 1 tablet by mouth every 8 hours as needed for Pain, Disp: 42 tablet, Rfl: 0    predniSONE (DELTASONE) 5 MG tablet, Take 1 tablet by mouth daily, Disp: 30 tablet, Rfl: 3    potassium chloride (KLOR-CON M10) 10 MEQ extended release tablet, Take 1 tablet by mouth daily, Disp: 90 tablet, Rfl: 3    traZODone (DESYREL) 50 MG tablet, Take 3 tablets by mouth nightly, Disp: 90 tablet, Rfl: 5    sucralfate (CARAFATE) 1 GM tablet, Take 1 tablet by mouth 4 times daily, Disp: 120 tablet, Rfl: 11    DULoxetine (CYMBALTA) 30 MG extended release capsule, Take 1 capsule by mouth daily, Disp: 90 capsule, Rfl: 1    Dulaglutide 0.75 MG/0.5ML SOPN, Inject 0.75 mg into the skin once a week, Disp: 5 pen, Rfl: 11    verapamil (CALAN SR) 240 MG extended release tablet, Take 1 tablet by mouth daily, Disp: 90 tablet, Rfl: 3    triamcinolone (KENALOG) 0.1 % cream, Apply topically 2 times daily Apply topically 2 times daily. , Disp: 1 Tube, Rfl: 5    magnesium oxide (MAG-OX) 400 (241.3 Mg) MG TABS tablet, Take 1 tablet by mouth daily, Disp: 90 tablet, Rfl: 5    clopidogrel (PLAVIX) 75 MG tablet, TAKE 1 TABLET BY MOUTH ONCE DAILY, Disp: 90 tablet, Rfl: 3    Continuous Blood Gluc Sensor (FREESTYLE SANDRA SENSOR SYSTEM) [facility], 1 Units by Does not apply route daily, Disp: 1 each, Rfl: 0    levocetirizine (XYZAL) 5 MG tablet, TAKE ONE TABLET BY MOUTH NIGHTLY, Disp: 30 tablet, Rfl: 11    Continuous Blood Gluc Sensor (FREESTYLE SANDRA SENSOR SYSTEM) MISC, 1 kit by Does not apply route 2 times daily Include sensors for a year  Dx:  E11.9, and lupus, Disp: 1 each, Rfl: 0    sodium chloride (OCEAN) 0.65 % nasal spray, 1 spray by Nasal route as needed for Congestion, Disp: 1 Bottle, Rfl: 3    NEXIUM 40 MG delayed release capsule, TAKE 1 CAPSULE BY MOUTH ONCE DAILY, Disp: 30 capsule, Rfl: 5    levalbuterol (XOPENEX HFA) 45 MCG/ACT inhaler, Inhale 1-2 puffs into the lungs every 4 hours as needed for Wheezing or Shortness of Breath, Disp: 1 Inhaler, Rfl: 3    levalbuterol (XOPENEX) 0.63 MG/3ML nebulization, Take 3 mLs by nebulization every 6 hours as needed for Wheezing or Shortness of Breath, Disp: 270 mL, Rfl: 5    Respiratory Therapy Supplies (NEBULIZER AIR TUBE/PLUGS) MISC, 1 kit by Does not apply route every 6 hours as needed (Wheezing/shortness of breath) Please provide nebulizer kit and supplies. , Disp: 1 each, Rfl: 0    EQ PAIN RELIEVER 325 MG tablet, TAKE TWO TABLETS BY MOUTH EVERY 4 HOURS AS NEEDED FOR PAIN AND FEVER, Disp: 120 tablet, Rfl: 3    Amitriptyline HCl (ELAVIL PO), Take 25 mg by mouth nightly , Disp: , Rfl:     aspirin (LORENZO ASPIRIN) 325 MG tablet, Take 1 tablet by mouth daily, Disp: 30 tablet, Rfl: 3    promethazine (PHENERGAN) 25 MG tablet, Take 1 tablet by mouth every 6 hours as needed for Nausea, Disp: 90 tablet, Rfl: 11    diclofenac sodium (VOLTAREN) 1 % GEL, Apply 2 g topically 4 times daily as needed for Pain (topical) , Disp: , Rfl:     acetaminophen (AMINOFEN) 325 MG tablet, Take 2 tablets by mouth 3 times daily for 14 days, Disp: 120 tablet, Rfl: 3      Physical Exam:      Vitals:    Vitals:    11/12/20 0906   BP: 112/72   Pulse: 82   Resp: 14   Temp: 97.3 °F (36.3 °C)       232 lb 14.4 oz (105.6 kg)       Temp: 97.3 °F (36.3 °C) I @FLOWSTAT(6)@ IPulse: 82 I @FLOWSTAT(8)@ I BP: 112/72 I @AOYOIA(33)@; @UYBRTE(52)@ I Resp: 14 I @FLOWSTAT(9)@ I   I @FLOWSTAT(10)@ I   I Height: 5' 3\" (160 cm) I   I Facility age limit for growth percentiles is 20 years. I     Facility age limit for growth percentiles is 20 years. Facility age limit for growth percentiles is 20 years. Facility age limit for growth percentiles is 20 years. Facility age limit for growth percentiles is 20 years. Physical Exam:    Physical Exam  Vitals signs and nursing note reviewed. Constitutional:       Appearance: Normal appearance. She is obese. HENT:      Head: Normocephalic and atraumatic. Right Ear: Tympanic membrane, ear canal and external ear normal.      Left Ear: Tympanic membrane, ear canal and external ear normal.      Nose: Nose normal.      Mouth/Throat:      Mouth: Mucous membranes are moist.      Pharynx: Oropharynx is clear. Eyes:      Extraocular Movements: Extraocular movements intact. Conjunctiva/sclera: Conjunctivae normal.      Pupils: Pupils are equal, round, and reactive to light. Neck:      Musculoskeletal: Normal range of motion and neck supple. Cardiovascular:      Rate and Rhythm: Normal rate and regular rhythm. Pulses: Normal pulses. Heart sounds: Normal heart sounds. Pulmonary:      Effort: Pulmonary effort is normal.      Breath sounds: Normal breath sounds. Musculoskeletal: Normal range of motion. Skin:     General: Skin is warm and dry. Capillary Refill: Capillary refill takes less than 2 seconds. Neurological:      General: No focal deficit present. Mental Status: She is alert and oriented to person, place, and time. Mental status is at baseline. Psychiatric:         Mood and Affect: Mood normal.         Behavior: Behavior normal.         Thought Content: Thought content normal.         Judgment: Judgment normal.             DATA:  Lab Review:    CBC:   Lab Results   Component Value Date    WBC 8.3 10/22/2020    RBC 4.95 10/22/2020    HGB 13.4 10/22/2020    HCT 41.2 10/22/2020    MCV 83.2 10/22/2020    MCH 27.1 10/22/2020    MCHC 32.5 10/22/2020    RDW 14.5 08/07/2018     10/22/2020          IgE   Date/Time Value Ref Range Status   10/03/2020 09:30 AM 2 <101 IU/mL Final     Comment:     [facility] [address] [address] [phone]     Immunoglobulin E   Date/Time Value Ref Range Status   10/03/2020 09:30 AM 4 <=214 kU/L Final     Comment:     REFERENCE INTERVAL: Immunoglobulin E, Serum  Access complete set of age- and/or gender-specific reference  intervals for this test in the [facility] Test Directory  ([facility]). IgG   Date/Time Value Ref Range Status   10/22/2020 08:36  (L) 700 - 1600 mg/dL Final     Comment:     [facility] [address] [address] [phone]     IgA   Date/Time Value Ref Range Status   10/03/2020 09:30 AM 96 70 - 400 mg/dL Final     Comment:     [facility] [address] [address] [phone]      IgM   Date/Time Value Ref Range Status   10/03/2020 09:30 AM < 25 (L) 40 - 230 mg/dL Final     Comment:     [facility] [address] [address] [phone]       No results found for: EVELIN   No results found for: RF       No results found for this or any previous visit. No results found for this or any previous visit.             PROCEDURES:      Skin Testing performed on: blood test performed 10/2020 all negative for food allergies. Assessment/Orders:    Diagnosis Orders   1. CVID (common variable immunodeficiency) ([facility] [address]     2. Food allergy         Plan:  Follow Up:6 months    Continue IVIG -we will monitor monthly labs    Patient to avoid cayenne pepper as it does cause her to have allergic type responses. I have given her an EpiPen and instructed her how to use    Patient was instructed on use of EpiPen. In case of severe allergic reaction the patient had any angioedema, scratchy throat, trouble breathing, chest tightness they're to use an EpiPen. Patient to use the EpiPen by injecting the vial into the lateral thigh through clothing. To hold in place for 5-10 seconds. Following use patient is to immediately go to the emergency room. EpiPen may be repeated after 5-10 minutes if no improvement. Patient 6 emergency treatment and is stable they are to notify this office of the event. If emergent or severe allergic reactions occurs related to the injection we will need to cease or consider reducing dose. Do not keep epipen in places that it may become too hot or too cold and inactivates the medication. Store according to manufacture recommendations. Take OTC Zinc to help support immune system    If patient has recurrent sinusitis I have highly encouraged for her sinuses to be cultured. Patient has refused a follow-up final sinus culture today to make sure she is cleared the chronic sinus infection. She is asymptomatic but I would like to make sure the gram-positive is resolved.   Patient still declines    If patient does get recurrent gram-positive sinus infection I feel like it is in her best interest to go ahead refer her to ENT for them to treat further and evaluate more aggressively      Follow up with me in 6 months    Spent 45 minutes of face-to-face time with the patient with well more than half of the visit being dedicated to the discussion of the various symptom problems, provided education of medications and disease process, as well as discussion of a therapeutic plan for each.     (Please note that portions of this note may have been completed with a voice recognition program.  Efforts were made to edit the dictation but occasionally words are mis-transcribed.)         Signed:  NAHID Monroe CNP  11/12/2020  9:53 AM

## 2020-11-19 ENCOUNTER — HOSPITAL ENCOUNTER (OUTPATIENT)
Dept: NURSING | Age: 40
Discharge: HOME | End: 2020-11-19
Payer: MEDICARE

## 2020-11-19 VITALS
RESPIRATION RATE: 18 BRPM | WEIGHT: 233 LBS | TEMPERATURE: 97.2 F | HEART RATE: 93 BPM | BODY MASS INDEX: 41.27 KG/M2 | DIASTOLIC BLOOD PRESSURE: 82 MMHG | SYSTOLIC BLOOD PRESSURE: 128 MMHG | OXYGEN SATURATION: 93 %

## 2020-11-19 DIAGNOSIS — D83.9 COMMON VARIABLE IMMUNODEFICIENCY (HCC): Primary | ICD-10-CM

## 2020-11-19 LAB
ALBUMIN SERPL-MCNC: 4.4 G/DL (ref 3.5–5.1)
ALP BLD-CCNC: 164 U/L (ref 38–126)
ALT SERPL-CCNC: 30 U/L (ref 11–66)
ANION GAP SERPL CALCULATED.3IONS-SCNC: 14 MEQ/L (ref 8–16)
AST SERPL-CCNC: 38 U/L (ref 5–40)
BASOPHILS # BLD: 0.4 %
BASOPHILS ABSOLUTE: 0 THOU/MM3 (ref 0–0.1)
BILIRUB SERPL-MCNC: 0.4 MG/DL (ref 0.3–1.2)
BUN BLDV-MCNC: 8 MG/DL (ref 7–22)
CALCIUM SERPL-MCNC: 9.7 MG/DL (ref 8.5–10.5)
CHLORIDE BLD-SCNC: 100 MEQ/L (ref 98–111)
CO2: 24 MEQ/L (ref 23–33)
CREAT SERPL-MCNC: 0.6 MG/DL (ref 0.4–1.2)
EOSINOPHIL # BLD: 2.2 %
EOSINOPHILS ABSOLUTE: 0.2 THOU/MM3 (ref 0–0.4)
ERYTHROCYTE [DISTWIDTH] IN BLOOD BY AUTOMATED COUNT: 14.6 % (ref 11.5–14.5)
ERYTHROCYTE [DISTWIDTH] IN BLOOD BY AUTOMATED COUNT: 44.2 FL (ref 35–45)
GFR SERPL CREATININE-BSD FRML MDRD: > 90 ML/MIN/1.73M2
GLUCOSE BLD-MCNC: 102 MG/DL (ref 70–108)
HCT VFR BLD CALC: 39.9 % (ref 37–47)
HEMOGLOBIN: 13 GM/DL (ref 12–16)
IGG: 658 MG/DL (ref 700–1600)
IMMATURE GRANS (ABS): 0.02 THOU/MM3 (ref 0–0.07)
IMMATURE GRANULOCYTES: 0.3 %
LYMPHOCYTES # BLD: 28.1 %
LYMPHOCYTES ABSOLUTE: 2.1 THOU/MM3 (ref 1–4.8)
MCH RBC QN AUTO: 27.3 PG (ref 26–33)
MCHC RBC AUTO-ENTMCNC: 32.6 GM/DL (ref 32.2–35.5)
MCV RBC AUTO: 83.6 FL (ref 81–99)
MONOCYTES # BLD: 7.3 %
MONOCYTES ABSOLUTE: 0.5 THOU/MM3 (ref 0.4–1.3)
NUCLEATED RED BLOOD CELLS: 0 /100 WBC
PLATELET # BLD: 292 THOU/MM3 (ref 130–400)
PMV BLD AUTO: 10.7 FL (ref 9.4–12.4)
POTASSIUM SERPL-SCNC: 4.4 MEQ/L (ref 3.5–5.2)
RBC # BLD: 4.77 MILL/MM3 (ref 4.2–5.4)
SEG NEUTROPHILS: 61.7 %
SEGMENTED NEUTROPHILS ABSOLUTE COUNT: 4.5 THOU/MM3 (ref 1.8–7.7)
SODIUM BLD-SCNC: 138 MEQ/L (ref 135–145)
TOTAL PROTEIN: 6.9 G/DL (ref 6.1–8)
WBC # BLD: 7.3 THOU/MM3 (ref 4.8–10.8)

## 2020-11-19 PROCEDURE — 96413 CHEMO IV INFUSION 1 HR: CPT

## 2020-11-19 PROCEDURE — 82784 ASSAY IGA/IGD/IGG/IGM EACH: CPT

## 2020-11-19 PROCEDURE — 85025 COMPLETE CBC W/AUTO DIFF WBC: CPT

## 2020-11-19 PROCEDURE — 36415 COLL VENOUS BLD VENIPUNCTURE: CPT

## 2020-11-19 PROCEDURE — 6360000002 HC RX W HCPCS: Performed by: NURSE PRACTITIONER

## 2020-11-19 PROCEDURE — 80053 COMPREHEN METABOLIC PANEL: CPT

## 2020-11-19 RX ORDER — SODIUM CHLORIDE 0.9 % (FLUSH) 0.9 %
10 SYRINGE (ML) INJECTION PRN
Status: DISCONTINUED | OUTPATIENT
Start: 2020-11-19 | End: 2020-11-20 | Stop reason: HOSPADM

## 2020-11-19 RX ORDER — DIPHENHYDRAMINE HYDROCHLORIDE 50 MG/ML
50 INJECTION INTRAMUSCULAR; INTRAVENOUS ONCE
Status: CANCELLED | OUTPATIENT
Start: 2020-12-17

## 2020-11-19 RX ORDER — METHYLPREDNISOLONE SODIUM SUCCINATE 125 MG/2ML
125 INJECTION, POWDER, LYOPHILIZED, FOR SOLUTION INTRAMUSCULAR; INTRAVENOUS ONCE
Status: CANCELLED | OUTPATIENT
Start: 2020-12-17

## 2020-11-19 RX ORDER — SODIUM CHLORIDE 9 MG/ML
INJECTION, SOLUTION INTRAVENOUS CONTINUOUS
Status: CANCELLED | OUTPATIENT
Start: 2020-12-17

## 2020-11-19 RX ORDER — DIPHENHYDRAMINE HCL 25 MG
25 TABLET ORAL ONCE
Status: CANCELLED | OUTPATIENT
Start: 2020-12-17

## 2020-11-19 RX ORDER — DIPHENHYDRAMINE HCL 25 MG
25 TABLET ORAL ONCE
Status: DISCONTINUED | OUTPATIENT
Start: 2020-11-19 | End: 2020-11-20 | Stop reason: HOSPADM

## 2020-11-19 RX ORDER — SODIUM CHLORIDE 0.9 % (FLUSH) 0.9 %
10 SYRINGE (ML) INJECTION PRN
Status: CANCELLED | OUTPATIENT
Start: 2020-12-17

## 2020-11-19 RX ORDER — SODIUM CHLORIDE 0.9 % (FLUSH) 0.9 %
5 SYRINGE (ML) INJECTION PRN
Status: CANCELLED | OUTPATIENT
Start: 2020-12-17

## 2020-11-19 RX ORDER — ACETAMINOPHEN 325 MG/1
650 TABLET ORAL ONCE
Status: CANCELLED | OUTPATIENT
Start: 2020-12-17

## 2020-11-19 RX ORDER — ACETAMINOPHEN 325 MG/1
650 TABLET ORAL ONCE
Status: DISCONTINUED | OUTPATIENT
Start: 2020-11-19 | End: 2020-11-20 | Stop reason: HOSPADM

## 2020-11-19 RX ADMIN — IMMUNE GLOBULIN (HUMAN) 20 G: 10 INJECTION INTRAVENOUS; SUBCUTANEOUS at 09:30

## 2020-11-19 ASSESSMENT — PAIN SCALES - GENERAL
PAINLEVEL_OUTOF10: 0

## 2020-11-19 ASSESSMENT — PAIN - FUNCTIONAL ASSESSMENT: PAIN_FUNCTIONAL_ASSESSMENT: 0-10

## 2020-11-19 NOTE — PROGRESS NOTES
0820  Pt ambulated into room for ivig infusion. Pt rights and responsibilities offered to patient. Pt offered snack and drink. Pt states no signs or symptoms of infections. 0930 infusion started no other needs at this time. 1030  Pt has no needs at this time, infusion infusing. 1100pt d/c instructions explained and pt verbalized understanding. Pt ambulated out for d/c.  Next infusion scheduled              _m___ Safety:       (Environmental)   [place] to environment   Ensure ID band is correct and in place/ allergy band as needed   Assess for fall risk   Initiate fall precautions as applicable (fall band, side rails, etc.)   Call light within reach   Bed in low position/ wheels locked    _m___ Pain:        Assess pain level and characteristics   Administer analgesics as ordered   Assess effectiveness of pain management and report to MD as needed    _m___ Knowledge Deficit:   Assess baseline knowledge   Provide teaching at level of understanding   Provide teaching via preferred learning method   Evaluate teaching effectiveness    _m___ Hemodynamic/Respiratory Status:       (Pre and Post Procedure Monitoring)   Assess/Monitor vital signs and LOC   Assess Baseline SpO2 prior to any sedation   Obtain weight/height   Assess vital signs/ LOC until patient meets discharge criteria   Monitor procedure site and notify MD of any issues

## 2020-11-23 ENCOUNTER — OFFICE VISIT (OUTPATIENT)
Dept: ALLERGY | Age: 40
End: 2020-11-23
Payer: MEDICARE

## 2020-11-23 VITALS
HEART RATE: 88 BPM | WEIGHT: 233 LBS | DIASTOLIC BLOOD PRESSURE: 70 MMHG | RESPIRATION RATE: 14 BRPM | SYSTOLIC BLOOD PRESSURE: 128 MMHG | HEIGHT: 63 IN | TEMPERATURE: 97.6 F | BODY MASS INDEX: 41.29 KG/M2

## 2020-11-23 PROCEDURE — 99214 OFFICE O/P EST MOD 30 MIN: CPT | Performed by: NURSE PRACTITIONER

## 2020-11-23 RX ORDER — HEPARIN SODIUM (PORCINE) LOCK FLUSH IV SOLN 100 UNIT/ML 100 UNIT/ML
100 SOLUTION INTRAVENOUS
Qty: 5 ML | Refills: 11
Start: 2020-11-23 | End: 2021-07-01

## 2020-11-23 ASSESSMENT — ENCOUNTER SYMPTOMS
VOICE CHANGE: 0
COUGH: 0
APNEA: 0
NAUSEA: 0
DIARRHEA: 0
TROUBLE SWALLOWING: 0
CHOKING: 0
WHEEZING: 0
FACIAL SWELLING: 0
SORE THROAT: 0
VOMITING: 0
CHEST TIGHTNESS: 0
RHINORRHEA: 0
STRIDOR: 0
SINUS PRESSURE: 0
SHORTNESS OF BREATH: 0
ABDOMINAL PAIN: 0
COLOR CHANGE: 0

## 2020-11-23 NOTE — PROGRESS NOTES
@[email or web address]@    Allergy & Asthma   [address] [facility], [address]  Ph:   [phone]  Fax:[phone]    Provider:  Dr. Trena Jim:   Chief Complaint   Patient presents with    Follow-up     Patient is here to discuss possible port placement for IVIG therapy            HISTORY OF PRESENT ILLNESS: ESTABLISHED PATIENT HERE FOR EVALUATION   58-year-old  female who presents today to discuss obtaining MediPort. Patient has poor central venous access. She has bruises all over her arms and hands as they have tried multiple times to gain access for patient to have her infusions. Patient denies any nausea, vomiting, chest pain. She reports that she is read about the Mediport understands the risk and benefits of wants to have. Severity obtaining her venous access is severe as patient has very brittle and poor veins. Patient has rebreathe been receiving IVIG infusions over the last several months. They have had increased difficulty obtaining her access to give patient the IVIG infusions. Review of Systems:  Review of Systems   Constitutional: Negative for activity change, appetite change, chills, diaphoresis, fatigue, fever and unexpected weight change. HENT: Negative for congestion, dental problem, ear discharge, ear pain, facial swelling, hearing loss, mouth sores, nosebleeds, postnasal drip, rhinorrhea, sinus pressure, sneezing, sore throat, tinnitus, trouble swallowing and voice change. Eyes: Negative for visual disturbance. Respiratory: Negative for apnea, cough, choking, chest tightness, shortness of breath, wheezing and stridor. Cardiovascular: Negative for chest pain, palpitations and leg swelling. Gastrointestinal: Negative for abdominal pain, diarrhea, nausea and vomiting. Endocrine: Negative for cold intolerance, heat intolerance, polydipsia and polyuria.    Genitourinary: Negative for difficulty urinating, dysuria, enuresis, hematuria and urgency. Musculoskeletal: Negative for arthralgias, gait problem, neck pain and neck stiffness. Skin: Negative for color change and rash. Allergic/Immunologic: Negative for environmental allergies, food allergies and immunocompromised state. Neurological: Negative for dizziness, syncope, facial asymmetry, speech difficulty, light-headedness and headaches. Hematological: Negative for adenopathy. Does not bruise/bleed easily. Psychiatric/Behavioral: Negative for confusion and sleep disturbance. The patient is not nervous/anxious. All other systems reviewed and are negative.         Past MedicalHistory:    Past Medical History:   Diagnosis Date    Asthma     Diabetes mellitus ([facility] [address] 6/8/2018    Fibromyalgia     GERD (gastroesophageal reflux disease)     Hyperlipidemia     Lup[facility] lupus erythematosus) [facility])     Lupus arthritis [facility])     [facility]    Migraine     Plaquenil adverse reaction in therapeutic use 5/2016    changes in the eyes    TIA (transient ischemic attack)     8/2016       Past Surgical History:  Past Surgical History:   Procedure Laterality Date    HYSTERECTOMY  2009    Total, endometriosis    LAPAROSCOPY      SHOULDER ARTHROSCOPY Left 08/20/2015    Debridement of rotator cuff and bone spurs       Family History:   Family History   Problem Relation Age of Onset    High Blood Pressure Mother     Rheum Arthritis Mother     Osteoporosis Mother     Diabetes Father     High Blood Pressure Father     Arthritis Father         gout    Diabetes Sister     Other Sister         Crest Syndrome    Breast Cancer Sister 54    Diabetes Brother     High Cholesterol Brother     Breast Cancer Maternal Cousin 20       Social History:   Social History     Tobacco Use    Smoking status: Never Smoker    Smokeless tobacco: Never Used   Substance Use Topics    Alcohol use: No     Alcohol/week: 0.0 standard drinks     Frequency: Never     Binge frequency: Never        Allergies:  Cayenne; Codeine; Tape [adhesive tape];  Amoxicillin; and Sulfa antibiotics    CurrentMedications:     Current Outpatient Medications:     heparin flush 100 UNIT/ML injection, 1 mL by Intracatheter route every 30 days Flush mediport monthly with 5 mls (or 500 units) of Heparin (100 units per ml), Disp: 5 mL, Rfl: 11    EPINEPHrine (EPIPEN 2-FERNANDO) 0.3 MG/0.3ML SOAJ injection, Inject one pen as directed STAT for allergic reaction, may disp generic NDC 41214-877-10, Disp: 2 each, Rfl: 0    Immune Globulin, Human, (GAMUNEX-C) 20 GM/200ML SOLN solution, Infuse 20 g intravenously every 30 days Premedicate with tylenol 650 mg and benadryl 25 mg 30 minutes prior to infusion IF patient has not already premedicated  CODE , Disp: 200 mL, Rfl: 5    montelukast (SINGULAIR) 10 MG tablet, Take 1 tablet by mouth nightly, Disp: 30 tablet, Rfl: 5    azaTHIOprine (IMURAN) 50 MG tablet, Take 1 tablet by mouth twice daily, Disp: 60 tablet, Rfl: 2    fluticasone (FLONASE) 50 MCG/ACT nasal spray, 2 sprays by Each Nostril route daily, Disp: 1 Bottle, Rfl: 0    Handicap Placard MISC, by Does not apply route Dx:  Lupus arthritis,  Length 5 years, Disp: 1 each, Rfl: 0    acetaminophen (AMINOFEN) 325 MG tablet, Take 2 tablets by mouth 3 times daily for 14 days, Disp: 120 tablet, Rfl: 3    Continuous Blood Gluc Sensor (DEXCOM G4 SENSOR) MISC, 1 Units by Does not apply route every 7 days Each unit is every 10 days, Disp: 3 each, Rfl: 11    Continuous Blood Gluc  (DEXCOM G5 MOBILE ) APPLE, 1 Units by Does not apply route daily, Disp: 1 Device, Rfl: 0    atorvastatin (LIPITOR) 80 MG tablet, Take 1 tablet by mouth daily, Disp: 90 tablet, Rfl: 3    metFORMIN (GLUCOPHAGE) 500 MG tablet, TAKE 1 TABLET BY MOUTH TWICE DAILY WITH  MEALS, Disp: 180 tablet, Rfl: 1    cyclobenzaprine (FLEXERIL) 5 MG tablet, Take 5 mg by mouth 3 times daily as needed , Disp: , Rfl:     DULoxetine (CYMBALTA) 60 MG extended release capsule, Take 60 mg by mouth daily, Disp: , Rfl:     Continuous Blood Gluc Sensor (FREESTYLE SANDRA 14 DAY SENSOR) MISC, USE TO CHECK BLOOD GLUCOSE TWICE DAILY, Disp: 3 each, Rfl: 11    clotrimazole-betamethasone (LOTRISONE) 1-0.05 % cream, Apply topically 2 times daily. , Disp: 45 g, Rfl: 0    ibuprofen (ADVIL;MOTRIN) 600 MG tablet, Take 1 tablet by mouth every 8 hours as needed for Pain, Disp: 42 tablet, Rfl: 0    predniSONE (DELTASONE) 5 MG tablet, Take 1 tablet by mouth daily, Disp: 30 tablet, Rfl: 3    potassium chloride (KLOR-CON M10) 10 MEQ extended release tablet, Take 1 tablet by mouth daily, Disp: 90 tablet, Rfl: 3    traZODone (DESYREL) 50 MG tablet, Take 3 tablets by mouth nightly, Disp: 90 tablet, Rfl: 5    sucralfate (CARAFATE) 1 GM tablet, Take 1 tablet by mouth 4 times daily, Disp: 120 tablet, Rfl: 11    DULoxetine (CYMBALTA) 30 MG extended release capsule, Take 1 capsule by mouth daily, Disp: 90 capsule, Rfl: 1    Dulaglutide 0.75 MG/0.5ML SOPN, Inject 0.75 mg into the skin once a week, Disp: 5 pen, Rfl: 11    verapamil (CALAN SR) 240 MG extended release tablet, Take 1 tablet by mouth daily, Disp: 90 tablet, Rfl: 3    triamcinolone (KENALOG) 0.1 % cream, Apply topically 2 times daily Apply topically 2 times daily. , Disp: 1 Tube, Rfl: 5    magnesium oxide (MAG-OX) 400 (241.3 Mg) MG TABS tablet, Take 1 tablet by mouth daily, Disp: 90 tablet, Rfl: 5    clopidogrel (PLAVIX) 75 MG tablet, TAKE 1 TABLET BY MOUTH ONCE DAILY, Disp: 90 tablet, Rfl: 3    Continuous Blood Gluc Sensor (FREESTYLE SANDRA SENSOR SYSTEM) MISC, 1 Units by Does not apply route daily, Disp: 1 each, Rfl: 0    levocetirizine (XYZAL) 5 MG tablet, TAKE ONE TABLET BY MOUTH NIGHTLY, Disp: 30 tablet, Rfl: 11    Continuous Blood Gluc Sensor (FREESTYLE SANDRA SENSOR SYSTEM) MISC, 1 kit by Does not apply route 2 times daily Include sensors for a year  Dx:  E11.9, and lupus, Disp: 1 each, Rfl: 0    sodium chloride (OCEAN) 0.65 % nasal spray, 1 spray by Nasal route as needed for Congestion, Disp: 1 Bottle, Rfl: 3    NEXIUM 40 MG delayed release capsule, TAKE 1 CAPSULE BY MOUTH ONCE DAILY, Disp: 30 capsule, Rfl: 5    levalbuterol (XOPENEX HFA) 45 MCG/ACT inhaler, Inhale 1-2 puffs into the lungs every 4 hours as needed for Wheezing or Shortness of Breath, Disp: 1 Inhaler, Rfl: 3    levalbuterol (XOPENEX) 0.63 MG/3ML nebulization, Take 3 mLs by nebulization every 6 hours as needed for Wheezing or Shortness of Breath, Disp: 270 mL, Rfl: 5    Respiratory Therapy Supplies (NEBULIZER AIR TUBE/PLUGS) MISC, 1 kit by Does not apply route every 6 hours as needed (Wheezing/shortness of breath) Please provide nebulizer kit and supplies. , Disp: 1 each, Rfl: 0    EQ PAIN RELIEVER 325 MG tablet, TAKE TWO TABLETS BY MOUTH EVERY 4 HOURS AS NEEDED FOR PAIN AND FEVER, Disp: 120 tablet, Rfl: 3    Amitriptyline HCl (ELAVIL PO), Take 25 mg by mouth nightly , Disp: , Rfl:     aspirin (LORENZO ASPIRIN) 325 MG tablet, Take 1 tablet by mouth daily, Disp: 30 tablet, Rfl: 3    promethazine (PHENERGAN) 25 MG tablet, Take 1 tablet by mouth every 6 hours as needed for Nausea, Disp: 90 tablet, Rfl: 11    diclofenac sodium (VOLTAREN) 1 % GEL, Apply 2 g topically 4 times daily as needed for Pain (topical) , Disp: , Rfl:       Physical Exam:      Vitals:    Vitals:    11/23/20 0956   BP: 128/70   Pulse: 88   Resp: 14   Temp: 97.6 °F (36.4 °C)       233 lb (105.7 kg)       Temp: 97.6 °F (36.4 °C) I @FLOWSTAT(6)@ IPulse: 88 I @FLOWSTAT(8)@ I BP: 128/70 I @YHRRAV(13)@; @HZCUEI(76)@ I Resp: 14 I @FLOWSTAT(9)@ I   I @FLOWSTAT(10)@ I   I Height: 5' 3\" (160 cm) I   I Facility age limit for growth percentiles is 20 years. I     Facility age limit for growth percentiles is 20 years. Facility age limit for growth percentiles is 20 years. Facility age limit for growth percentiles is 20 years.   Facility age limit for growth percentiles is 20 years.    Physical Exam:    Physical Exam  Vitals signs and nursing note reviewed. Constitutional:       Appearance: Normal appearance. She is normal weight. HENT:      Head: Normocephalic and atraumatic. Right Ear: Ear canal and external ear normal.      Left Ear: Ear canal and external ear normal.      Nose: Nose normal.      Mouth/Throat:      Mouth: Mucous membranes are moist.      Pharynx: Oropharynx is clear. Eyes:      Extraocular Movements: Extraocular movements intact. Conjunctiva/sclera: Conjunctivae normal.      Pupils: Pupils are equal, round, and reactive to light. Neck:      Musculoskeletal: Normal range of motion and neck supple. Cardiovascular:      Rate and Rhythm: Normal rate and regular rhythm. Pulses: Normal pulses. Heart sounds: Normal heart sounds. Pulmonary:      Effort: Pulmonary effort is normal.      Breath sounds: Normal breath sounds. Musculoskeletal: Normal range of motion. Skin:     General: Skin is warm and dry. Capillary Refill: Capillary refill takes less than 2 seconds. Neurological:      General: No focal deficit present. Mental Status: She is alert and oriented to person, place, and time. Mental status is at baseline. Psychiatric:         Mood and Affect: Mood normal.         Behavior: Behavior normal.         Thought Content:  Thought content normal.         Judgment: Judgment normal.             DATA:  Lab Review:    CBC:   Lab Results   Component Value Date    WBC 7.3 11/19/2020    RBC 4.77 11/19/2020    HGB 13.0 11/19/2020    HCT 39.9 11/19/2020    MCV 83.6 11/19/2020    MCH 27.3 11/19/2020    MCHC 32.6 11/19/2020    RDW 14.5 08/07/2018     11/19/2020          IgE   Date/Time Value Ref Range Status   10/03/2020 09:30 AM 2 <101 IU/mL Final     Comment:     [employer] [address] [place], [place] [address] [phone]     Immunoglobulin E   Date/Time Value Ref Range Status   10/03/2020 09:30 AM 4 <=214 kU/L Final Comment:     REFERENCE INTERVAL: Immunoglobulin E, Serum  Access complete set of age- and/or gender-specific reference  intervals for this test in the [facility] Laboratory Test Directory  ([email or web address]). IgG   Date/Time Value Ref Range Status   11/19/2020 09:30  (L) 700 - 1600 mg/dL Final     Comment:     [employer] [facility] [address] [facility] [phone]     IgA   Date/Time Value Ref Range Status   10/03/2020 09:30 AM 96 70 - 400 mg/dL Final     Comment:     [employer] [address] [facility] [phone]      IgM   Date/Time Value Ref Range Status   10/03/2020 09:30 AM < 25 (L) 40 - 230 mg/dL Final     Comment:     [employer] [address], [address] [facility] [phone]       No results found for: EVELIN   No results found for: RF       No results found for this or any previous visit. No results found for this or any previous visit. Assessment/Orders:    Diagnosis Orders   1. Poor venous access  IR INTERVENTIONAL RADIOLOGY PROCEDURE REQUEST    heparin flush 100 UNIT/ML injection       Plan:  Follow Up:as scheduled    I have discussed the Mediport with the patient today. Patient will be referred to interventional radiology to have a Mediport placed. At that time she will be placed under light conscious sedation. Patient has been explained the risk of conscious sedation. I have also explained the Mediport process to the patient. After Mediport is placed patient cannot use the Mediport for approximately 2 weeks. Patient expresses understanding. Risk of complications of placement includes scarring, infection, poor port placement, thrombosis, reaction to blood thinner, inability for port to work as expected, pneumothorax, pain at the placement site, and continual need to have it monitored monthly and flushed with saline and then heparin. This is regardless of patient receives infusion or not.     Patient will also need Mediport removed when she has completed her therapy. She understands that she will have to undergo conscious sedation again and have to go back to interventional radiology will then it be removed. Similar complication as above including infection, poor wound healing, pain at the site, and pneumothorax. Patient expresses understanding despite risk of Mediport placement they wish to continue to have Mediport placed. Patient is agreement once the IVIG treatment has stopped that the Mediport will be removed    Patient also understands Mediport cannot be accessed by anyone other than interventional radiology or outpatient nursing to have IVIG. Should the patient choose to stop the treatment I will order the Mediport to be removed. If patient does not return to clinic is noncompliant then I will be on her to find someone to continually manage the Mediport that it should not go and managed due to risk of thrombosis. Patient expresses understanding      Spent 25 minutes of face-to-face time with the patient with well more than half of the visit being dedicated to the discussion of the various symptom problems, provided education of medications and disease process, as well as discussion of a therapeutic plan for each.     (Please note that portions of this note may have been completed with a voice recognition program.  Efforts were made to edit the dictation but occasionally words are mis-transcribed.)         Signed:  NAHID Vaughn CNP  11/23/2020  10:28 AM

## 2020-11-25 ENCOUNTER — CARE COORDINATION (OUTPATIENT)
Dept: CARE COORDINATION | Age: 40
End: 2020-11-25

## 2020-11-25 NOTE — CARE COORDINATION
Attempted to reach patient for continued Care Coordination follow up and education. Patient was unavailable at the time of my call, and generic voicemail message was left asking patient to please return call to my direct number.   Randall Ramires, RN Care Coordinator

## 2020-11-25 NOTE — CARE COORDINATION
Ambulatory Care Coordination Note  11/25/2020  CM Risk Score: 3  Charlson 10 Year Mortality Risk Score: 47%     ACC: Zach Cornejo RN    Summary Note: Patient returned call for continued Care Coordination follow up and education re: the management of her DM and healthcare needs. Patient shared she has been doing well, and she denied any current concerns or complaints. Patient reported she continues to undergo IVIG treatments and is scheduled for mediport placement next week d/t poor IV access during recent infusions. Patient denied any questions re: the procedure. Patient reported BS have been doing well since starting Trulicity. Patient admits to occasional but rare hypoglycemia and hyperglycemia. Hypoglycemia and hyperglycemia signs/symptoms and hypoglycemia treatment education was reviewed with patient. Patient verbalized understanding. Importance of keeping BS controlled reviewed with patient and she acknowledged understanding. Patient was also educated on availability of same day appointments, urgent care/walk in clinic options, and after hours on call resources. Patient verbalized understanding. Patient denied any other questions, concerns, or needs and she was encouraged to call with any that may develop.            Actions:   - Reviewed DM education   - Educated on hyperglycemia/hypoglycemia  - Educated on hypoglycemia treatment  - Reviewed importance of keeping BS controlled  - Monitored for questions re: mediport placement  - Educated on availability of same day appointments, urgent care/walk in clinic options, and after hours on call resources  - Monitored for additional needs          Plan of Care:   -Raven f/u calls for assistance with the management of her DM and healthcare needs  - Complete DM education - In Process   - Review availability of same day appointments, urgent care/walk in clinic options, and after hours on call resources - Completed  - Educate on importance of early symptom recognition - In Process  - Complete Med Rec - Completed   - Educate on Flu vaccine - Completed  - Patient is current with Pneumonia vaccine  - Review Healthcare Decision Maker information (Completed) and interest in Living Will   - Patient is current with eye appointment  - A1C 6.7%  - Monitor for additional needs and readiness to discharge from [address] [place] [address] Coordination Interventions    Program Enrollment:  Complex Care  Referral from Primary Care Provider:  No  Suggested Interventions and Community Resources  Diabetes Education:  Completed (Comment: Oct. 2020)  Disease Specific Clinic:  Not Started  Medication Assistance Program:  Declined  Medi Set or Pill Pack:  Declined  Pharmacist:  Not Started  Registered Dietician:  Not Started  Transportation Support:  Declined  Zone Management Tools:  Completed (Comment: Oct. 2020)         Goals Addressed                 This Visit's Progress       Care Coordination     Conditions and Symptoms   Improving     I will schedule office visits, as directed by my provider. I will keep my appointment or reschedule if I have to cancel. I will notify my provider of any barriers to my plan of care. I will follow my Zone Management tool to seek urgent or emergent care. I will notify my provider of any symptoms that indicate a worsening of my condition. Barriers: lack of support, overwhelmed by complexity of regimen, stress, and lack of education  Plan for overcoming my barriers: Provide support and education to patient and monitor for resource needs  Confidence: 8/10  Anticipated Goal Completion Date: 2/25/2021              Prior to Admission medications    Medication Sig Start Date End Date Taking?  Authorizing Provider   heparin flush 100 UNIT/ML injection 1 mL by Intracatheter route every 30 days Flush mediport monthly with 5 mls (or 500 units) of Heparin (100 units per ml) 11/23/20   Ileana Mail, APRN - CNP   EPINEPHrine (EPIPEN 2-FERNANDO) 0.3 MG/0.3ML SOAJ injection Inject one pen as directed STAT for allergic reaction, may disp generic UlTito Gregg 47 [id number] 11/12/20   NAHID Muse CNP   Immune Globulin, Human, (GAMUNEX-C) 20 GM/200ML SOLN solution Infuse 20 g intravenously every 30 days Premedicate with tylenol 650 mg and benadryl 25 mg 30 minutes prior to infusion IF patient has not already premedicated    CODE  11/12/20   NAHID Muse - CNP   montelukast (SINGULAIR) 10 MG tablet Take 1 tablet by mouth nightly 11/3/20   Nneka Olsen MD   azaTHIOprine (IMURAN) 50 MG tablet Take 1 tablet by mouth twice daily 10/22/20   Bill Matos DO   fluticasone (FLONASE) 50 MCG/ACT nasal spray 2 sprays by Each Nostril route daily 10/12/20   Alex Irizarry MD   Handicap Placard MISC by Does not apply route Dx:  Lupus arthritis,  Length 5 years 10/6/20   Nneka Olsen MD   acetaminophen (AMINOFEN) 325 MG tablet Take 2 tablets by mouth 3 times daily for 14 days 10/5/20 11/23/20  Nneka Olsen MD   Continuous Blood Gluc Sensor (DEXCOM G4 SENSOR) MISC 1 Units by Does not apply route every 7 days Each unit is every 10 days 10/5/20   Nneka Olsen MD   Continuous Blood Gluc  (DEXCOM G5 MOBILE ) APPLE 1 Units by Does not apply route daily 10/5/20   Nneka Olsen MD   atorvastatin (LIPITOR) 80 MG tablet Take 1 tablet by mouth daily 10/1/20   Nneka Olsen MD   metFORMIN (GLUCOPHAGE) 500 MG tablet TAKE 1 TABLET BY MOUTH TWICE DAILY WITH  MEALS 10/1/20   nNeka Olsen MD   cyclobenzaprine (FLEXERIL) 5 MG tablet Take 5 mg by mouth 3 times daily as needed  7/30/20   Historical Provider, MD   DULoxetine (CYMBALTA) 60 MG extended release capsule Take 60 mg by mouth daily 9/3/20   Historical Provider, MD   Continuous Blood Gluc Sensor (FREESTYLE SANDRA 14 DAY SENSOR) [facility] USE TO CHECK BLOOD GLUCOSE TWICE DAILY 9/16/20   Nneka Olsen MD   clotrimazole-betamethasone (LOTRISONE) 1-0.05 % cream Apply topically 2 times daily. 9/1/20   Sal Calabrese MD   ibuprofen (ADVIL;MOTRIN) 600 MG tablet Take 1 tablet by mouth every 8 hours as needed for Pain 8/31/20   Sal Calabrese MD   predniSONE (DELTASONE) 5 MG tablet Take 1 tablet by mouth daily 8/17/20   Pallavi Coad, APRN - CNP   potassium chloride (KLOR-CON M10) 10 MEQ extended release tablet Take 1 tablet by mouth daily 7/6/20   Sal Calabrese MD   traZODone (DESYREL) 50 MG tablet Take 3 tablets by mouth nightly 7/6/20   Sal Calabrese MD   sucralfate (CARAFATE) 1 GM tablet Take 1 tablet by mouth 4 times daily 6/30/20   Sal Calabrese MD   DULoxetine (CYMBALTA) 30 MG extended release capsule Take 1 capsule by mouth daily 6/30/20   Vance LEW Mirna, DO   Dulaglutide 0.75 MG/0.5ML SOPN Inject 0.75 mg into the skin once a week 2/3/20   Sal Calabrese MD   verapamil (CALAN SR) 240 MG extended release tablet Take 1 tablet by mouth daily 12/30/19   Sal Calabrese MD   triamcinolone (KENALOG) 0.1 % cream Apply topically 2 times daily Apply topically 2 times daily.  12/20/19   Sal Calabrese MD   magnesium oxide (MAG-OX) 400 (241.3 Mg) MG TABS tablet Take 1 tablet by mouth daily 12/1/19   Sal Calabrese MD   clopidogrel (PLAVIX) 75 MG tablet TAKE 1 TABLET BY MOUTH ONCE DAILY 12/1/19   Sal Calabrese MD   Continuous Blood Gluc Sensor (FREESTYLE SANDRA SENSOR SYSTEM) MISC 1 Units by Does not apply route daily 9/30/19   Sal Calabrese MD   levocetirizine (XYZAL) 5 MG tablet TAKE ONE TABLET BY MOUTH NIGHTLY 7/30/19   Sal Calabrese MD   Continuous Blood Gluc Sensor (FREESTYLE SANDRA SENSOR SYSTEM) MISC 1 kit by Does not apply route 2 times daily Include sensors for a year  Dx:  E11.9, and lupus 7/17/19   Sal Calabrese MD   sodium chloride (OCEAN) 0.65 % nasal spray 1 spray by Nasal route as needed for Congestion 5/1/19   Sal Calabrese MD   NEXIUM 40 MG delayed release capsule TAKE 1 CAPSULE BY MOUTH ONCE DAILY 4/29/19   Sal Calabrese MD   levalbuterol Towana Adelso HFA) 45 MCG/ACT inhaler Inhale 1-2 puffs into the lungs every 4 hours as needed for Wheezing or Shortness of Breath 1/3/19   NAHID Martin CNP   levalbuterol (XOPENEX) 0.63 MG/3ML nebulization Take 3 mLs by nebulization every 6 hours as needed for Wheezing or Shortness of Breath 12/6/18   NAHID Martin CNP   Respiratory Therapy Supplies (NEBULIZER AIR TUBE/PLUGS) MISC 1 kit by Does not apply route every 6 hours as needed (Wheezing/shortness of breath) Please provide nebulizer kit and supplies. 12/6/18   NAHID Cho CNP   EQ PAIN RELIEVER 325 MG tablet TAKE TWO TABLETS BY MOUTH EVERY 4 HOURS AS NEEDED FOR PAIN AND FEVER 12/4/17   Alexi Cueva MD   Amitriptyline HCl (ELAVIL PO) Take 25 mg by mouth nightly     Historical Provider, MD   aspirin (LORENZO ASPIRIN) 325 MG tablet Take 1 tablet by mouth daily 9/11/17   Alexi Cueva MD   promethazine (PHENERGAN) 25 MG tablet Take 1 tablet by mouth every 6 hours as needed for Nausea 2/1/17   Alexi Cueva MD   diclofenac sodium (VOLTAREN) 1 % GEL Apply 2 g topically 4 times daily as needed for Pain (topical)     Historical Provider, MD       Future Appointments   Date Time Provider Herbert Portillo   12/2/2020  8:00 AM STR SPECIAL PROCEDURE ROOM 1 [facility] SPEC STR Radiolog   12/17/2020  8:30 AM STR EXAM ROOM 4 STR[facility] OP NURS [place] HOD   1/4/2021 10:00 AM STR CT IMAGING RM1  OP EXPRESS STR[facility] OUT EXP STR Radiolog   1/4/2021 10:30 AM STR PULMONARY FUNCTION ROOM 1 [facility] [place]   1/5/2021  9:30 AM NAHID Alexis CNP SRPX Rheum [facility] [place]   1/13/2021  9:00 AM NAHID Martin CNP Pulm Med [facility] [place]   2/8/2021  9:00 AM Alexi Cueva MD [facility] [place]   5/12/2021  9:00 AM NAHID Barlow - CNP SRPX ALLERGY P - [facility] [place]     ,   Diabetes Assessment    Meal Planning:  Avoidance of concentrated sweets, Carb counting   Do you have barriers with adherence to non-pharmacologic self-management interventions?  (Nutrition/Exercise/Self-Monitoring):  No   Have you ever had to go to the ED for symptoms of low blood sugar?:  No       No patient-reported symptoms   Do you have hyperglycemia symptoms?:  Yes   Frequency of Episodes:  1 Per:  Month   Do you have hypoglycemia symptoms?:  Yes   Frequency of Episodes:  2 Per:  Month   Blood Sugar Monitoring Regimen:  Before Meals, Morning Fasting, At Bedtime   Blood Sugar Trends:  No Change      ,   General Assessment    Do you have any symptoms that are causing concern?:  No      and Care Coordination Episodes    Type: Amb Care Coordination  Episode: Complex Care   Noted: 10/13/2020  Comments: [place] - list referral - DM

## 2020-11-27 RX ORDER — SODIUM CHLORIDE 0.9 % (FLUSH) 0.9 %
10 SYRINGE (ML) INJECTION PRN
Status: CANCELLED | OUTPATIENT
Start: 2020-12-14

## 2020-11-27 RX ORDER — HEPARIN SODIUM (PORCINE) LOCK FLUSH IV SOLN 100 UNIT/ML 100 UNIT/ML
500 SOLUTION INTRAVENOUS PRN
Status: CANCELLED | OUTPATIENT
Start: 2020-12-14

## 2020-11-27 RX ORDER — CYCLOBENZAPRINE HCL 5 MG
TABLET ORAL
Qty: 270 TABLET | Refills: 0 | Status: SHIPPED | OUTPATIENT
Start: 2020-11-27 | End: 2021-05-01 | Stop reason: SDUPTHER

## 2020-11-27 RX ORDER — ACETAMINOPHEN 325 MG/1
650 TABLET ORAL ONCE
Status: CANCELLED | OUTPATIENT
Start: 2020-12-14

## 2020-11-27 RX ORDER — DIPHENHYDRAMINE HCL 25 MG
25 TABLET ORAL ONCE
Status: CANCELLED | OUTPATIENT
Start: 2020-12-14

## 2020-11-29 ENCOUNTER — HOSPITAL ENCOUNTER (OUTPATIENT)
Age: 40
Discharge: HOME | End: 2020-11-29
Payer: MEDICARE

## 2020-11-29 LAB
ALBUMIN SERPL-MCNC: 4.3 G/DL (ref 3.5–5.1)
ALP BLD-CCNC: 159 U/L (ref 38–126)
ALT SERPL-CCNC: 27 U/L (ref 11–66)
ANION GAP SERPL CALCULATED.3IONS-SCNC: 14 MEQ/L (ref 8–16)
AST SERPL-CCNC: 32 U/L (ref 5–40)
BASOPHILS # BLD: 0.4 %
BASOPHILS ABSOLUTE: 0 THOU/MM3 (ref 0–0.1)
BILIRUB SERPL-MCNC: 0.4 MG/DL (ref 0.3–1.2)
BUN BLDV-MCNC: 9 MG/DL (ref 7–22)
BUN BLDV-MCNC: 9 MG/DL (ref 7–22)
C-REACTIVE PROTEIN: 0.17 MG/DL (ref 0–1)
CALCIUM SERPL-MCNC: 10 MG/DL (ref 8.5–10.5)
CALCIUM SERPL-MCNC: 10 MG/DL (ref 8.5–10.5)
CHLORIDE BLD-SCNC: 103 MEQ/L (ref 98–111)
CHLORIDE BLD-SCNC: 103 MEQ/L (ref 98–111)
CO2: 24 MEQ/L (ref 23–33)
CO2: 24 MEQ/L (ref 23–33)
CREAT SERPL-MCNC: 0.6 MG/DL (ref 0.4–1.2)
CREAT SERPL-MCNC: 0.6 MG/DL (ref 0.4–1.2)
EOSINOPHIL # BLD: 2.7 %
EOSINOPHILS ABSOLUTE: 0.2 THOU/MM3 (ref 0–0.4)
ERYTHROCYTE [DISTWIDTH] IN BLOOD BY AUTOMATED COUNT: 14.8 % (ref 11.5–14.5)
ERYTHROCYTE [DISTWIDTH] IN BLOOD BY AUTOMATED COUNT: 45.1 FL (ref 35–45)
GFR SERPL CREATININE-BSD FRML MDRD: > 90 ML/MIN/1.73M2
GLUCOSE BLD-MCNC: 164 MG/DL (ref 70–108)
GLUCOSE BLD-MCNC: 164 MG/DL (ref 70–108)
HCT VFR BLD CALC: 41.8 % (ref 37–47)
HEMOGLOBIN: 13.3 GM/DL (ref 12–16)
IMMATURE GRANS (ABS): 0.01 THOU/MM3 (ref 0–0.07)
IMMATURE GRANULOCYTES: 0.1 %
LYMPHOCYTES # BLD: 25.5 %
LYMPHOCYTES ABSOLUTE: 1.8 THOU/MM3 (ref 1–4.8)
MCH RBC QN AUTO: 26.8 PG (ref 26–33)
MCHC RBC AUTO-ENTMCNC: 31.8 GM/DL (ref 32.2–35.5)
MCV RBC AUTO: 84.3 FL (ref 81–99)
MONOCYTES # BLD: 6.3 %
MONOCYTES ABSOLUTE: 0.4 THOU/MM3 (ref 0.4–1.3)
NUCLEATED RED BLOOD CELLS: 0 /100 WBC
PLATELET # BLD: 306 THOU/MM3 (ref 130–400)
PMV BLD AUTO: 11 FL (ref 9.4–12.4)
POTASSIUM SERPL-SCNC: 4 MEQ/L (ref 3.5–5.2)
POTASSIUM SERPL-SCNC: 4 MEQ/L (ref 3.5–5.2)
RBC # BLD: 4.96 MILL/MM3 (ref 4.2–5.4)
SEDIMENTATION RATE, ERYTHROCYTE: 14 MM/HR (ref 0–20)
SEG NEUTROPHILS: 65 %
SEGMENTED NEUTROPHILS ABSOLUTE COUNT: 4.6 THOU/MM3 (ref 1.8–7.7)
SODIUM BLD-SCNC: 141 MEQ/L (ref 135–145)
SODIUM BLD-SCNC: 141 MEQ/L (ref 135–145)
TOTAL PROTEIN: 6.8 G/DL (ref 6.1–8)
WBC # BLD: 7.1 THOU/MM3 (ref 4.8–10.8)

## 2020-11-29 PROCEDURE — 36415 COLL VENOUS BLD VENIPUNCTURE: CPT

## 2020-11-29 PROCEDURE — 85651 RBC SED RATE NONAUTOMATED: CPT

## 2020-11-29 PROCEDURE — 85025 COMPLETE CBC W/AUTO DIFF WBC: CPT

## 2020-11-29 PROCEDURE — 80053 COMPREHEN METABOLIC PANEL: CPT

## 2020-11-29 PROCEDURE — 86140 C-REACTIVE PROTEIN: CPT

## 2020-11-30 RX ORDER — ACETAMINOPHEN 325 MG/1
650 TABLET ORAL 3 TIMES DAILY
Qty: 120 TABLET | Refills: 3 | Status: SHIPPED | OUTPATIENT
Start: 2020-11-30 | End: 2021-02-01

## 2020-11-30 RX ORDER — DULOXETIN HYDROCHLORIDE 30 MG/1
30 CAPSULE, DELAYED RELEASE ORAL DAILY
Qty: 90 CAPSULE | Refills: 1 | Status: SHIPPED | OUTPATIENT
Start: 2020-11-30 | End: 2021-08-30

## 2020-11-30 RX ORDER — AZATHIOPRINE 50 MG/1
TABLET ORAL
Qty: 60 TABLET | Refills: 2 | Status: SHIPPED | OUTPATIENT
Start: 2020-11-30 | End: 2021-02-08 | Stop reason: SDUPTHER

## 2020-12-01 RX ORDER — SODIUM CHLORIDE 450 MG/100ML
INJECTION, SOLUTION INTRAVENOUS CONTINUOUS
Status: CANCELLED | OUTPATIENT
Start: 2020-12-01

## 2020-12-01 RX ORDER — CLINDAMYCIN PHOSPHATE 600 MG/50ML
600 INJECTION INTRAVENOUS
Status: CANCELLED | OUTPATIENT
Start: 2020-12-01

## 2020-12-01 RX ORDER — FENTANYL CITRATE 50 UG/ML
50 INJECTION, SOLUTION INTRAMUSCULAR; INTRAVENOUS ONCE
Status: CANCELLED | OUTPATIENT
Start: 2020-12-01 | End: 2020-12-01

## 2020-12-01 RX ORDER — MIDAZOLAM HYDROCHLORIDE 2 MG/2ML
1 INJECTION, SOLUTION INTRAMUSCULAR; INTRAVENOUS ONCE
Status: CANCELLED | OUTPATIENT
Start: 2020-12-01 | End: 2020-12-01

## 2020-12-01 RX ORDER — HEPARIN SODIUM (PORCINE) LOCK FLUSH IV SOLN 100 UNIT/ML 100 UNIT/ML
500 SOLUTION INTRAVENOUS ONCE
Status: CANCELLED | OUTPATIENT
Start: 2020-12-01 | End: 2020-12-01

## 2020-12-02 ENCOUNTER — HOSPITAL ENCOUNTER (OUTPATIENT)
Dept: INTERVENTIONAL RADIOLOGY/VASCULAR | Age: 40
Discharge: HOME | End: 2020-12-02
Payer: MEDICARE

## 2020-12-02 VITALS
TEMPERATURE: 97.8 F | HEART RATE: 83 BPM | RESPIRATION RATE: 18 BRPM | OXYGEN SATURATION: 98 % | SYSTOLIC BLOOD PRESSURE: 142 MMHG | DIASTOLIC BLOOD PRESSURE: 78 MMHG

## 2020-12-02 LAB
ERYTHROCYTE [DISTWIDTH] IN BLOOD BY AUTOMATED COUNT: 14.6 % (ref 11.5–14.5)
ERYTHROCYTE [DISTWIDTH] IN BLOOD BY AUTOMATED COUNT: 44.6 FL (ref 35–45)
HCT VFR BLD CALC: 39.1 % (ref 37–47)
HEMOGLOBIN: 12.7 GM/DL (ref 12–16)
MCH RBC QN AUTO: 26.8 PG (ref 26–33)
MCHC RBC AUTO-ENTMCNC: 32.5 GM/DL (ref 32.2–35.5)
MCV RBC AUTO: 82.5 FL (ref 81–99)
PLATELET # BLD: 269 THOU/MM3 (ref 130–400)
PMV BLD AUTO: 10.5 FL (ref 9.4–12.4)
RBC # BLD: 4.74 MILL/MM3 (ref 4.2–5.4)
WBC # BLD: 6.9 THOU/MM3 (ref 4.8–10.8)

## 2020-12-02 PROCEDURE — 2500000003 HC RX 250 WO HCPCS

## 2020-12-02 PROCEDURE — C1769 GUIDE WIRE: HCPCS

## 2020-12-02 PROCEDURE — 36415 COLL VENOUS BLD VENIPUNCTURE: CPT

## 2020-12-02 PROCEDURE — 2580000003 HC RX 258: Performed by: RADIOLOGY

## 2020-12-02 PROCEDURE — 76937 US GUIDE VASCULAR ACCESS: CPT | Performed by: RADIOLOGY

## 2020-12-02 PROCEDURE — C1788 PORT, INDWELLING, IMP: HCPCS

## 2020-12-02 PROCEDURE — 2709999900 HC NON-CHARGEABLE SUPPLY

## 2020-12-02 PROCEDURE — 2500000003 HC RX 250 WO HCPCS: Performed by: RADIOLOGY

## 2020-12-02 PROCEDURE — 36561 INSERT TUNNELED CV CATH: CPT | Performed by: RADIOLOGY

## 2020-12-02 PROCEDURE — 85027 COMPLETE CBC AUTOMATED: CPT

## 2020-12-02 PROCEDURE — C1894 INTRO/SHEATH, NON-LASER: HCPCS

## 2020-12-02 PROCEDURE — 6360000002 HC RX W HCPCS

## 2020-12-02 PROCEDURE — 77001 FLUOROGUIDE FOR VEIN DEVICE: CPT | Performed by: RADIOLOGY

## 2020-12-02 RX ORDER — SODIUM CHLORIDE 450 MG/100ML
INJECTION, SOLUTION INTRAVENOUS CONTINUOUS
Status: DISCONTINUED | OUTPATIENT
Start: 2020-12-02 | End: 2020-12-03 | Stop reason: HOSPADM

## 2020-12-02 RX ORDER — FENTANYL CITRATE 50 UG/ML
50 INJECTION, SOLUTION INTRAMUSCULAR; INTRAVENOUS ONCE
Status: COMPLETED | OUTPATIENT
Start: 2020-12-02 | End: 2020-12-02

## 2020-12-02 RX ORDER — HEPARIN SODIUM (PORCINE) LOCK FLUSH IV SOLN 100 UNIT/ML 100 UNIT/ML
500 SOLUTION INTRAVENOUS ONCE
Status: COMPLETED | OUTPATIENT
Start: 2020-12-02 | End: 2020-12-02

## 2020-12-02 RX ORDER — CLINDAMYCIN PHOSPHATE 600 MG/50ML
600 INJECTION INTRAVENOUS
Status: COMPLETED | OUTPATIENT
Start: 2020-12-02 | End: 2020-12-02

## 2020-12-02 RX ORDER — MIDAZOLAM HYDROCHLORIDE 2 MG/2ML
1 INJECTION, SOLUTION INTRAMUSCULAR; INTRAVENOUS ONCE
Status: COMPLETED | OUTPATIENT
Start: 2020-12-02 | End: 2020-12-02

## 2020-12-02 RX ADMIN — FENTANYL CITRATE 50 MCG: 50 INJECTION, SOLUTION INTRAMUSCULAR; INTRAVENOUS at 09:48

## 2020-12-02 RX ADMIN — CLINDAMYCIN PHOSPHATE 600 MG: 600 INJECTION, SOLUTION INTRAVENOUS at 08:51

## 2020-12-02 RX ADMIN — FENTANYL CITRATE 50 MCG: 50 INJECTION, SOLUTION INTRAMUSCULAR; INTRAVENOUS at 09:58

## 2020-12-02 RX ADMIN — HEPARIN SODIUM (PORCINE) LOCK FLUSH IV SOLN 100 UNIT/ML 500 UNITS: 100 SOLUTION at 10:07

## 2020-12-02 RX ADMIN — MIDAZOLAM HYDROCHLORIDE 1 MG: 2 INJECTION, SOLUTION INTRAMUSCULAR; INTRAVENOUS at 09:57

## 2020-12-02 RX ADMIN — MIDAZOLAM HYDROCHLORIDE 1 MG: 2 INJECTION, SOLUTION INTRAMUSCULAR; INTRAVENOUS at 09:47

## 2020-12-02 RX ADMIN — SODIUM CHLORIDE: 4.5 INJECTION, SOLUTION INTRAVENOUS at 08:38

## 2020-12-02 ASSESSMENT — PAIN SCALES - GENERAL
PAINLEVEL_OUTOF10: 0

## 2020-12-02 NOTE — H&P
Formulation and discussion of sedation / procedure plans, risks, benefits, side effects and alternatives with patient and/or responsible adult completed.     Electronically signed by Ana Cunningham MD on 12/2/2020 at 9:47 AM

## 2020-12-02 NOTE — OP NOTE
Department of Radiology  Post Procedure Progress Note      Pre-Procedure Diagnosis:  immune deficiency    Procedure Performed:  mediport insertion     Anesthesia: local / versed and fentanyl    Findings: successful    Immediate Complications:  None    Estimated Blood Loss: minimal    SEE DICTATED PROCEDURE NOTE FOR COMPLETE DETAILS.     Abbey Guerra MD   12/2/2020 10:17 AM

## 2020-12-02 NOTE — H&P
[address] [address]  Sedation/Analgesia History & Physical    Pt Name: Kisha Yeung  MRN: [id number]  YOB: 1980  Provider Performing Procedure: Homa Waters MD  Primary Care Physician: Marisela Mendoza MD    PRE-PROCEDURE   DNR-CCA/DNR-CC []Yes [x]No  Brief History/Pre-Procedure Diagnosis: immune deficiency          MEDICAL HISTORY  []CAD/Valve  []Liver Disease  []Lung Disease []Diabetes  []Hypertension []Renal Disease  []Additional information:       has a past medical history of Asthma, Diabetes mellitus ([facility] [address] Fibromyalgia, GERD (gastroesophageal reflux disease), Hyperlipidemia, Lupus (systemic lupus erythematosus) ([facility] [address] Lupus arthritis ([facility] [address] Migraine, Plaquenil adverse reaction in therapeutic use, and TIA (transient ischemic attack). SURGICAL HISTORY   has a past surgical history that includes Hysterectomy (2009); laparoscopy; and Shoulder arthroscopy (Left, 08/20/2015).   Additional information:       ALLERGIES   Allergies as of 12/02/2020 - Review Complete 12/02/2020   Allergen Reaction Noted    Cayenne Swelling 01/27/2016    Codeine Rash 09/15/2012    Tape Coralee Lynne tape]  09/15/2012    Amoxicillin Other (See Comments) 03/30/2017    Sulfa antibiotics Other (See Comments) 05/23/2016     Additional information:       MEDICATIONS   Coumadin Use Last 5 Days [x]No []Yes  Antiplatelet drug therapy use last 5 days  [x]No []Yes  Other anticoagulant use last 5 days  [x]No []Yes    Current Outpatient Medications:     aspirin (LORENZO ASPIRIN) 325 MG tablet, Take 1 tablet by mouth daily, Disp: 30 tablet, Rfl: 3    DULoxetine (CYMBALTA) 30 MG extended release capsule, Take 1 capsule by mouth daily, Disp: 90 capsule, Rfl: 1    azaTHIOprine (IMURAN) 50 MG tablet, Take 1 tablet by mouth twice daily, Disp: 60 tablet, Rfl: 2    acetaminophen (AMINOFEN) 325 MG tablet, Take 2 tablets by mouth 3 times daily for 14 days, Disp: 120 tablet, Rfl: 3    clopidogrel (PLAVIX) 75 MG tablet, Take 1 tablet by mouth once daily, Disp: 90 tablet, Rfl: 3    cyclobenzaprine (FLEXERIL) 5 MG tablet, Take 1 tablet by mouth three times daily as needed for muscle spasm, Disp: 270 tablet, Rfl: 0    heparin flush 100 UNIT/ML injection, 1 mL by Intracatheter route every 30 days Flush mediport monthly with 5 mls (or 500 units) of Heparin (100 units per ml), Disp: 5 mL, Rfl: 11    EPINEPHrine (EPIPEN 2-FERNANDO) 0.3 MG/0.3ML SOAJ injection, Inject one pen as directed STAT for allergic reaction, may disp generic NDC 94235-892-80, Disp: 2 each, Rfl: 0    Immune Globulin, Human, (GAMUNEX-C) 20 GM/200ML SOLN solution, Infuse 20 g intravenously every 30 days Premedicate with tylenol 650 mg and benadryl 25 mg 30 minutes prior to infusion IF patient has not already premedicated  CODE , Disp: 200 mL, Rfl: 5    montelukast (SINGULAIR) 10 MG tablet, Take 1 tablet by mouth nightly, Disp: 30 tablet, Rfl: 5    fluticasone (FLONASE) 50 MCG/ACT nasal spray, 2 sprays by Each Nostril route daily, Disp: 1 Bottle, Rfl: 0    Handicap Placard MISC, by Does not apply route Dx:  Lupus arthritis,  Length 5 years, Disp: 1 each, Rfl: 0    Continuous Blood Gluc Sensor (DEXCOM G4 SENSOR) MISC, 1 Units by Does not apply route every 7 days Each unit is every 10 days, Disp: 3 each, Rfl: 11    Continuous Blood Gluc  (DEXCOM G5 MOBILE ) APPLE, 1 Units by Does not apply route daily, Disp: 1 Device, Rfl: 0    atorvastatin (LIPITOR) 80 MG tablet, Take 1 tablet by mouth daily, Disp: 90 tablet, Rfl: 3    metFORMIN (GLUCOPHAGE) 500 MG tablet, TAKE 1 TABLET BY MOUTH TWICE DAILY WITH  MEALS, Disp: 180 tablet, Rfl: 1    DULoxetine (CYMBALTA) 60 MG extended release capsule, Take 60 mg by mouth daily, Disp: , Rfl:     Continuous Blood Gluc Sensor (FREESTYLE SANDRA 14 DAY SENSOR) [facility], USE TO CHECK BLOOD GLUCOSE TWICE DAILY, Disp: 3 each, Rfl: 11    clotrimazole-betamethasone (LOTRISONE) 1-0.05 % cream, Apply topically 2 times daily. , Disp: 45 g, Rfl: 0    ibuprofen (ADVIL;MOTRIN) 600 MG tablet, Take 1 tablet by mouth every 8 hours as needed for Pain, Disp: 42 tablet, Rfl: 0    predniSONE (DELTASONE) 5 MG tablet, Take 1 tablet by mouth daily, Disp: 30 tablet, Rfl: 3    potassium chloride (KLOR-CON M10) 10 MEQ extended release tablet, Take 1 tablet by mouth daily, Disp: 90 tablet, Rfl: 3    traZODone (DESYREL) 50 MG tablet, Take 3 tablets by mouth nightly, Disp: 90 tablet, Rfl: 5    sucralfate (CARAFATE) 1 GM tablet, Take 1 tablet by mouth 4 times daily, Disp: 120 tablet, Rfl: 11    Dulaglutide 0.75 MG/0.5ML SOPN, Inject 0.75 mg into the skin once a week, Disp: 5 pen, Rfl: 11    verapamil (CALAN SR) 240 MG extended release tablet, Take 1 tablet by mouth daily, Disp: 90 tablet, Rfl: 3    triamcinolone (KENALOG) 0.1 % cream, Apply topically 2 times daily Apply topically 2 times daily. , Disp: 1 Tube, Rfl: 5    magnesium oxide (MAG-OX) 400 (241.3 Mg) MG TABS tablet, Take 1 tablet by mouth daily, Disp: 90 tablet, Rfl: 5    Continuous Blood Gluc Sensor ([address] [place] [address]) MISC, 1 Units by Does not apply route daily, Disp: 1 each, Rfl: 0    levocetirizine (XYZAL) 5 MG tablet, TAKE ONE TABLET BY MOUTH NIGHTLY, Disp: 30 tablet, Rfl: 11    Continuous Blood Gluc Sensor ([employer] SANDRA SENSOR SYSTEM) MISC, 1 kit by Does not apply route 2 times daily Include sensors for a year  Dx:  E11.9, and lupus, Disp: 1 each, Rfl: 0    sodium chloride (OCEAN) 0.65 % nasal spray, 1 spray by Nasal route as needed for Congestion, Disp: 1 Bottle, Rfl: 3    NEXIUM 40 MG delayed release capsule, TAKE 1 CAPSULE BY MOUTH ONCE DAILY, Disp: 30 capsule, Rfl: 5    levalbuterol (XOPENEX HFA) 45 MCG/ACT inhaler, Inhale 1-2 puffs into the lungs every 4 hours as needed for Wheezing or Shortness of Breath, Disp: 1 Inhaler, Rfl: 3    levalbuterol (XOPENEX) 0.63 MG/3ML nebulization, Take 3 mLs by nebulization every 6 hours as needed for Wheezing or Shortness of Breath, Disp: 270 mL, Rfl: 5    Respiratory Therapy Supplies (NEBULIZER AIR TUBE/PLUGS) MISC, 1 kit by Does not apply route every 6 hours as needed (Wheezing/shortness of breath) Please provide nebulizer kit and supplies. , Disp: 1 each, Rfl: 0    Amitriptyline HCl (ELAVIL PO), Take 25 mg by mouth nightly , Disp: , Rfl:     promethazine (PHENERGAN) 25 MG tablet, Take 1 tablet by mouth every 6 hours as needed for Nausea, Disp: 90 tablet, Rfl: 11    diclofenac sodium (VOLTAREN) 1 % GEL, Apply 2 g topically 4 times daily as needed for Pain (topical) , Disp: , Rfl:     Current Facility-Administered Medications:     0.45 % sodium chloride infusion, , Intravenous, Continuous, Shahida Chiu MD, Last Rate: 20 mL/hr at 12/02/20 0838    fentaNYL (SUBLIMAZE) injection 50 mcg, 50 mcg, Intravenous, Once, Shahida Chiu MD    heparin flush 100 UNIT/ML injection 500 Units, 500 Units, Intercatheter, Once, Shahida Chiu MD    midazolam PF (VERSED) injection 1 mg, 1 mg, Intravenous, Once, Shahida Chiu MD  Prior to Admission medications    Medication Sig Start Date End Date Taking?  Authorizing Provider   aspirin (LORENZO ASPIRIN) 325 MG tablet Take 1 tablet by mouth daily 9/11/17  Yes Twyla Larios MD   DULoxetine (CYMBALTA) 30 MG extended release capsule Take 1 capsule by mouth daily 11/30/20   NAHID Gaspar CNP   azaTHIOprine (IMURAN) 50 MG tablet Take 1 tablet by mouth twice daily 11/30/20   Sylvia Downey,    acetaminophen (AMINOFEN) 325 MG tablet Take 2 tablets by mouth 3 times daily for 14 days 11/30/20 12/14/20  Twyla Larios MD   clopidogrel (PLAVIX) 75 MG tablet Take 1 tablet by mouth once daily 11/29/20   Twyla Larios MD   cyclobenzaprine (FLEXERIL) 5 MG tablet Take 1 tablet by mouth three times daily as needed for muscle spasm 11/27/20   NAHID Gaspar CNP   heparin flush 100 UNIT/ML injection 1 mL by Intracatheter route every 30 days Flush mediport monthly with 5 mls (or 500 units) of Heparin (100 units per ml) 11/23/20   Patt Lipoma, APRN - CNP   EPINEPHrine (EPIPEN 2-FERNANDO) 0.3 MG/0.3ML SOAJ injection Inject one pen as directed STAT for allergic reaction, may disp generic [facility] [id number] 11/12/20   Patt Lipoma, APRN - CNP   Immune Globulin, Human, (GAMUNEX-C) 20 GM/200ML SOLN solution Infuse 20 g intravenously every 30 days Premedicate with tylenol 650 mg and benadryl 25 mg 30 minutes prior to infusion IF patient has not already premedicated    CODE  11/12/20   Patt Lipoma, APRN - CNP   montelukast (SINGULAIR) 10 MG tablet Take 1 tablet by mouth nightly 11/3/20   Luis Crockett MD   fluticasone (FLONASE) 50 MCG/ACT nasal spray 2 sprays by Each Nostril route daily 10/12/20   Darrel Lares MD   Handicap Placard MISC by Does not apply route Dx:  Lupus arthritis,  Length 5 years 10/6/20   Luis Crockett MD   Continuous Blood Gluc Sensor (DEXCOM G4 SENSOR) MISC 1 Units by Does not apply route every 7 days Each unit is every 10 days 10/5/20   Luis Crockett MD   Continuous Blood Gluc  (DEXCOM G5 MOBILE ) APPLE 1 Units by Does not apply route daily 10/5/20   Luis Crockett MD   atorvastatin (LIPITOR) 80 MG tablet Take 1 tablet by mouth daily 10/1/20   Luis Crockett MD   metFORMIN (GLUCOPHAGE) 500 MG tablet TAKE 1 TABLET BY MOUTH TWICE DAILY WITH  MEALS 10/1/20   Luis Crockett MD   DULoxetine (CYMBALTA) 60 MG extended release capsule Take 60 mg by mouth daily 9/3/20   Historical Provider, MD   Continuous Blood Gluc Sensor (FREESTYLE SANDRA 14 DAY SENSOR) [facility] USE TO CHECK BLOOD GLUCOSE TWICE DAILY 9/16/20   Luis Crockett MD   clotrimazole-betamethasone (LOTRISONE) 1-0.05 % cream Apply topically 2 times daily.  9/1/20   Luis Crockett MD   ibuprofen (ADVIL;MOTRIN) 600 MG tablet Take 1 tablet by mouth every 8 hours as needed for Pain 8/31/20   Luis Crockett MD   predniSONE (DELTASONE) 5 MG tablet Take 1 tablet by mouth daily 8/17/20   NAHID Yeager - CNP   potassium chloride (KLOR-CON M10) 10 MEQ extended release tablet Take 1 tablet by mouth daily 7/6/20   Marisela Mendoza MD   traZODone (DESYREL) 50 MG tablet Take 3 tablets by mouth nightly 7/6/20   Marisela Mendoza MD   sucralfate (CARAFATE) 1 GM tablet Take 1 tablet by mouth 4 times daily 6/30/20   Marisela Mendoza MD   Dulaglutide 0.75 MG/0.5ML SOPN Inject 0.75 mg into the skin once a week 2/3/20   Marisela Mendoza MD   verapamil (CALAN SR) 240 MG extended release tablet Take 1 tablet by mouth daily 12/30/19   Marisela Mendoza MD   triamcinolone (KENALOG) 0.1 % cream Apply topically 2 times daily Apply topically 2 times daily.  12/20/19   Marisela Mendoza MD   magnesium oxide (MAG-OX) 400 (241.3 Mg) MG TABS tablet Take 1 tablet by mouth daily 12/1/19   Marisela Mendoza MD   Continuous Blood Gluc Sensor ([address] [place] [address]) MISC 1 Units by Does not apply route daily 9/30/19   Marisela Mendoza MD   levocetirizine (XYZAL) 5 MG tablet TAKE ONE TABLET BY MOUTH NIGHTLY 7/30/19   Marisela Mendoza MD   Continuous Blood Gluc Sensor ([employer] SENSOR SYSTEM) MISC 1 kit by Does not apply route 2 times daily Include sensors for a year  Dx:  E11.9, and lupus 7/17/19   Marisela Mendoza MD   sodium chloride (OCEAN) 0.65 % nasal spray 1 spray by Nasal route as needed for Congestion 5/1/19   Marisela Mendoza MD   NEXIUM 40 MG delayed release capsule TAKE 1 CAPSULE BY MOUTH ONCE DAILY 4/29/19   Marisela Mendoza MD   levalbuterol Veria Ask HFA) 45 MCG/ACT inhaler Inhale 1-2 puffs into the lungs every 4 hours as needed for Wheezing or Shortness of Breath 1/3/19   NAHID Payne CNP   levalbuterol (XOPENEX) 0.63 MG/3ML nebulization Take 3 mLs by nebulization every 6 hours as needed for Wheezing or Shortness of Breath 12/6/18   NAHID Payne CNP   Respiratory Therapy Supplies (NEBULIZER AIR TUBE/PLUGS) MISC 1 kit by Does not apply route every 6 hours as needed (Wheezing/shortness of breath) Please provide nebulizer kit and supplies. 12/6/18   NAHID Ramires - CNP   Amitriptyline HCl (ELAVIL PO) Take 25 mg by mouth nightly     Historical Provider, MD   promethazine (PHENERGAN) 25 MG tablet Take 1 tablet by mouth every 6 hours as needed for Nausea 2/1/17   Stefania Jones MD   diclofenac sodium (VOLTAREN) 1 % GEL Apply 2 g topically 4 times daily as needed for Pain (topical)     Historical Provider, MD     Additional information:       VITAL SIGNS   Vitals:    12/02/20 0815   BP: (!) 134/91   Pulse: 88   Resp: 18   Temp: 97.8 °F (36.6 °C)   SpO2: 96%       PHYSICAL:   Heart:  [x]Regular rate and rhythm  []Other:    Lungs:  [x]Clear    []Other:    Abdomen: [x]Soft    []Other:    Mental Status: [x]Alert & Oriented  []Other:      PLANNED PROCEDURE   []Biospy []Arteriogram              []Drainage   [x]Mediport Insertion  []Fistulogram []IV access       []Vertebroplasty / Augmentation  []IVC filter []Dialysis catheter []Biliary drainage  []Other: []CAPD Catheter []Nephrostomy Tube / Stent  SEDATION  Planned agent:[x]Midazolam []Meperidine [x]Sublimaze []Dilaudid []Morphine     []Diazepam  []Other:     ASA Classification:  []1 [x]2 []3 []4 []5  Class 1: A normal healthy patient  Class 2: Pt with mild to moderate systemic disease  Class 3: Severe systemic disease or disturbance  Class 4: Severe systemic disorders that are already life threatening. Class 5: Moribund pt with little chances of survival, for more than 24 hours. Mallampati I Airway Classification:   [x]1 []2 []3 []4    [x]Pre-procedure diagnostic studies complete and results available. Comment:    [x]Previous sedation/anesthesia experiences assessed. Comment:    [x]The patient is an appropriate candidate to undergo the planned procedure sedation and anesthesia. (Refer to nursing sedation/analgesia documentation record)  [x]Formulation and discussion of sedation/procedure plan, risks, and expectations with patient and/or responsible adult completed.   [x]Patient examined immediately prior to the procedure.  (Refer to nursing sedation/analgesia documentation record)    Florian Schwarz MD  Electronically signed 12/2/2020 at 9:47 AM

## 2020-12-02 NOTE — PROGRESS NOTES
0930 Pt in specials radiology for medi-port insertion. Explained procedure to pt and pt verbalizes understanding. Consent signed. 56 Moved to table and attached to monitor. [address] to speak to pt.  Herreraton placed in right IJ/chest per Dr Torres Leonardo. 1008 Incisions right neck/chest approximated with suture. 1015 Dermabond applied to incisions right neck and chest.  1017 Report called to [facility].  1020 2 x 2 with op-site applied to neck site. 4 x 4 with op-site applied to chest site. Sites without redness, swelling or hematoma. 1023 Pt positioned on cart for comfort. Offers no complaints. 1025 Ice bag to site. Transferred to OPN per cart.
Patient in recovery, vitals are stable. She denies pain. Mediport dressing is dry and intact.
risks   Educate regarding infection prevention   Manage central venous catheter (flushes/ dressing changes per protocol)

## 2020-12-08 ENCOUNTER — CARE COORDINATION (OUTPATIENT)
Dept: CARE COORDINATION | Age: 40
End: 2020-12-08

## 2020-12-08 NOTE — CARE COORDINATION
Ambulatory Care Coordination Note  12/8/2020  CM Risk Score: 3  Charlson 10 Year Mortality Risk Score: 47%     ACC: Melissa Gill, RN    Summary Note: Patient was called for continued Care Coordination follow up and education re: the management of her DM and healthcare needs. Patient shared she has been doing well and she denied any current complaints or concerns. Patient reported she was able to have port placed recently and the site seems to be healing but does have some blisters and slight redness present that she feels is d/t use of adhesive. ACM reviewed signs/symptoms to monitor and call and report with patient and she verbalized understanding. Patient stated BS have remained stable in the 80-90 range and she denied any c/o hypoglycemia being present. DM education was reviewed with patient, including importance of keeping BS controlled and DM managed, and patient verbalized understanding. AC reviewed various services available to assist with education and disease management with patient. Patient declines the following services at this time and she was encouraged to call if she changes her mind- DM clinic, dietician, and pharmacy referrals. Patient verbalized understanding. Patient denied any other questions, concerns, or needs and she was encouraged to call with any that may develop. Will continue to work to f/u with patient in the future. Actions:   - Reviewed DM education   - Reviewed importance of keeping BS controlled and DM managed  - Monitored for signs/symptoms of hypoglycemia  - Reviewed and discussed DM clinic, dietician, and pharmacy referrals - Pt.  Declines  - Monitored for questions re: recent port placement  - Educated on signs/symptoms to call and report  - Monitored for additional needs          Plan of Care:   -Raven f/u calls for assistance with the management of her DM and healthcare needs  - Complete DM education - In Process   - Review availability of same day appointments, urgent care/walk in clinic options, and after hours on call resources - Completed  - Educate on importance of early symptom recognition - Completed   - Complete Med Rec - Completed   - Educate on Flu vaccine - Completed  - Patient is current with Pneumonia vaccine  - Review Healthcare Decision Maker information (Completed) and interest in Living Will   - Patient is current with eye appointment  - A1C 6.7%  - Monitor for additional needs and readiness to discharge from [address] [place] [address] Interventions    Program Enrollment:  Complex Care  Referral from Primary Care Provider:  No  Suggested Interventions and Community Resources  Diabetes Education:  Completed (Comment: Oct. 2020)  Disease Specific Clinic:  Declined (Comment: Dec. 2020)  Medication Assistance Program:  [facility] Tona Christianson or Pill Pack:  Declined  Pharmacist:  Declined (Comment: Dec. 2020)  Registered Dietician:  Declined (Comment: Dec. 2020)  Transportation Support:  Declined  Zone Management Tools:  Completed (Comment: Oct. 2020)         Goals Addressed                 This Visit's Progress       Care Coordination     Conditions and Symptoms   Improving     I will schedule office visits, as directed by my provider. I will keep my appointment or reschedule if I have to cancel. I will notify my provider of any barriers to my plan of care. I will follow my Zone Management tool to seek urgent or emergent care. I will notify my provider of any symptoms that indicate a worsening of my condition. Barriers: lack of support, overwhelmed by complexity of regimen, stress, and lack of education  Plan for overcoming my barriers: Provide support and education to patient and monitor for resource needs  Confidence: 8/10  Anticipated Goal Completion Date: 2/25/2021              Prior to Admission medications    Medication Sig Start Date End Date Taking?  Authorizing Provider   DULoxetine (CYMBALTA) 30 MG extended release capsule Take 1 capsule by mouth daily 11/30/20   NAHID Gaspar CNP   azaTHIOprine (IMURAN) 50 MG tablet Take 1 tablet by mouth twice daily 11/30/20   Sylvia Downey DO   acetaminophen (AMINOFEN) 325 MG tablet Take 2 tablets by mouth 3 times daily for 14 days 11/30/20 12/14/20  Twyla Larios MD   clopidogrel (PLAVIX) 75 MG tablet Take 1 tablet by mouth once daily 11/29/20   Twyla Larios MD   cyclobenzaprine (FLEXERIL) 5 MG tablet Take 1 tablet by mouth three times daily as needed for muscle spasm 11/27/20   NAHID Gaspar CNP   heparin flush 100 UNIT/ML injection 1 mL by Intracatheter route every 30 days Flush mediport monthly with 5 mls (or 500 units) of Heparin (100 units per ml) 11/23/20   NAHID Green CNP   EPINEPHrine (EPIPEN 2-FERNANDO) 0.3 MG/0.3ML SOAJ injection Inject one pen as directed STAT for allergic reaction, may disp [facility] [id number] 11/12/20   NAHID Green CNP   Immune Globulin, Human, (GAMUNEX-C) 20 GM/200ML SOLN solution Infuse 20 g intravenously every 30 days Premedicate with tylenol 650 mg and benadryl 25 mg 30 minutes prior to infusion IF patient has not already premedicated    CODE  11/12/20   NAHID Green CNP   montelukast (SINGULAIR) 10 MG tablet Take 1 tablet by mouth nightly 11/3/20   Twyla Larios MD   fluticasone (FLONASE) 50 MCG/ACT nasal spray 2 sprays by Each Nostril route daily 10/12/20   Daisha Reno MD   Handicap Placard MISC by Does not apply route Dx:  Lupus arthritis,  Length 5 years 10/6/20   Twyla Larios MD   Continuous Blood Gluc Sensor (DEXCOM G4 SENSOR) MISC 1 Units by Does not apply route every 7 days Each unit is every 10 days 10/5/20   Twyla Larios MD   Continuous Blood Gluc  (DEXCOM G5 MOBILE ) APPLE 1 Units by Does not apply route daily 10/5/20   Twyla Larios MD   atorvastatin (LIPITOR) 80 MG tablet Take 1 tablet by mouth daily 10/1/20   Twyla Larios MD   metFORMIN (GLUCOPHAGE) 500 MG tablet TAKE 1 TABLET BY MOUTH TWICE DAILY WITH  MEALS 10/1/20   Siva Irene MD   DULoxetine (CYMBALTA) 60 MG extended release capsule Take 60 mg by mouth daily 9/3/20   Historical Provider, MD   Continuous Blood Gluc Sensor (FREESTYLE SANDRA 14 DAY SENSOR) [facility] USE TO CHECK BLOOD GLUCOSE TWICE DAILY 9/16/20   Siva Irene MD   clotrimazole-betamethasone (LOTRISONE) 1-0.05 % cream Apply topically 2 times daily. 9/1/20   Siva Irene MD   ibuprofen (ADVIL;MOTRIN) 600 MG tablet Take 1 tablet by mouth every 8 hours as needed for Pain 8/31/20   Siva Irene MD   predniSONE (DELTASONE) 5 MG tablet Take 1 tablet by mouth daily 8/17/20   NAHID Owens - CNP   potassium chloride (KLOR-CON M10) 10 MEQ extended release tablet Take 1 tablet by mouth daily 7/6/20   Siva Irene MD   traZODone (DESYREL) 50 MG tablet Take 3 tablets by mouth nightly 7/6/20   Siva Irene MD   sucralfate (CARAFATE) 1 GM tablet Take 1 tablet by mouth 4 times daily 6/30/20   Siva Irene MD   Dulaglutide 0.75 MG/0.5ML SOPN Inject 0.75 mg into the skin once a week 2/3/20   Siva Irene MD   verapamil (CALAN SR) 240 MG extended release tablet Take 1 tablet by mouth daily 12/30/19   Siva Irene MD   triamcinolone (KENALOG) 0.1 % cream Apply topically 2 times daily Apply topically 2 times daily.  12/20/19   Siva Irene MD   magnesium oxide (MAG-OX) 400 (241.3 Mg) MG TABS tablet Take 1 tablet by mouth daily 12/1/19   Siva Irene MD   Continuous Blood Gluc Sensor ([address] [place] [address]) [facility] 1 Units by Does not apply route daily 9/30/19   Siva Irene MD   levocetirizine (XYZAL) 5 MG tablet TAKE ONE TABLET BY MOUTH NIGHTLY 7/30/19   Siva Irene MD   Continuous Blood Gluc Sensor (FREESTYLE SANDRA SENSOR SYSTEM) [facility] 1 kit by Does not apply route 2 times daily Include sensors for a year  Dx:  E11.9, and lupus 7/17/19   Siva Irene MD   sodium chloride (OCEAN) 0.65 % nasal spray 1 spray by Nasal route as needed for Congestion 5/1/19   Fei Schmidt MD   NEXIUM 40 MG delayed release capsule TAKE 1 CAPSULE BY MOUTH ONCE DAILY 4/29/19   Fei Schmidt MD   levalbuterol [facility]) 45 MCG/ACT inhaler Inhale 1-2 puffs into the lungs every 4 hours as needed for Wheezing or Shortness of Breath 1/3/19   NAHID Reyes CNP   levalbuterol (XOPENEX) 0.63 MG/3ML nebulization Take 3 mLs by nebulization every 6 hours as needed for Wheezing or Shortness of Breath 12/6/18   NAHID Reyes CNP   Respiratory Therapy Supplies (NEBULIZER AIR TUBE/PLUGS) MISC 1 kit by Does not apply route every 6 hours as needed (Wheezing/shortness of breath) Please provide nebulizer kit and supplies. 12/6/18   NAHID Cho CNP   Amitriptyline HCl (ELAVIL PO) Take 25 mg by mouth nightly     Historical Provider, MD   aspirin (LORENZO ASPIRIN) 325 MG tablet Take 1 tablet by mouth daily 9/11/17   Fei Schmidt MD   promethazine (PHENERGAN) 25 MG tablet Take 1 tablet by mouth every 6 hours as needed for Nausea 2/1/17   Fei Schmidt MD   diclofenac sodium (VOLTAREN) 1 % GEL Apply 2 g topically 4 times daily as needed for Pain (topical)     Historical Provider, MD       Future Appointments   Date Time Provider Herbert Portillo   12/17/2020  8:30 AM STR EXAM ROOM 4 STRZ OP NURS [place] HOD   1/4/2021 10:00 AM STR CT IMAGING RM1  OP EXPRESS STRZ OUT EXP STR Radiolog   1/4/2021 10:30 AM STR PULMONARY FUNCTION ROOM 1 STR[place]   1/5/2021  9:30 AM NAHID Brewer CNP SRPX Rheum [facility] - [place]   1/13/2021  9:00 AM NAHID Reyes CNP Pulm Med [facility] - [place]   2/8/2021  9:00 AM Fei Schmidt MD [place] [facility] - [place]   5/12/2021  9:00 AM Aleksandr Pradhan, APRN - CNP SRPX [facility] [place] - [place]     ,   Diabetes Assessment    Meal Planning:  Avoidance of concentrated sweets, Carb counting   Do you have barriers with adherence to non-pharmacologic self-management interventions? (Nutrition/Exercise/Self-Monitoring):   No

## 2020-12-17 ENCOUNTER — HOSPITAL ENCOUNTER (OUTPATIENT)
Dept: NURSING | Age: 40
Discharge: HOME | End: 2020-12-17
Payer: MEDICARE

## 2020-12-17 ENCOUNTER — CARE COORDINATION (OUTPATIENT)
Dept: CARE COORDINATION | Age: 40
End: 2020-12-17

## 2020-12-17 VITALS
BODY MASS INDEX: 40.39 KG/M2 | WEIGHT: 228 LBS | SYSTOLIC BLOOD PRESSURE: 137 MMHG | OXYGEN SATURATION: 94 % | TEMPERATURE: 97 F | RESPIRATION RATE: 18 BRPM | DIASTOLIC BLOOD PRESSURE: 87 MMHG | HEART RATE: 75 BPM

## 2020-12-17 DIAGNOSIS — D83.9 COMMON VARIABLE IMMUNODEFICIENCY (HCC): Primary | ICD-10-CM

## 2020-12-17 PROCEDURE — 2580000003 HC RX 258: Performed by: NURSE PRACTITIONER

## 2020-12-17 PROCEDURE — 96365 THER/PROPH/DIAG IV INF INIT: CPT

## 2020-12-17 PROCEDURE — 6360000002 HC RX W HCPCS: Performed by: NURSE PRACTITIONER

## 2020-12-17 RX ORDER — SODIUM CHLORIDE 0.9 % (FLUSH) 0.9 %
10 SYRINGE (ML) INJECTION PRN
Status: CANCELLED | OUTPATIENT
Start: 2021-01-14

## 2020-12-17 RX ORDER — ACETAMINOPHEN 325 MG/1
650 TABLET ORAL ONCE
Status: CANCELLED | OUTPATIENT
Start: 2021-01-14 | End: 2021-01-14

## 2020-12-17 RX ORDER — HEPARIN SODIUM (PORCINE) LOCK FLUSH IV SOLN 100 UNIT/ML 100 UNIT/ML
500 SOLUTION INTRAVENOUS PRN
Status: CANCELLED | OUTPATIENT
Start: 2021-01-14

## 2020-12-17 RX ORDER — ACETAMINOPHEN 325 MG/1
650 TABLET ORAL ONCE
Status: DISCONTINUED | OUTPATIENT
Start: 2020-12-17 | End: 2020-12-18 | Stop reason: HOSPADM

## 2020-12-17 RX ORDER — DIPHENHYDRAMINE HCL 25 MG
25 TABLET ORAL ONCE
Status: DISCONTINUED | OUTPATIENT
Start: 2020-12-17 | End: 2020-12-18 | Stop reason: HOSPADM

## 2020-12-17 RX ORDER — HEPARIN SODIUM (PORCINE) LOCK FLUSH IV SOLN 100 UNIT/ML 100 UNIT/ML
500 SOLUTION INTRAVENOUS PRN
Status: DISCONTINUED | OUTPATIENT
Start: 2020-12-17 | End: 2020-12-18 | Stop reason: HOSPADM

## 2020-12-17 RX ORDER — DIPHENHYDRAMINE HCL 25 MG
25 TABLET ORAL ONCE
Status: CANCELLED | OUTPATIENT
Start: 2021-01-14 | End: 2021-01-14

## 2020-12-17 RX ORDER — SODIUM CHLORIDE 0.9 % (FLUSH) 0.9 %
10 SYRINGE (ML) INJECTION PRN
Status: DISCONTINUED | OUTPATIENT
Start: 2020-12-17 | End: 2020-12-18 | Stop reason: HOSPADM

## 2020-12-17 RX ADMIN — Medication 10 ML: at 10:37

## 2020-12-17 RX ADMIN — IMMUNE GLOBULIN (HUMAN) 20 G: 10 INJECTION INTRAVENOUS; SUBCUTANEOUS at 09:08

## 2020-12-17 RX ADMIN — HEPARIN 500 UNITS: 100 SYRINGE at 10:37

## 2020-12-17 ASSESSMENT — PAIN SCALES - GENERAL: PAINLEVEL_OUTOF10: 0

## 2020-12-17 NOTE — PROGRESS NOTES
0810  PT AMBULATED INTO ROOM FOR IVIG INFUSION. PT RIGHTS AND RESPONSIBILITIES OFFERED TO PATIENT.    0915 INFUSION STARTED. PT WAS BROUGHT A SANDWICH BOX. PT HAS NO OTHER NEEDS AT THIS TIME. Shaheen Harper. PT HAS NO NEEDS. TEMPS ARE BEING TAKEN. 1045 INFUSION COMPLETE PT TOLERATED WELL. D/C INSTRUCTIONS EXPLAINED AND PT VERBALIZED UNDERSTANDING. PT AMBULATED OUT FOR D/C.                  __M__ Safety:       (Environmental)  ? [place] to environment  ? Ensure ID band is correct and in place/ allergy band as needed  ? Assess for fall risk  ? Initiate fall precautions as applicable (fall band, side rails, etc.)  ? Call light within reach  ? Bed in low position/ wheels locked    _M___ Pain:       ? Assess pain level and characteristics  ? Administer analgesics as ordered  ? Assess effectiveness of pain management and report to MD as needed    __M__ Knowledge Deficit:  ? Assess baseline knowledge  ? Provide teaching at level of understanding  ? Provide teaching via preferred learning method  ? Evaluate teaching effectiveness    _M___ Hemodynamic/Respiratory Status:       (Pre and Post Procedure Monitoring)  ? Assess/Monitor vital signs and LOC  ? Assess Baseline SpO2 prior to any sedation  ? Obtain weight/height  ? Assess vital signs/ LOC until patient meets discharge criteria  ? Monitor procedure site and notify MD of any issues    __M__ Infection-Risk of Central Venous Catheter:  ? Monitor for infection signs and symptoms (catheter site redness, temperature elevation, etc)  ? Assess for infection risks  ? Educate regarding infection prevention  ?  Manage central venous catheter (flushes/ dressing changes per protocol)

## 2020-12-22 ENCOUNTER — CARE COORDINATION (OUTPATIENT)
Dept: CARE COORDINATION | Age: 40
End: 2020-12-22

## 2020-12-22 NOTE — CARE COORDINATION
and readiness to discharge from [facility] Interventions    Program Enrollment: Complex Care  Referral from Primary Care Provider: No  Suggested Interventions and Community Resources  Diabetes Education: Completed (Comment: Oct. 2020)  Disease Specific Clinic: Declined (Comment: Dec. 2020)  Medication Assistance Program: [address] or Pill Pack: Declined  Pharmacist: Kisha Christian (Comment: Dec. 2020)  Registered Dietician: Declined (Comment: Dec. 2020)  Transportation Support: Declined  Zone Management Tools: Completed (Comment: Oct. 2020)         Goals Addressed                 This Visit's Progress       Care Coordination     Conditions and Symptoms   Improving     I will schedule office visits, as directed by my provider. I will keep my appointment or reschedule if I have to cancel. I will notify my provider of any barriers to my plan of care. I will follow my Zone Management tool to seek urgent or emergent care. I will notify my provider of any symptoms that indicate a worsening of my condition. Barriers: lack of support, overwhelmed by complexity of regimen, stress, and lack of education  Plan for overcoming my barriers: Provide support and education to patient and monitor for resource needs  Confidence: 8/10  Anticipated Goal Completion Date: 2/25/2021              Prior to Admission medications    Medication Sig Start Date End Date Taking?  Authorizing Provider   DULoxetine (CYMBALTA) 30 MG extended release capsule Take 1 capsule by mouth daily 11/30/20   NAHID Bermudez CNP   azaTHIOprine (IMURAN) 50 MG tablet Take 1 tablet by mouth twice daily 11/30/20   Jan Friedman DO   acetaminophen (AMINOFEN) 325 MG tablet Take 2 tablets by mouth 3 times daily for 14 days 11/30/20 12/14/20  Mitchell Moss MD   clopidogrel (PLAVIX) 75 MG tablet Take 1 tablet by mouth once daily 11/29/20   Mitchell Moss MD   cyclobenzaprine (FLEXERIL) 5 MG tablet Take 1 tablet by mouth three times daily as needed for muscle spasm 11/27/20   NAHID Owens CNP   heparin flush 100 UNIT/ML injection 1 mL by Intracatheter route every 30 days Flush mediport monthly with 5 mls (or 500 units) of Heparin (100 units per ml) 11/23/20   NAHID Diaz CNP   EPINEPHrine (EPIPEN 2-FERNANDO) 0.3 MG/0.3ML SOAJ injection Inject one pen as directed STAT for allergic reaction, may disp generic Faustino Gregg [address] [id number] 11/12/20   NAHID Diaz CNP   Immune Globulin, Human, (GAMUNEX-C) 20 GM/200ML SOLN solution Infuse 20 g intravenously every 30 days Premedicate with tylenol 650 mg and benadryl 25 mg 30 minutes prior to infusion IF patient has not already premedicated    CODE  11/12/20   NAHID Diaz CNP   montelukast (SINGULAIR) 10 MG tablet Take 1 tablet by mouth nightly 11/3/20   Siva Irene MD   fluticasone (FLONASE) 50 MCG/ACT nasal spray 2 sprays by Each Nostril route daily 10/12/20   Ralph Mcnulty MD   Handicap Placard MISC by Does not apply route Dx:  Lupus arthritis,  Length 5 years 10/6/20   Siva Irene MD   Continuous Blood Gluc Sensor (DEXCOM G4 SENSOR) MISC 1 Units by Does not apply route every 7 days Each unit is every 10 days 10/5/20   Siva Irene MD   Continuous Blood Gluc  (DEXCOM G5 MOBILE ) APPLE 1 Units by Does not apply route daily 10/5/20   Siva Irene MD   atorvastatin (LIPITOR) 80 MG tablet Take 1 tablet by mouth daily 10/1/20   Siva Irene MD   metFORMIN (GLUCOPHAGE) 500 MG tablet TAKE 1 TABLET BY MOUTH TWICE DAILY WITH  MEALS 10/1/20   Siva Irene MD   DULoxetine (CYMBALTA) 60 MG extended release capsule Take 60 mg by mouth daily 9/3/20   Historical Provider, MD   Continuous Blood Gluc Sensor (FREESTYLE SANDRA 14 DAY SENSOR) [facility] USE TO CHECK BLOOD GLUCOSE TWICE DAILY 9/16/20   Siva Irene MD   clotrimazole-betamethasone (LOTRISONE) 1-0.05 % cream Apply topically 2 times daily.  9/1/20   Siva Irene MD   ibuprofen Breath 12/6/18   NAHID Moulton CNP   Respiratory Therapy Supplies (NEBULIZER AIR TUBE/PLUGS) MISC 1 kit by Does not apply route every 6 hours as needed (Wheezing/shortness of breath) Please provide nebulizer kit and supplies. 12/6/18   NAHID Moulton CNP   Amitriptyline HCl (ELAVIL PO) Take 25 mg by mouth nightly     Historical Provider, MD   aspirin (LORENZO ASPIRIN) 325 MG tablet Take 1 tablet by mouth daily 9/11/17   Citlalli Douglas MD   promethazine (PHENERGAN) 25 MG tablet Take 1 tablet by mouth every 6 hours as needed for Nausea 2/1/17   Citlalli Douglas MD   diclofenac sodium (VOLTAREN) 1 % GEL Apply 2 g topically 4 times daily as needed for Pain (topical)     Historical Provider, MD       Future Appointments   Date Time Provider Herbert Portillo   1/4/2021 10:00 AM STR CT IMAGING RM1  OP EXPRESS STRZ OUT EXP STR Radiolog   1/4/2021 10:30 AM STR PULMONARY FUNCTION ROOM 1 STRZ PFT [facility]   1/5/2021  9:30 AM NAHID ePrry CNP N SRPX Rheum [facility] - [address] [place] [address]   1/13/2021  9:00 AM NAHID Moulton CNP N Pulm Med [facility] [address] [place] [address]   1/14/2021  9:00 AM STR EXAM ROOM [address] [place] [address]   2/8/2021  9:00 AM Citlalli Douglas MD Klass [facility] - [address] [facility]   5/12/2021  9:00 AM NAHID Henderson CNP N [address] [facility]     ,   Diabetes Assessment    Meal Planning: Avoidance of concentrated sweets, Carb counting   How often do you test your blood sugar?: Meals, Bedtime   Do you have barriers with adherence to non-pharmacologic self-management interventions?  (Nutrition/Exercise/Self-Monitoring): No   Have you ever had to go to the ED for symptoms of low blood sugar?: No       No patient-reported symptoms   Do you have hyperglycemia symptoms?: No   Do you have hypoglycemia symptoms?: No   Blood Sugar Monitoring Regimen: Before Meals, At Bedtime   Blood Sugar Trends: No Change      ,   General Assessment    Do you have any symptoms that are causing concern?: No      and Care Coordination Episodes    Type: Amb Care Coordination  Episode: Complex Care   Noted: 10/13/2020  Comments: Sherren Rosenthal - list referral - DM

## 2020-12-28 ENCOUNTER — TELEPHONE (OUTPATIENT)
Dept: PULMONOLOGY | Age: 40
End: 2020-12-28

## 2020-12-31 ENCOUNTER — HOSPITAL ENCOUNTER (OUTPATIENT)
Age: 40
Discharge: HOME | End: 2020-12-31
Payer: MEDICARE

## 2020-12-31 PROCEDURE — U0003 INFECTIOUS AGENT DETECTION BY NUCLEIC ACID (DNA OR RNA); SEVERE ACUTE RESPIRATORY SYNDROME CORONAVIRUS 2 (SARS-COV-2) (CORONAVIRUS DISEASE [COVID-19]), AMPLIFIED PROBE TECHNIQUE, MAKING USE OF HIGH THROUGHPUT TECHNOLOGIES AS DESCRIBED BY CMS-2020-01-R: HCPCS

## 2021-01-01 LAB — SARS-COV-2: NOT DETECTED

## 2021-01-04 ENCOUNTER — PATIENT MESSAGE (OUTPATIENT)
Dept: PULMONOLOGY | Age: 41
End: 2021-01-04

## 2021-01-07 ENCOUNTER — HOSPITAL ENCOUNTER (OUTPATIENT)
Age: 41
Discharge: HOME | End: 2021-01-07
Payer: MEDICARE

## 2021-01-07 LAB
ALBUMIN SERPL-MCNC: 4.4 G/DL (ref 3.5–5.1)
ALP BLD-CCNC: 164 U/L (ref 38–126)
ALT SERPL-CCNC: 27 U/L (ref 11–66)
ANION GAP SERPL CALCULATED.3IONS-SCNC: 14 MEQ/L (ref 8–16)
AST SERPL-CCNC: 34 U/L (ref 5–40)
BASOPHILS # BLD: 0.4 %
BASOPHILS ABSOLUTE: 0 THOU/MM3 (ref 0–0.1)
BILIRUB SERPL-MCNC: 0.4 MG/DL (ref 0.3–1.2)
BUN BLDV-MCNC: 8 MG/DL (ref 7–22)
CALCIUM SERPL-MCNC: 9.6 MG/DL (ref 8.5–10.5)
CHLORIDE BLD-SCNC: 102 MEQ/L (ref 98–111)
CO2: 25 MEQ/L (ref 23–33)
CREAT SERPL-MCNC: 0.6 MG/DL (ref 0.4–1.2)
EOSINOPHIL # BLD: 3.2 %
EOSINOPHILS ABSOLUTE: 0.3 THOU/MM3 (ref 0–0.4)
ERYTHROCYTE [DISTWIDTH] IN BLOOD BY AUTOMATED COUNT: 14.8 % (ref 11.5–14.5)
ERYTHROCYTE [DISTWIDTH] IN BLOOD BY AUTOMATED COUNT: 44.6 FL (ref 35–45)
GFR SERPL CREATININE-BSD FRML MDRD: > 90 ML/MIN/1.73M2
GLUCOSE BLD-MCNC: 128 MG/DL (ref 70–108)
HCT VFR BLD CALC: 39.7 % (ref 37–47)
HEMOGLOBIN: 12.6 GM/DL (ref 12–16)
IMMATURE GRANS (ABS): 0.01 THOU/MM3 (ref 0–0.07)
IMMATURE GRANULOCYTES: 0.1 %
LYMPHOCYTES # BLD: 27.7 %
LYMPHOCYTES ABSOLUTE: 2.2 THOU/MM3 (ref 1–4.8)
MCH RBC QN AUTO: 26.9 PG (ref 26–33)
MCHC RBC AUTO-ENTMCNC: 31.7 GM/DL (ref 32.2–35.5)
MCV RBC AUTO: 84.6 FL (ref 81–99)
MONOCYTES # BLD: 6.2 %
MONOCYTES ABSOLUTE: 0.5 THOU/MM3 (ref 0.4–1.3)
NUCLEATED RED BLOOD CELLS: 0 /100 WBC
PLATELET # BLD: 283 THOU/MM3 (ref 130–400)
PMV BLD AUTO: 11.1 FL (ref 9.4–12.4)
POTASSIUM SERPL-SCNC: 3.7 MEQ/L (ref 3.5–5.2)
RBC # BLD: 4.69 MILL/MM3 (ref 4.2–5.4)
SEG NEUTROPHILS: 62.4 %
SEGMENTED NEUTROPHILS ABSOLUTE COUNT: 4.9 THOU/MM3 (ref 1.8–7.7)
SODIUM BLD-SCNC: 141 MEQ/L (ref 135–145)
TOTAL PROTEIN: 6.8 G/DL (ref 6.1–8)
WBC # BLD: 7.9 THOU/MM3 (ref 4.8–10.8)

## 2021-01-07 PROCEDURE — 85025 COMPLETE CBC W/AUTO DIFF WBC: CPT

## 2021-01-07 PROCEDURE — 80053 COMPREHEN METABOLIC PANEL: CPT

## 2021-01-07 PROCEDURE — 36415 COLL VENOUS BLD VENIPUNCTURE: CPT

## 2021-01-08 ENCOUNTER — CARE COORDINATION (OUTPATIENT)
Dept: CARE COORDINATION | Age: 41
End: 2021-01-08

## 2021-01-08 NOTE — CARE COORDINATION
Attempted to reach patient for continued Care Coordination follow up and education. Patient was unavailable at the time of my call, and generic voicemail message was left asking patient to please return call to my direct number.   Samra Burnett, RN Care Coordinator

## 2021-01-13 ENCOUNTER — CARE COORDINATION (OUTPATIENT)
Dept: CARE COORDINATION | Age: 41
End: 2021-01-13

## 2021-01-13 NOTE — CARE COORDINATION
Attempted to reach patient for continued Care Coordination follow up and education. Patient was unavailable at the time of my call, and generic voicemail message was left asking patient to please return call to my direct number.   Paris Carlos RN Care Coordinator

## 2021-01-14 ENCOUNTER — HOSPITAL ENCOUNTER (OUTPATIENT)
Dept: NURSING | Age: 41
Discharge: HOME | End: 2021-01-14
Payer: MEDICARE

## 2021-01-14 VITALS
HEART RATE: 80 BPM | BODY MASS INDEX: 41.27 KG/M2 | SYSTOLIC BLOOD PRESSURE: 114 MMHG | TEMPERATURE: 97.6 F | DIASTOLIC BLOOD PRESSURE: 77 MMHG | OXYGEN SATURATION: 94 % | WEIGHT: 233 LBS | RESPIRATION RATE: 18 BRPM

## 2021-01-14 DIAGNOSIS — D83.9 COMMON VARIABLE IMMUNODEFICIENCY (HCC): Primary | ICD-10-CM

## 2021-01-14 DIAGNOSIS — J45.20 MILD INTERMITTENT ASTHMA WITHOUT COMPLICATION: Primary | ICD-10-CM

## 2021-01-14 PROCEDURE — 96365 THER/PROPH/DIAG IV INF INIT: CPT

## 2021-01-14 PROCEDURE — 96413 CHEMO IV INFUSION 1 HR: CPT

## 2021-01-14 PROCEDURE — 6360000002 HC RX W HCPCS: Performed by: NURSE PRACTITIONER

## 2021-01-14 RX ORDER — SODIUM CHLORIDE 0.9 % (FLUSH) 0.9 %
10 SYRINGE (ML) INJECTION PRN
Status: DISCONTINUED | OUTPATIENT
Start: 2021-01-14 | End: 2021-01-15 | Stop reason: HOSPADM

## 2021-01-14 RX ORDER — ACETAMINOPHEN 325 MG/1
650 TABLET ORAL ONCE
Status: CANCELLED | OUTPATIENT
Start: 2021-02-11 | End: 2021-02-11

## 2021-01-14 RX ORDER — DIPHENHYDRAMINE HCL 25 MG
25 TABLET ORAL ONCE
Status: DISCONTINUED | OUTPATIENT
Start: 2021-01-14 | End: 2021-01-15 | Stop reason: HOSPADM

## 2021-01-14 RX ORDER — SODIUM CHLORIDE 0.9 % (FLUSH) 0.9 %
10 SYRINGE (ML) INJECTION PRN
Status: CANCELLED | OUTPATIENT
Start: 2021-02-11

## 2021-01-14 RX ORDER — HEPARIN SODIUM (PORCINE) LOCK FLUSH IV SOLN 100 UNIT/ML 100 UNIT/ML
500 SOLUTION INTRAVENOUS PRN
Status: DISCONTINUED | OUTPATIENT
Start: 2021-01-14 | End: 2021-01-15 | Stop reason: HOSPADM

## 2021-01-14 RX ORDER — ACETAMINOPHEN 325 MG/1
650 TABLET ORAL ONCE
Status: DISCONTINUED | OUTPATIENT
Start: 2021-01-14 | End: 2021-01-15 | Stop reason: HOSPADM

## 2021-01-14 RX ORDER — DIPHENHYDRAMINE HCL 25 MG
25 TABLET ORAL ONCE
Status: CANCELLED | OUTPATIENT
Start: 2021-02-11 | End: 2021-02-11

## 2021-01-14 RX ORDER — HEPARIN SODIUM (PORCINE) LOCK FLUSH IV SOLN 100 UNIT/ML 100 UNIT/ML
500 SOLUTION INTRAVENOUS PRN
Status: CANCELLED | OUTPATIENT
Start: 2021-02-11

## 2021-01-14 RX ADMIN — HEPARIN 500 UNITS: 100 SYRINGE at 11:08

## 2021-01-14 RX ADMIN — IMMUNE GLOBULIN (HUMAN) 20 G: 10 INJECTION INTRAVENOUS; SUBCUTANEOUS at 09:55

## 2021-01-14 NOTE — PROGRESS NOTES
8590: [facility] accessed. IVIG infusion started. No concerns voiced. 1030: Patient tolerating infusion well. No concerns voiced. 1108: IVIG infusion complete. Tolerated well. AVS reviewed with patient, voiced understanding. Patient discharged ambulatory.

## 2021-01-14 NOTE — PROGRESS NOTES
0682: Patient arrived ambulatory for IVIG infusion. Patient denies any infection or antibiotic usage at this time. PT RIGHTS AND RESPONSIBILITIES OFFERED TO PT. Snack and beverage provided to patient. Patient stated she took her pre-meds Tylenol and Benadryl at 0730 at home. _m___ Safety:       (Environmental)  ? [place] to environment  ? Ensure ID band is correct and in place/ allergy band as needed  ? Assess for fall risk  ? Initiate fall precautions as applicable (fall band, side rails, etc.)  ? Call light within reach  ? Bed in low position/ wheels locked    _m___ Pain:       ? Assess pain level and characteristics  ? Administer analgesics as ordered  ? Assess effectiveness of pain management and report to MD as needed    _m___ Knowledge Deficit:  ? Assess baseline knowledge  ? Provide teaching at level of understanding  ? Provide teaching via preferred learning method  ? Evaluate teaching effectiveness    _m___ Hemodynamic/Respiratory Status:       (Pre and Post Procedure Monitoring)  ? Assess/Monitor vital signs and LOC  ? Assess Baseline SpO2 prior to any sedation  ? Obtain weight/height  ? Assess vital signs/ LOC until patient meets discharge criteria  ? Monitor procedure site and notify MD of any issues    _m___ Infection-Risk of Central Venous Catheter:  ? Monitor for infection signs and symptoms (catheter site redness, temperature elevation, etc)  ? Assess for infection risks  ? Educate regarding infection prevention  ?  Manage central venous catheter (flushes/ dressing changes per protocol)

## 2021-01-15 DIAGNOSIS — R91.1 LUNG NODULE, SOLITARY: Primary | ICD-10-CM

## 2021-01-15 DIAGNOSIS — J45.20 MILD INTERMITTENT ASTHMA WITHOUT COMPLICATION: ICD-10-CM

## 2021-01-15 NOTE — TELEPHONE ENCOUNTER
Patient rescheduled her testing. I called patient to let her know that since Dr John Montengero ordered the CT they will need to get a new prior auth. Patient will contact their office.

## 2021-01-22 ENCOUNTER — HOSPITAL ENCOUNTER (OUTPATIENT)
Dept: WOMENS IMAGING | Age: 41
Discharge: HOME | End: 2021-01-22
Payer: MEDICARE

## 2021-01-22 DIAGNOSIS — R92.0 BREAST MICROCALCIFICATIONS: ICD-10-CM

## 2021-01-22 DIAGNOSIS — Z09 FOLLOW-UP EXAM: ICD-10-CM

## 2021-01-22 PROCEDURE — 77066 DX MAMMO INCL CAD BI: CPT

## 2021-01-25 ENCOUNTER — CARE COORDINATION (OUTPATIENT)
Dept: CARE COORDINATION | Age: 41
End: 2021-01-25

## 2021-01-25 NOTE — CARE COORDINATION
Attempted to reach patient for continued Care Coordination follow up and education. Patient was unavailable at the time of my call, and voicemail unable to accept messages. F/u My Chart message also sent to patient. Will continue to work to f/u with patient in the future.   Guy Billingsley RN

## 2021-02-07 ENCOUNTER — HOSPITAL ENCOUNTER (OUTPATIENT)
Age: 41
Discharge: HOME | End: 2021-02-07
Payer: MEDICARE

## 2021-02-07 DIAGNOSIS — D83.9 CVID (COMMON VARIABLE IMMUNODEFICIENCY) (HCC): ICD-10-CM

## 2021-02-07 LAB
ALBUMIN SERPL-MCNC: 4.4 G/DL (ref 3.5–5.1)
ALP BLD-CCNC: 159 U/L (ref 38–126)
ALT SERPL-CCNC: 29 U/L (ref 11–66)
ANION GAP SERPL CALCULATED.3IONS-SCNC: 9 MEQ/L (ref 8–16)
AST SERPL-CCNC: 31 U/L (ref 5–40)
BASOPHILS # BLD: 0.5 %
BASOPHILS ABSOLUTE: 0 THOU/MM3 (ref 0–0.1)
BILIRUB SERPL-MCNC: 0.5 MG/DL (ref 0.3–1.2)
BUN BLDV-MCNC: 9 MG/DL (ref 7–22)
CALCIUM SERPL-MCNC: 9.7 MG/DL (ref 8.5–10.5)
CHLORIDE BLD-SCNC: 104 MEQ/L (ref 98–111)
CO2: 27 MEQ/L (ref 23–33)
CREAT SERPL-MCNC: 0.6 MG/DL (ref 0.4–1.2)
EOSINOPHIL # BLD: 2.2 %
EOSINOPHILS ABSOLUTE: 0.2 THOU/MM3 (ref 0–0.4)
ERYTHROCYTE [DISTWIDTH] IN BLOOD BY AUTOMATED COUNT: 14.8 % (ref 11.5–14.5)
ERYTHROCYTE [DISTWIDTH] IN BLOOD BY AUTOMATED COUNT: 43.2 FL (ref 35–45)
GFR SERPL CREATININE-BSD FRML MDRD: > 90 ML/MIN/1.73M2
GLUCOSE BLD-MCNC: 103 MG/DL (ref 70–108)
HCT VFR BLD CALC: 40.4 % (ref 37–47)
HEMOGLOBIN: 12.9 GM/DL (ref 12–16)
IMMATURE GRANS (ABS): 0.01 THOU/MM3 (ref 0–0.07)
IMMATURE GRANULOCYTES: 0.1 %
LYMPHOCYTES # BLD: 24.9 %
LYMPHOCYTES ABSOLUTE: 1.9 THOU/MM3 (ref 1–4.8)
MCH RBC QN AUTO: 26.3 PG (ref 26–33)
MCHC RBC AUTO-ENTMCNC: 31.9 GM/DL (ref 32.2–35.5)
MCV RBC AUTO: 82.3 FL (ref 81–99)
MONOCYTES # BLD: 6.9 %
MONOCYTES ABSOLUTE: 0.5 THOU/MM3 (ref 0.4–1.3)
NUCLEATED RED BLOOD CELLS: 0 /100 WBC
PLATELET # BLD: 301 THOU/MM3 (ref 130–400)
PMV BLD AUTO: 10.7 FL (ref 9.4–12.4)
POTASSIUM SERPL-SCNC: 4.1 MEQ/L (ref 3.5–5.2)
RBC # BLD: 4.91 MILL/MM3 (ref 4.2–5.4)
SEG NEUTROPHILS: 65.4 %
SEGMENTED NEUTROPHILS ABSOLUTE COUNT: 5.1 THOU/MM3 (ref 1.8–7.7)
SODIUM BLD-SCNC: 140 MEQ/L (ref 135–145)
TOTAL PROTEIN: 7.4 G/DL (ref 6.1–8)
WBC # BLD: 7.8 THOU/MM3 (ref 4.8–10.8)

## 2021-02-07 PROCEDURE — 85025 COMPLETE CBC W/AUTO DIFF WBC: CPT

## 2021-02-07 PROCEDURE — 82784 ASSAY IGA/IGD/IGG/IGM EACH: CPT

## 2021-02-07 PROCEDURE — 36415 COLL VENOUS BLD VENIPUNCTURE: CPT

## 2021-02-07 PROCEDURE — 80053 COMPREHEN METABOLIC PANEL: CPT

## 2021-02-08 ENCOUNTER — CARE COORDINATION (OUTPATIENT)
Dept: CARE COORDINATION | Age: 41
End: 2021-02-08

## 2021-02-08 DIAGNOSIS — M32.9 LUPUS ARTHRITIS (HCC): ICD-10-CM

## 2021-02-08 DIAGNOSIS — Z51.81 MEDICATION MONITORING ENCOUNTER: ICD-10-CM

## 2021-02-08 LAB — IGG: 755 MG/DL (ref 700–1600)

## 2021-02-08 RX ORDER — AZATHIOPRINE 50 MG/1
TABLET ORAL
Qty: 60 TABLET | Refills: 2 | Status: SHIPPED | OUTPATIENT
Start: 2021-02-08 | End: 2021-05-24 | Stop reason: SDUPTHER

## 2021-02-08 NOTE — CARE COORDINATION
Attempted to reach patient for Care Coordination f/u and education re: the management of her DM and healthcare needs. Patient was not available at the time of my call and voicemail unable to accept messages. My Chart message also sent to patient. If no response received I will plan to discharge from Care Coordination as I have been unable to reach her s/p multiple recent attempts.

## 2021-02-08 NOTE — PROGRESS NOTES
Diagnosis Orders   1. Lupus arthritis (HCC)  azaTHIOprine (IMURAN) 50 MG tablet   2. Medication monitoring encounter  azaTHIOprine (IMURAN) 50 MG tablet     - repeat labs in 12 weeks.

## 2021-02-11 ENCOUNTER — HOSPITAL ENCOUNTER (OUTPATIENT)
Age: 41
Discharge: HOME | End: 2021-02-11
Payer: MEDICARE

## 2021-02-11 ENCOUNTER — HOSPITAL ENCOUNTER (OUTPATIENT)
Dept: NURSING | Age: 41
Discharge: HOME | End: 2021-02-11
Payer: MEDICARE

## 2021-02-11 VITALS
HEART RATE: 97 BPM | BODY MASS INDEX: 41.27 KG/M2 | TEMPERATURE: 97.3 F | RESPIRATION RATE: 18 BRPM | OXYGEN SATURATION: 95 % | SYSTOLIC BLOOD PRESSURE: 124 MMHG | WEIGHT: 233 LBS | DIASTOLIC BLOOD PRESSURE: 87 MMHG

## 2021-02-11 DIAGNOSIS — D83.9 COMMON VARIABLE IMMUNODEFICIENCY (HCC): Primary | ICD-10-CM

## 2021-02-11 PROCEDURE — 96365 THER/PROPH/DIAG IV INF INIT: CPT

## 2021-02-11 PROCEDURE — 6370000000 HC RX 637 (ALT 250 FOR IP): Performed by: NURSE PRACTITIONER

## 2021-02-11 PROCEDURE — 96413 CHEMO IV INFUSION 1 HR: CPT

## 2021-02-11 PROCEDURE — 6360000002 HC RX W HCPCS: Performed by: NURSE PRACTITIONER

## 2021-02-11 PROCEDURE — U0003 INFECTIOUS AGENT DETECTION BY NUCLEIC ACID (DNA OR RNA); SEVERE ACUTE RESPIRATORY SYNDROME CORONAVIRUS 2 (SARS-COV-2) (CORONAVIRUS DISEASE [COVID-19]), AMPLIFIED PROBE TECHNIQUE, MAKING USE OF HIGH THROUGHPUT TECHNOLOGIES AS DESCRIBED BY CMS-2020-01-R: HCPCS

## 2021-02-11 RX ORDER — ACETAMINOPHEN 325 MG/1
650 TABLET ORAL ONCE
Status: CANCELLED | OUTPATIENT
Start: 2021-03-11 | End: 2021-03-11

## 2021-02-11 RX ORDER — HEPARIN SODIUM (PORCINE) LOCK FLUSH IV SOLN 100 UNIT/ML 100 UNIT/ML
500 SOLUTION INTRAVENOUS PRN
Status: CANCELLED | OUTPATIENT
Start: 2021-03-11

## 2021-02-11 RX ORDER — SODIUM CHLORIDE 0.9 % (FLUSH) 0.9 %
10 SYRINGE (ML) INJECTION PRN
Status: CANCELLED | OUTPATIENT
Start: 2021-03-11

## 2021-02-11 RX ORDER — ACETAMINOPHEN 325 MG/1
650 TABLET ORAL ONCE
Status: COMPLETED | OUTPATIENT
Start: 2021-02-11 | End: 2021-02-11

## 2021-02-11 RX ORDER — HEPARIN SODIUM (PORCINE) LOCK FLUSH IV SOLN 100 UNIT/ML 100 UNIT/ML
500 SOLUTION INTRAVENOUS PRN
Status: DISCONTINUED | OUTPATIENT
Start: 2021-02-11 | End: 2021-02-12 | Stop reason: HOSPADM

## 2021-02-11 RX ORDER — DIPHENHYDRAMINE HCL 25 MG
25 TABLET ORAL ONCE
Status: COMPLETED | OUTPATIENT
Start: 2021-02-11 | End: 2021-02-11

## 2021-02-11 RX ORDER — DIPHENHYDRAMINE HCL 25 MG
25 TABLET ORAL ONCE
Status: CANCELLED | OUTPATIENT
Start: 2021-03-11 | End: 2021-03-11

## 2021-02-11 RX ADMIN — ACETAMINOPHEN 650 MG: 325 TABLET ORAL at 09:13

## 2021-02-11 RX ADMIN — DIPHENHYDRAMINE HCL 25 MG: 25 TABLET ORAL at 09:13

## 2021-02-11 RX ADMIN — HEPARIN 500 UNITS: 100 SYRINGE at 10:52

## 2021-02-11 RX ADMIN — IMMUNE GLOBULIN (HUMAN) 20 G: 10 INJECTION INTRAVENOUS; SUBCUTANEOUS at 09:27

## 2021-02-11 ASSESSMENT — PAIN SCALES - GENERAL: PAINLEVEL_OUTOF10: 0

## 2021-02-11 NOTE — PROGRESS NOTES
0855  Pt ambulated into room for ivig infusion. Pt rights and responsibilities offered to patient. Patient states no infections and no atbs and no changes since last visit here. Pt offered snack and drink. Patient took tylenol and benadryl at home around [address] patient infusion started and pt has no other needs at this time. 1015  Pt tolerating infusion. Pt has no other needs at this time. Pt was brought a snack and drink. 1049 infusion complete and pt tolerated well. D/c instructions explained and pt verbalized understanding. Pt and mother ambulated out for d/c.                    __m__ Safety:       (Environmental)  ? [place] to environment  ? Ensure ID band is correct and in place/ allergy band as needed  ? Assess for fall risk  ? Initiate fall precautions as applicable (fall band, side rails, etc.)  ? Call light within reach  ? Bed in low position/ wheels locked    _m___ Pain:       ? Assess pain level and characteristics  ? Administer analgesics as ordered  ? Assess effectiveness of pain management and report to MD as needed    _m___ Knowledge Deficit:  ? Assess baseline knowledge  ? Provide teaching at level of understanding  ? Provide teaching via preferred learning method  ? Evaluate teaching effectiveness    __m__ Hemodynamic/Respiratory Status:       (Pre and Post Procedure Monitoring)  ? Assess/Monitor vital signs and LOC  ? Assess Baseline SpO2 prior to any sedation  ? Obtain weight/height  ? Assess vital signs/ LOC until patient meets discharge criteria  ? Monitor procedure site and notify MD of any issues    __m__ Infection-Risk of Central Venous Catheter:  ? Monitor for infection signs and symptoms (catheter site redness, temperature elevation, etc)  ? Assess for infection risks  ? Educate regarding infection prevention  ?  Manage central venous catheter (flushes/ dressing changes per protocol)

## 2021-02-12 LAB — SARS-COV-2: NOT DETECTED

## 2021-02-25 ENCOUNTER — OFFICE VISIT (OUTPATIENT)
Dept: FAMILY MEDICINE CLINIC | Age: 41
End: 2021-02-25
Payer: MEDICARE

## 2021-02-25 VITALS
TEMPERATURE: 97.2 F | HEART RATE: 85 BPM | DIASTOLIC BLOOD PRESSURE: 80 MMHG | BODY MASS INDEX: 41.27 KG/M2 | RESPIRATION RATE: 16 BRPM | SYSTOLIC BLOOD PRESSURE: 122 MMHG | WEIGHT: 233 LBS | OXYGEN SATURATION: 98 %

## 2021-02-25 DIAGNOSIS — I63.9 CEREBROVASCULAR ACCIDENT (CVA), UNSPECIFIED MECHANISM (HCC): ICD-10-CM

## 2021-02-25 DIAGNOSIS — E66.9 OBESITY (BMI 30-39.9): ICD-10-CM

## 2021-02-25 DIAGNOSIS — E83.42 HYPOMAGNESEMIA: ICD-10-CM

## 2021-02-25 DIAGNOSIS — E78.00 PURE HYPERCHOLESTEROLEMIA: ICD-10-CM

## 2021-02-25 DIAGNOSIS — G43.809 OTHER MIGRAINE WITHOUT STATUS MIGRAINOSUS, NOT INTRACTABLE: ICD-10-CM

## 2021-02-25 DIAGNOSIS — J30.2 SEASONAL ALLERGIES: ICD-10-CM

## 2021-02-25 DIAGNOSIS — R91.1 LUNG NODULE, SOLITARY: ICD-10-CM

## 2021-02-25 DIAGNOSIS — M79.7 FIBROMYALGIA: ICD-10-CM

## 2021-02-25 DIAGNOSIS — E11.69 TYPE 2 DIABETES MELLITUS WITH OTHER SPECIFIED COMPLICATION, WITHOUT LONG-TERM CURRENT USE OF INSULIN (HCC): Primary | ICD-10-CM

## 2021-02-25 DIAGNOSIS — M32.9 LUPUS (HCC): ICD-10-CM

## 2021-02-25 DIAGNOSIS — E66.01 MORBID OBESITY DUE TO EXCESS CALORIES (HCC): ICD-10-CM

## 2021-02-25 DIAGNOSIS — J45.40 MODERATE PERSISTENT ASTHMA WITHOUT COMPLICATION: ICD-10-CM

## 2021-02-25 DIAGNOSIS — G45.9 TIA (TRANSIENT ISCHEMIC ATTACK): ICD-10-CM

## 2021-02-25 DIAGNOSIS — G40.909 SEIZURE DISORDER (HCC): ICD-10-CM

## 2021-02-25 DIAGNOSIS — K21.9 GASTROESOPHAGEAL REFLUX DISEASE WITHOUT ESOPHAGITIS: ICD-10-CM

## 2021-02-25 PROCEDURE — 99214 OFFICE O/P EST MOD 30 MIN: CPT | Performed by: FAMILY MEDICINE

## 2021-02-25 ASSESSMENT — PATIENT HEALTH QUESTIONNAIRE - PHQ9
2. FEELING DOWN, DEPRESSED OR HOPELESS: 0
1. LITTLE INTEREST OR PLEASURE IN DOING THINGS: 0
SUM OF ALL RESPONSES TO PHQ9 QUESTIONS 1 & 2: 0

## 2021-02-25 NOTE — PROGRESS NOTES
[address] [place] [id number]  Dept: [phone]  Dept Fax: [phone]  Loc: [phone]    Pravin Montez is a 38 y.o. female who presents today for:  Chief Complaint   Patient presents with    3 Month Follow-Up     dm           HPI:     HPI    Diabetes Mellitus: Patient presents for follow up of diabetes. Symptoms: paresthesia of the feet and visual disturbances. Symptoms have gradually worsened. Patient denies increase appetite, polydipsia and weight loss. Evaluation to date has been included: fasting blood sugar, fasting lipid panel, hemoglobin A1C and microalbuminuria. Home sugars: 105-150 am and 150-250 pm. Treatment to date: Continued metformin which has been Yes:  , which has been somewhat effective. Lab Results   Component Value Date    LABA1C 6.7 (H) 09/12/2020     No results found for: EAG    Hyperlipidemia: Patient presents with hyperlipidemia. She was tested because screening. Her last labs showed   Lab Results   Component Value Date    CHOL 156 04/16/2020    CHOL 135 12/18/2018    CHOL 263 (H) 08/08/2018     Lab Results   Component Value Date    TRIG 119 04/16/2020    TRIG 121 12/18/2018    TRIG 237 (H) 08/08/2018     Lab Results   Component Value Date    HDL 35 04/16/2020    HDL 32 12/18/2018    HDL 28 08/08/2018     Lab Results   Component Value Date    LDLCALC 97 04/16/2020    LDLCALC 79 12/18/2018    [address] 08/08/2018     No results found for: LABVLDL, VLDL  No results found for: CHOLHDLRATIO  There is a family history of hyperlipidemia. There is not a family history of early ischemia heart disease. GERD: Lili complains of heartburn. This has been associated with early satiety and heartburn. She denies no other symptoms. Symptoms have been present for several years. She denies dysphagia. She has not lost weight.  She denies melena, hematochezia, hematemesis, and coffee ground emesis. Medical therapy in the past has included proton pump inhibitors. Still seeing neurology ( Dr Mercy Lara) for seizures. They are still occurring a few times per week. She was thinking about stopping all her medications and restarting \"fresh\". She is highly encouraged NOT to do this for fear of intractable seizure with brain damage. The seizures are usually followed by a headache. 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 She had an \"episode\" in my office in early 2018, she was started on aspirin 325 mg and went from having 8-9 episodes a day to 4 episodes every 2 months. Now also on plavix as well. Migraines are controlled with prn medications less then 2 per month. Fibromyalgia and Lupus and lupus arthritis is under the care of Rheumatology in [place] and formerly also a patient of Dr Ulysses Gartner who is no longer in practice. Allergies are well controlled on current medications. Lung nodule in the left upper lobe under the care of pulmonology. Reviewed chart forpast medical history , surgical history , allergies, social history , family history and medications.     Health Maintenance   Topic Date Due    COVID-19 Vaccine (1 of 2) 08/10/1995    Hepatitis B vaccine (1 of 3 - Risk 3-dose series) 08/10/1998    Annual Wellness Visit (AWV)  05/29/2019    Diabetic microalbuminuria test  04/11/2020    Diabetic foot exam  07/17/2020    Diabetic retinal exam  02/25/2021    Lipid screen  04/16/2021    A1C test (Diabetic or Prediabetic)  09/12/2021    Breast cancer screen  01/22/2022    DTaP/Tdap/Td vaccine (2 - Td) 07/11/2030    Flu vaccine  Completed    Pneumococcal 0-64 years Vaccine  Completed    Hepatitis C screen  Completed    Hepatitis A vaccine  Aged Out    Hib vaccine  Aged Out    Meningococcal (ACWY) vaccine  Aged Out    HIV screen  Discontinued       Subjective:      Constitutional:Negative for fever, chills, diaphoresis, activity change, appetite change and fatigue. HENT: Negative for hearing loss, ear pain, congestion, sore throat, rhinorrhea, postnasal drip and ear discharge. Eyes: Negative for photophobia and visual disturbance. Respiratory: Negative for cough, chest tightness, shortness of breath and wheezing. Cardiovascular: Negative for chest pain and leg swelling. Gastrointestinal: Negative for nausea, vomiting, abdominal pain, diarrhea and constipation. Genitourinary: Negative for dysuria, urgency and frequency. Neurological: Negative for weakness, light-headedness and headaches. Psychiatric/Behavioral: Negative for sleep disturbance.      :     Vitals:    02/25/21 1016   BP: 122/80   Site: Left Upper Arm   Position: Sitting   Cuff Size: Large Adult   Pulse: 85   Resp: 16   Temp: 97.2 °F (36.2 °C)   TempSrc: Temporal   SpO2: 98%   Weight: 233 lb (105.7 kg)     Wt Readings from Last 3 Encounters:   02/25/21 233 lb (105.7 kg)   02/11/21 233 lb (105.7 kg)   01/14/21 233 lb (105.7 kg)       Physical Exam  Constitutional: Vital signs are normal. She appears well-developed and well-nourished. She is active. HENT:   Head: Normocephalic and atraumatic. Right Ear: Tympanic membrane, external ear and ear canal normal. No drainage or tenderness. Left Ear: Tympanic membrane, external ear and ear canal normal. No drainage or tenderness. Nose: Nose normal. No mucosal edema or rhinorrhea. Mouth/Throat: Uvula is midline, oropharynx is clear and moist and mucous membranes are normal. Mucous membranes are not pale. Normal dentition. No posterior oropharyngeal edema or posterior oropharyngeal erythema. Eyes: Lids are normal. Right eye exhibits no chemosis and no discharge. Left eye exhibits no chemosis and no drainage. Right conjunctiva has no hemorrhage. Left conjunctiva has no hemorrhage. Right eye exhibits normal extraocular motion. Left eye exhibits normal extraocular motion.  Right pupil is round and reactive. Left pupil is round and reactive. Pupils are equal.   Cardiovascular: Normal rate, regular rhythm, S1 normal, S2 normal and normal heart sounds. Exam reveals no gallop. No murmur heard. Pulmonary/Chest: Effort normal and breath sounds normal. No respiratory distress. She has no wheezes. She has no rhonchi. She has no rales. Abdominal: Soft. Normal appearance and bowel sounds are normal. She exhibits no distension and no mass. There is no hepatosplenomegaly. No tenderness. She has no rigidity, no rebound and no guarding. No hernia. Musculoskeletal:        Right lower leg: She exhibits no edema. Left lower leg: She exhibits no edema. Neurological: She is alert. Assessment/Plan   Migue Rascon was seen today for 3 month follow-up. Diagnoses and all orders for this visit:    Type 2 diabetes mellitus with other specified complication, without long-term current use of insulin (HCC)  -     Comprehensive Metabolic Panel, Fasting; Future  -     Hemoglobin A1C; Future  -     Microalbumin / Creatinine Urine Ratio; Future    Pure hypercholesterolemia  -     Comprehensive Metabolic Panel, Fasting; Future  -     Lipid Panel; Future    Fibromyalgia    Seizure disorder (HCC)  -     TSH With Reflex Ft4; Future    Cerebrovascular accident (CVA), unspecified mechanism ([facility] [address]    Gastroesophageal reflux disease without esophagitis  -     CBC; Future    Morbid obesity due to excess calories (HCC)    Seasonal allergies    TIA (transient ischemic attack)    Obesity (BMI 30-39. 9)    Hypomagnesemia    Lung nodule, solitary    Other migraine without status migrainosus, not intractable    Moderate persistent asthma without complication    Lupus (HCC)    No change to medication   Continue healthy diet and exercise  Yearly eye exam  Daily foot inspection  Yearly flu shot  Monitor glucose regularly  Daily aspirin  Regular labs : A1c quarterly, lipids every 6 months and BMP quaterly    Discussed use, benefit, and side effectsof prescribed medications. All patient questions answered. Pt voiced understanding. Reviewed health maintenance. Instructed to continue current medications, diet and exercise. Patient agreed with treatment plan. Followup as directed.      Electronically signed by Tanya Pedraza MD

## 2021-03-01 ENCOUNTER — PATIENT MESSAGE (OUTPATIENT)
Dept: FAMILY MEDICINE CLINIC | Age: 41
End: 2021-03-01

## 2021-03-01 DIAGNOSIS — E11.69 TYPE 2 DIABETES MELLITUS WITH OTHER SPECIFIED COMPLICATION, WITHOUT LONG-TERM CURRENT USE OF INSULIN (HCC): ICD-10-CM

## 2021-03-01 NOTE — TELEPHONE ENCOUNTER
From: Yuri Ho  To:  Tiffanie Robledo MD  Sent: 3/1/2021 12:06 PM EST  Subject: Prescription Question    I need a new prescription for trulicity

## 2021-03-02 ENCOUNTER — HOSPITAL ENCOUNTER (OUTPATIENT)
Age: 41
Discharge: HOME | End: 2021-03-02
Payer: MEDICARE

## 2021-03-02 LAB
ALBUMIN SERPL-MCNC: 4.3 G/DL (ref 3.5–5.1)
ALP BLD-CCNC: 161 U/L (ref 38–126)
ALT SERPL-CCNC: 19 U/L (ref 11–66)
ANION GAP SERPL CALCULATED.3IONS-SCNC: 15 MEQ/L (ref 8–16)
AST SERPL-CCNC: 23 U/L (ref 5–40)
BASOPHILS # BLD: 0.7 %
BASOPHILS ABSOLUTE: 0.1 THOU/MM3 (ref 0–0.1)
BILIRUB SERPL-MCNC: 0.4 MG/DL (ref 0.3–1.2)
BUN BLDV-MCNC: 12 MG/DL (ref 7–22)
CALCIUM SERPL-MCNC: 9.5 MG/DL (ref 8.5–10.5)
CHLORIDE BLD-SCNC: 101 MEQ/L (ref 98–111)
CO2: 25 MEQ/L (ref 23–33)
CREAT SERPL-MCNC: 0.6 MG/DL (ref 0.4–1.2)
EOSINOPHIL # BLD: 2.6 %
EOSINOPHILS ABSOLUTE: 0.2 THOU/MM3 (ref 0–0.4)
ERYTHROCYTE [DISTWIDTH] IN BLOOD BY AUTOMATED COUNT: 15 % (ref 11.5–14.5)
ERYTHROCYTE [DISTWIDTH] IN BLOOD BY AUTOMATED COUNT: 45.2 FL (ref 35–45)
GFR SERPL CREATININE-BSD FRML MDRD: > 90 ML/MIN/1.73M2
GLUCOSE BLD-MCNC: 136 MG/DL (ref 70–108)
HCT VFR BLD CALC: 42 % (ref 37–47)
HEMOGLOBIN: 13.1 GM/DL (ref 12–16)
IMMATURE GRANS (ABS): 0.04 THOU/MM3 (ref 0–0.07)
IMMATURE GRANULOCYTES: 0.5 %
LYMPHOCYTES # BLD: 34.2 %
LYMPHOCYTES ABSOLUTE: 2.9 THOU/MM3 (ref 1–4.8)
MCH RBC QN AUTO: 26 PG (ref 26–33)
MCHC RBC AUTO-ENTMCNC: 31.2 GM/DL (ref 32.2–35.5)
MCV RBC AUTO: 83.3 FL (ref 81–99)
MONOCYTES # BLD: 6.3 %
MONOCYTES ABSOLUTE: 0.5 THOU/MM3 (ref 0.4–1.3)
NUCLEATED RED BLOOD CELLS: 0 /100 WBC
PLATELET # BLD: 269 THOU/MM3 (ref 130–400)
PMV BLD AUTO: 11.2 FL (ref 9.4–12.4)
POTASSIUM SERPL-SCNC: 3.5 MEQ/L (ref 3.5–5.2)
RBC # BLD: 5.04 MILL/MM3 (ref 4.2–5.4)
SEG NEUTROPHILS: 55.7 %
SEGMENTED NEUTROPHILS ABSOLUTE COUNT: 4.7 THOU/MM3 (ref 1.8–7.7)
SODIUM BLD-SCNC: 141 MEQ/L (ref 135–145)
TOTAL PROTEIN: 7.2 G/DL (ref 6.1–8)
WBC # BLD: 8.4 THOU/MM3 (ref 4.8–10.8)

## 2021-03-02 PROCEDURE — 80053 COMPREHEN METABOLIC PANEL: CPT

## 2021-03-02 PROCEDURE — 36415 COLL VENOUS BLD VENIPUNCTURE: CPT

## 2021-03-02 PROCEDURE — 82784 ASSAY IGA/IGD/IGG/IGM EACH: CPT

## 2021-03-02 PROCEDURE — 85025 COMPLETE CBC W/AUTO DIFF WBC: CPT

## 2021-03-03 LAB — IGG: 801 MG/DL (ref 700–1600)

## 2021-03-11 ENCOUNTER — HOSPITAL ENCOUNTER (OUTPATIENT)
Dept: NURSING | Age: 41
Discharge: HOME | End: 2021-03-11
Payer: MEDICARE

## 2021-03-11 VITALS
SYSTOLIC BLOOD PRESSURE: 140 MMHG | HEART RATE: 86 BPM | WEIGHT: 233 LBS | DIASTOLIC BLOOD PRESSURE: 90 MMHG | TEMPERATURE: 97.5 F | BODY MASS INDEX: 41.27 KG/M2 | RESPIRATION RATE: 16 BRPM

## 2021-03-11 DIAGNOSIS — D83.9 COMMON VARIABLE IMMUNODEFICIENCY (HCC): Primary | ICD-10-CM

## 2021-03-11 PROCEDURE — 96413 CHEMO IV INFUSION 1 HR: CPT

## 2021-03-11 PROCEDURE — 2580000003 HC RX 258: Performed by: NURSE PRACTITIONER

## 2021-03-11 PROCEDURE — 6360000002 HC RX W HCPCS: Performed by: NURSE PRACTITIONER

## 2021-03-11 PROCEDURE — 96365 THER/PROPH/DIAG IV INF INIT: CPT

## 2021-03-11 RX ORDER — HEPARIN SODIUM (PORCINE) LOCK FLUSH IV SOLN 100 UNIT/ML 100 UNIT/ML
500 SOLUTION INTRAVENOUS PRN
Status: DISCONTINUED | OUTPATIENT
Start: 2021-03-11 | End: 2021-03-12 | Stop reason: HOSPADM

## 2021-03-11 RX ORDER — SODIUM CHLORIDE 0.9 % (FLUSH) 0.9 %
10 SYRINGE (ML) INJECTION PRN
Status: DISCONTINUED | OUTPATIENT
Start: 2021-03-11 | End: 2021-03-12 | Stop reason: HOSPADM

## 2021-03-11 RX ORDER — DIPHENHYDRAMINE HCL 25 MG
25 TABLET ORAL ONCE
Status: DISCONTINUED | OUTPATIENT
Start: 2021-03-11 | End: 2021-03-12 | Stop reason: HOSPADM

## 2021-03-11 RX ORDER — ACETAMINOPHEN 325 MG/1
650 TABLET ORAL ONCE
Status: CANCELLED | OUTPATIENT
Start: 2021-04-08 | End: 2021-04-08

## 2021-03-11 RX ORDER — HEPARIN SODIUM (PORCINE) LOCK FLUSH IV SOLN 100 UNIT/ML 100 UNIT/ML
500 SOLUTION INTRAVENOUS PRN
Status: CANCELLED | OUTPATIENT
Start: 2021-04-08

## 2021-03-11 RX ORDER — SODIUM CHLORIDE 0.9 % (FLUSH) 0.9 %
10 SYRINGE (ML) INJECTION PRN
Status: CANCELLED | OUTPATIENT
Start: 2021-04-08

## 2021-03-11 RX ORDER — DIPHENHYDRAMINE HCL 25 MG
25 TABLET ORAL ONCE
Status: CANCELLED | OUTPATIENT
Start: 2021-04-08 | End: 2021-04-08

## 2021-03-11 RX ORDER — ACETAMINOPHEN 325 MG/1
650 TABLET ORAL ONCE
Status: DISCONTINUED | OUTPATIENT
Start: 2021-03-11 | End: 2021-03-12 | Stop reason: HOSPADM

## 2021-03-11 RX ADMIN — Medication 10 ML: at 10:38

## 2021-03-11 RX ADMIN — HEPARIN 500 UNITS: 100 SYRINGE at 10:38

## 2021-03-11 RX ADMIN — IMMUNE GLOBULIN (HUMAN) 20 G: 10 INJECTION INTRAVENOUS; SUBCUTANEOUS at 09:24

## 2021-03-11 ASSESSMENT — PAIN - FUNCTIONAL ASSESSMENT: PAIN_FUNCTIONAL_ASSESSMENT: 0-10

## 2021-03-11 NOTE — PROGRESS NOTES
0900: Patient admitted to room 05, vitals are stable. Patient offered rights and responsibilities.    __M__ Safety:       (Environmental)   [place] to environment   Ensure ID band is correct and in place/ allergy band as needed   Assess for fall risk   Initiate fall precautions as applicable (fall band, side rails, etc.)   Call light within reach   Bed in low position/ wheels locked    __M__ Pain:        Assess pain level and characteristics   Administer analgesics as ordered   Assess effectiveness of pain management and report to MD as needed    ___M_ Knowledge Deficit:   Assess baseline knowledge   Provide teaching at level of understanding   Provide teaching via preferred learning method   Evaluate teaching effectiveness    __M__ Hemodynamic/Respiratory Status:       (Pre and Post Procedure Monitoring)   Assess/Monitor vital signs and LOC   Assess Baseline SpO2 prior to any sedation   Obtain weight/height   Assess vital signs/ LOC until patient meets discharge criteria   Monitor procedure site and notify MD of any issues  M  ____ Infection-Risk of Central Venous Catheter:   Monitor for infection signs and symptoms (catheter site redness, temperature elevation, etc)   Assess for infection risks   Educate regarding infection prevention   Manage central venous catheter (flushes/ dressing changes per protocol)

## 2021-03-17 DIAGNOSIS — L30.4 INTERTRIGO: ICD-10-CM

## 2021-03-17 RX ORDER — CLOTRIMAZOLE AND BETAMETHASONE DIPROPIONATE 10; .64 MG/G; MG/G
CREAM TOPICAL
Qty: 45 G | Refills: 0 | Status: SHIPPED | OUTPATIENT
Start: 2021-03-17 | End: 2021-12-20

## 2021-03-31 ENCOUNTER — HOSPITAL ENCOUNTER (OUTPATIENT)
Age: 41
Discharge: HOME | End: 2021-03-31
Payer: MEDICARE

## 2021-03-31 DIAGNOSIS — D83.9 CVID (COMMON VARIABLE IMMUNODEFICIENCY) (HCC): ICD-10-CM

## 2021-03-31 PROCEDURE — 85025 COMPLETE CBC W/AUTO DIFF WBC: CPT

## 2021-03-31 PROCEDURE — 80053 COMPREHEN METABOLIC PANEL: CPT

## 2021-03-31 PROCEDURE — 36415 COLL VENOUS BLD VENIPUNCTURE: CPT

## 2021-04-01 LAB
ALBUMIN SERPL-MCNC: 4.4 G/DL (ref 3.5–5.1)
ALP BLD-CCNC: 173 U/L (ref 38–126)
ALT SERPL-CCNC: 22 U/L (ref 11–66)
ANION GAP SERPL CALCULATED.3IONS-SCNC: 11 MEQ/L (ref 8–16)
AST SERPL-CCNC: 27 U/L (ref 5–40)
BASOPHILS # BLD: 0.4 %
BASOPHILS ABSOLUTE: 0 THOU/MM3 (ref 0–0.1)
BILIRUB SERPL-MCNC: 0.3 MG/DL (ref 0.3–1.2)
BUN BLDV-MCNC: 6 MG/DL (ref 7–22)
CALCIUM SERPL-MCNC: 9.2 MG/DL (ref 8.5–10.5)
CHLORIDE BLD-SCNC: 103 MEQ/L (ref 98–111)
CO2: 27 MEQ/L (ref 23–33)
CREAT SERPL-MCNC: 0.6 MG/DL (ref 0.4–1.2)
CRENATED RBC'S: ABNORMAL
ELLIPTOCYTES: ABNORMAL
EOSINOPHIL # BLD: 2.5 %
EOSINOPHILS ABSOLUTE: 0.2 THOU/MM3 (ref 0–0.4)
ERYTHROCYTE [DISTWIDTH] IN BLOOD BY AUTOMATED COUNT: 15.6 % (ref 11.5–14.5)
ERYTHROCYTE [DISTWIDTH] IN BLOOD BY AUTOMATED COUNT: 49 FL (ref 35–45)
GFR SERPL CREATININE-BSD FRML MDRD: > 90 ML/MIN/1.73M2
GLUCOSE BLD-MCNC: 136 MG/DL (ref 70–108)
HCT VFR BLD CALC: 44.3 % (ref 37–47)
HEMOGLOBIN: 12.8 GM/DL (ref 12–16)
HYPOCHROMIA: PRESENT
IMMATURE GRANS (ABS): 0.01 THOU/MM3 (ref 0–0.07)
IMMATURE GRANULOCYTES: 0.1 %
LYMPHOCYTES # BLD: 29 %
LYMPHOCYTES ABSOLUTE: 2 THOU/MM3 (ref 1–4.8)
MCH RBC QN AUTO: 25.2 PG (ref 26–33)
MCHC RBC AUTO-ENTMCNC: 28.9 GM/DL (ref 32.2–35.5)
MCV RBC AUTO: 87.4 FL (ref 81–99)
MONOCYTES # BLD: 6.7 %
MONOCYTES ABSOLUTE: 0.5 THOU/MM3 (ref 0.4–1.3)
NUCLEATED RED BLOOD CELLS: 0 /100 WBC
PLATELET # BLD: 287 THOU/MM3 (ref 130–400)
PLATELET ESTIMATE: ADEQUATE
PMV BLD AUTO: 10.5 FL (ref 9.4–12.4)
POIKILOCYTES: ABNORMAL
POTASSIUM SERPL-SCNC: 4 MEQ/L (ref 3.5–5.2)
RBC # BLD: 5.07 MILL/MM3 (ref 4.2–5.4)
SCAN OF BLOOD SMEAR: NORMAL
SEG NEUTROPHILS: 61.3 %
SEGMENTED NEUTROPHILS ABSOLUTE COUNT: 4.2 THOU/MM3 (ref 1.8–7.7)
SODIUM BLD-SCNC: 141 MEQ/L (ref 135–145)
TOTAL PROTEIN: 7.2 G/DL (ref 6.1–8)
WBC # BLD: 6.9 THOU/MM3 (ref 4.8–10.8)

## 2021-04-05 ENCOUNTER — TELEPHONE (OUTPATIENT)
Dept: PULMONOLOGY | Age: 41
End: 2021-04-05

## 2021-04-05 DIAGNOSIS — J45.20 MILD INTERMITTENT ASTHMA WITHOUT COMPLICATION: Primary | ICD-10-CM

## 2021-04-08 ENCOUNTER — HOSPITAL ENCOUNTER (OUTPATIENT)
Dept: NURSING | Age: 41
Discharge: HOME | End: 2021-04-08
Payer: MEDICARE

## 2021-04-08 VITALS
DIASTOLIC BLOOD PRESSURE: 77 MMHG | TEMPERATURE: 97.8 F | WEIGHT: 233 LBS | HEART RATE: 80 BPM | RESPIRATION RATE: 18 BRPM | BODY MASS INDEX: 41.27 KG/M2 | OXYGEN SATURATION: 95 % | SYSTOLIC BLOOD PRESSURE: 137 MMHG

## 2021-04-08 DIAGNOSIS — D83.9 COMMON VARIABLE IMMUNODEFICIENCY (HCC): Primary | ICD-10-CM

## 2021-04-08 PROCEDURE — 2580000003 HC RX 258: Performed by: NURSE PRACTITIONER

## 2021-04-08 PROCEDURE — 6360000002 HC RX W HCPCS: Performed by: NURSE PRACTITIONER

## 2021-04-08 PROCEDURE — 96413 CHEMO IV INFUSION 1 HR: CPT

## 2021-04-08 PROCEDURE — 96365 THER/PROPH/DIAG IV INF INIT: CPT

## 2021-04-08 RX ORDER — HEPARIN SODIUM (PORCINE) LOCK FLUSH IV SOLN 100 UNIT/ML 100 UNIT/ML
500 SOLUTION INTRAVENOUS PRN
Status: DISCONTINUED | OUTPATIENT
Start: 2021-04-08 | End: 2021-04-09 | Stop reason: HOSPADM

## 2021-04-08 RX ORDER — DIPHENHYDRAMINE HCL 25 MG
25 TABLET ORAL ONCE
Status: CANCELLED | OUTPATIENT
Start: 2021-05-06 | End: 2021-05-06

## 2021-04-08 RX ORDER — HEPARIN SODIUM (PORCINE) LOCK FLUSH IV SOLN 100 UNIT/ML 100 UNIT/ML
500 SOLUTION INTRAVENOUS PRN
Status: CANCELLED | OUTPATIENT
Start: 2021-05-06

## 2021-04-08 RX ORDER — SODIUM CHLORIDE 0.9 % (FLUSH) 0.9 %
10 SYRINGE (ML) INJECTION PRN
Status: CANCELLED | OUTPATIENT
Start: 2021-05-06

## 2021-04-08 RX ORDER — DIPHENHYDRAMINE HCL 25 MG
25 TABLET ORAL ONCE
Status: DISCONTINUED | OUTPATIENT
Start: 2021-04-08 | End: 2021-04-09 | Stop reason: HOSPADM

## 2021-04-08 RX ORDER — SODIUM CHLORIDE 0.9 % (FLUSH) 0.9 %
10 SYRINGE (ML) INJECTION PRN
Status: DISCONTINUED | OUTPATIENT
Start: 2021-04-08 | End: 2021-04-09 | Stop reason: HOSPADM

## 2021-04-08 RX ORDER — ACETAMINOPHEN 325 MG/1
650 TABLET ORAL ONCE
Status: DISCONTINUED | OUTPATIENT
Start: 2021-04-08 | End: 2021-04-09 | Stop reason: HOSPADM

## 2021-04-08 RX ORDER — ACETAMINOPHEN 325 MG/1
650 TABLET ORAL ONCE
Status: CANCELLED | OUTPATIENT
Start: 2021-05-06 | End: 2021-05-06

## 2021-04-08 RX ADMIN — IMMUNE GLOBULIN (HUMAN) 20 G: 10 INJECTION INTRAVENOUS; SUBCUTANEOUS at 08:59

## 2021-04-08 RX ADMIN — Medication 10 ML: at 10:17

## 2021-04-08 RX ADMIN — HEPARIN 500 UNITS: 100 SYRINGE at 10:17

## 2021-04-08 NOTE — PROGRESS NOTES
3714 pt arrives ambulatory with mother for IVIG infusion. Infusion explained and questions answered. PT RIGHTS AND RESPONSIBILITIES OFFERED TO PT.  1000 pt tolerating infusion well. Denies needs at this time. 1020 infusion complete. Pt tolerated it well with no complaints. Pt discharged ambulatory with instructions with no complaints.              _m___ Safety:       (Environmental)   [place] to environment   Ensure ID band is correct and in place/ allergy band as needed   Assess for fall risk   Initiate fall precautions as applicable (fall band, side rails, etc.)   Call light within reach   Bed in low position/ wheels locked    _m___ Pain:        Assess pain level and characteristics   Administer analgesics as ordered   Assess effectiveness of pain management and report to MD as needed    __m__ Knowledge Deficit:   Assess baseline knowledge   Provide teaching at level of understanding   Provide teaching via preferred learning method   Evaluate teaching effectiveness    _m___ Hemodynamic/Respiratory Status:       (Pre and Post Procedure Monitoring)   Assess/Monitor vital signs and LOC   Assess Baseline SpO2 prior to any sedation   Obtain weight/height   Assess vital signs/ LOC until patient meets discharge criteria   Monitor procedure site and notify MD of any issues    _m___ Infection-Risk of Central Venous Catheter:   Monitor for infection signs and symptoms (catheter site redness, temperature elevation, etc)   Assess for infection risks   Educate regarding infection prevention   Manage central venous catheter (flushes/ dressing changes per protocol)

## 2021-04-12 ENCOUNTER — HOSPITAL ENCOUNTER (OUTPATIENT)
Dept: CT IMAGING | Age: 41
Discharge: HOME | End: 2021-04-12
Payer: MEDICARE

## 2021-04-12 DIAGNOSIS — R91.1 LUNG NODULE, SOLITARY: ICD-10-CM

## 2021-04-12 DIAGNOSIS — J45.20 MILD INTERMITTENT ASTHMA WITHOUT COMPLICATION: ICD-10-CM

## 2021-04-12 PROCEDURE — 71250 CT THORAX DX C-: CPT

## 2021-05-01 DIAGNOSIS — Z51.81 MEDICATION MONITORING ENCOUNTER: ICD-10-CM

## 2021-05-01 DIAGNOSIS — M32.9 LUPUS ARTHRITIS (HCC): ICD-10-CM

## 2021-05-02 ENCOUNTER — HOSPITAL ENCOUNTER (OUTPATIENT)
Age: 41
Discharge: HOME | End: 2021-05-02
Payer: MEDICARE

## 2021-05-02 LAB
ALBUMIN SERPL-MCNC: 4.3 G/DL (ref 3.5–5.1)
ALP BLD-CCNC: 150 U/L (ref 38–126)
ALT SERPL-CCNC: 23 U/L (ref 11–66)
ANION GAP SERPL CALCULATED.3IONS-SCNC: 12 MEQ/L (ref 8–16)
AST SERPL-CCNC: 27 U/L (ref 5–40)
BASOPHILS # BLD: 0.5 %
BASOPHILS ABSOLUTE: 0 THOU/MM3 (ref 0–0.1)
BILIRUB SERPL-MCNC: 0.3 MG/DL (ref 0.3–1.2)
BUN BLDV-MCNC: 11 MG/DL (ref 7–22)
CALCIUM SERPL-MCNC: 9.1 MG/DL (ref 8.5–10.5)
CHLORIDE BLD-SCNC: 99 MEQ/L (ref 98–111)
CO2: 26 MEQ/L (ref 23–33)
CREAT SERPL-MCNC: 0.7 MG/DL (ref 0.4–1.2)
EOSINOPHIL # BLD: 2.3 %
EOSINOPHILS ABSOLUTE: 0.2 THOU/MM3 (ref 0–0.4)
ERYTHROCYTE [DISTWIDTH] IN BLOOD BY AUTOMATED COUNT: 14.6 % (ref 11.5–14.5)
ERYTHROCYTE [DISTWIDTH] IN BLOOD BY AUTOMATED COUNT: 43.6 FL (ref 35–45)
GFR SERPL CREATININE-BSD FRML MDRD: > 90 ML/MIN/1.73M2
GLUCOSE BLD-MCNC: 125 MG/DL (ref 70–108)
HCT VFR BLD CALC: 40.3 % (ref 37–47)
HEMOGLOBIN: 12.3 GM/DL (ref 12–16)
IMMATURE GRANS (ABS): 0.02 THOU/MM3 (ref 0–0.07)
IMMATURE GRANULOCYTES: 0.2 %
LYMPHOCYTES # BLD: 35.4 %
LYMPHOCYTES ABSOLUTE: 2.9 THOU/MM3 (ref 1–4.8)
MCH RBC QN AUTO: 25 PG (ref 26–33)
MCHC RBC AUTO-ENTMCNC: 30.5 GM/DL (ref 32.2–35.5)
MCV RBC AUTO: 81.9 FL (ref 81–99)
MONOCYTES # BLD: 6.1 %
MONOCYTES ABSOLUTE: 0.5 THOU/MM3 (ref 0.4–1.3)
NUCLEATED RED BLOOD CELLS: 0 /100 WBC
PLATELET # BLD: 295 THOU/MM3 (ref 130–400)
PMV BLD AUTO: 10.2 FL (ref 9.4–12.4)
POTASSIUM SERPL-SCNC: 4 MEQ/L (ref 3.5–5.2)
RBC # BLD: 4.92 MILL/MM3 (ref 4.2–5.4)
SEG NEUTROPHILS: 55.5 %
SEGMENTED NEUTROPHILS ABSOLUTE COUNT: 4.6 THOU/MM3 (ref 1.8–7.7)
SODIUM BLD-SCNC: 137 MEQ/L (ref 135–145)
TOTAL PROTEIN: 6.8 G/DL (ref 6.1–8)
WBC # BLD: 8.3 THOU/MM3 (ref 4.8–10.8)

## 2021-05-02 PROCEDURE — 80053 COMPREHEN METABOLIC PANEL: CPT

## 2021-05-02 PROCEDURE — 36415 COLL VENOUS BLD VENIPUNCTURE: CPT

## 2021-05-02 PROCEDURE — 85025 COMPLETE CBC W/AUTO DIFF WBC: CPT

## 2021-05-03 RX ORDER — AZATHIOPRINE 50 MG/1
TABLET ORAL
Qty: 180 TABLET | Refills: 0 | OUTPATIENT
Start: 2021-05-03

## 2021-05-03 RX ORDER — MONTELUKAST SODIUM 10 MG/1
10 TABLET ORAL NIGHTLY
Qty: 30 TABLET | Refills: 11 | Status: SHIPPED | OUTPATIENT
Start: 2021-05-03 | End: 2021-05-29 | Stop reason: SDUPTHER

## 2021-05-06 ENCOUNTER — HOSPITAL ENCOUNTER (OUTPATIENT)
Dept: NURSING | Age: 41
Discharge: HOME | End: 2021-05-06
Payer: MEDICARE

## 2021-05-06 VITALS
HEART RATE: 81 BPM | TEMPERATURE: 97.9 F | SYSTOLIC BLOOD PRESSURE: 122 MMHG | RESPIRATION RATE: 18 BRPM | DIASTOLIC BLOOD PRESSURE: 76 MMHG | WEIGHT: 233 LBS | BODY MASS INDEX: 41.27 KG/M2 | OXYGEN SATURATION: 95 %

## 2021-05-06 DIAGNOSIS — D83.9 COMMON VARIABLE IMMUNODEFICIENCY (HCC): Primary | ICD-10-CM

## 2021-05-06 PROCEDURE — 96365 THER/PROPH/DIAG IV INF INIT: CPT

## 2021-05-06 PROCEDURE — 6360000002 HC RX W HCPCS: Performed by: NURSE PRACTITIONER

## 2021-05-06 PROCEDURE — 96413 CHEMO IV INFUSION 1 HR: CPT

## 2021-05-06 PROCEDURE — 2580000003 HC RX 258: Performed by: NURSE PRACTITIONER

## 2021-05-06 RX ORDER — SODIUM CHLORIDE 0.9 % (FLUSH) 0.9 %
10 SYRINGE (ML) INJECTION PRN
Status: CANCELLED | OUTPATIENT
Start: 2021-06-03

## 2021-05-06 RX ORDER — HEPARIN SODIUM (PORCINE) LOCK FLUSH IV SOLN 100 UNIT/ML 100 UNIT/ML
500 SOLUTION INTRAVENOUS PRN
Status: CANCELLED | OUTPATIENT
Start: 2021-06-03

## 2021-05-06 RX ORDER — SODIUM CHLORIDE 0.9 % (FLUSH) 0.9 %
10 SYRINGE (ML) INJECTION PRN
Status: DISCONTINUED | OUTPATIENT
Start: 2021-05-06 | End: 2021-05-07 | Stop reason: HOSPADM

## 2021-05-06 RX ORDER — DIPHENHYDRAMINE HCL 25 MG
25 TABLET ORAL ONCE
Status: CANCELLED | OUTPATIENT
Start: 2021-06-03 | End: 2021-06-03

## 2021-05-06 RX ORDER — HEPARIN SODIUM (PORCINE) LOCK FLUSH IV SOLN 100 UNIT/ML 100 UNIT/ML
500 SOLUTION INTRAVENOUS PRN
Status: DISCONTINUED | OUTPATIENT
Start: 2021-05-06 | End: 2021-05-07 | Stop reason: HOSPADM

## 2021-05-06 RX ORDER — ACETAMINOPHEN 325 MG/1
650 TABLET ORAL ONCE
Status: CANCELLED | OUTPATIENT
Start: 2021-06-03 | End: 2021-06-03

## 2021-05-06 RX ORDER — DIPHENHYDRAMINE HCL 25 MG
25 TABLET ORAL ONCE
Status: DISCONTINUED | OUTPATIENT
Start: 2021-05-06 | End: 2021-05-07 | Stop reason: HOSPADM

## 2021-05-06 RX ORDER — ACETAMINOPHEN 325 MG/1
650 TABLET ORAL ONCE
Status: DISCONTINUED | OUTPATIENT
Start: 2021-05-06 | End: 2021-05-07 | Stop reason: HOSPADM

## 2021-05-06 RX ADMIN — IMMUNE GLOBULIN (HUMAN) 20 G: 10 INJECTION INTRAVENOUS; SUBCUTANEOUS at 09:15

## 2021-05-06 RX ADMIN — Medication 10 ML: at 10:44

## 2021-05-06 RX ADMIN — HEPARIN 500 UNITS: 100 SYRINGE at 10:45

## 2021-05-06 NOTE — PROGRESS NOTES
0900  Patient arrived to [facility] ambulatory for IVIG infusion. Oriented to room and call light  PT RIGHTS AND RESPONSIBILITIES OFFERED TO PT.    2687  Port accessed using sterile technique. 0915  Infusion started and she denies complaints. Snack offered. 0945  Vitals as charted, she denies complaints    1014  Vitals as charted, she denies complaints    1034  Vitals as charted, she denies complaints     1050  Medication completed and she denies complaints. Port  De-accessed. Discharge instructions reviewed and she denies questions.  Discharged ambulatory           __M__ Safety:       (Environmental)  [place] to environment   Ensure ID band is correct and in place/ allergy band as needed   Assess for fall risk   Initiate fall precautions as applicable (fall band, side rails, etc.)   Call light within reach   Bed in low position/ wheels locked    __M__ Pain:        Assess pain level and characteristics   Administer analgesics as ordered   Assess effectiveness of pain management and report to MD as needed    _M___ Knowledge Deficit:   Assess baseline knowledge   Provide teaching at level of understanding   Provide teaching via preferred learning method   Evaluate teaching effectiveness    __M__ Hemodynamic/Respiratory Status:       (Pre and Post Procedure Monitoring)   Assess/Monitor vital signs and LOC   Assess Baseline SpO2 prior to any sedation   Obtain weight/height   Assess vital signs/ LOC until patient meets discharge criteria   Monitor procedure site and notify MD of any issues    _M___ Infection-Risk of Central Venous Catheter:   Monitor for infection signs and symptoms (catheter site redness, temperature elevation, etc)   Assess for infection risks   Educate regarding infection prevention   Manage central venous catheter (flushes/ dressing changes per protocol)

## 2021-05-12 ENCOUNTER — OFFICE VISIT (OUTPATIENT)
Dept: ALLERGY | Age: 41
End: 2021-05-12
Payer: MEDICARE

## 2021-05-12 VITALS
BODY MASS INDEX: 41.63 KG/M2 | HEART RATE: 70 BPM | TEMPERATURE: 97.2 F | SYSTOLIC BLOOD PRESSURE: 128 MMHG | RESPIRATION RATE: 14 BRPM | WEIGHT: 235 LBS | DIASTOLIC BLOOD PRESSURE: 74 MMHG

## 2021-05-12 DIAGNOSIS — J02.9 PHARYNGITIS, UNSPECIFIED ETIOLOGY: Primary | ICD-10-CM

## 2021-05-12 DIAGNOSIS — J32.4 CHRONIC PANSINUSITIS: ICD-10-CM

## 2021-05-12 DIAGNOSIS — D83.9 CVID (COMMON VARIABLE IMMUNODEFICIENCY) (HCC): ICD-10-CM

## 2021-05-12 PROCEDURE — 99213 OFFICE O/P EST LOW 20 MIN: CPT | Performed by: NURSE PRACTITIONER

## 2021-05-12 RX ORDER — PREDNISONE 20 MG/1
20 TABLET ORAL 2 TIMES DAILY
Qty: 10 TABLET | Refills: 0 | Status: SHIPPED | OUTPATIENT
Start: 2021-05-12 | End: 2021-05-17

## 2021-05-12 RX ORDER — POTASSIUM CHLORIDE 750 MG/1
TABLET, FILM COATED, EXTENDED RELEASE ORAL
COMMUNITY
Start: 2021-03-30 | End: 2021-06-28

## 2021-05-12 ASSESSMENT — ENCOUNTER SYMPTOMS
SORE THROAT: 1
SINUS PRESSURE: 1
RHINORRHEA: 1

## 2021-05-12 NOTE — PROGRESS NOTES
@[facility]@    Allergy & Asthma   [address] [facility], [address]  Ph:   [phone]  Fax:[phone]    Provider:  Dr. Lilibeth Molina:   Chief Complaint   Patient presents with    Follow-up     Patient is here for 6 month follow up to review labs           HISTORY OF PRESENT ILLNESS: ESTABLISHED PATIENT HERE FOR EVALUATION   Patient here for follow-up on C VID and IgG treatments. Patient states that she feels much better and she has been less sick with the exception of being ill today. She reports that overall that she feels well by receiving the IVIG. She denies any nausea, vomiting, fever. Patient states she has been sick for the last 2 to 3 days. She complains of bilateral ear pain and pressure and a sore throat particularly on the left side of her throat. Severity of symptoms are now mild to moderate. Patient states she has not taken any over-the-counter medications to feel better. She does not report that she is done anything to elicit symptoms. Patient states that symptoms feels like it is painful with swallowing. The ears feel like it has a lot of pressure. As far as IVIG. Patient has been going for treatments every month. She states that she feels like the treatments have been very beneficial.  She is very happy with receiving the care that she is received. She has had no adverse reactions or outcome from the treatments. She has been going for labs monthly. Patient has not had any adverse reactions from the IVIG including anaphylaxis or acute renal problems        Review of Systems:  Review of Systems   HENT: Positive for congestion, postnasal drip, rhinorrhea, sinus pressure and sore throat. Bilateral ear pain   Skin: Positive for rash. Allergic/Immunologic: Positive for immunocompromised state. All other systems reviewed and are negative.         Past MedicalHistory:    Past Medical History:   Diagnosis Date    Asthma     March 2, 2020      Francisco Javier Grayson  [id number] [address]        To Whom It May Concern,     Francisco Javier Grayson attended clinic here on Mar 2, 2020 and may return to school on when no fever for 24 hours..    If you have questions or concerns, please call the clinic at the number listed above.    Sincerely,         Jyoti Collins, CNP     Diabetes mellitus ([facility] [address] 6/8/2018    Fibromyalgia     GERD (gastroesophageal reflux disease)     Hyperlipidemia     Lupus (systemic lupus erythematosus) (HCC)     Lupus arthritis [facility])     [facility]    Migraine     Plaquenil adverse reaction in therapeutic use 5/2016    changes in the eyes    TIA (transient ischemic attack)     8/2016       Past Surgical History:  Past Surgical History:   Procedure Laterality Date    HYSTERECTOMY  2009    Total, endometriosis    LAPAROSCOPY      SHOULDER ARTHROSCOPY Left 08/20/2015    Debridement of rotator cuff and bone spurs       Family History:   Family History   Problem Relation Age of Onset    High Blood Pressure Mother     Rheum Arthritis Mother     Osteoporosis Mother     Diabetes Father     High Blood Pressure Father     Arthritis Father         gout    Diabetes Sister     Other Sister         Crest Syndrome    Breast Cancer Sister 54    Diabetes Brother     High Cholesterol Brother     Breast Cancer Maternal Cousin 20       Social History:   Social History     Tobacco Use    Smoking status: Never Smoker    Smokeless tobacco: Never Used   Substance Use Topics    Alcohol use: No     Alcohol/week: 0.0 standard drinks     Frequency: Never     Binge frequency: Never        Allergies:  Cayenne, Codeine, Tape [adhesive tape], Amoxicillin, and Sulfa antibiotics    CurrentMedications:     Current Outpatient Medications:     potassium chloride (KLOR-CON) 10 MEQ extended release tablet, TAKE 1 TABLET BY MOUTH ONCE DAILY, Disp: , Rfl:     predniSONE (DELTASONE) 20 MG tablet, Take 1 tablet by mouth 2 times daily for 5 days, Disp: 10 tablet, Rfl: 0    montelukast (SINGULAIR) 10 MG tablet, Take 1 tablet by mouth nightly, Disp: 30 tablet, Rfl: 11    cyclobenzaprine (FLEXERIL) 5 MG tablet, Take 1-2 tablets by mouth 3 times daily as needed for Muscle spasms, Disp: 270 tablet, Rfl: 0    metFORMIN (GLUCOPHAGE) 500 MG tablet, TAKE 1 TABLET BY MOUTH TWICE DAILY WITH MEALS, Disp: 180 tablet, Rfl: 3    clotrimazole-betamethasone (LOTRISONE) 1-0.05 % cream, APPLY  CREAM TOPICALLY TWICE DAILY, Disp: 45 g, Rfl: 0    traZODone (DESYREL) 50 MG tablet, TAKE 3 TABLETS BY MOUTH NIGHTLY, Disp: 270 tablet, Rfl: 3    magnesium oxide (MAG-OX) 400 (241.3 Mg) MG TABS tablet, Take 1 tablet by mouth once daily, Disp: 90 tablet, Rfl: 3    Dulaglutide 0.75 MG/0.5ML SOPN, Inject 0.75 mg into the skin once a week, Disp: 5 pen, Rfl: 11    azaTHIOprine (IMURAN) 50 MG tablet, Take 1 tablet by mouth twice daily, Disp: 60 tablet, Rfl: 2    EQ ACETAMINOPHEN 325 MG tablet, TAKE TWO TABLETS BY MOUTH EVERY 4 HOURS AS NEEDED FOR PAIN AND  FEVER, Disp: 180 tablet, Rfl: 3    verapamil (CALAN SR) 240 MG extended release tablet, Take 1 tablet by mouth once daily, Disp: 90 tablet, Rfl: 3    DULoxetine (CYMBALTA) 30 MG extended release capsule, Take 1 capsule by mouth daily, Disp: 90 capsule, Rfl: 1    clopidogrel (PLAVIX) 75 MG tablet, Take 1 tablet by mouth once daily, Disp: 90 tablet, Rfl: 3    heparin flush 100 UNIT/ML injection, 1 mL by Intracatheter route every 30 days Flush mediport monthly with 5 mls (or 500 units) of Heparin (100 units per ml), Disp: 5 mL, Rfl: 11    EPINEPHrine (EPIPEN 2-FERNANDO) 0.3 MG/0.3ML SOAJ injection, Inject one pen as directed STAT for allergic reaction, may disp generic NDC 36228-785-30, Disp: 2 each, Rfl: 0    Immune Globulin, Human, (GAMUNEX-C) 20 GM/200ML SOLN solution, Infuse 20 g intravenously every 30 days Premedicate with tylenol 650 mg and benadryl 25 mg 30 minutes prior to infusion IF patient has not already premedicated  CODE , Disp: 200 mL, Rfl: 5    fluticasone (FLONASE) 50 MCG/ACT nasal spray, 2 sprays by Each Nostril route daily, Disp: 1 Bottle, Rfl: 0    Handicap Placard MISC, by Does not apply route Dx:  Lupus arthritis,  Length 5 years, Disp: 1 each, Rfl: 0    Continuous Blood Gluc Sensor (DEXCOM G4 SENSOR) MISC, 1 Units by Does not apply route every 7 days Each unit is every 10 days, Disp: 3 each, Rfl: 11    Continuous Blood Gluc  (DEXCOM G5 MOBILE ) APPLE, 1 Units by Does not apply route daily, Disp: 1 Device, Rfl: 0    atorvastatin (LIPITOR) 80 MG tablet, Take 1 tablet by mouth daily, Disp: 90 tablet, Rfl: 3    DULoxetine (CYMBALTA) 60 MG extended release capsule, Take 60 mg by mouth daily, Disp: , Rfl:     Continuous Blood Gluc Sensor (FREESTYLE SANDRA 14 DAY SENSOR) [facility], USE TO CHECK BLOOD GLUCOSE TWICE DAILY, Disp: 3 each, Rfl: 11    predniSONE (DELTASONE) 5 MG tablet, Take 1 tablet by mouth daily, Disp: 30 tablet, Rfl: 3    potassium chloride (KLOR-CON M10) 10 MEQ extended release tablet, Take 1 tablet by mouth daily, Disp: 90 tablet, Rfl: 3    sucralfate (CARAFATE) 1 GM tablet, Take 1 tablet by mouth 4 times daily, Disp: 120 tablet, Rfl: 11    triamcinolone (KENALOG) 0.1 % cream, Apply topically 2 times daily Apply topically 2 times daily. , Disp: 1 Tube, Rfl: 5    Continuous Blood Gluc Sensor ([address] [place] [address]) MIS, 1 Units by Does not apply route daily, Disp: 1 each, Rfl: 0    levocetirizine (XYZAL) 5 MG tablet, TAKE ONE TABLET BY MOUTH NIGHTLY, Disp: 30 tablet, Rfl: 11    Continuous Blood Gluc Sensor (FREESTYLE SANDRA SENSOR SYSTEM) MISC, 1 kit by Does not apply route 2 times daily Include sensors for a year  Dx:  E11.9, and lupus, Disp: 1 each, Rfl: 0    sodium chloride (OCEAN) 0.65 % nasal spray, 1 spray by Nasal route as needed for Congestion, Disp: 1 Bottle, Rfl: 3    NEXIUM 40 MG delayed release capsule, TAKE 1 CAPSULE BY MOUTH ONCE DAILY, Disp: 30 capsule, Rfl: 5    levalbuterol (XOPENEX HFA) 45 MCG/ACT inhaler, Inhale 1-2 puffs into the lungs every 4 hours as needed for Wheezing or Shortness of Breath, Disp: 1 Inhaler, Rfl: 3    levalbuterol (XOPENEX) 0.63 MG/3ML nebulization, Take 3 mLs by nebulization every 6 hours as needed for Wheezing or Shortness of Breath, Disp: 270 mL, Rfl: drainage noted. Erythema noted. Eyes:      General: No scleral icterus. Right eye: No discharge. Left eye: No discharge. Extraocular Movements: Extraocular movements intact. Conjunctiva/sclera: Conjunctivae normal.      Pupils: Pupils are equal, round, and reactive to light. Neck:      Musculoskeletal: Normal range of motion and neck supple. Thyroid: No thyromegaly. Cardiovascular:      Rate and Rhythm: Normal rate and regular rhythm. Pulses: Normal pulses. Heart sounds: Normal heart sounds. Pulmonary:      Effort: Pulmonary effort is normal. No respiratory distress. Breath sounds: Normal breath sounds. No wheezing or rales. Abdominal:      Palpations: Abdomen is soft. Tenderness: There is no abdominal tenderness. Musculoskeletal: Normal range of motion. General: No tenderness. Skin:     General: Skin is warm and dry. Findings: No rash. Neurological:      General: No focal deficit present. Mental Status: She is alert and oriented to person, place, and time. Mental status is at baseline. Deep Tendon Reflexes: Reflexes are normal and symmetric. Psychiatric:         Behavior: Behavior normal.         Thought Content:  Thought content normal.         Judgment: Judgment normal.             DATA:  Lab Review:    CBC:   Lab Results   Component Value Date    WBC 8.3 05/02/2021    RBC 4.92 05/02/2021    HGB 12.3 05/02/2021    HCT 40.3 05/02/2021    MCV 81.9 05/02/2021    MCH 25.0 05/02/2021    MCHC 30.5 05/02/2021    RDW 14.5 08/07/2018     05/02/2021          IgE   Date/Time Value Ref Range Status   10/03/2020 09:30 AM 2 <101 IU/mL Final     Comment:     [facility] [id number] [place] [address] [phone]     Immunoglobulin E   Date/Time Value Ref Range Status   10/03/2020 09:30 AM 4 <=214 kU/L Final     Comment:     REFERENCE INTERVAL: Immunoglobulin E, Serum  Access complete set of age- and/or gender-specific reference  intervals for this test in the [address] [place] [address] Laboratory Test Directory  ([email or web address]). IgG   Date/Time Value Ref Range Status   03/02/2021 07:36  700 - 1600 mg/dL Final     Comment:     [address], [address] [place] [address] [phone]     IgA   Date/Time Value Ref Range Status   10/03/2020 09:30 AM 96 70 - 400 mg/dL Final     Comment:     [address] [phone]      IgM   Date/Time Value Ref Range Status   10/03/2020 09:30 AM < 25 (L) 40 - 230 mg/dL Final     Comment:     [address] [phone]       No results found for: EVELIN   No results found for: RF       No results found for this or any previous visit. No results found for this or any previous visit. PROCEDURES:      Skin Testing performed on:Skin Testing performed on: blood test performed 10/2020 all negative for food allergies. Assessment/Orders:    Diagnosis Orders   1. Pharyngitis, unspecified etiology  predniSONE (DELTASONE) 20 MG tablet   2. CVID (common variable immunodeficiency) ([facility] [address]     3. Chronic pansinusitis         Plan:  Follow Up:6 months    Patient to continue to flush port monthly  Patient to have labs done 1 week prior to next IVIG infusion which is scheduled for early June. If IVIG is normal we will schedule next infusion for every 2 months. And then will change labs for every 2 months. If patient continues to have normal IVIG will write an order for patient to have removal of port. Patient will need to return to clinic at that time to have another H&P for port can be removed    Change IGG to every 2 months    Spent 20 minutes of face-to-face time with the patient with well more than half of the visit being dedicated to the discussion of the various symptom problems, provided education of medications and disease process, as well as discussion of a therapeutic plan for each.   Face-to-face education time does not include any time that may have been spent for procedures.     (Please note that portions of this note may have been completed with a voice recognition program.  Efforts were made to edit the dictation but occasionally words are mis-transcribed.)         Signed:  NAHID Edmond CNP  5/12/2021  10:15 AM

## 2021-05-19 DIAGNOSIS — J06.9 UPPER RESPIRATORY INFECTION, ACUTE: Primary | ICD-10-CM

## 2021-05-19 RX ORDER — DOXYCYCLINE HYCLATE 100 MG
100 TABLET ORAL 2 TIMES DAILY
Qty: 20 TABLET | Refills: 0 | Status: SHIPPED | OUTPATIENT
Start: 2021-05-19 | End: 2021-05-29

## 2021-05-19 NOTE — PROGRESS NOTES
Patient continues to have symptoms of upper respiratory infection that has lasted greater than 2 weeks. She states that it continues to progress despite treatment with steroids. We have also tried antihistamines to help control the symptoms. None has worked. We will go ahead and treat patient with doxycycline.   Patient has been instructed to take doxycycline 100 mg twice a day with food to prevent nausea x10 days

## 2021-05-23 ENCOUNTER — HOSPITAL ENCOUNTER (OUTPATIENT)
Age: 41
Discharge: HOME | End: 2021-05-23
Payer: MEDICARE

## 2021-05-23 LAB
ALBUMIN SERPL-MCNC: 4.1 G/DL (ref 3.5–5.1)
ALP BLD-CCNC: 163 U/L (ref 38–126)
ALT SERPL-CCNC: 18 U/L (ref 11–66)
ANION GAP SERPL CALCULATED.3IONS-SCNC: 12 MEQ/L (ref 8–16)
AST SERPL-CCNC: 22 U/L (ref 5–40)
BASOPHILS # BLD: 0.4 %
BASOPHILS ABSOLUTE: 0 THOU/MM3 (ref 0–0.1)
BILIRUB SERPL-MCNC: 0.3 MG/DL (ref 0.3–1.2)
BUN BLDV-MCNC: 11 MG/DL (ref 7–22)
CALCIUM SERPL-MCNC: 9.3 MG/DL (ref 8.5–10.5)
CHLORIDE BLD-SCNC: 104 MEQ/L (ref 98–111)
CO2: 26 MEQ/L (ref 23–33)
CREAT SERPL-MCNC: 0.7 MG/DL (ref 0.4–1.2)
EOSINOPHIL # BLD: 1.5 %
EOSINOPHILS ABSOLUTE: 0.1 THOU/MM3 (ref 0–0.4)
ERYTHROCYTE [DISTWIDTH] IN BLOOD BY AUTOMATED COUNT: 15.3 % (ref 11.5–14.5)
ERYTHROCYTE [DISTWIDTH] IN BLOOD BY AUTOMATED COUNT: 46.9 FL (ref 35–45)
GFR SERPL CREATININE-BSD FRML MDRD: > 90 ML/MIN/1.73M2
GLUCOSE BLD-MCNC: 165 MG/DL (ref 70–108)
HCT VFR BLD CALC: 40.8 % (ref 37–47)
HEMOGLOBIN: 12.1 GM/DL (ref 12–16)
IMMATURE GRANS (ABS): 0.01 THOU/MM3 (ref 0–0.07)
IMMATURE GRANULOCYTES: 0.1 %
LYMPHOCYTES # BLD: 30.4 %
LYMPHOCYTES ABSOLUTE: 2.4 THOU/MM3 (ref 1–4.8)
MCH RBC QN AUTO: 25.3 PG (ref 26–33)
MCHC RBC AUTO-ENTMCNC: 29.7 GM/DL (ref 32.2–35.5)
MCV RBC AUTO: 85.4 FL (ref 81–99)
MONOCYTES # BLD: 6.3 %
MONOCYTES ABSOLUTE: 0.5 THOU/MM3 (ref 0.4–1.3)
NUCLEATED RED BLOOD CELLS: 0 /100 WBC
PLATELET # BLD: 271 THOU/MM3 (ref 130–400)
PMV BLD AUTO: 10.8 FL (ref 9.4–12.4)
POTASSIUM SERPL-SCNC: 3.8 MEQ/L (ref 3.5–5.2)
RBC # BLD: 4.78 MILL/MM3 (ref 4.2–5.4)
SEG NEUTROPHILS: 61.3 %
SEGMENTED NEUTROPHILS ABSOLUTE COUNT: 4.8 THOU/MM3 (ref 1.8–7.7)
SODIUM BLD-SCNC: 142 MEQ/L (ref 135–145)
TOTAL PROTEIN: 6.6 G/DL (ref 6.1–8)
WBC # BLD: 7.8 THOU/MM3 (ref 4.8–10.8)

## 2021-05-23 PROCEDURE — 36415 COLL VENOUS BLD VENIPUNCTURE: CPT

## 2021-05-23 PROCEDURE — 80053 COMPREHEN METABOLIC PANEL: CPT

## 2021-05-23 PROCEDURE — 85025 COMPLETE CBC W/AUTO DIFF WBC: CPT

## 2021-05-24 DIAGNOSIS — Z51.81 MEDICATION MONITORING ENCOUNTER: ICD-10-CM

## 2021-05-24 DIAGNOSIS — M32.9 LUPUS ARTHRITIS (HCC): ICD-10-CM

## 2021-05-24 RX ORDER — AZATHIOPRINE 50 MG/1
TABLET ORAL
Qty: 60 TABLET | Refills: 2 | Status: SHIPPED | OUTPATIENT
Start: 2021-05-24 | End: 2021-07-06 | Stop reason: SDUPTHER

## 2021-05-25 ENCOUNTER — HOSPITAL ENCOUNTER (OUTPATIENT)
Age: 41
Discharge: HOME | End: 2021-05-25
Payer: MEDICARE

## 2021-05-25 PROCEDURE — 82784 ASSAY IGA/IGD/IGG/IGM EACH: CPT

## 2021-05-25 PROCEDURE — 36415 COLL VENOUS BLD VENIPUNCTURE: CPT

## 2021-05-26 LAB — IGG: 779 MG/DL (ref 700–1600)

## 2021-05-27 DIAGNOSIS — D83.9 CVID (COMMON VARIABLE IMMUNODEFICIENCY) (HCC): Primary | ICD-10-CM

## 2021-05-28 ENCOUNTER — TELEPHONE (OUTPATIENT)
Dept: ENT CLINIC | Age: 41
End: 2021-05-28

## 2021-05-28 NOTE — TELEPHONE ENCOUNTER
Outpatient nursing called asking for a clarification of the IVIG order. They said at the top it says Q 4-8 weeks and at the bottom it states every 2 months. They need to know how often patient is to get the infusions. Informed them Angelo Nielsen will not be back into the office until Wednesday but I would put in a phone encounter to verify with her. Outpatient Nursing verbalized understanding and thanked me. Encounter printed out to discuss with Angelo Nielsen.

## 2021-05-30 ENCOUNTER — HOSPITAL ENCOUNTER (EMERGENCY)
Age: 41
Discharge: HOME | End: 2021-05-30
Attending: FAMILY MEDICINE
Payer: MEDICARE

## 2021-05-30 VITALS
WEIGHT: 235 LBS | HEART RATE: 98 BPM | BODY MASS INDEX: 41.64 KG/M2 | TEMPERATURE: 96.2 F | OXYGEN SATURATION: 96 % | SYSTOLIC BLOOD PRESSURE: 133 MMHG | DIASTOLIC BLOOD PRESSURE: 93 MMHG | RESPIRATION RATE: 20 BRPM | HEIGHT: 63 IN

## 2021-05-30 DIAGNOSIS — J30.9 ALLERGIC RHINITIS, UNSPECIFIED SEASONALITY, UNSPECIFIED TRIGGER: Primary | ICD-10-CM

## 2021-05-30 LAB
GROUP A STREP CULTURE, REFLEX: NEGATIVE
REFLEX THROAT C + S: NORMAL

## 2021-05-30 PROCEDURE — 87880 STREP A ASSAY W/OPTIC: CPT

## 2021-05-30 PROCEDURE — 99282 EMERGENCY DEPT VISIT SF MDM: CPT

## 2021-05-30 PROCEDURE — 87070 CULTURE OTHR SPECIMN AEROBIC: CPT

## 2021-05-30 ASSESSMENT — ENCOUNTER SYMPTOMS
COUGH: 1
SHORTNESS OF BREATH: 0
SORE THROAT: 1
VOMITING: 0
EYE DISCHARGE: 0
EYE REDNESS: 0
SINUS PRESSURE: 1
NAUSEA: 0
SINUS PAIN: 1
WHEEZING: 0

## 2021-05-30 NOTE — ED PROVIDER NOTES
[facility]  eMERGENCY dEPARTMENT eNCOUnter          CHIEF COMPLAINT       Chief Complaint   Patient presents with    Pharyngitis    Sinusitis       Nurses Notes reviewed and I agree except as noted in the HPI. HISTORY OF PRESENT ILLNESS    Debby Vo is a 38 y.o. female who presents with sore throat,sinus congestion,ear fullness. Patient notes symptoms restarted a couple of days ago. Patient notes was previously treated with doxycycline for 10 days for suggested sinusitis within last two weeks. Patient has finished that regime. She notes mild cough. Denies wheezing. Denies other sick contacts. REVIEW OF SYSTEMS     Review of Systems   Constitutional: Negative for chills and fever. HENT: Positive for congestion, sinus pressure, sinus pain and sore throat. Negative for ear discharge. Ear fullness    Eyes: Negative for discharge and redness. Respiratory: Positive for cough. Negative for shortness of breath and wheezing. Gastrointestinal: Negative for nausea and vomiting. All other systems reviewed and are negative. PAST MEDICAL HISTORY    has a past medical history of Asthma, Diabetes mellitus ([address] Fibromyalgia, GERD (gastroesophageal reflux disease), Hyperlipidemia, Lupus (systemic lupus erythematosus) ([address] Lupus arthritis ([facility] [address] Migraine, Plaquenil adverse reaction in therapeutic use, and TIA (transient ischemic attack). SURGICAL HISTORY      has a past surgical history that includes Hysterectomy (2009); laparoscopy; and Shoulder arthroscopy (Left, 08/20/2015).     CURRENT MEDICATIONS       Previous Medications    AMITRIPTYLINE HCL (ELAVIL PO)    Take 25 mg by mouth nightly     ASPIRIN (LORENZO ASPIRIN) 325 MG TABLET    Take 1 tablet by mouth daily    ATORVASTATIN (LIPITOR) 80 MG TABLET    Take 1 tablet by mouth daily    AZATHIOPRINE (IMURAN) 50 MG TABLET    Take 1 tablet by mouth twice daily    CLOPIDOGREL (PLAVIX) 75 MG TABLET    Take 1 tablet by and benadryl 25 mg 30 minutes prior to infusion IF patient has not already premedicated    CODE     LEVALBUTEROL (XOPENEX HFA) 45 MCG/ACT INHALER    Inhale 1-2 puffs into the lungs every 4 hours as needed for Wheezing or Shortness of Breath    LEVALBUTEROL (XOPENEX) 0.63 MG/3ML NEBULIZATION    Take 3 mLs by nebulization every 6 hours as needed for Wheezing or Shortness of Breath    LEVOCETIRIZINE (XYZAL) 5 MG TABLET    TAKE ONE TABLET BY MOUTH NIGHTLY    MAGNESIUM OXIDE (MAG-OX) 400 (241.3 MG) MG TABS TABLET    Take 1 tablet by mouth once daily    METFORMIN (GLUCOPHAGE) 500 MG TABLET    TAKE 1 TABLET BY MOUTH TWICE DAILY WITH MEALS    MONTELUKAST (SINGULAIR) 10 MG TABLET    Take 1 tablet by mouth nightly    NEXIUM 40 MG DELAYED RELEASE CAPSULE    TAKE 1 CAPSULE BY MOUTH ONCE DAILY    POTASSIUM CHLORIDE (KLOR-CON M10) 10 MEQ EXTENDED RELEASE TABLET    Take 1 tablet by mouth daily    POTASSIUM CHLORIDE (KLOR-CON) 10 MEQ EXTENDED RELEASE TABLET    TAKE 1 TABLET BY MOUTH ONCE DAILY    PREDNISONE (DELTASONE) 5 MG TABLET    Take 1 tablet by mouth daily    PROMETHAZINE (PHENERGAN) 25 MG TABLET    Take 1 tablet by mouth every 6 hours as needed for Nausea    RESPIRATORY THERAPY SUPPLIES (NEBULIZER AIR TUBE/PLUGS) MISC    1 kit by Does not apply route every 6 hours as needed (Wheezing/shortness of breath) Please provide nebulizer kit and supplies. SODIUM CHLORIDE (OCEAN) 0.65 % NASAL SPRAY    1 spray by Nasal route as needed for Congestion    SUCRALFATE (CARAFATE) 1 GM TABLET    Take 1 tablet by mouth 4 times daily    TRAZODONE (DESYREL) 50 MG TABLET    TAKE 3 TABLETS BY MOUTH NIGHTLY    TRIAMCINOLONE (KENALOG) 0.1 % CREAM    Apply topically 2 times daily Apply topically 2 times daily. VERAPAMIL (CALAN SR) 240 MG EXTENDED RELEASE TABLET    Take 1 tablet by mouth once daily       ALLERGIES     is allergic to cayenne, codeine, tape [adhesive tape], amoxicillin, and sulfa antibiotics.     FAMILY HISTORY     She are mis-transcribed.)    MD Nestor Kraus MD  05/30/21 [id number]

## 2021-05-30 NOTE — ED NOTES
Pt. Presents ambulatory to ED with c/o sore throat, pressure in bilat. Ears and sinus pressure, pt. Voices recently completed antibiotic and course of steroids for sinus infection. PCP instructed pt. If symptoms continue, should be checked for strep.      Lari Lanes, RN  05/30/21 [id number]

## 2021-05-30 NOTE — ED NOTES
AVS rev'd with pt. And copy given. Pulse regular. Extremities warm. Respirations regular and quiet. Mucous membranes pink & moist. Alert and oriented times 3. No nausea or vomiting. Range of motion within patient's limits. Skin pink, warm and dry. Calm and cooperative.      Koffi Dey RN  05/30/21 [id number]

## 2021-06-01 ENCOUNTER — CARE COORDINATION (OUTPATIENT)
Dept: CARE COORDINATION | Age: 41
End: 2021-06-01

## 2021-06-01 LAB — THROAT/NOSE CULTURE: NORMAL

## 2021-06-01 RX ORDER — MONTELUKAST SODIUM 10 MG/1
10 TABLET ORAL NIGHTLY
Qty: 30 TABLET | Refills: 11 | Status: SHIPPED | OUTPATIENT
Start: 2021-06-01 | End: 2021-09-01 | Stop reason: SDUPTHER

## 2021-06-01 RX ORDER — CLOPIDOGREL BISULFATE 75 MG/1
75 TABLET ORAL DAILY
Qty: 90 TABLET | Refills: 3 | Status: SHIPPED | OUTPATIENT
Start: 2021-06-01 | End: 2021-11-27 | Stop reason: SDUPTHER

## 2021-06-01 RX ORDER — IBUPROFEN 600 MG/1
600 TABLET ORAL EVERY 8 HOURS PRN
Qty: 42 TABLET | Refills: 0 | Status: SHIPPED | OUTPATIENT
Start: 2021-06-01 | End: 2021-08-29 | Stop reason: SDUPTHER

## 2021-06-01 NOTE — CARE COORDINATION
Attempted to reach patient for Care Coordination enrollment s/p recent list referral for assistance with the management of her DM, healthcare needs, and in f/u to her recent ED visit for c/o sore throat and sinusitis. Patient was not available at the time of my call and generic voicemail message was left asking patient to please return call to my direct number. Introduction My Chart message also sent to patient. Will continue to work to f/u with patient in the future.

## 2021-06-01 NOTE — TELEPHONE ENCOUNTER
Looking at the IVIG order [facility] wrote. The infusion is every 4-8 weeks, IgG is every 2 months and all the other labs are 1 week prior to infusion.

## 2021-06-02 ENCOUNTER — OFFICE VISIT (OUTPATIENT)
Dept: FAMILY MEDICINE CLINIC | Age: 41
End: 2021-06-02
Payer: MEDICARE

## 2021-06-02 ENCOUNTER — HOSPITAL ENCOUNTER (OUTPATIENT)
Dept: GENERAL RADIOLOGY | Age: 41
Discharge: HOME | End: 2021-06-02
Payer: MEDICARE

## 2021-06-02 VITALS
BODY MASS INDEX: 41.56 KG/M2 | HEART RATE: 93 BPM | DIASTOLIC BLOOD PRESSURE: 74 MMHG | SYSTOLIC BLOOD PRESSURE: 122 MMHG | WEIGHT: 234.6 LBS | TEMPERATURE: 96.9 F | RESPIRATION RATE: 20 BRPM

## 2021-06-02 VITALS
OXYGEN SATURATION: 98 % | HEART RATE: 84 BPM | RESPIRATION RATE: 16 BRPM | SYSTOLIC BLOOD PRESSURE: 134 MMHG | DIASTOLIC BLOOD PRESSURE: 94 MMHG | TEMPERATURE: 97 F

## 2021-06-02 DIAGNOSIS — G40.909 SEIZURE DISORDER (HCC): ICD-10-CM

## 2021-06-02 DIAGNOSIS — K21.9 GASTROESOPHAGEAL REFLUX DISEASE WITHOUT ESOPHAGITIS: ICD-10-CM

## 2021-06-02 DIAGNOSIS — J45.40 MODERATE PERSISTENT ASTHMA WITHOUT COMPLICATION: ICD-10-CM

## 2021-06-02 DIAGNOSIS — G45.9 TIA (TRANSIENT ISCHEMIC ATTACK): ICD-10-CM

## 2021-06-02 DIAGNOSIS — M79.7 FIBROMYALGIA: ICD-10-CM

## 2021-06-02 DIAGNOSIS — I63.9 CEREBROVASCULAR ACCIDENT (CVA), UNSPECIFIED MECHANISM (HCC): ICD-10-CM

## 2021-06-02 DIAGNOSIS — E83.42 HYPOMAGNESEMIA: ICD-10-CM

## 2021-06-02 DIAGNOSIS — G43.809 OTHER MIGRAINE WITHOUT STATUS MIGRAINOSUS, NOT INTRACTABLE: ICD-10-CM

## 2021-06-02 DIAGNOSIS — M32.9 LUPUS ARTHRITIS (HCC): ICD-10-CM

## 2021-06-02 DIAGNOSIS — E78.00 PURE HYPERCHOLESTEROLEMIA: ICD-10-CM

## 2021-06-02 DIAGNOSIS — Z92.29 HISTORY OF HEPATITIS B VACCINATION: ICD-10-CM

## 2021-06-02 DIAGNOSIS — E11.69 TYPE 2 DIABETES MELLITUS WITH OTHER SPECIFIED COMPLICATION, WITHOUT LONG-TERM CURRENT USE OF INSULIN (HCC): Primary | ICD-10-CM

## 2021-06-02 DIAGNOSIS — J30.2 SEASONAL ALLERGIES: ICD-10-CM

## 2021-06-02 DIAGNOSIS — R79.89 ELEVATED TSH: ICD-10-CM

## 2021-06-02 DIAGNOSIS — D83.9 COMMON VARIABLE IMMUNODEFICIENCY (HCC): Primary | ICD-10-CM

## 2021-06-02 PROCEDURE — 36591 DRAW BLOOD OFF VENOUS DEVICE: CPT

## 2021-06-02 PROCEDURE — 99214 OFFICE O/P EST MOD 30 MIN: CPT | Performed by: FAMILY MEDICINE

## 2021-06-02 PROCEDURE — 2580000003 HC RX 258: Performed by: NURSE PRACTITIONER

## 2021-06-02 PROCEDURE — 6360000002 HC RX W HCPCS: Performed by: NURSE PRACTITIONER

## 2021-06-02 RX ORDER — HEPARIN SODIUM (PORCINE) LOCK FLUSH IV SOLN 100 UNIT/ML 100 UNIT/ML
500 SOLUTION INTRAVENOUS PRN
Status: CANCELLED | OUTPATIENT
Start: 2021-06-02

## 2021-06-02 RX ORDER — HEPARIN SODIUM (PORCINE) LOCK FLUSH IV SOLN 100 UNIT/ML 100 UNIT/ML
500 SOLUTION INTRAVENOUS PRN
Status: DISCONTINUED | OUTPATIENT
Start: 2021-06-02 | End: 2021-06-03 | Stop reason: HOSPADM

## 2021-06-02 RX ORDER — AZELASTINE 1 MG/ML
2 SPRAY, METERED NASAL 2 TIMES DAILY
Qty: 1 BOTTLE | Refills: 11 | Status: SHIPPED | OUTPATIENT
Start: 2021-06-02

## 2021-06-02 RX ORDER — SODIUM CHLORIDE 0.9 % (FLUSH) 0.9 %
5-40 SYRINGE (ML) INJECTION PRN
Status: CANCELLED | OUTPATIENT
Start: 2021-06-02

## 2021-06-02 RX ORDER — SODIUM CHLORIDE 0.9 % (FLUSH) 0.9 %
5-40 SYRINGE (ML) INJECTION PRN
Status: DISCONTINUED | OUTPATIENT
Start: 2021-06-02 | End: 2021-06-03 | Stop reason: HOSPADM

## 2021-06-02 RX ADMIN — HEPARIN 500 UNITS: 100 SYRINGE at 09:30

## 2021-06-02 RX ADMIN — Medication 20 ML: at 09:30

## 2021-06-02 NOTE — PROGRESS NOTES
Pt is here for a port flush. Mediport accessed with a 20 mendez 1 inch needle on 3rd attempt, port flushed easily with 10ml's of saline, blood return noted, no swelling or redness noted. Port flushed with 20ml's of saline and heparin 500 units, mediport deaccesed and bandaid applied, pt tolerated well. Pt released ambulatory in stable condition.

## 2021-06-02 NOTE — PROGRESS NOTES
Medical therapy in the past has included proton pump inhibitors. Still seeing neurology ( Dr Vera Garland) for seizures. They are still occurring a few times per week. She was thinking about stopping all her medications and restarting \"fresh\". She is highly encouraged NOT to do this for fear of intractable seizure with brain damage. The seizures are usually followed by a headache. 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 She had an \"episode\" in my office in early 2018, she was started on aspirin 325 mg and went from having 8-9 episodes a day to 4 episodes every 2 months. Now also on plavix as well. Migraines are controlled with prn medications less then 2 per month. Fibromyalgia and Lupus and lupus arthritis is under the care of Rheumatology in [facility] and formerly also a patient of Dr Jocelyn Weston who is no longer in practice. Allergies are well controlled on current medications. Lung nodule in the left upper lobe under the care of pulmonology. Reviewed chart forpast medical history , surgical history , allergies, social history , family history and medications.     Health Maintenance   Topic Date Due    Hepatitis B vaccine (1 of 3 - Risk 3-dose series) Never done    Annual Wellness Visit (AWV)  Never done    Diabetic microalbuminuria test  04/11/2020    Diabetic foot exam  07/17/2020    Diabetic retinal exam  02/25/2021    Lipid screen  04/16/2021    A1C test (Diabetic or Prediabetic)  09/12/2021    Breast cancer screen  01/22/2022    DTaP/Tdap/Td vaccine (2 - Td or Tdap) 07/11/2030    Pneumococcal 0-64 years Vaccine (4 of 4) 08/10/2044    Flu vaccine  Completed    COVID-19 Vaccine  Completed    Hepatitis C screen  Completed    Hepatitis A vaccine  Aged Out    Hib vaccine  Aged Out    Meningococcal (ACWY) vaccine  Aged Out    HIV screen  Discontinued       Subjective:      Constitutional:Negative for fever, chills, diaphoresis, activity change, appetite change and fatigue. HENT: Negative for hearing loss, ear pain, congestion, sore throat, rhinorrhea, postnasal drip and ear discharge. Eyes: Negative for photophobia and visual disturbance. Respiratory: Negative for cough, chest tightness, shortness of breath and wheezing. Cardiovascular: Negative for chest pain and leg swelling. Gastrointestinal: Negative for nausea, vomiting, abdominal pain, diarrhea and constipation. Genitourinary: Negative for dysuria, urgency and frequency. Neurological: Negative for weakness, light-headedness and headaches. Psychiatric/Behavioral: Negative for sleep disturbance.      :     Vitals:    06/02/21 0821   BP: 122/74   Site: Left Upper Arm   Position: Sitting   Pulse: 93   Resp: 20   Temp: 96.9 °F (36.1 °C)   TempSrc: Oral   Weight: 234 lb 9.6 oz (106.4 kg)     Wt Readings from Last 3 Encounters:   06/02/21 234 lb 9.6 oz (106.4 kg)   05/30/21 235 lb (106.6 kg)   05/12/21 235 lb (106.6 kg)       Physical Exam  Physical Exam   Constitutional: Vital signs are normal. She appears well-developed and well-nourished. She is active. HENT:   Head: Normocephalic and atraumatic. Right Ear: Tympanic membrane, external ear and ear canal normal. No drainage or tenderness. Left Ear: Tympanic membrane, external ear and ear canal normal. No drainage or tenderness. Nose: Nose normal. No mucosal edema or rhinorrhea. Mouth/Throat: Uvula is midline, oropharynx is clear and moist and mucous membranes are normal. Mucous membranes are not pale. Normal dentition. No posterior oropharyngeal edema or posterior oropharyngeal erythema. Eyes: Lids are normal. Right eye exhibits no chemosis and no discharge. Left eye exhibits no chemosis and no drainage. Right conjunctiva has no hemorrhage. Left conjunctiva has no hemorrhage. Right eye exhibits normal extraocular motion. Left eye exhibits normal extraocular motion.  Right pupil is round and reactive. Left pupil is round and reactive. Pupils are equal.   Cardiovascular: Normal rate, regular rhythm, S1 normal, S2 normal and normal heart sounds. Exam reveals no gallop. No murmur heard. Pulmonary/Chest: Effort normal and breath sounds normal. No respiratory distress. She has no wheezes. She has no rhonchi. She has no rales. Abdominal: Soft. Normal appearance and bowel sounds are normal. She exhibits no distension and no mass. There is no hepatosplenomegaly. No tenderness. She has no rigidity, no rebound and no guarding. No hernia. Musculoskeletal:        Right lower leg: She exhibits no edema. Left lower leg: She exhibits no edema. Neurological: She is alert. Assessment/Plan   Yohana Lawrence was seen today for 3 month follow-up. Diagnoses and all orders for this visit:    Type 2 diabetes mellitus with other specified complication, without long-term current use of insulin ([address]    Pure hypercholesterolemia    Gastroesophageal reflux disease without esophagitis    TIA (transient ischemic attack)    Other migraine without status migrainosus, not intractable    Lupus arthritis (HCC)    Seasonal allergies  -     azelastine (ASTELIN) 0.1 % nasal spray; 2 sprays by Nasal route 2 times daily Use in each nostril as directed    Cerebrovascular accident (CVA), unspecified mechanism ([address]    Fibromyalgia    Hypomagnesemia    Seizure disorder (HCC)    Moderate persistent asthma without complication    Elevated TSH    History of hepatitis B vaccination  -     Hepatitis B Surface Antibody; Future    No change to medication   Continue healthy diet and exercise  Yearly eye exam  Daily foot inspection  Yearly flu shot  Monitor glucose regularly  Daily aspirin  Regular labs : A1c quarterly, lipids every 6 months and BMP quaterly    [place] foods  Small meals  No late meals    Discussed use, benefit, and side effectsof prescribed medications. All patient questions answered.   Pt voiced understanding. Reviewed health maintenance. Instructed to continue current medications, diet and exercise. Patient agreed with treatment plan. Followup as directed.      Electronically signed by Adrián Carson MD

## 2021-06-03 RX ORDER — DIPHENHYDRAMINE HCL 25 MG
25 TABLET ORAL ONCE
Status: CANCELLED | OUTPATIENT
Start: 2021-06-03 | End: 2021-06-03

## 2021-06-03 RX ORDER — ACETAMINOPHEN 325 MG/1
650 TABLET ORAL ONCE
Status: CANCELLED | OUTPATIENT
Start: 2021-06-03 | End: 2021-06-03

## 2021-06-03 NOTE — TELEPHONE ENCOUNTER
Spoke to Pierre Coronado. Barbara change the IVIG order to every 4 weeks. Order faxed to [facility]. I also called them and informed Ale Done. Ale Done verbalized understanding and thanked me.

## 2021-06-07 ENCOUNTER — CARE COORDINATION (OUTPATIENT)
Dept: CARE COORDINATION | Age: 41
End: 2021-06-07

## 2021-06-07 NOTE — CARE COORDINATION
Attempted to reach patient for Care Coordination enrollment s/p recent list referral for assistance with the management of her DM and healthcare needs. Patient was not available at the time of my call and generic voicemail message was left asking patient to please return call to my direct number. Will continue to attempt to f/u with patient in the future.

## 2021-06-11 ENCOUNTER — CARE COORDINATION (OUTPATIENT)
Dept: CARE COORDINATION | Age: 41
End: 2021-06-11

## 2021-06-11 NOTE — CARE COORDINATION
Attempted to reach patient for Care Coordination enrollment s/p recent list referral for assistance with the management of her DM and healthcare needs. Patient was unavailable at the time of my call and generic voicemail message was left asking patient to please return call to my direct number. Follow up My Chart message also sent to patient. I have been unable to reach patient s/p multiple recent attempts and no further f/u planned at this time. PCP updated.

## 2021-06-26 ENCOUNTER — HOSPITAL ENCOUNTER (OUTPATIENT)
Age: 41
Discharge: HOME | End: 2021-06-26
Payer: MEDICARE

## 2021-06-26 DIAGNOSIS — D83.9 CVID (COMMON VARIABLE IMMUNODEFICIENCY) (HCC): ICD-10-CM

## 2021-06-26 LAB
ALBUMIN SERPL-MCNC: 4.4 G/DL (ref 3.5–5.1)
ALP BLD-CCNC: 143 U/L (ref 38–126)
ALT SERPL-CCNC: 25 U/L (ref 11–66)
ANION GAP SERPL CALCULATED.3IONS-SCNC: 10 MEQ/L (ref 8–16)
AST SERPL-CCNC: 36 U/L (ref 5–40)
BASOPHILS # BLD: 0.5 %
BASOPHILS ABSOLUTE: 0 THOU/MM3 (ref 0–0.1)
BILIRUB SERPL-MCNC: 0.4 MG/DL (ref 0.3–1.2)
BUN BLDV-MCNC: 8 MG/DL (ref 7–22)
CALCIUM SERPL-MCNC: 9.4 MG/DL (ref 8.5–10.5)
CHLORIDE BLD-SCNC: 101 MEQ/L (ref 98–111)
CO2: 27 MEQ/L (ref 23–33)
CREAT SERPL-MCNC: 0.7 MG/DL (ref 0.4–1.2)
EOSINOPHIL # BLD: 1.6 %
EOSINOPHILS ABSOLUTE: 0.1 THOU/MM3 (ref 0–0.4)
ERYTHROCYTE [DISTWIDTH] IN BLOOD BY AUTOMATED COUNT: 15.1 % (ref 11.5–14.5)
ERYTHROCYTE [DISTWIDTH] IN BLOOD BY AUTOMATED COUNT: 45.8 FL (ref 35–45)
GFR SERPL CREATININE-BSD FRML MDRD: > 90 ML/MIN/1.73M2
GLUCOSE BLD-MCNC: 134 MG/DL (ref 70–108)
HCT VFR BLD CALC: 40.4 % (ref 37–47)
HEMOGLOBIN: 12.3 GM/DL (ref 12–16)
IMMATURE GRANS (ABS): 0.04 THOU/MM3 (ref 0–0.07)
IMMATURE GRANULOCYTES: 0.5 %
LYMPHOCYTES # BLD: 28 %
LYMPHOCYTES ABSOLUTE: 2.2 THOU/MM3 (ref 1–4.8)
MCH RBC QN AUTO: 25.3 PG (ref 26–33)
MCHC RBC AUTO-ENTMCNC: 30.4 GM/DL (ref 32.2–35.5)
MCV RBC AUTO: 83 FL (ref 81–99)
MONOCYTES # BLD: 7.2 %
MONOCYTES ABSOLUTE: 0.6 THOU/MM3 (ref 0.4–1.3)
NUCLEATED RED BLOOD CELLS: 0 /100 WBC
PLATELET # BLD: 312 THOU/MM3 (ref 130–400)
PMV BLD AUTO: 10.9 FL (ref 9.4–12.4)
POTASSIUM SERPL-SCNC: 4.1 MEQ/L (ref 3.5–5.2)
RBC # BLD: 4.87 MILL/MM3 (ref 4.2–5.4)
SEG NEUTROPHILS: 62.2 %
SEGMENTED NEUTROPHILS ABSOLUTE COUNT: 5 THOU/MM3 (ref 1.8–7.7)
SODIUM BLD-SCNC: 138 MEQ/L (ref 135–145)
TOTAL PROTEIN: 6.9 G/DL (ref 6.1–8)
WBC # BLD: 8 THOU/MM3 (ref 4.8–10.8)

## 2021-06-26 PROCEDURE — 36415 COLL VENOUS BLD VENIPUNCTURE: CPT

## 2021-06-26 PROCEDURE — 80053 COMPREHEN METABOLIC PANEL: CPT

## 2021-06-26 PROCEDURE — 85025 COMPLETE CBC W/AUTO DIFF WBC: CPT

## 2021-06-28 RX ORDER — POTASSIUM CHLORIDE 750 MG/1
TABLET, FILM COATED, EXTENDED RELEASE ORAL
Qty: 90 TABLET | Refills: 0 | Status: SHIPPED | OUTPATIENT
Start: 2021-06-28 | End: 2021-09-01

## 2021-06-30 ENCOUNTER — OFFICE VISIT (OUTPATIENT)
Dept: PULMONOLOGY | Age: 41
End: 2021-06-30
Payer: MEDICARE

## 2021-06-30 ENCOUNTER — OFFICE VISIT (OUTPATIENT)
Dept: RHEUMATOLOGY | Age: 41
End: 2021-06-30
Payer: MEDICARE

## 2021-06-30 VITALS
HEART RATE: 95 BPM | SYSTOLIC BLOOD PRESSURE: 130 MMHG | OXYGEN SATURATION: 98 % | HEIGHT: 63 IN | DIASTOLIC BLOOD PRESSURE: 80 MMHG | BODY MASS INDEX: 41.8 KG/M2 | WEIGHT: 235.89 LBS

## 2021-06-30 VITALS
DIASTOLIC BLOOD PRESSURE: 88 MMHG | SYSTOLIC BLOOD PRESSURE: 136 MMHG | TEMPERATURE: 97.5 F | BODY MASS INDEX: 41.78 KG/M2 | HEART RATE: 95 BPM | OXYGEN SATURATION: 98 % | HEIGHT: 63 IN | WEIGHT: 235.8 LBS

## 2021-06-30 DIAGNOSIS — M79.7 FIBROMYALGIA: ICD-10-CM

## 2021-06-30 DIAGNOSIS — M32.9 LUPUS ARTHRITIS (HCC): Primary | ICD-10-CM

## 2021-06-30 DIAGNOSIS — J45.20 MILD INTERMITTENT ASTHMA WITHOUT COMPLICATION: Primary | ICD-10-CM

## 2021-06-30 DIAGNOSIS — M32.9 LUPUS (HCC): ICD-10-CM

## 2021-06-30 DIAGNOSIS — G56.03 BILATERAL CARPAL TUNNEL SYNDROME: ICD-10-CM

## 2021-06-30 DIAGNOSIS — R91.1 LUNG NODULE, SOLITARY: ICD-10-CM

## 2021-06-30 DIAGNOSIS — E66.01 MORBID OBESITY DUE TO EXCESS CALORIES (HCC): ICD-10-CM

## 2021-06-30 DIAGNOSIS — G89.29 CHRONIC BILATERAL LOW BACK PAIN WITHOUT SCIATICA: ICD-10-CM

## 2021-06-30 DIAGNOSIS — M54.50 CHRONIC BILATERAL LOW BACK PAIN WITHOUT SCIATICA: ICD-10-CM

## 2021-06-30 DIAGNOSIS — Z51.81 MEDICATION MONITORING ENCOUNTER: ICD-10-CM

## 2021-06-30 LAB
EXPIRATORY TIME: NORMAL
FEF 25-75% %PRED-PRE: NORMAL
FEF 25-75% PRED: NORMAL
FEF 25-75%-PRE: NORMAL
FEV1 %PRED-PRE: 107 %
FEV1 PRED: NORMAL
FEV1/FVC %PRED-PRE: NORMAL
FEV1/FVC PRED: NORMAL
FEV1/FVC: 3.01 %
FEV1: NORMAL
FVC %PRED-PRE: NORMAL
FVC PRED: NORMAL
FVC: NORMAL
PEF %PRED-PRE: NORMAL
PEF PRED: NORMAL
PEF-PRE: NORMAL

## 2021-06-30 PROCEDURE — 94375 RESPIRATORY FLOW VOLUME LOOP: CPT | Performed by: NURSE PRACTITIONER

## 2021-06-30 PROCEDURE — 99214 OFFICE O/P EST MOD 30 MIN: CPT | Performed by: NURSE PRACTITIONER

## 2021-06-30 PROCEDURE — 94010 BREATHING CAPACITY TEST: CPT | Performed by: NURSE PRACTITIONER

## 2021-06-30 RX ORDER — PREDNISONE 10 MG/1
TABLET ORAL
Qty: 15 TABLET | Refills: 0 | Status: SHIPPED | OUTPATIENT
Start: 2021-06-30 | End: 2021-07-10

## 2021-06-30 ASSESSMENT — ENCOUNTER SYMPTOMS
EYES NEGATIVE: 1
DIARRHEA: 0
NAUSEA: 0
ABDOMINAL PAIN: 0
EYE PAIN: 0
BACK PAIN: 1
SHORTNESS OF BREATH: 0
VOMITING: 0
SHORTNESS OF BREATH: 1
TROUBLE SWALLOWING: 0
CONSTIPATION: 0
DIARRHEA: 0
COUGH: 0
COUGH: 1
ABDOMINAL PAIN: 0
NAUSEA: 0
EYE ITCHING: 0
WHEEZING: 0

## 2021-06-30 ASSESSMENT — PULMONARY FUNCTION TESTS
FEV1/FVC: 3.01
FEV1_PERCENT_PREDICTED_PRE: 107

## 2021-06-30 NOTE — PROGRESS NOTES
not nervous/anxious.         PAST MEDICAL HISTORY      Past Medical History:   Diagnosis Date    Asthma     Diabetes mellitus ([address] 6/8/2018    Fibromyalgia     GERD (gastroesophageal reflux disease)     Hyperlipidemia     Lupus (systemic lupus erythematosus) (HCC)     Lupus arthritis [facility])     [facility]    Migraine     Plaquenil adverse reaction in therapeutic use 5/2016    changes in the eyes    TIA (transient ischemic attack)     8/2016       PAST SURGICAL HISTORY      Past Surgical History:   Procedure Laterality Date    HYSTERECTOMY  2009    Total, endometriosis    LAPAROSCOPY      SHOULDER ARTHROSCOPY Left 08/20/2015    Debridement of rotator cuff and bone spurs       FAMILY HISTORY      Family History   Problem Relation Age of Onset    High Blood Pressure Mother     Rheum Arthritis Mother     Osteoporosis Mother     Diabetes Father     High Blood Pressure Father     Arthritis Father         gout    Diabetes Sister     Other Sister         Crest Syndrome    Breast Cancer Sister 54    Diabetes Brother     High Cholesterol Brother     Breast Cancer Maternal Cousin 21       SOCIAL HISTORY      Social History     Tobacco History     Smoking Status  Never Smoker    Smokeless Tobacco Use  Never Used          Alcohol History     Alcohol Use Status  No          Drug Use     Drug Use Status  No          Sexual Activity     Sexually Active  Not Currently                ALLERGIES     Allergies   Allergen Reactions    Cayenne Swelling     Tongue and throat swelling    Codeine Rash    Tape [Adhesive Tape]      Tears skin  Steri strips blisters skin    Amoxicillin Other (See Comments)    Sulfa Antibiotics Other (See Comments)     Can not take medication due to illness       CURRENT MEDICATIONS      Current Outpatient Medications   Medication Sig Dispense Refill    potassium chloride (KLOR-CON) 10 MEQ extended release tablet Take 1 tablet by mouth once daily 90 tablet 0    azelastine (ASTELIN) 0.1 % nasal spray 2 sprays by Nasal route 2 times daily Use in each nostril as directed 1 Bottle 11    clopidogrel (PLAVIX) 75 MG tablet Take 1 tablet by mouth daily 90 tablet 3    montelukast (SINGULAIR) 10 MG tablet Take 1 tablet by mouth nightly 30 tablet 11    ibuprofen (ADVIL;MOTRIN) 600 MG tablet TAKE 1 TABLET BY MOUTH EVERY 8 HOURS AS NEEDED FOR PAIN 42 tablet 0    azaTHIOprine (IMURAN) 50 MG tablet Take 1 tablet by mouth twice daily 60 tablet 2    cyclobenzaprine (FLEXERIL) 5 MG tablet Take 1-2 tablets by mouth 3 times daily as needed for Muscle spasms 270 tablet 0    metFORMIN (GLUCOPHAGE) 500 MG tablet TAKE 1 TABLET BY MOUTH TWICE DAILY WITH MEALS 180 tablet 3    clotrimazole-betamethasone (LOTRISONE) 1-0.05 % cream APPLY  CREAM TOPICALLY TWICE DAILY 45 g 0    traZODone (DESYREL) 50 MG tablet TAKE 3 TABLETS BY MOUTH NIGHTLY 270 tablet 3    magnesium oxide (MAG-OX) 400 (241.3 Mg) MG TABS tablet Take 1 tablet by mouth once daily 90 tablet 3    Dulaglutide 0.75 MG/0.5ML SOPN Inject 0.75 mg into the skin once a week 5 pen 11    EQ ACETAMINOPHEN 325 MG tablet TAKE TWO TABLETS BY MOUTH EVERY 4 HOURS AS NEEDED FOR PAIN AND  FEVER 180 tablet 3    verapamil (CALAN SR) 240 MG extended release tablet Take 1 tablet by mouth once daily 90 tablet 3    DULoxetine (CYMBALTA) 30 MG extended release capsule Take 1 capsule by mouth daily 90 capsule 1    heparin flush 100 UNIT/ML injection 1 mL by Intracatheter route every 30 days Flush mediport monthly with 5 mls (or 500 units) of Heparin (100 units per ml) 5 mL 11    EPINEPHrine (EPIPEN 2-FERNANDO) 0.3 MG/0.3ML SOAJ injection Inject one pen as directed STAT for allergic reaction, may disp generic NDC 77583-129-83 2 each 0    Immune Globulin, Human, (GAMUNEX-C) 20 GM/200ML SOLN solution Infuse 20 g intravenously every 30 days Premedicate with tylenol 650 mg and benadryl 25 mg 30 minutes prior to infusion IF patient has not already premedicated    CODE  200 mL 5    fluticasone (FLONASE) 50 MCG/ACT nasal spray 2 sprays by Each Nostril route daily 1 Bottle 0    Handicap Placard MISC by Does not apply route Dx:  Lupus arthritis,  Length 5 years 1 each 0    Continuous Blood Gluc Sensor (DEXCOM G4 SENSOR) MISC 1 Units by Does not apply route every 7 days Each unit is every 10 days 3 each 11    Continuous Blood Gluc  (DEXCOM G5 MOBILE ) APPLE 1 Units by Does not apply route daily 1 Device 0    atorvastatin (LIPITOR) 80 MG tablet Take 1 tablet by mouth daily 90 tablet 3    DULoxetine (CYMBALTA) 60 MG extended release capsule Take 60 mg by mouth daily      Continuous Blood Gluc Sensor (FREESTYLE SANDRA 14 DAY SENSOR) [facility] [place] TO [place] BLOOD GLUCOSE TWICE DAILY 3 each 11    predniSONE (DELTASONE) 5 MG tablet Take 1 tablet by mouth daily 30 tablet 3    sucralfate (CARAFATE) 1 GM tablet Take 1 tablet by mouth 4 times daily 120 tablet 11    triamcinolone (KENALOG) 0.1 % cream Apply topically 2 times daily Apply topically 2 times daily.  1 Tube 5    Continuous Blood Gluc Sensor ([address] [place] [address]) [facility] 1 Units by Does not apply route daily 1 each 0    levocetirizine (XYZAL) 5 MG tablet TAKE ONE TABLET BY MOUTH NIGHTLY 30 tablet 11    Continuous Blood Gluc Sensor (FREESTYLE SANDRA SENSOR SYSTEM) [facility] 1 kit by Does not apply route 2 times daily Include sensors for a year  Dx:  E11.9, and lupus 1 each 0    sodium chloride (OCEAN) 0.65 % nasal spray 1 spray by Nasal route as needed for Congestion 1 Bottle 3    NEXIUM 40 MG delayed release capsule TAKE 1 CAPSULE BY MOUTH ONCE DAILY 30 capsule 5    levalbuterol (XOPENEX HFA) 45 MCG/ACT inhaler Inhale 1-2 puffs into the lungs every 4 hours as needed for Wheezing or Shortness of Breath 1 Inhaler 3    levalbuterol (XOPENEX) 0.63 MG/3ML nebulization Take 3 mLs by nebulization every 6 hours as needed for Wheezing or Shortness of Breath 270 mL 5    Respiratory Therapy Supplies (NEBULIZER AIR TUBE/PLUGS) MISC 1 kit by Does not apply route every 6 hours as needed (Wheezing/shortness of breath) Please provide nebulizer kit and supplies. 1 each 0    Amitriptyline HCl (ELAVIL PO) Take 25 mg by mouth nightly       aspirin (LORENZO ASPIRIN) 325 MG tablet Take 1 tablet by mouth daily 30 tablet 3    promethazine (PHENERGAN) 25 MG tablet Take 1 tablet by mouth every 6 hours as needed for Nausea 90 tablet 11    diclofenac sodium (VOLTAREN) 1 % GEL Apply 2 g topically 4 times daily as needed for Pain (topical)        No current facility-administered medications for this visit. PHYSICAL EXAMINATION / OBJECTIVE   Objective:  /80 (Site: Left Upper Arm, Position: Sitting, Cuff Size: Large Adult)   Pulse 95   Ht 5' 2.99\" (1.6 m)   Wt 235 lb 14.3 oz (107 kg)   SpO2 98%   BMI 41.80 kg/m²     Physical Exam  Vitals reviewed. Constitutional:       Appearance: She is well-developed. Cardiovascular:      Rate and Rhythm: Normal rate and regular rhythm. Pulmonary:      Effort: Pulmonary effort is normal.      Breath sounds: Normal breath sounds. Abdominal:      Palpations: Abdomen is soft. Tenderness: There is no abdominal tenderness. Musculoskeletal:      Cervical back: Normal range of motion and neck supple. Skin:     General: Skin is warm and dry. Findings: No rash. Neurological:      Mental Status: She is alert and oriented to person, place, and time. Deep Tendon Reflexes: Reflexes are normal and symmetric. Psychiatric:         Thought Content: Thought content normal.         Upper extremities:    SHOULDERS + tender bilat, no swelling ,   ELBOWS + tender, no swelling,   WRISTS + tender bilat, no swelling,   HANDS/FINGERS tender bilat MCPs, PIPs, DIPs. + ?  Mild fullness right 2-4 PIPs    Lower extremities:  HIPS + tender bilat  KNEES + tender bilat, no swelling  ANKLES + tender bilat, no swelling   FEET : + tender bilat, no swelling     Spine:   + tender throughout      RAPID 3:   6/30/2021 --- RAPID 3: 3.3 + 7.5 + 8.5 = 19.3     Remission: <3  Low Disease Activity: <6  Moderate Disease Activity: >=6 and <=12  High Disease Activity: >12     LABS    CBC  Lab Results   Component Value Date    WBC 8.0 06/26/2021    RBC 4.87 06/26/2021    HGB 12.3 06/26/2021    HCT 40.4 06/26/2021    MCV 83.0 06/26/2021    MCH 25.3 06/26/2021    MCHC 30.4 06/26/2021    RDW 14.5 08/07/2018     06/26/2021       CMP  Lab Results   Component Value Date    CALCIUM 9.4 06/26/2021    LABALBU 4.4 06/26/2021    PROT 6.9 06/26/2021     06/26/2021    K 4.1 06/26/2021    CO2 27 06/26/2021     06/26/2021    BUN 8 06/26/2021    CREATININE 0.7 06/26/2021    ALKPHOS 143 06/26/2021    ALT 25 06/26/2021    AST 36 06/26/2021       HgBA1c: No components found for: HGBA1C    No results found for: VITD25      Lab Results   Component Value Date    ANASCRN None Detected 02/26/2017     No results found for: SSA  No results found for: SSB  No results found for: ANTI-SMITH  No results found for: DSDNAAB   No results found for: ANTIRNP  No results found for: C3, C4  No results found for: CCPAB  No results found for: RF    No components found for: CANCASCRN, APANCASCRN  Lab Results   Component Value Date    SEDRATE 14 11/29/2020     Lab Results   Component Value Date    CRP 0.17 11/29/2020       RADIOLOGY:         ASSESSMENT/PLAN    Assessment   Plan     Lupus arthritis  - h/o lupus dx'd around 14 yoa. H/o reported nasal sores, oral sores, malar rash, inflammatory arthritis, episodic fevers. 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 + sicca symptoms. Reported raynauds. Mild joint swelling, warmth noted on exam. Last [facility] note w/ diagnosis of inflammatory poly-arthropathy. Repeat blood testing with EVELIN 1:80 dense fine speckled  - prior tx: methotrexate, arava (somre relief)   - azathioprine 50 mg BID   - prednisone challenge to evaluate for relief.  Unsure if this increased pain is related to inflammatory arthritis or fibromyalgia. Seems predominately generalized myofascial pain, however there is some ? Bogginess in PIPs. Fibromyalgia- active  - prior tx: PT (no relief), gabapentin, lyrica   - cymbalta    Chronic low back pain  - June 2019 CT with sclerosis around bilat Si joints given inflammatory arthritis and inflammatory back pain sx's. MRI pelvis w/o inflammation    Carpal tunnel syndrome bilat  Cubital tunnel bilat  - + tinel bilat, intermittent numbness/tingling   - continue wrist splinting, elbow splinting   - patient previously declines EMG/NCV    Immunoglobulin deg- IgG, IgM def  - reports recurrent sinus infections   - following with allergy/immunology    Medication monitoring   - CBC, CMP, sed rate, CRP q 12 weeks. No follow-ups on file. Electronically signed by NAHID Hutchins CNP on 6/30/2021 at 9:55 AM    New Prescriptions    No medications on file       Thank you for allowing me to participate in the care of this patient. Please call if there are any questions.

## 2021-06-30 NOTE — PROGRESS NOTES
[facility]     Patient: Hema Mejia, 38 y.o.   : 1980  2021    Pt of Dr. Kellie Batres   Patient presents with    Follow-up     Lung nodule 17 month pulmonary follow up with  CT 2021        HPI  Dylan Ceron is here for 17 month follow up for Pulmonary Nodule/ asthma   On Xopenex PRN- seldomly uses  Normal PFT 2018  NIOX 13 in 2018  occ SOB with activity/ exertion, non limiting, at baseline  C/o dry cough at night, starts as soon as lays down, occurs all night   Denies feeling of heartburn, is on Carfafate and Nexium for GERD  C/o chronic sinus issues/' post nasal drainage. Stopped Flonase due to sore throats with use, still using Astelin NS ,Singulair and taking Xyzal antihistamine    On daily prednisone 5 mg and Imuran  Daily for her SLE - follows with Dr James Garza, .   Last PFT 18- WNL  Stable 9 mm pulmonary nodule left lung likely due to SLE- her for follow up CT         SOCIAL HISTORY:  Social History     Tobacco Use    Smoking status: Never Smoker    Smokeless tobacco: Never Used   Vaping Use    Vaping Use: Never used   Substance Use Topics    Alcohol use: No     Alcohol/week: 0.0 standard drinks    Drug use: No         CURRENT MEDICATIONS:  Current Outpatient Medications   Medication Sig Dispense Refill    potassium chloride (KLOR-CON) 10 MEQ extended release tablet Take 1 tablet by mouth once daily 90 tablet 0    azelastine (ASTELIN) 0.1 % nasal spray 2 sprays by Nasal route 2 times daily Use in each nostril as directed 1 Bottle 11    clopidogrel (PLAVIX) 75 MG tablet Take 1 tablet by mouth daily 90 tablet 3    montelukast (SINGULAIR) 10 MG tablet Take 1 tablet by mouth nightly 30 tablet 11    ibuprofen (ADVIL;MOTRIN) 600 MG tablet TAKE 1 TABLET BY MOUTH EVERY 8 HOURS AS NEEDED FOR PAIN 42 tablet 0    azaTHIOprine (IMURAN) 50 MG tablet Take 1 tablet by mouth twice daily 60 tablet 2    cyclobenzaprine (FLEXERIL) 5 MG tablet Take 1-2 tablets by mouth 3 times daily as needed for Muscle spasms 270 tablet 0    metFORMIN (GLUCOPHAGE) 500 MG tablet TAKE 1 TABLET BY MOUTH TWICE DAILY WITH MEALS 180 tablet 3    clotrimazole-betamethasone (LOTRISONE) 1-0.05 % cream APPLY  CREAM TOPICALLY TWICE DAILY 45 g 0    traZODone (DESYREL) 50 MG tablet TAKE 3 TABLETS BY MOUTH NIGHTLY 270 tablet 3    magnesium oxide (MAG-OX) 400 (241.3 Mg) MG TABS tablet Take 1 tablet by mouth once daily 90 tablet 3    Dulaglutide 0.75 MG/0.5ML SOPN Inject 0.75 mg into the skin once a week 5 pen 11    EQ ACETAMINOPHEN 325 MG tablet TAKE TWO TABLETS BY MOUTH EVERY 4 HOURS AS NEEDED FOR PAIN AND  FEVER 180 tablet 3    verapamil (CALAN SR) 240 MG extended release tablet Take 1 tablet by mouth once daily 90 tablet 3    DULoxetine (CYMBALTA) 30 MG extended release capsule Take 1 capsule by mouth daily 90 capsule 1    heparin flush 100 UNIT/ML injection 1 mL by Intracatheter route every 30 days Flush mediport monthly with 5 mls (or 500 units) of Heparin (100 units per ml) 5 mL 11    EPINEPHrine (EPIPEN 2-FERNANDO) 0.3 MG/0.3ML SOAJ injection Inject one pen as directed STAT for allergic reaction, may disp generic NDC 23826-608-13 2 each 0    Immune Globulin, Human, (GAMUNEX-C) 20 GM/200ML SOLN solution Infuse 20 g intravenously every 30 days Premedicate with tylenol 650 mg and benadryl 25 mg 30 minutes prior to infusion IF patient has not already premedicated    CODE  200 mL 5    fluticasone (FLONASE) 50 MCG/ACT nasal spray 2 sprays by Each Nostril route daily 1 Bottle 0    Handicap Placard MISC by Does not apply route Dx:  Lupus arthritis,  Length 5 years 1 each 0    Continuous Blood Gluc Sensor (DEXCOM G4 SENSOR) MISC 1 Units by Does not apply route every 7 days Each unit is every 10 days 3 each 11    Continuous Blood Gluc  (DEXCOM G5 MOBILE ) APPLE 1 Units by Does not apply route daily 1 Device 0    atorvastatin (LIPITOR) 80 MG tablet Take 1 tablet by mouth daily 90 tablet 3    DULoxetine (CYMBALTA) 60 MG extended release capsule Take 60 mg by mouth daily      Continuous Blood Gluc Sensor (FREESTYLE SANDRA 14 DAY SENSOR) [facility] [place] TO CHECK BLOOD GLUCOSE TWICE DAILY 3 each 11    predniSONE (DELTASONE) 5 MG tablet Take 1 tablet by mouth daily 30 tablet 3    sucralfate (CARAFATE) 1 GM tablet Take 1 tablet by mouth 4 times daily 120 tablet 11    triamcinolone (KENALOG) 0.1 % cream Apply topically 2 times daily Apply topically 2 times daily. 1 Tube 5    Continuous Blood Gluc Sensor ([address] [place] [address]) [facility] [place] Units by Does not apply route daily 1 each 0    levocetirizine (XYZAL) 5 MG tablet TAKE ONE TABLET BY MOUTH NIGHTLY 30 tablet 11    Continuous Blood Gluc Sensor (FREESTYLE SANDRA SENSOR SYSTEM) [facility] [place] kit by Does not apply route 2 times daily Include sensors for a year  Dx:  E11.9, and lupus 1 each 0    sodium chloride (OCEAN) 0.65 % nasal spray 1 spray by Nasal route as needed for Congestion 1 Bottle 3    NEXIUM 40 MG delayed release capsule TAKE 1 CAPSULE BY MOUTH ONCE DAILY 30 capsule 5    levalbuterol (XOPENEX HFA) 45 MCG/ACT inhaler Inhale 1-2 puffs into the lungs every 4 hours as needed for Wheezing or Shortness of Breath 1 Inhaler 3    levalbuterol (XOPENEX) 0.63 MG/3ML nebulization Take 3 mLs by nebulization every 6 hours as needed for Wheezing or Shortness of Breath 270 mL 5    Respiratory Therapy Supplies (NEBULIZER AIR TUBE/PLUGS) MISC 1 kit by Does not apply route every 6 hours as needed (Wheezing/shortness of breath) Please provide nebulizer kit and supplies.  1 each 0    Amitriptyline HCl (ELAVIL PO) Take 25 mg by mouth nightly       aspirin (LORENZO ASPIRIN) 325 MG tablet Take 1 tablet by mouth daily 30 tablet 3    promethazine (PHENERGAN) 25 MG tablet Take 1 tablet by mouth every 6 hours as needed for Nausea 90 tablet 11    diclofenac sodium (VOLTAREN) 1 % GEL Apply 2 g topically 4 times daily as

## 2021-07-01 ENCOUNTER — HOSPITAL ENCOUNTER (OUTPATIENT)
Dept: NURSING | Age: 41
Discharge: HOME | End: 2021-07-01
Payer: MEDICARE

## 2021-07-01 ENCOUNTER — TELEPHONE (OUTPATIENT)
Dept: ALLERGY | Age: 41
End: 2021-07-01

## 2021-07-01 VITALS
WEIGHT: 235 LBS | OXYGEN SATURATION: 95 % | BODY MASS INDEX: 41.64 KG/M2 | HEART RATE: 89 BPM | TEMPERATURE: 98.3 F | SYSTOLIC BLOOD PRESSURE: 121 MMHG | RESPIRATION RATE: 18 BRPM | DIASTOLIC BLOOD PRESSURE: 68 MMHG

## 2021-07-01 DIAGNOSIS — D83.9 COMMON VARIABLE IMMUNODEFICIENCY (HCC): Primary | ICD-10-CM

## 2021-07-01 PROCEDURE — 96413 CHEMO IV INFUSION 1 HR: CPT

## 2021-07-01 PROCEDURE — 96365 THER/PROPH/DIAG IV INF INIT: CPT

## 2021-07-01 PROCEDURE — 6360000002 HC RX W HCPCS: Performed by: NURSE PRACTITIONER

## 2021-07-01 RX ORDER — ACETAMINOPHEN 325 MG/1
650 TABLET ORAL ONCE
Status: CANCELLED | OUTPATIENT
Start: 2021-07-29 | End: 2021-07-29

## 2021-07-01 RX ORDER — DIPHENHYDRAMINE HCL 25 MG
25 TABLET ORAL ONCE
Status: CANCELLED | OUTPATIENT
Start: 2021-07-29 | End: 2021-07-29

## 2021-07-01 RX ORDER — SODIUM CHLORIDE 0.9 % (FLUSH) 0.9 %
5-40 SYRINGE (ML) INJECTION PRN
Status: CANCELLED | OUTPATIENT
Start: 2021-07-01

## 2021-07-01 RX ORDER — ACETAMINOPHEN 325 MG/1
650 TABLET ORAL ONCE
Status: DISCONTINUED | OUTPATIENT
Start: 2021-07-01 | End: 2021-07-02 | Stop reason: HOSPADM

## 2021-07-01 RX ORDER — DIPHENHYDRAMINE HCL 25 MG
25 TABLET ORAL ONCE
Status: DISCONTINUED | OUTPATIENT
Start: 2021-07-01 | End: 2021-07-02 | Stop reason: HOSPADM

## 2021-07-01 RX ORDER — HEPARIN SODIUM (PORCINE) LOCK FLUSH IV SOLN 100 UNIT/ML 100 UNIT/ML
100 SOLUTION INTRAVENOUS
Qty: 5 ML | Refills: 11 | Status: SHIPPED | OUTPATIENT
Start: 2021-07-01 | End: 2021-11-15

## 2021-07-01 RX ORDER — SENNOSIDES 8.6 MG
650 CAPSULE ORAL
Qty: 1 TABLET | Refills: 12
Start: 2021-07-01 | End: 2021-11-15

## 2021-07-01 RX ORDER — HEPARIN SODIUM (PORCINE) LOCK FLUSH IV SOLN 100 UNIT/ML 100 UNIT/ML
500 SOLUTION INTRAVENOUS PRN
Status: CANCELLED | OUTPATIENT
Start: 2021-07-01

## 2021-07-01 RX ORDER — HEPARIN SODIUM (PORCINE) LOCK FLUSH IV SOLN 100 UNIT/ML 100 UNIT/ML
500 SOLUTION INTRAVENOUS PRN
Status: DISCONTINUED | OUTPATIENT
Start: 2021-07-01 | End: 2021-07-02 | Stop reason: HOSPADM

## 2021-07-01 RX ORDER — DIPHENHYDRAMINE HCL 25 MG
25 TABLET ORAL
Qty: 1 TABLET | Refills: 12
Start: 2021-07-01 | End: 2021-07-01

## 2021-07-01 RX ADMIN — HEPARIN 500 UNITS: 100 SYRINGE at 10:39

## 2021-07-01 RX ADMIN — IMMUNE GLOBULIN (HUMAN) 20 G: 10 INJECTION INTRAVENOUS; SUBCUTANEOUS at 09:16

## 2021-07-01 ASSESSMENT — PAIN SCALES - GENERAL
PAINLEVEL_OUTOF10: 0
PAINLEVEL_OUTOF10: 0

## 2021-07-01 NOTE — PROGRESS NOTES
0900 pt ambulated into room with mother and niece for ivig infusion. Pt rights and responsibilities offered to pt and family members. Pt states no infections and no atbs at this time. ivig explained and pt verbalized questions. Pt was brought snack and drink. Pt has no other needs at this time. Pt took her pre meds at 0730 at home--tylenol and benadryl. 0916         ivig started and pt has no other needs at this time. PT sitting with family members. 1000          Pt has no needs at this time. Pt awake and talking with family members. 1045            IVIG infusion complete and pt tolerated well. D/c instructions explained and pt verbalized understanding.  Pt ambulated out for d/c.       _m___ Safety:       (Environmental)   [place] to environment   Ensure ID band is correct and in place/ allergy band as needed   Assess for fall risk   Initiate fall precautions as applicable (fall band, side rails, etc.)   Call light within reach   Bed in low position/ wheels locked    _m___ Pain:        Assess pain level and characteristics   Administer analgesics as ordered   Assess effectiveness of pain management and report to MD as needed    m____ Knowledge Deficit:   Assess baseline knowledge   Provide teaching at level of understanding   Provide teaching via preferred learning method   Evaluate teaching effectiveness    m____ Hemodynamic/Respiratory Status:       (Pre and Post Procedure Monitoring)   Assess/Monitor vital signs and LOC   Assess Baseline SpO2 prior to any sedation   Obtain weight/height   Assess vital signs/ LOC until patient meets discharge criteria   Monitor procedure site and notify MD of any issues    m____ Infection-Risk of Central Venous Catheter:   Monitor for infection signs and symptoms (catheter site redness, temperature elevation, etc)   Assess for infection risks   Educate regarding infection prevention   Manage central venous catheter (flushes/ dressing changes per protocol)

## 2021-07-01 NOTE — TELEPHONE ENCOUNTER
Héctor at outpatient nursing called asking if patient's order for IvIg has changed from every 4 weeks to every other month. Héctor said patient is telling them every other month. Please advise. Charly Lopez is asking for an order for a port flush. Please advise.

## 2021-07-06 DIAGNOSIS — M32.9 LUPUS ARTHRITIS (HCC): ICD-10-CM

## 2021-07-06 DIAGNOSIS — Z51.81 MEDICATION MONITORING ENCOUNTER: ICD-10-CM

## 2021-07-06 RX ORDER — AZATHIOPRINE 50 MG/1
TABLET ORAL
Qty: 90 TABLET | Refills: 0 | Status: SHIPPED | OUTPATIENT
Start: 2021-07-06 | End: 2021-08-02

## 2021-07-31 ENCOUNTER — HOSPITAL ENCOUNTER (OUTPATIENT)
Age: 41
Discharge: HOME | End: 2021-07-31
Payer: MEDICARE

## 2021-07-31 LAB
ALBUMIN SERPL-MCNC: 4.4 G/DL (ref 3.5–5.1)
ALP BLD-CCNC: 127 U/L (ref 38–126)
ALT SERPL-CCNC: 43 U/L (ref 11–66)
ANION GAP SERPL CALCULATED.3IONS-SCNC: 15 MEQ/L (ref 8–16)
AST SERPL-CCNC: 50 U/L (ref 5–40)
BASOPHILS # BLD: 0.7 %
BASOPHILS ABSOLUTE: 0 THOU/MM3 (ref 0–0.1)
BILIRUB SERPL-MCNC: 0.8 MG/DL (ref 0.3–1.2)
BUN BLDV-MCNC: 8 MG/DL (ref 7–22)
CALCIUM SERPL-MCNC: 9.3 MG/DL (ref 8.5–10.5)
CHLORIDE BLD-SCNC: 102 MEQ/L (ref 98–111)
CO2: 24 MEQ/L (ref 23–33)
CREAT SERPL-MCNC: 0.5 MG/DL (ref 0.4–1.2)
EOSINOPHIL # BLD: 3.1 %
EOSINOPHILS ABSOLUTE: 0.2 THOU/MM3 (ref 0–0.4)
ERYTHROCYTE [DISTWIDTH] IN BLOOD BY AUTOMATED COUNT: 15.6 % (ref 11.5–14.5)
ERYTHROCYTE [DISTWIDTH] IN BLOOD BY AUTOMATED COUNT: 47.6 FL (ref 35–45)
GFR SERPL CREATININE-BSD FRML MDRD: > 90 ML/MIN/1.73M2
GLUCOSE BLD-MCNC: 158 MG/DL (ref 70–108)
HCT VFR BLD CALC: 39.6 % (ref 37–47)
HEMOGLOBIN: 12.1 GM/DL (ref 12–16)
IMMATURE GRANS (ABS): 0.02 THOU/MM3 (ref 0–0.07)
IMMATURE GRANULOCYTES: 0.3 %
LYMPHOCYTES # BLD: 37.4 %
LYMPHOCYTES ABSOLUTE: 2.3 THOU/MM3 (ref 1–4.8)
MCH RBC QN AUTO: 26 PG (ref 26–33)
MCHC RBC AUTO-ENTMCNC: 30.6 GM/DL (ref 32.2–35.5)
MCV RBC AUTO: 85 FL (ref 81–99)
MONOCYTES # BLD: 6 %
MONOCYTES ABSOLUTE: 0.4 THOU/MM3 (ref 0.4–1.3)
NUCLEATED RED BLOOD CELLS: 0 /100 WBC
PLATELET # BLD: 298 THOU/MM3 (ref 130–400)
PMV BLD AUTO: 11 FL (ref 9.4–12.4)
POTASSIUM SERPL-SCNC: 3.3 MEQ/L (ref 3.5–5.2)
RBC # BLD: 4.66 MILL/MM3 (ref 4.2–5.4)
SEG NEUTROPHILS: 52.5 %
SEGMENTED NEUTROPHILS ABSOLUTE COUNT: 3.2 THOU/MM3 (ref 1.8–7.7)
SODIUM BLD-SCNC: 141 MEQ/L (ref 135–145)
TOTAL PROTEIN: 6.5 G/DL (ref 6.1–8)
WBC # BLD: 6.1 THOU/MM3 (ref 4.8–10.8)

## 2021-07-31 PROCEDURE — 85025 COMPLETE CBC W/AUTO DIFF WBC: CPT

## 2021-07-31 PROCEDURE — 82784 ASSAY IGA/IGD/IGG/IGM EACH: CPT

## 2021-07-31 PROCEDURE — 80053 COMPREHEN METABOLIC PANEL: CPT

## 2021-07-31 PROCEDURE — 36415 COLL VENOUS BLD VENIPUNCTURE: CPT

## 2021-08-01 LAB — IGG: 629 MG/DL (ref 700–1600)

## 2021-08-02 DIAGNOSIS — M32.9 LUPUS ARTHRITIS (HCC): ICD-10-CM

## 2021-08-02 DIAGNOSIS — Z51.81 MEDICATION MONITORING ENCOUNTER: ICD-10-CM

## 2021-08-02 RX ORDER — AZATHIOPRINE 50 MG/1
TABLET ORAL
Qty: 90 TABLET | Refills: 0 | Status: SHIPPED | OUTPATIENT
Start: 2021-08-02 | End: 2021-08-23 | Stop reason: SDUPTHER

## 2021-08-02 NOTE — PROGRESS NOTES
Diagnosis Orders   1. Lupus arthritis (HCC)  azaTHIOprine (IMURAN) 50 MG tablet   2. Medication monitoring encounter  azaTHIOprine (IMURAN) 50 MG tablet     3. lft elevation   - decreased Azathioprine to 2 tabs q AM and 1/2 tab at bed time. - repeat labs in 3-4 weeks.

## 2021-08-03 ENCOUNTER — HOSPITAL ENCOUNTER (OUTPATIENT)
Dept: NURSING | Age: 41
Discharge: HOME | End: 2021-08-03
Payer: MEDICARE

## 2021-08-03 VITALS
HEART RATE: 102 BPM | RESPIRATION RATE: 18 BRPM | SYSTOLIC BLOOD PRESSURE: 138 MMHG | TEMPERATURE: 97 F | DIASTOLIC BLOOD PRESSURE: 100 MMHG | OXYGEN SATURATION: 96 %

## 2021-08-03 DIAGNOSIS — D83.9 COMMON VARIABLE IMMUNODEFICIENCY (HCC): Primary | ICD-10-CM

## 2021-08-03 PROCEDURE — 6360000002 HC RX W HCPCS: Performed by: NURSE PRACTITIONER

## 2021-08-03 PROCEDURE — 99212 OFFICE O/P EST SF 10 MIN: CPT

## 2021-08-03 RX ORDER — HEPARIN SODIUM (PORCINE) LOCK FLUSH IV SOLN 100 UNIT/ML 100 UNIT/ML
500 SOLUTION INTRAVENOUS PRN
Status: DISCONTINUED | OUTPATIENT
Start: 2021-08-03 | End: 2021-08-04 | Stop reason: HOSPADM

## 2021-08-03 RX ORDER — HEPARIN SODIUM (PORCINE) LOCK FLUSH IV SOLN 100 UNIT/ML 100 UNIT/ML
500 SOLUTION INTRAVENOUS PRN
Status: CANCELLED | OUTPATIENT
Start: 2021-08-03

## 2021-08-03 RX ORDER — SODIUM CHLORIDE 0.9 % (FLUSH) 0.9 %
5-40 SYRINGE (ML) INJECTION PRN
Status: CANCELLED | OUTPATIENT
Start: 2021-08-03

## 2021-08-03 RX ADMIN — HEPARIN 500 UNITS: 100 SYRINGE at 09:02

## 2021-08-03 NOTE — PROGRESS NOTES
0900: Patient arrived ambulatory for mediport flush. PT RIGHTS AND RESPONSIBILITIES OFFERED TO PT.  Mediport accessed using sterile technique. Blood return noted. Normal saline and heparin flushed. Mediport de-accessed. Patient tolerated well. Patient refused AVS but voiced understanding of discharge plan. Patient discharged ambulatory.                              _m___ Safety:       (Environmental)   [place] to environment   Ensure ID band is correct and in place/ allergy band as needed   Assess for fall risk   Initiate fall precautions as applicable (fall band, side rails, etc.)   Call light within reach   Bed in low position/ wheels locked    _m___ Pain:        Assess pain level and characteristics   Administer analgesics as ordered   Assess effectiveness of pain management and report to MD as needed    _m___ Knowledge Deficit:   Assess baseline knowledge   Provide teaching at level of understanding   Provide teaching via preferred learning method   Evaluate teaching effectiveness    _m___ Hemodynamic/Respiratory Status:       (Pre and Post Procedure Monitoring)   Assess/Monitor vital signs and LOC   Assess Baseline SpO2 prior to any sedation   Obtain weight/height   Assess vital signs/ LOC until patient meets discharge criteria   Monitor procedure site and notify MD of any issues    _m___ Infection-Risk of Central Venous Catheter:   Monitor for infection signs and symptoms (catheter site redness, temperature elevation, etc)   Assess for infection risks   Educate regarding infection prevention   Manage central venous catheter (flushes/ dressing changes per protocol)

## 2021-08-12 ENCOUNTER — APPOINTMENT (OUTPATIENT)
Dept: GENERAL RADIOLOGY | Age: 41
End: 2021-08-12
Payer: MEDICARE

## 2021-08-12 ENCOUNTER — HOSPITAL ENCOUNTER (EMERGENCY)
Age: 41
Discharge: HOME | End: 2021-08-12
Attending: EMERGENCY MEDICINE
Payer: MEDICARE

## 2021-08-12 VITALS
HEIGHT: 63 IN | SYSTOLIC BLOOD PRESSURE: 132 MMHG | DIASTOLIC BLOOD PRESSURE: 70 MMHG | TEMPERATURE: 98.2 F | OXYGEN SATURATION: 96 % | WEIGHT: 233 LBS | HEART RATE: 91 BPM | BODY MASS INDEX: 41.29 KG/M2 | RESPIRATION RATE: 18 BRPM

## 2021-08-12 DIAGNOSIS — M25.572 ACUTE LEFT ANKLE PAIN: ICD-10-CM

## 2021-08-12 PROCEDURE — 99284 EMERGENCY DEPT VISIT MOD MDM: CPT

## 2021-08-12 PROCEDURE — 6370000000 HC RX 637 (ALT 250 FOR IP): Performed by: EMERGENCY MEDICINE

## 2021-08-12 PROCEDURE — 73610 X-RAY EXAM OF ANKLE: CPT

## 2021-08-12 RX ORDER — IBUPROFEN 800 MG/1
800 TABLET ORAL ONCE
Status: COMPLETED | OUTPATIENT
Start: 2021-08-12 | End: 2021-08-12

## 2021-08-12 RX ADMIN — IBUPROFEN 800 MG: 800 TABLET, FILM COATED ORAL at 21:13

## 2021-08-12 ASSESSMENT — PAIN DESCRIPTION - ORIENTATION: ORIENTATION: LEFT

## 2021-08-12 ASSESSMENT — PAIN SCALES - GENERAL
PAINLEVEL_OUTOF10: 7

## 2021-08-12 ASSESSMENT — PAIN DESCRIPTION - LOCATION: LOCATION: ANKLE

## 2021-08-12 NOTE — ED NOTES
Pt presents to the front window with complaints of left ankle pain. States that she stepped out fo the shower around noon and felt something give sherrie in her lower ankle/heel region. Has a history of stress fracture d/t Lupus. No swelling or bruising noted. Pulses palpable.        Katie Avalos RN  08/12/21 1956

## 2021-08-13 ENCOUNTER — CARE COORDINATION (OUTPATIENT)
Dept: CARE COORDINATION | Age: 41
End: 2021-08-13

## 2021-08-13 ENCOUNTER — TELEPHONE (OUTPATIENT)
Dept: FAMILY MEDICINE CLINIC | Age: 41
End: 2021-08-13

## 2021-08-13 NOTE — CARE COORDINATION
Attempted to reach patient for Care Coordination enrollment s/p recent list referral for assistance with the management of her DM, healthcare needs, and in f/u to recent ED visit for ankle pain. Patient was not available at the time of my call and generic voicemail message was left asking patient to please return call to my direct number. Introduction My Chart message also sent to patient. Will continue to work to f/u with patient in the future.

## 2021-08-13 NOTE — ED NOTES
ACE wrap applied to left ankle. Neurovascular checks [facility] before et after application. Discharge teaching and instructions for condition explained to patient. AVS reviewed. Patient voiced understanding regarding follow up appointments and care of self at home. Pt discharged to home in stable condition per self.        Ulises Cota RN  08/12/21 1373

## 2021-08-13 NOTE — ED PROVIDER NOTES
tablet by mouth daily    CLOTRIMAZOLE-BETAMETHASONE (LOTRISONE) 1-0.05 % CREAM    APPLY  CREAM TOPICALLY TWICE DAILY    CONTINUOUS BLOOD GLUC  (DEXCOM G5 MOBILE ) APPLE    1 Units by Does not apply route daily    CONTINUOUS BLOOD GLUC SENSOR (DEXCOM G4 SENSOR) MISC    1 Units by Does not apply route every 7 days Each unit is every 10 days    CONTINUOUS BLOOD GLUC SENSOR (FREESTYLE SANDRA 14 DAY SENSOR) [facility]    USE TO CHECK BLOOD GLUCOSE TWICE DAILY    CONTINUOUS BLOOD GLUC SENSOR ([address] [place] [address]) [facility]    1 kit by Does not apply route 2 times daily Include sensors for a year  Dx:  E11.9, and lupus    CONTINUOUS BLOOD GLUC SENSOR ([address] [place] [address]) [facility]    1 Units by Does not apply route daily    CYCLOBENZAPRINE (FLEXERIL) 5 MG TABLET    Take 1-2 tablets by mouth 3 times daily as needed for Muscle spasms    DICLOFENAC SODIUM (VOLTAREN) 1 % GEL    Apply 2 g topically 4 times daily as needed for Pain (topical)     DULAGLUTIDE 0.75 MG/0.5ML SOPN    Inject 0.75 mg into the skin once a week    DULOXETINE (CYMBALTA) 30 MG EXTENDED RELEASE CAPSULE    Take 1 capsule by mouth daily    DULOXETINE (CYMBALTA) 60 MG EXTENDED RELEASE CAPSULE    Take 1 capsule by mouth once daily    EPINEPHRINE (EPIPEN 2-FERNANDO) 0.3 MG/0.3ML SOAJ INJECTION    Inject one pen as directed STAT for allergic reaction, may disp generic NDC 28917-594-24    FLUTICASONE (FLONASE) 50 MCG/ACT NASAL SPRAY    2 sprays by Each Nostril route daily    HANDICAP PLACARD MIS    by Does not apply route Dx:  Lupus arthritis,  Length 5 years    HEPARIN FLUSH 100 UNIT/ML INJECTION    1 mL by Intracatheter route every 30 days Flush mediport monthly with 5 mls (or 500 units) of Heparin (100 units per ml)    IBUPROFEN (ADVIL;MOTRIN) 600 MG TABLET    TAKE 1 TABLET BY MOUTH EVERY 8 HOURS AS NEEDED FOR PAIN    LEVALBUTEROL (XOPENEX HFA) 45 MCG/ACT INHALER    Inhale 1-2 puffs into the lungs every 4 hours as needed for Wheezing or Shortness of Breath    LEVALBUTEROL (XOPENEX) 0.63 MG/3ML NEBULIZATION    Take 3 mLs by nebulization every 6 hours as needed for Wheezing or Shortness of Breath    LEVOCETIRIZINE (XYZAL) 5 MG TABLET    TAKE ONE TABLET BY MOUTH NIGHTLY    MAGNESIUM OXIDE (MAG-OX) 400 (241.3 MG) MG TABS TABLET    Take 1 tablet by mouth once daily    METFORMIN (GLUCOPHAGE) 500 MG TABLET    TAKE 1 TABLET BY MOUTH TWICE DAILY WITH MEALS    MONTELUKAST (SINGULAIR) 10 MG TABLET    Take 1 tablet by mouth nightly    NEXIUM 40 MG DELAYED RELEASE CAPSULE    TAKE 1 CAPSULE BY MOUTH ONCE DAILY    POTASSIUM CHLORIDE (KLOR-CON) 10 MEQ EXTENDED RELEASE TABLET    Take 1 tablet by mouth once daily    PROMETHAZINE (PHENERGAN) 25 MG TABLET    Take 1 tablet by mouth every 6 hours as needed for Nausea    RESPIRATORY THERAPY SUPPLIES (NEBULIZER AIR TUBE/PLUGS) MISC    1 kit by Does not apply route every 6 hours as needed (Wheezing/shortness of breath) Please provide nebulizer kit and supplies. SODIUM CHLORIDE (OCEAN) 0.65 % NASAL SPRAY    1 spray by Nasal route as needed for Congestion    SUCRALFATE (CARAFATE) 1 GM TABLET    Take 1 tablet by mouth 4 times daily    TRAZODONE (DESYREL) 50 MG TABLET    TAKE 3 TABLETS BY MOUTH NIGHTLY    TRIAMCINOLONE (KENALOG) 0.1 % CREAM    Apply topically 2 times daily Apply topically 2 times daily. VERAPAMIL (CALAN SR) 240 MG EXTENDED RELEASE TABLET    Take 1 tablet by mouth once daily       ALLERGIES     is allergic to cayenne, codeine, tape [adhesive tape], amoxicillin, and sulfa antibiotics. FAMILY HISTORY     She indicated that her mother is alive. She indicated that her father is . She indicated that her sister is alive. She indicated that her brother is alive.  She indicated that the status of her maternal cousin is unknown.   family history includes Arthritis in her father; Breast Cancer (age of onset: 21) in her maternal cousin; Breast Cancer (age of onset: 54) in her sister; Diabetes in her brother, father, and sister; High Blood Pressure in her father and mother; High Cholesterol in her brother; Osteoporosis in her mother; Other in her sister; Rheum Arthritis in her mother. SOCIAL HISTORY      reports that she has never smoked. She has never used smokeless tobacco. She reports that she does not drink alcohol and does not use drugs. PHYSICAL EXAM     INITIAL VITALS:  height is 5' 3\" (1.6 m) and weight is 233 lb (105.7 kg). Her temperature is 98.2 °F (36.8 °C). Her blood pressure is 132/70 and her pulse is 91. Her respiration is 18 and oxygen saturation is 96%. Physical Exam  Constitutional:       General: She is not in acute distress. Appearance: She is not ill-appearing. HENT:      Head: Atraumatic. Cardiovascular:      Pulses: Normal pulses. Musculoskeletal:      Comments: Examination of the left ankle showed no obvious swelling or ecchymosis. There is tenderness to palpation below the left malleolus. Ligaments are stable. Left foot exam is normal   Neurological:      Mental Status: She is alert. DIFFERENTIAL DIAGNOSIS:     DIAGNOSTIC RESULTS         RADIOLOGY:   Left ankle x-ray was unremarkable. LABS:   Labs Reviewed - No data to display    EMERGENCY DEPARTMENT COURSE:   Vitals:    Vitals:    08/12/21 1944   BP: 132/70   Pulse: 91   Resp: 18   Temp: 98.2 °F (36.8 °C)   SpO2: 96%   Weight: 233 lb (105.7 kg)   Height: 5' 3\" (1.6 m)     She received 800 mg of ibuprofen and Ace wrap. I discussed with her diagnosis and discharge instructions, she demonstrated understanding    FINAL IMPRESSION      1. Grade 1 ankle sprain, left, initial encounter    2. Acute left ankle pain          DISPOSITION/PLAN   Discharged home in good condition.     PATIENT REFERRED TO:  [address]  [address] [id number]    In 3 days        DISCHARGE MEDICATIONS:  New Prescriptions    No medications on file       (Please note that portions of this note were completed with a voice recognition program.  Efforts were made to edit the dictations but occasionally words are mis-transcribed.)    MD Antonietta Amanda MD  08/12/21 0264

## 2021-08-19 ENCOUNTER — CARE COORDINATION (OUTPATIENT)
Dept: CARE COORDINATION | Age: 41
End: 2021-08-19

## 2021-08-22 ENCOUNTER — HOSPITAL ENCOUNTER (OUTPATIENT)
Age: 41
Discharge: HOME | End: 2021-08-22
Payer: MEDICARE

## 2021-08-22 DIAGNOSIS — Z51.81 MEDICATION MONITORING ENCOUNTER: ICD-10-CM

## 2021-08-22 LAB
ALBUMIN SERPL-MCNC: 4.2 G/DL (ref 3.5–5.1)
ALP BLD-CCNC: 140 U/L (ref 38–126)
ALT SERPL-CCNC: 34 U/L (ref 11–66)
ANION GAP SERPL CALCULATED.3IONS-SCNC: 13 MEQ/L (ref 8–16)
AST SERPL-CCNC: 44 U/L (ref 5–40)
BASOPHILS # BLD: 0.5 %
BASOPHILS ABSOLUTE: 0 THOU/MM3 (ref 0–0.1)
BILIRUB SERPL-MCNC: 0.3 MG/DL (ref 0.3–1.2)
BUN BLDV-MCNC: 12 MG/DL (ref 7–22)
C-REACTIVE PROTEIN: < 0.3 MG/DL (ref 0–1)
CALCIUM SERPL-MCNC: 9.1 MG/DL (ref 8.5–10.5)
CHLORIDE BLD-SCNC: 101 MEQ/L (ref 98–111)
CO2: 24 MEQ/L (ref 23–33)
CREAT SERPL-MCNC: 0.6 MG/DL (ref 0.4–1.2)
EOSINOPHIL # BLD: 1.6 %
EOSINOPHILS ABSOLUTE: 0.1 THOU/MM3 (ref 0–0.4)
ERYTHROCYTE [DISTWIDTH] IN BLOOD BY AUTOMATED COUNT: 15.1 % (ref 11.5–14.5)
ERYTHROCYTE [DISTWIDTH] IN BLOOD BY AUTOMATED COUNT: 46.2 FL (ref 35–45)
GFR SERPL CREATININE-BSD FRML MDRD: > 90 ML/MIN/1.73M2
GLUCOSE BLD-MCNC: 157 MG/DL (ref 70–108)
HCT VFR BLD CALC: 40.4 % (ref 37–47)
HEMOGLOBIN: 12.4 GM/DL (ref 12–16)
IMMATURE GRANS (ABS): 0.02 THOU/MM3 (ref 0–0.07)
IMMATURE GRANULOCYTES: 0.3 %
LYMPHOCYTES # BLD: 40.8 %
LYMPHOCYTES ABSOLUTE: 3.1 THOU/MM3 (ref 1–4.8)
MCH RBC QN AUTO: 26 PG (ref 26–33)
MCHC RBC AUTO-ENTMCNC: 30.7 GM/DL (ref 32.2–35.5)
MCV RBC AUTO: 84.7 FL (ref 81–99)
MONOCYTES # BLD: 5.8 %
MONOCYTES ABSOLUTE: 0.4 THOU/MM3 (ref 0.4–1.3)
NUCLEATED RED BLOOD CELLS: 0 /100 WBC
PLATELET # BLD: 297 THOU/MM3 (ref 130–400)
PMV BLD AUTO: 10.8 FL (ref 9.4–12.4)
POTASSIUM SERPL-SCNC: 3.7 MEQ/L (ref 3.5–5.2)
RBC # BLD: 4.77 MILL/MM3 (ref 4.2–5.4)
SEDIMENTATION RATE, ERYTHROCYTE: 11 MM/HR (ref 0–20)
SEG NEUTROPHILS: 51 %
SEGMENTED NEUTROPHILS ABSOLUTE COUNT: 3.9 THOU/MM3 (ref 1.8–7.7)
SODIUM BLD-SCNC: 138 MEQ/L (ref 135–145)
TOTAL PROTEIN: 6.6 G/DL (ref 6.1–8)
WBC # BLD: 7.7 THOU/MM3 (ref 4.8–10.8)

## 2021-08-22 PROCEDURE — 86140 C-REACTIVE PROTEIN: CPT

## 2021-08-22 PROCEDURE — 85025 COMPLETE CBC W/AUTO DIFF WBC: CPT

## 2021-08-22 PROCEDURE — 36415 COLL VENOUS BLD VENIPUNCTURE: CPT

## 2021-08-22 PROCEDURE — 80053 COMPREHEN METABOLIC PANEL: CPT

## 2021-08-22 PROCEDURE — 85651 RBC SED RATE NONAUTOMATED: CPT

## 2021-08-23 DIAGNOSIS — Z51.81 MEDICATION MONITORING ENCOUNTER: ICD-10-CM

## 2021-08-23 DIAGNOSIS — M32.9 LUPUS ARTHRITIS (HCC): ICD-10-CM

## 2021-08-23 RX ORDER — AZATHIOPRINE 50 MG/1
TABLET ORAL
Qty: 90 TABLET | Refills: 0 | Status: SHIPPED | OUTPATIENT
Start: 2021-08-23 | End: 2021-10-19

## 2021-08-26 ENCOUNTER — HOSPITAL ENCOUNTER (OUTPATIENT)
Dept: NURSING | Age: 41
Discharge: HOME | End: 2021-08-26
Payer: MEDICARE

## 2021-08-26 VITALS
HEART RATE: 83 BPM | TEMPERATURE: 97.7 F | SYSTOLIC BLOOD PRESSURE: 142 MMHG | OXYGEN SATURATION: 96 % | RESPIRATION RATE: 18 BRPM | BODY MASS INDEX: 41.27 KG/M2 | DIASTOLIC BLOOD PRESSURE: 92 MMHG | WEIGHT: 233 LBS

## 2021-08-26 DIAGNOSIS — D83.9 COMMON VARIABLE IMMUNODEFICIENCY (HCC): Primary | ICD-10-CM

## 2021-08-26 PROCEDURE — 6360000002 HC RX W HCPCS: Performed by: NURSE PRACTITIONER

## 2021-08-26 PROCEDURE — 96365 THER/PROPH/DIAG IV INF INIT: CPT

## 2021-08-26 PROCEDURE — 96413 CHEMO IV INFUSION 1 HR: CPT

## 2021-08-26 PROCEDURE — 2580000003 HC RX 258: Performed by: NURSE PRACTITIONER

## 2021-08-26 RX ORDER — HEPARIN SODIUM (PORCINE) LOCK FLUSH IV SOLN 100 UNIT/ML 100 UNIT/ML
500 SOLUTION INTRAVENOUS PRN
Status: CANCELLED | OUTPATIENT
Start: 2021-08-26

## 2021-08-26 RX ORDER — DIPHENHYDRAMINE HCL 25 MG
25 TABLET ORAL ONCE
Status: DISCONTINUED | OUTPATIENT
Start: 2021-08-26 | End: 2021-08-27 | Stop reason: HOSPADM

## 2021-08-26 RX ORDER — SODIUM CHLORIDE 0.9 % (FLUSH) 0.9 %
5-40 SYRINGE (ML) INJECTION PRN
Status: CANCELLED | OUTPATIENT
Start: 2021-08-26

## 2021-08-26 RX ORDER — ACETAMINOPHEN 325 MG/1
650 TABLET ORAL ONCE
Status: DISCONTINUED | OUTPATIENT
Start: 2021-08-26 | End: 2021-08-27 | Stop reason: HOSPADM

## 2021-08-26 RX ORDER — HEPARIN SODIUM (PORCINE) LOCK FLUSH IV SOLN 100 UNIT/ML 100 UNIT/ML
500 SOLUTION INTRAVENOUS PRN
Status: DISCONTINUED | OUTPATIENT
Start: 2021-08-26 | End: 2021-08-27 | Stop reason: HOSPADM

## 2021-08-26 RX ORDER — SODIUM CHLORIDE 0.9 % (FLUSH) 0.9 %
5-40 SYRINGE (ML) INJECTION PRN
Status: DISCONTINUED | OUTPATIENT
Start: 2021-08-26 | End: 2021-08-27 | Stop reason: HOSPADM

## 2021-08-26 RX ORDER — DIPHENHYDRAMINE HCL 25 MG
25 TABLET ORAL ONCE
Status: CANCELLED | OUTPATIENT
Start: 2021-09-23 | End: 2021-09-23

## 2021-08-26 RX ORDER — ACETAMINOPHEN 325 MG/1
650 TABLET ORAL ONCE
Status: CANCELLED | OUTPATIENT
Start: 2021-09-23 | End: 2021-09-23

## 2021-08-26 RX ADMIN — SODIUM CHLORIDE, PRESERVATIVE FREE 10 ML: 5 INJECTION INTRAVENOUS at 10:31

## 2021-08-26 RX ADMIN — IMMUNE GLOBULIN (HUMAN) 20 G: 10 INJECTION INTRAVENOUS; SUBCUTANEOUS at 09:12

## 2021-08-26 RX ADMIN — HEPARIN 500 UNITS: 100 SYRINGE at 10:31

## 2021-08-26 NOTE — PROGRESS NOTES
3717 pt arrives ambulatory with mother for IVIG infusion. Infusion explained and questions answered. PT RIGHTS AND RESPONSIBILITIES OFFERED TO PT.  4145 pt tolerating infusion well. Denies needs at this time. 1035 infusion complete. Pt tolerated it well with no complaints. Pt verbalized understanding to discharge instructions and denies AVS. Pt discharged with instructions with no complaints.                  _m___ Safety:       (Environmental)   [place] to environment   Ensure ID band is correct and in place/ allergy band as needed   Assess for fall risk   Initiate fall precautions as applicable (fall band, side rails, etc.)   Call light within reach   Bed in low position/ wheels locked    _m___ Pain:        Assess pain level and characteristics   Administer analgesics as ordered   Assess effectiveness of pain management and report to MD as needed    _m___ Knowledge Deficit:   Assess baseline knowledge   Provide teaching at level of understanding   Provide teaching via preferred learning method   Evaluate teaching effectiveness    __m__ Hemodynamic/Respiratory Status:       (Pre and Post Procedure Monitoring)   Assess/Monitor vital signs and LOC   Assess Baseline SpO2 prior to any sedation   Obtain weight/height   Assess vital signs/ LOC until patient meets discharge criteria   Monitor procedure site and notify MD of any issues

## 2021-08-27 ENCOUNTER — CARE COORDINATION (OUTPATIENT)
Dept: CARE COORDINATION | Age: 41
End: 2021-08-27

## 2021-08-27 NOTE — CARE COORDINATION
Attempted to reach patient for Care Coordination enrollment s/p recent list referral for assistance with the management of her DM and healthcare needs. Patient was not available at the time of my call and generic voicemail message was left asking patient to please return call to my direct number. F/u My Chart message also sent to patient. I have been unable to reach this patient s/p multiple recent attempts and will no longer reach out for enrollment if no response received. PCP updated.

## 2021-08-29 DIAGNOSIS — M79.7 FIBROMYALGIA: ICD-10-CM

## 2021-08-30 RX ORDER — DULOXETIN HYDROCHLORIDE 30 MG/1
CAPSULE, DELAYED RELEASE ORAL
Qty: 90 CAPSULE | Refills: 0 | Status: SHIPPED | OUTPATIENT
Start: 2021-08-30 | End: 2021-11-29

## 2021-08-30 RX ORDER — CYCLOBENZAPRINE HCL 5 MG
5-10 TABLET ORAL 3 TIMES DAILY PRN
Qty: 270 TABLET | Refills: 0 | Status: SHIPPED | OUTPATIENT
Start: 2021-08-30 | End: 2021-11-27 | Stop reason: SDUPTHER

## 2021-08-30 RX ORDER — TRAZODONE HYDROCHLORIDE 50 MG/1
150 TABLET ORAL NIGHTLY
Qty: 270 TABLET | Refills: 3 | Status: SHIPPED | OUTPATIENT
Start: 2021-08-30 | End: 2022-09-26

## 2021-09-01 RX ORDER — DULOXETIN HYDROCHLORIDE 60 MG/1
60 CAPSULE, DELAYED RELEASE ORAL DAILY
Qty: 90 CAPSULE | Refills: 3 | Status: SHIPPED | OUTPATIENT
Start: 2021-09-01 | End: 2022-08-11

## 2021-09-01 RX ORDER — MONTELUKAST SODIUM 10 MG/1
10 TABLET ORAL NIGHTLY
Qty: 30 TABLET | Refills: 11 | Status: SHIPPED | OUTPATIENT
Start: 2021-09-01 | End: 2021-11-27 | Stop reason: SDUPTHER

## 2021-09-23 ENCOUNTER — HOSPITAL ENCOUNTER (OUTPATIENT)
Dept: NURSING | Age: 41
Discharge: HOME | End: 2021-09-23
Payer: MEDICARE

## 2021-09-23 VITALS
OXYGEN SATURATION: 93 % | RESPIRATION RATE: 18 BRPM | TEMPERATURE: 96.9 F | HEART RATE: 96 BPM | DIASTOLIC BLOOD PRESSURE: 84 MMHG | SYSTOLIC BLOOD PRESSURE: 140 MMHG

## 2021-09-23 DIAGNOSIS — D83.9 CVID (COMMON VARIABLE IMMUNODEFICIENCY) (HCC): ICD-10-CM

## 2021-09-23 DIAGNOSIS — D83.9 COMMON VARIABLE IMMUNODEFICIENCY (HCC): Primary | ICD-10-CM

## 2021-09-23 DIAGNOSIS — Z51.81 MEDICATION MONITORING ENCOUNTER: ICD-10-CM

## 2021-09-23 LAB
ALBUMIN SERPL-MCNC: 4.4 G/DL (ref 3.5–5.1)
ALP BLD-CCNC: 149 U/L (ref 38–126)
ALT SERPL-CCNC: 41 U/L (ref 11–66)
ANION GAP SERPL CALCULATED.3IONS-SCNC: 14 MEQ/L (ref 8–16)
AST SERPL-CCNC: 55 U/L (ref 5–40)
BASOPHILS # BLD: 0.8 %
BASOPHILS ABSOLUTE: 0.1 THOU/MM3 (ref 0–0.1)
BILIRUB SERPL-MCNC: 0.5 MG/DL (ref 0.3–1.2)
BUN BLDV-MCNC: 10 MG/DL (ref 7–22)
C-REACTIVE PROTEIN: < 0.3 MG/DL (ref 0–1)
CALCIUM SERPL-MCNC: 9.4 MG/DL (ref 8.5–10.5)
CHLORIDE BLD-SCNC: 101 MEQ/L (ref 98–111)
CO2: 27 MEQ/L (ref 23–33)
CREAT SERPL-MCNC: 0.6 MG/DL (ref 0.4–1.2)
EOSINOPHIL # BLD: 2 %
EOSINOPHILS ABSOLUTE: 0.1 THOU/MM3 (ref 0–0.4)
ERYTHROCYTE [DISTWIDTH] IN BLOOD BY AUTOMATED COUNT: 15.5 % (ref 11.5–14.5)
ERYTHROCYTE [DISTWIDTH] IN BLOOD BY AUTOMATED COUNT: 47 FL (ref 35–45)
GFR SERPL CREATININE-BSD FRML MDRD: > 90 ML/MIN/1.73M2
GLUCOSE BLD-MCNC: 123 MG/DL (ref 70–108)
HCT VFR BLD CALC: 40.8 % (ref 37–47)
HEMOGLOBIN: 12.6 GM/DL (ref 12–16)
IGG: 663 MG/DL (ref 700–1600)
IMMATURE GRANS (ABS): 0.01 THOU/MM3 (ref 0–0.07)
IMMATURE GRANULOCYTES: 0.2 %
LYMPHOCYTES # BLD: 31.8 %
LYMPHOCYTES ABSOLUTE: 2.1 THOU/MM3 (ref 1–4.8)
MCH RBC QN AUTO: 26 PG (ref 26–33)
MCHC RBC AUTO-ENTMCNC: 30.9 GM/DL (ref 32.2–35.5)
MCV RBC AUTO: 84.3 FL (ref 81–99)
MONOCYTES # BLD: 7.9 %
MONOCYTES ABSOLUTE: 0.5 THOU/MM3 (ref 0.4–1.3)
NUCLEATED RED BLOOD CELLS: 0 /100 WBC
PLATELET # BLD: 255 THOU/MM3 (ref 130–400)
PMV BLD AUTO: 10.5 FL (ref 9.4–12.4)
POTASSIUM SERPL-SCNC: 3.4 MEQ/L (ref 3.5–5.2)
RBC # BLD: 4.84 MILL/MM3 (ref 4.2–5.4)
SEDIMENTATION RATE, ERYTHROCYTE: 8 MM/HR (ref 0–20)
SEG NEUTROPHILS: 57.3 %
SEGMENTED NEUTROPHILS ABSOLUTE COUNT: 3.8 THOU/MM3 (ref 1.8–7.7)
SODIUM BLD-SCNC: 142 MEQ/L (ref 135–145)
TOTAL PROTEIN: 6.9 G/DL (ref 6.1–8)
WBC # BLD: 6.6 THOU/MM3 (ref 4.8–10.8)

## 2021-09-23 PROCEDURE — 82784 ASSAY IGA/IGD/IGG/IGM EACH: CPT

## 2021-09-23 PROCEDURE — 85651 RBC SED RATE NONAUTOMATED: CPT

## 2021-09-23 PROCEDURE — 85025 COMPLETE CBC W/AUTO DIFF WBC: CPT

## 2021-09-23 PROCEDURE — 86140 C-REACTIVE PROTEIN: CPT

## 2021-09-23 PROCEDURE — 36591 DRAW BLOOD OFF VENOUS DEVICE: CPT

## 2021-09-23 PROCEDURE — 99212 OFFICE O/P EST SF 10 MIN: CPT

## 2021-09-23 PROCEDURE — 80053 COMPREHEN METABOLIC PANEL: CPT

## 2021-09-23 PROCEDURE — 2580000003 HC RX 258: Performed by: NURSE PRACTITIONER

## 2021-09-23 PROCEDURE — 6360000002 HC RX W HCPCS: Performed by: NURSE PRACTITIONER

## 2021-09-23 RX ORDER — SODIUM CHLORIDE 0.9 % (FLUSH) 0.9 %
5-40 SYRINGE (ML) INJECTION PRN
Status: CANCELLED | OUTPATIENT
Start: 2021-09-23

## 2021-09-23 RX ORDER — HEPARIN SODIUM (PORCINE) LOCK FLUSH IV SOLN 100 UNIT/ML 100 UNIT/ML
500 SOLUTION INTRAVENOUS PRN
Status: DISCONTINUED | OUTPATIENT
Start: 2021-09-23 | End: 2021-09-24 | Stop reason: HOSPADM

## 2021-09-23 RX ORDER — SODIUM CHLORIDE 0.9 % (FLUSH) 0.9 %
5-40 SYRINGE (ML) INJECTION PRN
Status: DISCONTINUED | OUTPATIENT
Start: 2021-09-23 | End: 2021-09-24 | Stop reason: HOSPADM

## 2021-09-23 RX ORDER — HEPARIN SODIUM (PORCINE) LOCK FLUSH IV SOLN 100 UNIT/ML 100 UNIT/ML
500 SOLUTION INTRAVENOUS PRN
Status: CANCELLED | OUTPATIENT
Start: 2021-09-23

## 2021-09-23 RX ADMIN — Medication 10 ML: at 09:22

## 2021-09-23 RX ADMIN — HEPARIN 500 UNITS: 100 SYRINGE at 09:22

## 2021-09-23 ASSESSMENT — PAIN - FUNCTIONAL ASSESSMENT: PAIN_FUNCTIONAL_ASSESSMENT: 0-10

## 2021-09-23 NOTE — PROGRESS NOTES
0441 Patient arrived to hospitals ambulatory for port flush and lab draws. Oriented to room and call light  PT RIGHTS AND RESPONSIBILITIES OFFERED TO PT.    7212  Port accessed using sterile technique. Labs drawn    0922 port de-accessed. Discharge instructions explained and she denies questions.   Refused avs.  Discharged ambulatory     _M___ Safety:       (Environmental)   [place] to environment   Ensure ID band is correct and in place/ allergy band as needed   Assess for fall risk   Initiate fall precautions as applicable (fall band, side rails, etc.)   Call light within reach   Bed in low position/ wheels locked    _M___ Pain:        Assess pain level and characteristics   Administer analgesics as ordered   Assess effectiveness of pain management and report to MD as needed    __M__ Knowledge Deficit:   Assess baseline knowledge   Provide teaching at level of understanding   Provide teaching via preferred learning method   Evaluate teaching effectiveness    __M__ Hemodynamic/Respiratory Status:       (Pre and Post Procedure Monitoring)   Assess/Monitor vital signs and LOC   Assess Baseline SpO2 prior to any sedation   Obtain weight/height   Assess vital signs/ LOC until patient meets discharge criteria   Monitor procedure site and notify MD of any issues    __M__ Infection-Risk of Central Venous Catheter:   Monitor for infection signs and symptoms (catheter site redness, temperature elevation, etc)   Assess for infection risks   Educate regarding infection prevention   Manage central venous catheter (flushes/ dressing changes per protocol)

## 2021-09-29 RX ORDER — ATORVASTATIN CALCIUM 80 MG/1
80 TABLET, FILM COATED ORAL DAILY
Qty: 90 TABLET | Refills: 3 | Status: SHIPPED | OUTPATIENT
Start: 2021-09-29 | End: 2022-10-31

## 2021-10-14 RX ORDER — PREDNISONE 10 MG/1
TABLET ORAL
Qty: 30 TABLET | Refills: 0 | Status: SHIPPED | OUTPATIENT
Start: 2021-10-14 | End: 2021-10-29

## 2021-10-19 ENCOUNTER — HOSPITAL ENCOUNTER (OUTPATIENT)
Age: 41
Discharge: HOME | End: 2021-10-19
Payer: MEDICARE

## 2021-10-19 DIAGNOSIS — M32.9 LUPUS ARTHRITIS (HCC): ICD-10-CM

## 2021-10-19 DIAGNOSIS — Z51.81 MEDICATION MONITORING ENCOUNTER: ICD-10-CM

## 2021-10-19 LAB
ALBUMIN SERPL-MCNC: 4.3 G/DL (ref 3.5–5.1)
ALP BLD-CCNC: 176 U/L (ref 38–126)
ALT SERPL-CCNC: 41 U/L (ref 11–66)
ANION GAP SERPL CALCULATED.3IONS-SCNC: 10 MEQ/L (ref 8–16)
AST SERPL-CCNC: 27 U/L (ref 5–40)
BASOPHILS # BLD: 0.3 %
BASOPHILS ABSOLUTE: 0 THOU/MM3 (ref 0–0.1)
BILIRUB SERPL-MCNC: 0.4 MG/DL (ref 0.3–1.2)
BUN BLDV-MCNC: 10 MG/DL (ref 7–22)
C-REACTIVE PROTEIN: 0.51 MG/DL (ref 0–1)
CALCIUM SERPL-MCNC: 9.3 MG/DL (ref 8.5–10.5)
CHLORIDE BLD-SCNC: 101 MEQ/L (ref 98–111)
CO2: 29 MEQ/L (ref 23–33)
CREAT SERPL-MCNC: 0.7 MG/DL (ref 0.4–1.2)
EOSINOPHIL # BLD: 0.9 %
EOSINOPHILS ABSOLUTE: 0.1 THOU/MM3 (ref 0–0.4)
ERYTHROCYTE [DISTWIDTH] IN BLOOD BY AUTOMATED COUNT: 15.8 % (ref 11.5–14.5)
ERYTHROCYTE [DISTWIDTH] IN BLOOD BY AUTOMATED COUNT: 46.7 FL (ref 35–45)
GFR SERPL CREATININE-BSD FRML MDRD: > 90 ML/MIN/1.73M2
GLUCOSE BLD-MCNC: 125 MG/DL (ref 70–108)
HCT VFR BLD CALC: 39.9 % (ref 37–47)
HEMOGLOBIN: 12.8 GM/DL (ref 12–16)
IMMATURE GRANS (ABS): 0.09 THOU/MM3 (ref 0–0.07)
IMMATURE GRANULOCYTES: 0.6 %
LYMPHOCYTES # BLD: 28.8 %
LYMPHOCYTES ABSOLUTE: 4 THOU/MM3 (ref 1–4.8)
MCH RBC QN AUTO: 26.3 PG (ref 26–33)
MCHC RBC AUTO-ENTMCNC: 32.1 GM/DL (ref 32.2–35.5)
MCV RBC AUTO: 82.1 FL (ref 81–99)
MONOCYTES # BLD: 5 %
MONOCYTES ABSOLUTE: 0.7 THOU/MM3 (ref 0.4–1.3)
NUCLEATED RED BLOOD CELLS: 0 /100 WBC
PLATELET # BLD: 261 THOU/MM3 (ref 130–400)
PMV BLD AUTO: 10 FL (ref 9.4–12.4)
POTASSIUM SERPL-SCNC: 3.5 MEQ/L (ref 3.5–5.2)
RBC # BLD: 4.86 MILL/MM3 (ref 4.2–5.4)
SEDIMENTATION RATE, ERYTHROCYTE: 7 MM/HR (ref 0–20)
SEG NEUTROPHILS: 64.4 %
SEGMENTED NEUTROPHILS ABSOLUTE COUNT: 9 THOU/MM3 (ref 1.8–7.7)
SODIUM BLD-SCNC: 140 MEQ/L (ref 135–145)
TOTAL PROTEIN: 6.2 G/DL (ref 6.1–8)
WBC # BLD: 13.9 THOU/MM3 (ref 4.8–10.8)

## 2021-10-19 PROCEDURE — 80053 COMPREHEN METABOLIC PANEL: CPT

## 2021-10-19 PROCEDURE — 36415 COLL VENOUS BLD VENIPUNCTURE: CPT

## 2021-10-19 PROCEDURE — 86140 C-REACTIVE PROTEIN: CPT

## 2021-10-19 PROCEDURE — 85651 RBC SED RATE NONAUTOMATED: CPT

## 2021-10-19 PROCEDURE — 85025 COMPLETE CBC W/AUTO DIFF WBC: CPT

## 2021-10-21 ENCOUNTER — HOSPITAL ENCOUNTER (OUTPATIENT)
Dept: NURSING | Age: 41
Discharge: HOME | End: 2021-10-21
Payer: MEDICARE

## 2021-10-21 VITALS
RESPIRATION RATE: 18 BRPM | WEIGHT: 233 LBS | OXYGEN SATURATION: 96 % | HEART RATE: 76 BPM | SYSTOLIC BLOOD PRESSURE: 146 MMHG | TEMPERATURE: 96.9 F | DIASTOLIC BLOOD PRESSURE: 80 MMHG | BODY MASS INDEX: 41.27 KG/M2

## 2021-10-21 DIAGNOSIS — D83.9 COMMON VARIABLE IMMUNODEFICIENCY (HCC): Primary | ICD-10-CM

## 2021-10-21 PROCEDURE — 2580000003 HC RX 258: Performed by: NURSE PRACTITIONER

## 2021-10-21 PROCEDURE — 6360000002 HC RX W HCPCS: Performed by: NURSE PRACTITIONER

## 2021-10-21 PROCEDURE — 96366 THER/PROPH/DIAG IV INF ADDON: CPT

## 2021-10-21 PROCEDURE — 96365 THER/PROPH/DIAG IV INF INIT: CPT

## 2021-10-21 PROCEDURE — 96415 CHEMO IV INFUSION ADDL HR: CPT

## 2021-10-21 PROCEDURE — 96413 CHEMO IV INFUSION 1 HR: CPT

## 2021-10-21 RX ORDER — ACETAMINOPHEN 325 MG/1
650 TABLET ORAL ONCE
Status: CANCELLED | OUTPATIENT
Start: 2021-11-18 | End: 2021-11-18

## 2021-10-21 RX ORDER — DIPHENHYDRAMINE HCL 25 MG
25 TABLET ORAL ONCE
Status: CANCELLED | OUTPATIENT
Start: 2021-11-18 | End: 2021-11-18

## 2021-10-21 RX ORDER — HEPARIN SODIUM (PORCINE) LOCK FLUSH IV SOLN 100 UNIT/ML 100 UNIT/ML
500 SOLUTION INTRAVENOUS PRN
Status: CANCELLED | OUTPATIENT
Start: 2021-10-21

## 2021-10-21 RX ORDER — ACETAMINOPHEN 325 MG/1
650 TABLET ORAL ONCE
Status: DISCONTINUED | OUTPATIENT
Start: 2021-10-21 | End: 2021-10-22 | Stop reason: HOSPADM

## 2021-10-21 RX ORDER — SODIUM CHLORIDE 0.9 % (FLUSH) 0.9 %
5-40 SYRINGE (ML) INJECTION PRN
Status: DISCONTINUED | OUTPATIENT
Start: 2021-10-21 | End: 2021-10-22 | Stop reason: HOSPADM

## 2021-10-21 RX ORDER — DIPHENHYDRAMINE HCL 25 MG
25 TABLET ORAL ONCE
Status: DISCONTINUED | OUTPATIENT
Start: 2021-10-21 | End: 2021-10-22 | Stop reason: HOSPADM

## 2021-10-21 RX ORDER — HEPARIN SODIUM (PORCINE) LOCK FLUSH IV SOLN 100 UNIT/ML 100 UNIT/ML
500 SOLUTION INTRAVENOUS PRN
Status: DISCONTINUED | OUTPATIENT
Start: 2021-10-21 | End: 2021-10-22 | Stop reason: HOSPADM

## 2021-10-21 RX ORDER — SODIUM CHLORIDE 0.9 % (FLUSH) 0.9 %
5-40 SYRINGE (ML) INJECTION PRN
Status: CANCELLED | OUTPATIENT
Start: 2021-10-21

## 2021-10-21 RX ADMIN — IMMUNE GLOBULIN (HUMAN) 20 G: 10 INJECTION INTRAVENOUS; SUBCUTANEOUS at 09:07

## 2021-10-21 RX ADMIN — SODIUM CHLORIDE, PRESERVATIVE FREE 10 ML: 5 INJECTION INTRAVENOUS at 10:46

## 2021-10-21 RX ADMIN — HEPARIN 500 UNITS: 100 SYRINGE at 10:46

## 2021-10-21 ASSESSMENT — PAIN SCALES - GENERAL: PAINLEVEL_OUTOF10: 0

## 2021-10-21 NOTE — PROGRESS NOTES
0900 Patient here ambulatory to room for IVIG. Patient confirms no active infections. PT RIGHTS AND RESPONSIBILITIES OFFERED TO PT.    0915 IVIG infusion started. 0945 Rate increased, patient tolerating well. 1000 Rate increased patient tolerating well. 1015 Rate increased patient tolerating well    1044 infusion complete. Patient tolerated infusion. Vital signs stable    1050 AVS reviewed with patient. Verbalizes understanding. Patient left ambulatory to discharge lobby.       __M__ Safety:       (Environmental)   [place] to environment   Ensure ID band is correct and in place/ allergy band as needed   Assess for fall risk   Initiate fall precautions as applicable (fall band, side rails, etc.)   Call light within reach   Bed in low position/ wheels locked    _M___ Pain:        Assess pain level and characteristics   Administer analgesics as ordered   Assess effectiveness of pain management and report to MD as needed    _M___ Knowledge Deficit:   Assess baseline knowledge   Provide teaching at level of understanding   Provide teaching via preferred learning method   Evaluate teaching effectiveness    __M__ Hemodynamic/Respiratory Status:       (Pre and Post Procedure Monitoring)   Assess/Monitor vital signs and LOC   Assess Baseline SpO2 prior to any sedation   Obtain weight/height   Assess vital signs/ LOC until patient meets discharge criteria   Monitor procedure site and notify MD of any issues    __M__ Infection-Risk of Central Venous Catheter:   Monitor for infection signs and symptoms (catheter site redness, temperature elevation, etc)   Assess for infection risks   Educate regarding infection prevention   Manage central venous catheter (flushes/ dressing changes per protocol)

## 2021-11-01 RX ORDER — FLASH GLUCOSE SENSOR
1 KIT MISCELLANEOUS
Qty: 3 EACH | Refills: 11 | Status: SHIPPED | OUTPATIENT
Start: 2021-11-01

## 2021-11-11 DIAGNOSIS — Z51.81 MEDICATION MONITORING ENCOUNTER: Primary | ICD-10-CM

## 2021-11-11 RX ORDER — FOLIC ACID 1 MG/1
1 TABLET ORAL DAILY
Qty: 90 TABLET | Refills: 1 | Status: SHIPPED | OUTPATIENT
Start: 2021-11-11 | End: 2022-06-01

## 2021-11-15 ENCOUNTER — TELEPHONE (OUTPATIENT)
Dept: ENT CLINIC | Age: 41
End: 2021-11-15

## 2021-11-15 ENCOUNTER — OFFICE VISIT (OUTPATIENT)
Dept: ALLERGY | Age: 41
End: 2021-11-15
Payer: MEDICARE

## 2021-11-15 VITALS
HEART RATE: 94 BPM | RESPIRATION RATE: 12 BRPM | WEIGHT: 232.1 LBS | DIASTOLIC BLOOD PRESSURE: 72 MMHG | HEIGHT: 63 IN | OXYGEN SATURATION: 96 % | BODY MASS INDEX: 41.12 KG/M2 | TEMPERATURE: 96.9 F | SYSTOLIC BLOOD PRESSURE: 124 MMHG

## 2021-11-15 DIAGNOSIS — D83.9 CVID (COMMON VARIABLE IMMUNODEFICIENCY) (HCC): Primary | ICD-10-CM

## 2021-11-15 PROCEDURE — 99213 OFFICE O/P EST LOW 20 MIN: CPT | Performed by: NURSE PRACTITIONER

## 2021-11-15 RX ORDER — HEPARIN SODIUM (PORCINE) LOCK FLUSH IV SOLN 100 UNIT/ML 100 UNIT/ML
100 SOLUTION INTRAVENOUS
Qty: 5 ML | Refills: 11
Start: 2021-11-15

## 2021-11-15 RX ORDER — HEPARIN SODIUM (PORCINE) LOCK FLUSH IV SOLN 100 UNIT/ML 100 UNIT/ML
100 SOLUTION INTRAVENOUS
Qty: 5 ML | Refills: 11 | Status: SHIPPED | OUTPATIENT
Start: 2021-11-15 | End: 2021-11-15

## 2021-11-15 RX ORDER — DIPHENHYDRAMINE HCL 25 MG
25 TABLET ORAL
Qty: 1 TABLET | Refills: 5
Start: 2021-11-15 | End: 2021-11-15

## 2021-11-15 RX ORDER — SENNOSIDES 8.6 MG
650 CAPSULE ORAL
Qty: 1 TABLET | Refills: 5
Start: 2021-11-15 | End: 2022-02-24 | Stop reason: SDUPTHER

## 2021-11-15 ASSESSMENT — ENCOUNTER SYMPTOMS
VOICE CHANGE: 0
RHINORRHEA: 0
DIARRHEA: 0
COLOR CHANGE: 0
SINUS PRESSURE: 0
APNEA: 0
CHOKING: 0
STRIDOR: 0
NAUSEA: 0
SHORTNESS OF BREATH: 0
SORE THROAT: 0
COUGH: 0
ABDOMINAL PAIN: 0
FACIAL SWELLING: 0
WHEEZING: 0
VOMITING: 0
TROUBLE SWALLOWING: 0
CHEST TIGHTNESS: 0

## 2021-11-15 NOTE — TELEPHONE ENCOUNTER
711 MARIETTA Rodriguez called and said they could not get heparin or immune globin. She wants to know if  you want them to cancel the order?     Please advise

## 2021-11-15 NOTE — PROGRESS NOTES
@[facility]@    Allergy & Asthma   [address] [facility], [address]  Ph:   [phone]  Fax:[phone]    Provider:  Dr. Willis Farfan:   Chief Complaint   Patient presents with    Follow-up     Patient here for 6 month follow up. HISTORY OF PRESENT ILLNESS: ESTABLISHED PATIENT HERE FOR EVALUATION   57-year-old  female here today to follow-up on IVIG. Patient is doing very well on the medication reports that she has not had any sinus infections or illnesses this year. Her last IgG level was 663. Patient is currently receiving Gamunex 20 g every other month. She states that she is not having any problems with the infusions and she is very thankful to have the infusions. She has been very pleased with her care and states that she is the best she has felt in a long time. She denies any symptoms of anaphylaxis although she does go ahead and premedicate with Tylenol and diphenhydramine. Patient denies any nausea, vomiting, fever. Severity of symptoms today are mild as she is not having any problems with any illnesses. Patient has a longstanding history of being sick. She is initially started IVIG for C VID. She states that she is doing very well now and wants to go ahead and proceed with current medication regime and continue to receive her IVIG every other month. Patient does have a MediPort which she does get flushed monthly. She also knows that no one is to use the Mediport its only for her IVIG. Patient is aware that if the Mediport should become clogged or unable to be accessed that she will have to have it removed and at that point we will not consider alternative methods for delivering the Gamunex such as subcutaneously. Review of Systems:  Review of Systems   Constitutional: Negative for activity change, appetite change, chills, diaphoresis, fatigue, fever and unexpected weight change.    HENT: Negative for congestion, dental problem, ear discharge, ear pain, facial swelling, hearing loss, mouth sores, nosebleeds, postnasal drip, rhinorrhea, sinus pressure, sneezing, sore throat, tinnitus, trouble swallowing and voice change. Eyes: Negative for visual disturbance. Respiratory: Negative for apnea, cough, choking, chest tightness, shortness of breath, wheezing and stridor. Cardiovascular: Negative for chest pain, palpitations and leg swelling. Gastrointestinal: Negative for abdominal pain, diarrhea, nausea and vomiting. Endocrine: Negative for cold intolerance, heat intolerance, polydipsia and polyuria. Genitourinary: Negative for difficulty urinating, dysuria, enuresis, hematuria and urgency. Musculoskeletal: Negative for arthralgias, gait problem, neck pain and neck stiffness. Skin: Negative for color change and rash. Allergic/Immunologic: Positive for immunocompromised state. Negative for environmental allergies and food allergies. Neurological: Negative for dizziness, syncope, facial asymmetry, speech difficulty, light-headedness and headaches. Hematological: Negative for adenopathy. Does not bruise/bleed easily. Psychiatric/Behavioral: Negative for confusion and sleep disturbance. The patient is not nervous/anxious. All other systems reviewed and are negative.         Past MedicalHistory:    Past Medical History:   Diagnosis Date    Asthma     Diabetes mellitus ([facility] [address] 6/8/2018    Fibromyalgia     GERD (gastroesophageal reflux disease)     Hyperlipidemia     Lupus [facility]ic lupus erythematosus) [facility])     Lupus arthritis [facility])     [facility]    Migraine     Plaquenil adverse reaction in therapeutic use 5/2016    changes in the eyes    TIA (transient ischemic attack)     8/2016       Past Surgical History:  Past Surgical History:   Procedure Laterality Date    HYSTERECTOMY  2009    Total, endometriosis    LAPAROSCOPY      SHOULDER ARTHROSCOPY Left 08/20/2015    Debridement of rotator cuff and bone spurs       Family History:   Family History   Problem Relation Age of Onset    High Blood Pressure Mother     Rheum Arthritis Mother     Osteoporosis Mother     Diabetes Father     High Blood Pressure Father     Arthritis Father         gout    Diabetes Sister     Other Sister         Crest Syndrome    Breast Cancer Sister 54    Diabetes Brother     High Cholesterol Brother     Breast Cancer Maternal Cousin 21       Social History:   Social History     Tobacco Use    Smoking status: Never Smoker    Smokeless tobacco: Never Used   Substance Use Topics    Alcohol use: No     Alcohol/week: 0.0 standard drinks        Allergies:  Cayenne, Codeine, Tape [adhesive tape], Amoxicillin, and Sulfa antibiotics    CurrentMedications:     Current Outpatient Medications:     diphenhydrAMINE (BENADRYL ALLERGY) 25 MG tablet, Take 1 tablet by mouth once as needed for Itching (give 30 minutes prior to IVIG infusions), Disp: 1 tablet, Rfl: 5    Immune Globulin, Human, (GAMUNEX-C) 20 GM/200ML SOLN solution, Premedicate with tylenol 650 mg and benadryl 25 mg 30 minutes prior to infusion IF patient has not already premedicated CODE , Disp: 200 mL, Rfl: 5    acetaminophen (TYLENOL 8 HOUR) 650 MG extended release tablet, Take 1 tablet by mouth once as needed for Pain (give 30 minutes prior to IVIG infusions), Disp: 1 tablet, Rfl: 5    heparin flush 100 UNIT/ML injection, 1 mL by IntraCATHeter route every 30 days Flush mediport monthly with 5 mls (or 500 units) of Heparin (100 units per ml), Disp: 5 mL, Rfl: 11    methotrexate (RHEUMATREX) 2.5 MG chemo tablet, Take 4 tablets by mouth once a week, Disp: 16 tablet, Rfl: 0    folic acid (FOLVITE) 1 MG tablet, Take 1 tablet by mouth daily, Disp: 90 tablet, Rfl: 1    Continuous Blood Gluc Sensor (FREESTYLE SANDRA 14 DAY SENSOR) [facility], USE TO CHECK BLOOD SUGAR TWICE DAILY, Disp: 3 each, Rfl: 5    Continuous Blood Gluc Sensor (FREESTYLE SANDRA 14 DAY SENSOR) MISC, 1 Units by Subcuticular route every 14 days, Disp: 3 each, Rfl: 11    atorvastatin (LIPITOR) 80 MG tablet, Take 1 tablet by mouth daily, Disp: 90 tablet, Rfl: 3    DULoxetine (CYMBALTA) 60 MG extended release capsule, Take 1 capsule by mouth daily, Disp: 90 capsule, Rfl: 3    montelukast (SINGULAIR) 10 MG tablet, Take 1 tablet by mouth nightly, Disp: 30 tablet, Rfl: 11    potassium chloride (KLOR-CON) 10 MEQ extended release tablet, Take 1 tablet by mouth once daily, Disp: 90 tablet, Rfl: 3    sucralfate (CARAFATE) 1 GM tablet, Take 1 tablet by mouth 4 times daily, Disp: 120 tablet, Rfl: 3    DULoxetine (CYMBALTA) 30 MG extended release capsule, Take 1 capsule by mouth once daily, Disp: 90 capsule, Rfl: 0    ibuprofen (ADVIL;MOTRIN) 600 MG tablet, Take 1 tablet by mouth every 8 hours as needed for Pain, Disp: 42 tablet, Rfl: 5    cyclobenzaprine (FLEXERIL) 5 MG tablet, Take 1-2 tablets by mouth 3 times daily as needed for Muscle spasms, Disp: 270 tablet, Rfl: 0    traZODone (DESYREL) 50 MG tablet, Take 3 tablets by mouth nightly, Disp: 270 tablet, Rfl: 3    clopidogrel (PLAVIX) 75 MG tablet, Take 1 tablet by mouth daily, Disp: 90 tablet, Rfl: 3    metFORMIN (GLUCOPHAGE) 500 MG tablet, TAKE 1 TABLET BY MOUTH TWICE DAILY WITH MEALS, Disp: 180 tablet, Rfl: 3    clotrimazole-betamethasone (LOTRISONE) 1-0.05 % cream, APPLY  CREAM TOPICALLY TWICE DAILY, Disp: 45 g, Rfl: 0    magnesium oxide (MAG-OX) 400 (241.3 Mg) MG TABS tablet, Take 1 tablet by mouth once daily, Disp: 90 tablet, Rfl: 3    Dulaglutide 0.75 MG/0.5ML SOPN, Inject 0.75 mg into the skin once a week, Disp: 5 pen, Rfl: 11    verapamil (CALAN SR) 240 MG extended release tablet, Take 1 tablet by mouth once daily, Disp: 90 tablet, Rfl: 3    EPINEPHrine (EPIPEN 2-FERNANDO) 0.3 MG/0.3ML SOAJ injection, Inject one pen as directed STAT for allergic reaction, may disp generic NDC 81962-035-38, Disp: 2 each, Rfl: 0    Handicap Placard MISC, by Does not apply route Dx:  Lupus arthritis,  Length 5 years, Disp: 1 each, Rfl: 0    triamcinolone (KENALOG) 0.1 % cream, Apply topically 2 times daily Apply topically 2 times daily. , Disp: 1 Tube, Rfl: 5    Continuous Blood Gluc Sensor ([address] [place] [address]) [facility], 1 Units by Does not apply route daily, Disp: 1 each, Rfl: 0    levocetirizine (XYZAL) 5 MG tablet, TAKE ONE TABLET BY MOUTH NIGHTLY, Disp: 30 tablet, Rfl: 11    Continuous Blood Gluc Sensor ([employer] SANDRA SENSOR SYSTEM) MISC, 1 kit by Does not apply route 2 times daily Include sensors for a year  Dx:  E11.9, and lupus, Disp: 1 each, Rfl: 0    sodium chloride (OCEAN) 0.65 % nasal spray, 1 spray by Nasal route as needed for Congestion, Disp: 1 Bottle, Rfl: 3    NEXIUM 40 MG delayed release capsule, TAKE 1 CAPSULE BY MOUTH ONCE DAILY, Disp: 30 capsule, Rfl: 5    levalbuterol (XOPENEX HFA) 45 MCG/ACT inhaler, Inhale 1-2 puffs into the lungs every 4 hours as needed for Wheezing or Shortness of Breath, Disp: 1 Inhaler, Rfl: 3    levalbuterol (XOPENEX) 0.63 MG/3ML nebulization, Take 3 mLs by nebulization every 6 hours as needed for Wheezing or Shortness of Breath, Disp: 270 mL, Rfl: 5    Respiratory Therapy Supplies (NEBULIZER AIR TUBE/PLUGS) MISC, 1 kit by Does not apply route every 6 hours as needed (Wheezing/shortness of breath) Please provide nebulizer kit and supplies. , Disp: 1 each, Rfl: 0    aspirin (LORENZO ASPIRIN) 325 MG tablet, Take 1 tablet by mouth daily, Disp: 30 tablet, Rfl: 3    diclofenac sodium (VOLTAREN) 1 % GEL, Apply 2 g topically 4 times daily as needed for Pain (topical) , Disp: , Rfl:     azelastine (ASTELIN) 0.1 % nasal spray, 2 sprays by Nasal route 2 times daily Use in each nostril as directed, Disp: 1 Bottle, Rfl: 11      Physical Exam:      Vitals:    Vitals:    11/15/21 0853   BP: 124/72   Pulse: 94   Resp: 12   Temp: 96.9 °F (36.1 °C)   SpO2: 96%       232 lb 1.6 oz (105.3 kg)       Temp: 96.9 °F (36.1 °C) I RBC 4.86 10/19/2021    HGB 12.8 10/19/2021    HCT 39.9 10/19/2021    MCV 82.1 10/19/2021    MCH 26.3 10/19/2021    MCHC 32.1 10/19/2021    RDW 14.5 08/07/2018     10/19/2021          IgE   Date/Time Value Ref Range Status   10/03/2020 09:30 AM 2 <101 IU/mL Final     Comment:     [address] [phone]     Immunoglobulin E   Date/Time Value Ref Range Status   10/03/2020 09:30 AM 4 <=214 kU/L Final     Comment:     REFERENCE INTERVAL: Immunoglobulin E, Serum  Access complete set of age- and/or gender-specific reference  intervals for this test in the [facility] Test Directory  ([facility]). IgG   Date/Time Value Ref Range Status   09/23/2021 09:33  (L) 700 - 1600 mg/dL Final     Comment:     [address] [phone]     IgA   Date/Time Value Ref Range Status   10/03/2020 09:30 AM 96 70 - 400 mg/dL Final     Comment:     [address] [phone]      IgM   Date/Time Value Ref Range Status   10/03/2020 09:30 AM < 25 (L) 40 - 230 mg/dL Final     Comment:     [address] [phone]       No results found for: EVELIN   No results found for: RF       No results found for this or any previous visit. No results found for this or any previous visit. All current and previous medial labs have been reviewed and discussed with patient         Procedures: Allergy Testing:Skin Testing performed on: blood test performed 10/2020 all negative for food allergies.     Assessment/Orders:    Diagnosis Orders   1.  CVID (common variable immunodeficiency) (HCC)  diphenhydrAMINE (BENADRYL ALLERGY) 25 MG tablet    CBC Auto Differential    Comprehensive Metabolic Panel    Immune Globulin, Human, (GAMUNEX-C) 20 GM/200ML SOLN solution    Glomerular Filtration Rate, Estimated    IgG    acetaminophen (TYLENOL 8 HOUR) 650 MG extended release tablet heparin flush 100 UNIT/ML injection       All diagnostic imaging  have been personally reviewed     Plan:  Follow Up:6 months    Patient to continue Gamunex every 2 to 3 months. Please see IVIG orders scanned  Patient return to clinic in 6 months  Patient report any episodes of feeling ill or illness  Patient to continue all current medication regime      Spent 30 minutes of face-to-face time with the patient with well more than half of the visit being dedicated to the discussion of the various symptom problems, provided education of medications and disease process, as well as discussion of a therapeutic plan for each. Face-to-face education time does not include any time that may have been spent for procedures.     (Please note that portions of this note may have been completed with a voice recognition program.  Efforts were made to edit the dictation but occasionally words are mis-transcribed.)         Signed:  NAHID Lainez CNP  11/15/2021  9:20 AM

## 2021-11-18 ENCOUNTER — HOSPITAL ENCOUNTER (OUTPATIENT)
Dept: NURSING | Age: 41
Discharge: HOME | End: 2021-11-18
Payer: MEDICARE

## 2021-11-18 VITALS
RESPIRATION RATE: 18 BRPM | OXYGEN SATURATION: 96 % | TEMPERATURE: 97.2 F | DIASTOLIC BLOOD PRESSURE: 87 MMHG | HEART RATE: 85 BPM | SYSTOLIC BLOOD PRESSURE: 166 MMHG

## 2021-11-18 DIAGNOSIS — D83.9 COMMON VARIABLE IMMUNODEFICIENCY (HCC): Primary | ICD-10-CM

## 2021-11-18 PROCEDURE — 99212 OFFICE O/P EST SF 10 MIN: CPT

## 2021-11-18 PROCEDURE — 6360000002 HC RX W HCPCS: Performed by: NURSE PRACTITIONER

## 2021-11-18 RX ORDER — HEPARIN SODIUM (PORCINE) LOCK FLUSH IV SOLN 100 UNIT/ML 100 UNIT/ML
500 SOLUTION INTRAVENOUS PRN
Status: DISCONTINUED | OUTPATIENT
Start: 2021-11-18 | End: 2021-11-19 | Stop reason: HOSPADM

## 2021-11-18 RX ORDER — SODIUM CHLORIDE 0.9 % (FLUSH) 0.9 %
5-40 SYRINGE (ML) INJECTION PRN
Status: CANCELLED | OUTPATIENT
Start: 2021-11-18

## 2021-11-18 RX ORDER — HEPARIN SODIUM (PORCINE) LOCK FLUSH IV SOLN 100 UNIT/ML 100 UNIT/ML
500 SOLUTION INTRAVENOUS PRN
Status: CANCELLED | OUTPATIENT
Start: 2021-11-18

## 2021-11-18 RX ADMIN — HEPARIN 500 UNITS: 100 SYRINGE at 09:05

## 2021-11-18 NOTE — PROGRESS NOTES
0900  Pt ambulated into room for port flush. Pt rights and responsibilities offered to pt. Pt port accessed using sterile technique and pt tolerated well. No lab orders today. Pt d/c instructions explained and pt verbalized understanding. Pt ambulated out for d/c.  Pt tolerated well.       __m__ Safety:       (Environmental)   [place] to environment   Ensure ID band is correct and in place/ allergy band as needed   Assess for fall risk   Initiate fall precautions as applicable (fall band, side rails, etc.)   Call light within reach   Bed in low position/ wheels locked    _m___ Pain:        Assess pain level and characteristics   Administer analgesics as ordered   Assess effectiveness of pain management and report to MD as needed    _m___ Knowledge Deficit:   Assess baseline knowledge   Provide teaching at level of understanding   Provide teaching via preferred learning method   Evaluate teaching effectiveness    _m___ Hemodynamic/Respiratory Status:       (Pre and Post Procedure Monitoring)   Assess/Monitor vital signs and LOC   Assess Baseline SpO2 prior to any sedation   Obtain weight/height   Assess vital signs/ LOC until patient meets discharge criteria   Monitor procedure site and notify MD of any issues    _m___ Infection-Risk of Central Venous Catheter:   Monitor for infection signs and symptoms (catheter site redness, temperature elevation, etc)   Assess for infection risks   Educate regarding infection prevention   Manage central venous catheter (flushes/ dressing changes per protocol)

## 2021-11-29 RX ORDER — CLOPIDOGREL BISULFATE 75 MG/1
75 TABLET ORAL DAILY
Qty: 90 TABLET | Refills: 3 | Status: SHIPPED | OUTPATIENT
Start: 2021-11-29

## 2021-11-29 RX ORDER — CYCLOBENZAPRINE HCL 5 MG
5-10 TABLET ORAL 3 TIMES DAILY PRN
Qty: 270 TABLET | Refills: 0 | Status: SHIPPED | OUTPATIENT
Start: 2021-11-29 | End: 2022-03-01 | Stop reason: SDUPTHER

## 2021-11-29 RX ORDER — DULOXETIN HYDROCHLORIDE 30 MG/1
CAPSULE, DELAYED RELEASE ORAL
Qty: 90 CAPSULE | Refills: 0 | Status: SHIPPED | OUTPATIENT
Start: 2021-11-29 | End: 2022-03-03

## 2021-11-29 RX ORDER — MONTELUKAST SODIUM 10 MG/1
10 TABLET ORAL NIGHTLY
Qty: 30 TABLET | Refills: 11 | Status: SHIPPED | OUTPATIENT
Start: 2021-11-29 | End: 2022-01-11 | Stop reason: SDUPTHER

## 2021-12-12 ENCOUNTER — HOSPITAL ENCOUNTER (OUTPATIENT)
Age: 41
Discharge: HOME | End: 2021-12-12
Payer: MEDICARE

## 2021-12-12 DIAGNOSIS — E78.00 PURE HYPERCHOLESTEROLEMIA: ICD-10-CM

## 2021-12-12 DIAGNOSIS — D83.9 CVID (COMMON VARIABLE IMMUNODEFICIENCY) (HCC): ICD-10-CM

## 2021-12-12 DIAGNOSIS — K21.9 GASTROESOPHAGEAL REFLUX DISEASE WITHOUT ESOPHAGITIS: ICD-10-CM

## 2021-12-12 DIAGNOSIS — E11.69 TYPE 2 DIABETES MELLITUS WITH OTHER SPECIFIED COMPLICATION, WITHOUT LONG-TERM CURRENT USE OF INSULIN (HCC): ICD-10-CM

## 2021-12-12 DIAGNOSIS — Z51.81 MEDICATION MONITORING ENCOUNTER: ICD-10-CM

## 2021-12-12 DIAGNOSIS — G40.909 SEIZURE DISORDER (HCC): ICD-10-CM

## 2021-12-12 LAB
ALBUMIN SERPL-MCNC: 4.3 G/DL (ref 3.5–5.1)
ALP BLD-CCNC: 160 U/L (ref 38–126)
ALT SERPL-CCNC: 48 U/L (ref 11–66)
ANION GAP SERPL CALCULATED.3IONS-SCNC: 10 MEQ/L (ref 8–16)
AST SERPL-CCNC: 48 U/L (ref 5–40)
AVERAGE GLUCOSE: 141 MG/DL (ref 70–126)
BASOPHILS # BLD: 0.3 %
BASOPHILS ABSOLUTE: 0 THOU/MM3 (ref 0–0.1)
BILIRUB SERPL-MCNC: 0.5 MG/DL (ref 0.3–1.2)
BUN BLDV-MCNC: 8 MG/DL (ref 7–22)
C-REACTIVE PROTEIN: < 0.3 MG/DL (ref 0–1)
CALCIUM SERPL-MCNC: 9.3 MG/DL (ref 8.5–10.5)
CHLORIDE BLD-SCNC: 105 MEQ/L (ref 98–111)
CHOLESTEROL, TOTAL: 126 MG/DL (ref 100–199)
CO2: 26 MEQ/L (ref 23–33)
CREAT SERPL-MCNC: 0.6 MG/DL (ref 0.4–1.2)
CREATININE, URINE: 335.4 MG/DL
EOSINOPHIL # BLD: 1.9 %
EOSINOPHILS ABSOLUTE: 0.1 THOU/MM3 (ref 0–0.4)
ERYTHROCYTE [DISTWIDTH] IN BLOOD BY AUTOMATED COUNT: 15.1 % (ref 11.5–14.5)
ERYTHROCYTE [DISTWIDTH] IN BLOOD BY AUTOMATED COUNT: 45.7 FL (ref 35–45)
GFR SERPL CREATININE-BSD FRML MDRD: > 90 ML/MIN/1.73M2
GLUCOSE BLD-MCNC: 113 MG/DL (ref 70–108)
GLUCOSE FASTING: 113 MG/DL (ref 70–108)
HBA1C MFR BLD: 6.7 % (ref 4.4–6.4)
HCT VFR BLD CALC: 41.1 % (ref 37–47)
HDLC SERPL-MCNC: 33 MG/DL
HEMOGLOBIN: 12.6 GM/DL (ref 12–16)
IMMATURE GRANS (ABS): 0.02 THOU/MM3 (ref 0–0.07)
IMMATURE GRANULOCYTES: 0.3 %
LDL CHOLESTEROL CALCULATED: 74 MG/DL
LYMPHOCYTES # BLD: 30.5 %
LYMPHOCYTES ABSOLUTE: 2.1 THOU/MM3 (ref 1–4.8)
MCH RBC QN AUTO: 25.9 PG (ref 26–33)
MCHC RBC AUTO-ENTMCNC: 30.7 GM/DL (ref 32.2–35.5)
MCV RBC AUTO: 84.6 FL (ref 81–99)
MICROALBUMIN UR-MCNC: 3.84 MG/DL
MICROALBUMIN/CREAT UR-RTO: 11 MG/G (ref 0–30)
MONOCYTES # BLD: 5.1 %
MONOCYTES ABSOLUTE: 0.3 THOU/MM3 (ref 0.4–1.3)
NUCLEATED RED BLOOD CELLS: 0 /100 WBC
PLATELET # BLD: 270 THOU/MM3 (ref 130–400)
PMV BLD AUTO: 10.9 FL (ref 9.4–12.4)
POTASSIUM SERPL-SCNC: 3.7 MEQ/L (ref 3.5–5.2)
RBC # BLD: 4.86 MILL/MM3 (ref 4.2–5.4)
SEDIMENTATION RATE, ERYTHROCYTE: 11 MM/HR (ref 0–20)
SEG NEUTROPHILS: 61.9 %
SEGMENTED NEUTROPHILS ABSOLUTE COUNT: 4.2 THOU/MM3 (ref 1.8–7.7)
SODIUM BLD-SCNC: 141 MEQ/L (ref 135–145)
TOTAL PROTEIN: 7 G/DL (ref 6.1–8)
TRIGL SERPL-MCNC: 97 MG/DL (ref 0–199)
TSH SERPL DL<=0.05 MIU/L-ACNC: 4.1 UIU/ML (ref 0.4–4.2)
WBC # BLD: 6.8 THOU/MM3 (ref 4.8–10.8)

## 2021-12-12 PROCEDURE — 82043 UR ALBUMIN QUANTITATIVE: CPT

## 2021-12-12 PROCEDURE — 85025 COMPLETE CBC W/AUTO DIFF WBC: CPT

## 2021-12-12 PROCEDURE — 84443 ASSAY THYROID STIM HORMONE: CPT

## 2021-12-12 PROCEDURE — 86140 C-REACTIVE PROTEIN: CPT

## 2021-12-12 PROCEDURE — 85651 RBC SED RATE NONAUTOMATED: CPT

## 2021-12-12 PROCEDURE — 82784 ASSAY IGA/IGD/IGG/IGM EACH: CPT

## 2021-12-12 PROCEDURE — 36415 COLL VENOUS BLD VENIPUNCTURE: CPT

## 2021-12-12 PROCEDURE — 80061 LIPID PANEL: CPT

## 2021-12-12 PROCEDURE — 80053 COMPREHEN METABOLIC PANEL: CPT

## 2021-12-12 PROCEDURE — 83036 HEMOGLOBIN GLYCOSYLATED A1C: CPT

## 2021-12-13 ENCOUNTER — HOSPITAL ENCOUNTER (OUTPATIENT)
Age: 41
Discharge: HOME | End: 2021-12-13
Payer: MEDICARE

## 2021-12-13 DIAGNOSIS — D83.9 CVID (COMMON VARIABLE IMMUNODEFICIENCY) (HCC): ICD-10-CM

## 2021-12-13 DIAGNOSIS — R79.89 LFT ELEVATION: ICD-10-CM

## 2021-12-13 DIAGNOSIS — R79.89 LFT ELEVATION: Primary | ICD-10-CM

## 2021-12-13 LAB
ALBUMIN SERPL-MCNC: 4.5 G/DL (ref 3.5–5.1)
ALP BLD-CCNC: 159 U/L (ref 38–126)
ALT SERPL-CCNC: 51 U/L (ref 11–66)
AST SERPL-CCNC: 52 U/L (ref 5–40)
BILIRUB SERPL-MCNC: 0.5 MG/DL (ref 0.3–1.2)
BILIRUBIN DIRECT: < 0.2 MG/DL (ref 0–0.3)
IGG: 638 MG/DL (ref 700–1600)
TOTAL PROTEIN: 7.2 G/DL (ref 6.1–8)

## 2021-12-13 PROCEDURE — 36415 COLL VENOUS BLD VENIPUNCTURE: CPT

## 2021-12-13 PROCEDURE — 82784 ASSAY IGA/IGD/IGG/IGM EACH: CPT

## 2021-12-13 PROCEDURE — 80076 HEPATIC FUNCTION PANEL: CPT

## 2021-12-14 LAB — IGG: 667 MG/DL (ref 700–1600)

## 2021-12-14 NOTE — RESULT ENCOUNTER NOTE
Please stop the Azathioprine b/c of the continued lft elevation. We can discuss this in more detail at your follow evaluation. I would like for you to have the blood testing repeated a few days prior to the next office visit. Nicola Chaney

## 2021-12-16 ENCOUNTER — HOSPITAL ENCOUNTER (OUTPATIENT)
Dept: NURSING | Age: 41
Discharge: HOME | End: 2021-12-16
Payer: MEDICARE

## 2021-12-16 VITALS
SYSTOLIC BLOOD PRESSURE: 115 MMHG | DIASTOLIC BLOOD PRESSURE: 70 MMHG | HEART RATE: 93 BPM | TEMPERATURE: 97.3 F | OXYGEN SATURATION: 95 % | WEIGHT: 233 LBS | RESPIRATION RATE: 18 BRPM | BODY MASS INDEX: 41.27 KG/M2

## 2021-12-16 DIAGNOSIS — D83.9 COMMON VARIABLE IMMUNODEFICIENCY (HCC): Primary | ICD-10-CM

## 2021-12-16 PROCEDURE — 96365 THER/PROPH/DIAG IV INF INIT: CPT

## 2021-12-16 PROCEDURE — 96413 CHEMO IV INFUSION 1 HR: CPT

## 2021-12-16 PROCEDURE — 6360000002 HC RX W HCPCS: Performed by: NURSE PRACTITIONER

## 2021-12-16 RX ORDER — ACETAMINOPHEN 325 MG/1
650 TABLET ORAL ONCE
Status: DISCONTINUED | OUTPATIENT
Start: 2021-12-16 | End: 2021-12-17 | Stop reason: HOSPADM

## 2021-12-16 RX ORDER — DIPHENHYDRAMINE HCL 25 MG
25 TABLET ORAL ONCE
Status: CANCELLED | OUTPATIENT
Start: 2022-01-13 | End: 2022-01-13

## 2021-12-16 RX ORDER — DIPHENHYDRAMINE HCL 25 MG
25 TABLET ORAL ONCE
Status: DISCONTINUED | OUTPATIENT
Start: 2021-12-16 | End: 2021-12-17 | Stop reason: HOSPADM

## 2021-12-16 RX ORDER — ACETAMINOPHEN 325 MG/1
650 TABLET ORAL ONCE
Status: CANCELLED | OUTPATIENT
Start: 2022-01-13 | End: 2022-01-13

## 2021-12-16 RX ORDER — SODIUM CHLORIDE 0.9 % (FLUSH) 0.9 %
5-40 SYRINGE (ML) INJECTION PRN
Status: DISCONTINUED | OUTPATIENT
Start: 2021-12-16 | End: 2021-12-17 | Stop reason: HOSPADM

## 2021-12-16 RX ORDER — SODIUM CHLORIDE 0.9 % (FLUSH) 0.9 %
5-40 SYRINGE (ML) INJECTION PRN
Status: CANCELLED | OUTPATIENT
Start: 2021-12-16

## 2021-12-16 RX ORDER — HEPARIN SODIUM (PORCINE) LOCK FLUSH IV SOLN 100 UNIT/ML 100 UNIT/ML
500 SOLUTION INTRAVENOUS PRN
Status: DISCONTINUED | OUTPATIENT
Start: 2021-12-16 | End: 2021-12-17 | Stop reason: HOSPADM

## 2021-12-16 RX ORDER — HEPARIN SODIUM (PORCINE) LOCK FLUSH IV SOLN 100 UNIT/ML 100 UNIT/ML
500 SOLUTION INTRAVENOUS PRN
Status: CANCELLED | OUTPATIENT
Start: 2021-12-16

## 2021-12-16 RX ADMIN — HEPARIN 500 UNITS: 100 SYRINGE at 12:01

## 2021-12-16 RX ADMIN — IMMUNE GLOBULIN (HUMAN) 20 G: 10 INJECTION INTRAVENOUS; SUBCUTANEOUS at 10:38

## 2021-12-16 ASSESSMENT — PAIN SCALES - GENERAL: PAINLEVEL_OUTOF10: 0

## 2021-12-16 NOTE — PROGRESS NOTES
3466 pt ambulated into room with mother for ivig and port flush. Pt rights and responsibilities offered to pt. Pt was offered snack and drink. Pt port accessed using sterile technique. Pt had labs drawn previously to visit. Pt states she took premeds at home tylenol and benadryl at 0900     1038         ivig infusion started. Pt has no other needs at this time. 1115          Infusion infusing and pt has no other needs at this time. 1145          Pt awake talking with mother. Pt has no other needs at this time. 1200         Infusion complete and pt tolerated well. D/c instructions explained and pt verbalized understanding.  Pt ambulated out with mother for d/c.       __m__ Safety:       (Environmental)   [place] to environment   Ensure ID band is correct and in place/ allergy band as needed   Assess for fall risk   Initiate fall precautions as applicable (fall band, side rails, etc.)   Call light within reach   Bed in low position/ wheels locked    __m__ Pain:        Assess pain level and characteristics   Administer analgesics as ordered   Assess effectiveness of pain management and report to MD as needed    __m__ Knowledge Deficit:   Assess baseline knowledge   Provide teaching at level of understanding   Provide teaching via preferred learning method   Evaluate teaching effectiveness    __m__ Hemodynamic/Respiratory Status:       (Pre and Post Procedure Monitoring)   Assess/Monitor vital signs and LOC   Assess Baseline SpO2 prior to any sedation   Obtain weight/height   Assess vital signs/ LOC until patient meets discharge criteria   Monitor procedure site and notify MD of any issues    __m__ Infection-Risk of Central Venous Catheter:   Monitor for infection signs and symptoms (catheter site redness, temperature elevation, etc)   Assess for infection risks   Educate regarding infection prevention   Manage central venous catheter (flushes/ dressing changes per protocol)

## 2021-12-19 DIAGNOSIS — L30.4 INTERTRIGO: ICD-10-CM

## 2021-12-20 RX ORDER — CLOTRIMAZOLE AND BETAMETHASONE DIPROPIONATE 10; .64 MG/G; MG/G
CREAM TOPICAL
Qty: 45 G | Refills: 0 | Status: SHIPPED | OUTPATIENT
Start: 2021-12-20

## 2022-01-11 ENCOUNTER — OFFICE VISIT (OUTPATIENT)
Dept: FAMILY MEDICINE CLINIC | Age: 42
End: 2022-01-11
Payer: MEDICARE

## 2022-01-11 VITALS
SYSTOLIC BLOOD PRESSURE: 122 MMHG | HEART RATE: 92 BPM | OXYGEN SATURATION: 93 % | HEIGHT: 63 IN | WEIGHT: 227 LBS | BODY MASS INDEX: 40.22 KG/M2 | DIASTOLIC BLOOD PRESSURE: 84 MMHG | RESPIRATION RATE: 17 BRPM | TEMPERATURE: 96.6 F

## 2022-01-11 DIAGNOSIS — E11.69 TYPE 2 DIABETES MELLITUS WITH OTHER SPECIFIED COMPLICATION, WITHOUT LONG-TERM CURRENT USE OF INSULIN (HCC): Primary | ICD-10-CM

## 2022-01-11 DIAGNOSIS — K21.9 GASTROESOPHAGEAL REFLUX DISEASE WITHOUT ESOPHAGITIS: ICD-10-CM

## 2022-01-11 DIAGNOSIS — J30.2 SEASONAL ALLERGIES: ICD-10-CM

## 2022-01-11 DIAGNOSIS — M32.9 LUPUS ARTHRITIS (HCC): ICD-10-CM

## 2022-01-11 DIAGNOSIS — J45.40 MODERATE PERSISTENT ASTHMA WITHOUT COMPLICATION: ICD-10-CM

## 2022-01-11 DIAGNOSIS — G45.9 TIA (TRANSIENT ISCHEMIC ATTACK): ICD-10-CM

## 2022-01-11 DIAGNOSIS — E66.9 OBESITY (BMI 30-39.9): ICD-10-CM

## 2022-01-11 DIAGNOSIS — G40.909 SEIZURE DISORDER (HCC): ICD-10-CM

## 2022-01-11 DIAGNOSIS — R79.89 ELEVATED TSH: ICD-10-CM

## 2022-01-11 DIAGNOSIS — E66.01 MORBID OBESITY DUE TO EXCESS CALORIES (HCC): ICD-10-CM

## 2022-01-11 DIAGNOSIS — E78.00 PURE HYPERCHOLESTEROLEMIA: ICD-10-CM

## 2022-01-11 DIAGNOSIS — M25.561 CHRONIC PAIN OF RIGHT KNEE: ICD-10-CM

## 2022-01-11 DIAGNOSIS — I63.9 CEREBROVASCULAR ACCIDENT (CVA), UNSPECIFIED MECHANISM (HCC): ICD-10-CM

## 2022-01-11 DIAGNOSIS — Z92.29 HISTORY OF HEPATITIS B VACCINATION: ICD-10-CM

## 2022-01-11 DIAGNOSIS — M32.9 LUPUS (HCC): ICD-10-CM

## 2022-01-11 DIAGNOSIS — R91.1 LUNG NODULE, SOLITARY: ICD-10-CM

## 2022-01-11 DIAGNOSIS — D80.3 IGG SUBCLASS DEFICIENCY (HCC): ICD-10-CM

## 2022-01-11 DIAGNOSIS — R79.89 ELEVATED LFTS: ICD-10-CM

## 2022-01-11 DIAGNOSIS — D83.9 COMMON VARIABLE IMMUNODEFICIENCY (HCC): ICD-10-CM

## 2022-01-11 DIAGNOSIS — E83.42 HYPOMAGNESEMIA: ICD-10-CM

## 2022-01-11 DIAGNOSIS — M79.7 FIBROMYALGIA: ICD-10-CM

## 2022-01-11 DIAGNOSIS — G89.29 CHRONIC PAIN OF RIGHT KNEE: ICD-10-CM

## 2022-01-11 DIAGNOSIS — G43.809 OTHER MIGRAINE WITHOUT STATUS MIGRAINOSUS, NOT INTRACTABLE: ICD-10-CM

## 2022-01-11 DIAGNOSIS — L30.4 INTERTRIGO: ICD-10-CM

## 2022-01-11 PROCEDURE — 99215 OFFICE O/P EST HI 40 MIN: CPT | Performed by: FAMILY MEDICINE

## 2022-01-11 RX ORDER — MONTELUKAST SODIUM 10 MG/1
10 TABLET ORAL NIGHTLY
Qty: 90 TABLET | Refills: 11 | Status: SHIPPED | OUTPATIENT
Start: 2022-01-11

## 2022-01-11 SDOH — ECONOMIC STABILITY: FOOD INSECURITY: WITHIN THE PAST 12 MONTHS, YOU WORRIED THAT YOUR FOOD WOULD RUN OUT BEFORE YOU GOT MONEY TO BUY MORE.: NEVER TRUE

## 2022-01-11 SDOH — ECONOMIC STABILITY: FOOD INSECURITY: WITHIN THE PAST 12 MONTHS, THE FOOD YOU BOUGHT JUST DIDN'T LAST AND YOU DIDN'T HAVE MONEY TO GET MORE.: NEVER TRUE

## 2022-01-11 ASSESSMENT — SOCIAL DETERMINANTS OF HEALTH (SDOH): HOW HARD IS IT FOR YOU TO PAY FOR THE VERY BASICS LIKE FOOD, HOUSING, MEDICAL CARE, AND HEATING?: NOT HARD AT ALL

## 2022-01-11 NOTE — PROGRESS NOTES
the past has included proton pump inhibitors. Still seeing neurology ( Dr Sanket Mcnulty) for seizures. They are still occurring a few times per week. She was thinking about stopping all her medications and restarting \"fresh\". She is highly encouraged NOT to do this for fear of intractable seizure with brain damage. The seizures are usually followed by a headache. 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 She had an \"episode\" in my office in early 2018, she was started on aspirin 325 mg and went from having 8-9 episodes a day to 4 episodes every 2 months. Now also on plavix as well. Migraines are controlled with prn medications less then 2 per month. Fibromyalgia and Lupus and lupus arthritis is under the care of Rheumatology in [place] and formerly also a patient of Dr Dean West who is no longer in practice. She is frustrated because rheumatology took her off the MTX due to elevated LFTs which have been present for many years. We discussed getting GI involved to watch the liver and possibly give approval for the MTX. Allergies are well controlled on current medications. Lung nodule in the left upper lobe under the care of pulmonology. Stable as of 4-2021. As a side note before I left the room, she reports that she has pain in the right knee that is ongoing and would like to have an xray before the next visit. Reviewed chart forpast medical history , surgical history , allergies, social history , family history and medications.     Health Maintenance   Topic Date Due    Hepatitis B vaccine (1 of 3 - Risk 3-dose series) Never done    Diabetic foot exam  07/17/2020    Diabetic retinal exam  02/25/2021    COVID-19 Vaccine (3 - Moderna risk 4-dose series) 11/20/2021    Breast cancer screen  01/22/2022    Depression Screen  02/25/2022    A1C test (Diabetic or Prediabetic)  12/12/2022    Diabetic microalbuminuria test  12/12/2022    Lipid screen  12/12/2022    DTaP/Tdap/Td vaccine (2 - Td or Tdap) 07/11/2030    Pneumococcal 0-64 years Vaccine (4 of 4 - PPSV23) 08/10/2044    Flu vaccine  Completed    Hepatitis C screen  Completed    Hepatitis A vaccine  Aged Out    Hib vaccine  Aged Out    Meningococcal (ACWY) vaccine  Aged Out    HIV screen  Discontinued       Subjective:      Constitutional:Negative for fever, chills, diaphoresis, activity change, appetite change and fatigue. HENT: Negative for hearing loss, ear pain, congestion, sore throat, rhinorrhea, postnasal drip and ear discharge. Eyes: Negative for photophobia and visual disturbance. Respiratory: Negative for cough, chest tightness, shortness of breath and wheezing. Cardiovascular: Negative for chest pain and leg swelling. Gastrointestinal: Negative for nausea, vomiting, abdominal pain, diarrhea and constipation. Genitourinary: Negative for dysuria, urgency and frequency. Neurological: Negative for weakness, light-headedness and headaches. Psychiatric/Behavioral: Negative for sleep disturbance.      :     Vitals:    01/11/22 1106   BP: 122/84   Site: Left Upper Arm   Position: Sitting   Cuff Size: Large Adult   Pulse: 92   Resp: 17   Temp: 96.6 °F (35.9 °C)   TempSrc: Temporal   SpO2: 93%   Weight: 227 lb (103 kg)   Height: 5' 3\" (1.6 m)     Wt Readings from Last 3 Encounters:   01/11/22 227 lb (103 kg)   12/16/21 233 lb (105.7 kg)   11/15/21 232 lb 1.6 oz (105.3 kg)       Physical Exam  Physical Exam   Constitutional: Vital signs are normal. She appears well-developed and well-nourished. She is active. HENT:   Head: Normocephalic and atraumatic. Right Ear: Tympanic membrane, external ear and ear canal normal. No drainage or tenderness. Left Ear: Tympanic membrane, external ear and ear canal normal. No drainage or tenderness. Nose: Nose normal. No mucosal edema or rhinorrhea.    Mouth/Throat: Uvula is midline, oropharynx is clear and moist and mucous membranes are normal. Mucous membranes are not pale. Normal dentition. No posterior oropharyngeal edema or posterior oropharyngeal erythema. Eyes: Lids are normal. Right eye exhibits no chemosis and no discharge. Left eye exhibits no chemosis and no drainage. Right conjunctiva has no hemorrhage. Left conjunctiva has no hemorrhage. Right eye exhibits normal extraocular motion. Left eye exhibits normal extraocular motion. Right pupil is round and reactive. Left pupil is round and reactive. Pupils are equal.   Cardiovascular: Normal rate, regular rhythm, S1 normal, S2 normal and normal heart sounds. Exam reveals no gallop. No murmur heard. Pulmonary/Chest: Effort normal and breath sounds normal. No respiratory distress. She has no wheezes. She has no rhonchi. She has no rales. Abdominal: Soft. Normal appearance and bowel sounds are normal. She exhibits no distension and no mass. There is no hepatosplenomegaly. No tenderness. She has no rigidity, no rebound and no guarding. No hernia. Musculoskeletal:        Right lower leg: She exhibits no edema. Left lower leg: She exhibits no edema. Neurological: She is alert. Assessment/Plan   Anila Alberto was seen today for annual exam.    Diagnoses and all orders for this visit:    Type 2 diabetes mellitus with other specified complication, without long-term current use of insulin ([facility] [address]  -     Basic Metabolic Panel; Future  -     Hemoglobin A1C; Future  -     Dulaglutide 0.75 MG/0.5ML SOPN; Inject 0.75 mg into the skin once a week    Pure hypercholesterolemia    Cerebrovascular accident (CVA), unspecified mechanism (HCC)    Fibromyalgia    TIA (transient ischemic attack)    Gastroesophageal reflux disease without esophagitis    Other migraine without status migrainosus, not intractable    Lupus arthritis (HCC)    Seasonal allergies  -     montelukast (SINGULAIR) 10 MG tablet;  Take 1 tablet by mouth nightly    Seizure disorder [facility])    History of hepatitis B vaccination    Lung nodule, solitary    Hypomagnesemia    Elevated TSH    Obesity (BMI 30-39. 9)    Moderate persistent asthma without complication    Lupus ([facility] [address]    Morbid obesity due to excess calories (HCC)    Intertrigo    Common variable immunodeficiency (HCC)    IgG subclass deficiency ([facility] [address]    Elevated LFTs  -     AFL - Barak Escobar MD, Gastroenterology, [place]    Chronic pain of right knee  -     XR KNEE RIGHT (3 VIEWS); Future    No change to medication   Continue healthy diet and exercise  Yearly eye exam  Daily foot inspection  Yearly flu shot  Monitor glucose regularly  Daily aspirin  Regular labs : A1c quarterly, lipids every 6 months and BMP quaterly     Discussed use, benefit, and side effectsof prescribed medications. All patient questions answered. Pt voiced understanding. Reviewed health maintenance. Instructed to continue current medications, diet and exercise. Patient agreed with treatment plan. Followup as directed.      Over 50 minutes spent with patient with >50% spent in counseling and coordination of care    Electronically signed by Taisha Han MD

## 2022-01-17 ENCOUNTER — HOSPITAL ENCOUNTER (OUTPATIENT)
Dept: GENERAL RADIOLOGY | Age: 42
Discharge: HOME | End: 2022-01-17
Payer: MEDICARE

## 2022-01-17 DIAGNOSIS — D83.9 COMMON VARIABLE IMMUNODEFICIENCY (HCC): Primary | ICD-10-CM

## 2022-01-17 PROCEDURE — 6360000002 HC RX W HCPCS: Performed by: NURSE PRACTITIONER

## 2022-01-17 PROCEDURE — 99215 OFFICE O/P EST HI 40 MIN: CPT

## 2022-01-17 PROCEDURE — 2580000003 HC RX 258: Performed by: NURSE PRACTITIONER

## 2022-01-17 RX ORDER — SODIUM CHLORIDE 0.9 % (FLUSH) 0.9 %
5-40 SYRINGE (ML) INJECTION PRN
Status: CANCELLED | OUTPATIENT
Start: 2022-01-17

## 2022-01-17 RX ORDER — HEPARIN SODIUM (PORCINE) LOCK FLUSH IV SOLN 100 UNIT/ML 100 UNIT/ML
500 SOLUTION INTRAVENOUS PRN
Status: DISCONTINUED | OUTPATIENT
Start: 2022-01-17 | End: 2022-01-18 | Stop reason: HOSPADM

## 2022-01-17 RX ORDER — HEPARIN SODIUM (PORCINE) LOCK FLUSH IV SOLN 100 UNIT/ML 100 UNIT/ML
500 SOLUTION INTRAVENOUS PRN
Status: CANCELLED | OUTPATIENT
Start: 2022-01-17

## 2022-01-17 RX ORDER — SODIUM CHLORIDE 0.9 % (FLUSH) 0.9 %
5-40 SYRINGE (ML) INJECTION PRN
Status: DISCONTINUED | OUTPATIENT
Start: 2022-01-17 | End: 2022-01-18 | Stop reason: HOSPADM

## 2022-01-17 RX ADMIN — SODIUM CHLORIDE, PRESERVATIVE FREE 20 ML: 5 INJECTION INTRAVENOUS at 09:09

## 2022-01-17 RX ADMIN — HEPARIN 500 UNITS: 100 SYRINGE at 09:10

## 2022-01-17 NOTE — PLAN OF CARE
Met: yes   Safety:         (Environmental)  [place] to environment  Ensure ID band is correct and in place/ allergy band as needed  Assess for fall risk  Initiate fall precautions as applicable (fall band, side rails, etc.)  Call light within reach  Bed in low position/ wheels locked    Met: yes   Pain:       Assess pain level and characteristics  Administer analgesics as ordered  Assess effectiveness of pain management and report to MD as needed    Met: yes   Knowledge Deficit:  Assess baseline knowledge  Provide teaching at level of understanding  Provide teaching via preferred learning method  Evaluate teaching effectiveness        Met: yes   Infection-Risk of Central Venous Catheter:  Monitor for infection signs and symptoms (catheter site redness, temperature elevation, etc)  Assess for infection risks  Educate regarding infection prevention  Manage central venous catheter (flushes/ dressing changes per protocol)

## 2022-01-17 NOTE — PROGRESS NOTES
Pt is here for a port flush. Pt alert and oriented times 3, resp even and unlabored, skin pink, warm and dry. Mediport intact in mid chest, mendez needle inserted and blood return noted, port flushed with 20ml's of saline and 5ml's of heparin without difficulty, site soft and without edema or redness. Mendez needle removed and bandaid applied. Pt released ambulatory in stable condition.

## 2022-01-26 ENCOUNTER — HOSPITAL ENCOUNTER (OUTPATIENT)
Dept: ULTRASOUND IMAGING | Age: 42
Discharge: HOME | End: 2022-01-26
Payer: MEDICARE

## 2022-01-26 ENCOUNTER — HOSPITAL ENCOUNTER (OUTPATIENT)
Age: 42
Discharge: HOME | End: 2022-01-26
Payer: MEDICARE

## 2022-01-26 DIAGNOSIS — R79.89 ELEVATED LFTS: ICD-10-CM

## 2022-01-26 DIAGNOSIS — R11.0 NAUSEA: ICD-10-CM

## 2022-01-26 LAB
FERRITIN: 25 NG/ML (ref 10–291)
HEPATITIS C ANTIBODY: NEGATIVE
IRON: 45 UG/DL (ref 50–170)
TOTAL IRON BINDING CAPACITY: 372 UG/DL (ref 171–450)

## 2022-01-26 PROCEDURE — 36415 COLL VENOUS BLD VENIPUNCTURE: CPT

## 2022-01-26 PROCEDURE — 82728 ASSAY OF FERRITIN: CPT

## 2022-01-26 PROCEDURE — 82784 ASSAY IGA/IGD/IGG/IGM EACH: CPT

## 2022-01-26 PROCEDURE — 86708 HEPATITIS A ANTIBODY: CPT

## 2022-01-26 PROCEDURE — 86709 HEPATITIS A IGM ANTIBODY: CPT

## 2022-01-26 PROCEDURE — 86038 ANTINUCLEAR ANTIBODIES: CPT

## 2022-01-26 PROCEDURE — 83516 IMMUNOASSAY NONANTIBODY: CPT

## 2022-01-26 PROCEDURE — 86704 HEP B CORE ANTIBODY TOTAL: CPT

## 2022-01-26 PROCEDURE — 82390 ASSAY OF CERULOPLASMIN: CPT

## 2022-01-26 PROCEDURE — 76705 ECHO EXAM OF ABDOMEN: CPT

## 2022-01-26 PROCEDURE — 82103 ALPHA-1-ANTITRYPSIN TOTAL: CPT

## 2022-01-26 PROCEDURE — 83540 ASSAY OF IRON: CPT

## 2022-01-26 PROCEDURE — 93975 VASCULAR STUDY: CPT

## 2022-01-26 PROCEDURE — 86803 HEPATITIS C AB TEST: CPT

## 2022-01-26 PROCEDURE — 83550 IRON BINDING TEST: CPT

## 2022-01-26 PROCEDURE — 82525 ASSAY OF COPPER: CPT

## 2022-01-27 LAB
ALPHA-1 ANTITRYPSIN: 155 MG/DL (ref 90–200)
HAV AB SERPL IA-ACNC: REACTIVE
HAV IGM SER IA-ACNC: NEGATIVE
HEPATITIS B CORE TOTAL ANTIBODY: NONREACTIVE
IGA: 125 MG/DL (ref 70–400)
MITOCHONDRIAL M2 AB, IGG: < 0.5 U/ML (ref 0–4)
TISSUE TRANSGLUTAMINASE IGA: 0.3 U/ML
TTG, IGG: < 0.6 U/ML

## 2022-01-29 LAB
ANA SCREEN: NORMAL
F-ACTIN AB IGG: 7 UNITS (ref 0–19)
MYELOPEROXIDASE AB, IGG: 0 AU/ML (ref 0–19)
SERINE PROTEASE 3, IGG: 0 AU/ML (ref 0–19)

## 2022-01-30 LAB
CERULOPLASMIN: 26 MG/DL (ref 17–54)
COPPER: 112.8 UG/DL (ref 80–155)

## 2022-02-06 ENCOUNTER — HOSPITAL ENCOUNTER (OUTPATIENT)
Age: 42
Discharge: HOME | End: 2022-02-06
Payer: MEDICARE

## 2022-02-06 DIAGNOSIS — Z51.81 MEDICATION MONITORING ENCOUNTER: ICD-10-CM

## 2022-02-06 LAB
ALBUMIN SERPL-MCNC: 4.4 G/DL (ref 3.5–5.1)
ALP BLD-CCNC: 165 U/L (ref 38–126)
ALT SERPL-CCNC: 32 U/L (ref 11–66)
ANION GAP SERPL CALCULATED.3IONS-SCNC: 12 MEQ/L (ref 8–16)
AST SERPL-CCNC: 39 U/L (ref 5–40)
BASOPHILS # BLD: 0.6 % (ref 0–3)
BILIRUB SERPL-MCNC: 0.5 MG/DL (ref 0.3–1.2)
BUN BLDV-MCNC: 8 MG/DL (ref 7–22)
C-REACTIVE PROTEIN: < 0.3 MG/DL (ref 0–1)
CALCIUM SERPL-MCNC: 9.2 MG/DL (ref 8.5–10.5)
CHLORIDE BLD-SCNC: 101 MEQ/L (ref 98–111)
CO2: 23 MEQ/L (ref 23–33)
CREAT SERPL-MCNC: 0.6 MG/DL (ref 0.4–1.2)
EOSINOPHILS RELATIVE PERCENT: 2.4 % (ref 0–4)
GFR SERPL CREATININE-BSD FRML MDRD: > 90 ML/MIN/1.73M2
GLUCOSE BLD-MCNC: 120 MG/DL (ref 70–108)
HCT VFR BLD CALC: 40.8 % (ref 37–47)
HEMOGLOBIN: 12.6 GM/DL (ref 12–16)
LYMPHOCYTES # BLD: 27.6 % (ref 15–47)
MCH RBC QN AUTO: 25.3 PG (ref 27–31)
MCHC RBC AUTO-ENTMCNC: 31 GM/DL (ref 33–37)
MCV RBC AUTO: 81.6 FL (ref 81–99)
MONOCYTES: 8 % (ref 0–12)
PDW BLD-RTO: 14.1 % (ref 11.5–14.5)
PLATELET # BLD: 257 THOU/MM3 (ref 130–400)
PMV BLD AUTO: 8.7 FL (ref 7.4–10.4)
POTASSIUM SERPL-SCNC: 3.8 MEQ/L (ref 3.5–5.2)
RBC # BLD: 4.99 MILL/MM3 (ref 4.2–5.4)
SEDIMENTATION RATE, ERYTHROCYTE: 11 MM/HR (ref 0–20)
SEGS: 61.4 % (ref 43–75)
SODIUM BLD-SCNC: 136 MEQ/L (ref 135–145)
TOTAL PROTEIN: 6.8 G/DL (ref 6.1–8)
WBC # BLD: 7.1 THOU/MM3 (ref 4.8–10.8)

## 2022-02-06 PROCEDURE — 85025 COMPLETE CBC W/AUTO DIFF WBC: CPT

## 2022-02-06 PROCEDURE — 82784 ASSAY IGA/IGD/IGG/IGM EACH: CPT

## 2022-02-06 PROCEDURE — 85651 RBC SED RATE NONAUTOMATED: CPT

## 2022-02-06 PROCEDURE — 80053 COMPREHEN METABOLIC PANEL: CPT

## 2022-02-06 PROCEDURE — 36415 COLL VENOUS BLD VENIPUNCTURE: CPT

## 2022-02-06 PROCEDURE — 86140 C-REACTIVE PROTEIN: CPT

## 2022-02-07 LAB — IGG: 678 MG/DL (ref 700–1600)

## 2022-02-10 ENCOUNTER — HOSPITAL ENCOUNTER (OUTPATIENT)
Dept: NURSING | Age: 42
Discharge: HOME | End: 2022-02-10
Payer: MEDICARE

## 2022-02-10 VITALS
BODY MASS INDEX: 40.74 KG/M2 | WEIGHT: 230 LBS | OXYGEN SATURATION: 92 % | HEART RATE: 93 BPM | DIASTOLIC BLOOD PRESSURE: 78 MMHG | TEMPERATURE: 97 F | SYSTOLIC BLOOD PRESSURE: 117 MMHG | RESPIRATION RATE: 16 BRPM

## 2022-02-10 DIAGNOSIS — D83.9 COMMON VARIABLE IMMUNODEFICIENCY (HCC): Primary | ICD-10-CM

## 2022-02-10 PROCEDURE — 96413 CHEMO IV INFUSION 1 HR: CPT

## 2022-02-10 PROCEDURE — 96365 THER/PROPH/DIAG IV INF INIT: CPT

## 2022-02-10 PROCEDURE — 6360000002 HC RX W HCPCS: Performed by: NURSE PRACTITIONER

## 2022-02-10 RX ORDER — HEPARIN SODIUM (PORCINE) LOCK FLUSH IV SOLN 100 UNIT/ML 100 UNIT/ML
500 SOLUTION INTRAVENOUS PRN
Status: DISCONTINUED | OUTPATIENT
Start: 2022-02-10 | End: 2022-02-11 | Stop reason: HOSPADM

## 2022-02-10 RX ORDER — DIPHENHYDRAMINE HCL 25 MG
25 TABLET ORAL ONCE
Status: DISCONTINUED | OUTPATIENT
Start: 2022-02-10 | End: 2022-02-11 | Stop reason: HOSPADM

## 2022-02-10 RX ORDER — SODIUM CHLORIDE 0.9 % (FLUSH) 0.9 %
5-40 SYRINGE (ML) INJECTION PRN
Status: CANCELLED | OUTPATIENT
Start: 2022-02-10

## 2022-02-10 RX ORDER — ACETAMINOPHEN 325 MG/1
650 TABLET ORAL ONCE
Status: CANCELLED | OUTPATIENT
Start: 2022-03-10 | End: 2022-03-10

## 2022-02-10 RX ORDER — ACETAMINOPHEN 325 MG/1
650 TABLET ORAL ONCE
Status: DISCONTINUED | OUTPATIENT
Start: 2022-02-10 | End: 2022-02-11 | Stop reason: HOSPADM

## 2022-02-10 RX ORDER — DIPHENHYDRAMINE HCL 25 MG
25 TABLET ORAL ONCE
Status: CANCELLED | OUTPATIENT
Start: 2022-03-10 | End: 2022-03-10

## 2022-02-10 RX ORDER — HEPARIN SODIUM (PORCINE) LOCK FLUSH IV SOLN 100 UNIT/ML 100 UNIT/ML
500 SOLUTION INTRAVENOUS PRN
Status: CANCELLED | OUTPATIENT
Start: 2022-02-10

## 2022-02-10 RX ADMIN — IMMUNE GLOBULIN (HUMAN) 20 G: 10 INJECTION INTRAVENOUS; SUBCUTANEOUS at 09:46

## 2022-02-10 RX ADMIN — HEPARIN 500 UNITS: 100 SYRINGE at 10:59

## 2022-02-10 NOTE — PROGRESS NOTES
0930: Patient arrived ambulatory with mother for IVIG infusion. PT RIGHTS AND RESPONSIBILITIES OFFERED TO PT.  Medijoni accessed using sterile procedure. Patient states she took her pre-meds at home. 5641: IVIG started. 1057: IVIG complete, patient tolerated well. AVS reviewed with patient, voiced understanding. Patient discharged ambulatory.                          _m___ Safety:       (Environmental)   [place] to environment   Ensure ID band is correct and in place/ allergy band as needed   Assess for fall risk   Initiate fall precautions as applicable (fall band, side rails, etc.)   Call light within reach   Bed in low position/ wheels locked    _m___ Pain:        Assess pain level and characteristics   Administer analgesics as ordered   Assess effectiveness of pain management and report to MD as needed    _m___ Knowledge Deficit:   Assess baseline knowledge   Provide teaching at level of understanding   Provide teaching via preferred learning method   Evaluate teaching effectiveness    _m___ Hemodynamic/Respiratory Status:       (Pre and Post Procedure Monitoring)   Assess/Monitor vital signs and LOC   Assess Baseline SpO2 prior to any sedation   Obtain weight/height   Assess vital signs/ LOC until patient meets discharge criteria   Monitor procedure site and notify MD of any issues    _m___ Infection-Risk of Central Venous Catheter:   Monitor for infection signs and symptoms (catheter site redness, temperature elevation, etc)   Assess for infection risks   Educate regarding infection prevention   Manage central venous catheter (flushes/ dressing changes per protocol)

## 2022-02-14 ENCOUNTER — HOSPITAL ENCOUNTER (OUTPATIENT)
Dept: WOMENS IMAGING | Age: 42
Discharge: HOME | End: 2022-02-14
Payer: MEDICARE

## 2022-02-14 DIAGNOSIS — Z12.31 VISIT FOR SCREENING MAMMOGRAM: ICD-10-CM

## 2022-02-14 PROCEDURE — 77067 SCR MAMMO BI INCL CAD: CPT

## 2022-02-14 PROCEDURE — 77063 BREAST TOMOSYNTHESIS BI: CPT

## 2022-02-24 ENCOUNTER — TELEPHONE (OUTPATIENT)
Dept: ALLERGY | Age: 42
End: 2022-02-24

## 2022-02-24 DIAGNOSIS — D83.9 CVID (COMMON VARIABLE IMMUNODEFICIENCY) (HCC): ICD-10-CM

## 2022-02-24 DIAGNOSIS — Z29.8 IMMUNOTHERAPY: Primary | ICD-10-CM

## 2022-02-24 RX ORDER — DIPHENHYDRAMINE HCL 25 MG
50 TABLET ORAL
Qty: 1 TABLET | Refills: 11
Start: 2022-02-24 | End: 2022-02-24 | Stop reason: SDUPTHER

## 2022-02-24 RX ORDER — DIPHENHYDRAMINE HCL 25 MG
50 TABLET ORAL
Qty: 1 TABLET | Refills: 11 | Status: SHIPPED | OUTPATIENT
Start: 2022-02-24 | End: 2022-02-24

## 2022-02-24 RX ORDER — IMMUNE GLOBULIN 10 PERCENT (HUMAN) WITH RECOMBINANT HUMAN HYALURONIDASE 20 G/200ML
20 KIT SUBCUTANEOUS
Qty: 1 EACH | Refills: 11
Start: 2022-02-24 | End: 2022-02-24 | Stop reason: SDUPTHER

## 2022-02-24 RX ORDER — SENNOSIDES 8.6 MG
650 CAPSULE ORAL
Qty: 1 TABLET | Refills: 5 | Status: SHIPPED | OUTPATIENT
Start: 2022-02-24 | End: 2022-08-11

## 2022-02-24 RX ORDER — IMMUNE GLOBULIN 10 PERCENT (HUMAN) WITH RECOMBINANT HUMAN HYALURONIDASE 20 G/200ML
20 KIT SUBCUTANEOUS
Qty: 1 EACH | Refills: 11 | Status: SHIPPED | OUTPATIENT
Start: 2022-02-24

## 2022-02-24 RX ORDER — SENNOSIDES 8.6 MG
650 CAPSULE ORAL
Qty: 1 TABLET | Refills: 5
Start: 2022-02-24 | End: 2022-02-24 | Stop reason: SDUPTHER

## 2022-02-24 NOTE — TELEPHONE ENCOUNTER
Viral Lopez from [facility] called about the pt wanting home infusions. Al gave fax number of [phone] to send clinical notes and insurance card and he will then do the PA.  Can you write a clinical note on pt wanting home infusions because of success with other infusions to send to him or can I send patient message

## 2022-02-24 NOTE — PROGRESS NOTES
Patient wants to change IVIG to [facility] home infusions. Contacted Dawn to start process. Once approved patient will need to have appt with me to get port removed. Also instructed not to have infusions more often then every 4 weeks.

## 2022-02-25 NOTE — TELEPHONE ENCOUNTER
Called Viral Lopez back from [facility] due to receiving a message to call him back. He states they received fax but due to pt's insurance and having no Part D and has HMO he has to transfer prescription to another pharmacy. He states he will transfer to [address] [place] [address]. Once he received confirmation number he will call back with that.

## 2022-03-01 RX ORDER — SUCRALFATE 1 G/1
1 TABLET ORAL 4 TIMES DAILY
Qty: 120 TABLET | Refills: 11 | Status: SHIPPED | OUTPATIENT
Start: 2022-03-01

## 2022-03-01 RX ORDER — CYCLOBENZAPRINE HCL 5 MG
5-10 TABLET ORAL 3 TIMES DAILY PRN
Qty: 270 TABLET | Refills: 0 | Status: SHIPPED | OUTPATIENT
Start: 2022-03-01 | End: 2022-06-20

## 2022-03-01 RX ORDER — CYCLOBENZAPRINE HCL 5 MG
TABLET ORAL
Qty: 270 TABLET | Refills: 0 | OUTPATIENT
Start: 2022-03-01

## 2022-03-02 NOTE — TELEPHONE ENCOUNTER
Received phone call from Mel Duran from [facility]. Informed because of pt's insurance [facility] pharmacy will be taking the prescription over. [facility] needs demographic information on pt and to fax to [phone]. Will fax.

## 2022-03-03 RX ORDER — DULOXETIN HYDROCHLORIDE 30 MG/1
CAPSULE, DELAYED RELEASE ORAL
Qty: 90 CAPSULE | Refills: 0 | Status: SHIPPED | OUTPATIENT
Start: 2022-03-03 | End: 2022-05-31

## 2022-03-03 RX ORDER — DULOXETIN HYDROCHLORIDE 30 MG/1
CAPSULE, DELAYED RELEASE ORAL
Qty: 90 CAPSULE | Refills: 0 | OUTPATIENT
Start: 2022-03-03

## 2022-03-03 NOTE — TELEPHONE ENCOUNTER
Received a call from April at the infusion [facility]. She asked to have the dose and frequency of the Hyqvia. She also asked if May 2021 was the last time patient was seen. I looked and patient was seen in November 2021. April asked to have the office note from November faxed to her at [phone]. I also told her I would write the dose and frequency on the fax cover. April verbalized understanding and thanked me. Fax sent and confirmation scanned into media.

## 2022-03-04 NOTE — TELEPHONE ENCOUNTER
Called April from [facility] deriancy and informed PA is cancelled for gamunex-c. April states she will continue on for PA for Hyqvia. Stated needed nothing else at this time.

## 2022-03-04 NOTE — TELEPHONE ENCOUNTER
Received call from April from [address] [place] [address] and stated needed to call pt's insurance to cancel PA for Gaminex-C to continue for PA for Hyqvia. Lithuanian Led spoke to from [place] and stated she is unable to do this request and need to speak to speciality pharmacy. Ref # [id number]. Speciality pharmacy phone number [phone] opt 5.

## 2022-03-04 NOTE — TELEPHONE ENCOUNTER
Called insurance speciality pharmacy and spoke with Kushal and stated she cancelled the PA for Gamunex-C. Reference number [id number].

## 2022-03-08 NOTE — TELEPHONE ENCOUNTER
Received fax from [facility] and [address] [place] [address] that pt is approved for Hyqvia home injections until 5/27/22. #[id number]. Will inform pt after speaking with provider on this.

## 2022-03-10 ENCOUNTER — TELEPHONE (OUTPATIENT)
Dept: ALLERGY | Age: 42
End: 2022-03-10

## 2022-03-10 ENCOUNTER — HOSPITAL ENCOUNTER (OUTPATIENT)
Dept: GENERAL RADIOLOGY | Age: 42
Discharge: HOME | End: 2022-03-10
Payer: MEDICARE

## 2022-03-10 VITALS
WEIGHT: 233 LBS | RESPIRATION RATE: 16 BRPM | BODY MASS INDEX: 41.29 KG/M2 | TEMPERATURE: 96.7 F | SYSTOLIC BLOOD PRESSURE: 119 MMHG | DIASTOLIC BLOOD PRESSURE: 80 MMHG | OXYGEN SATURATION: 95 % | HEIGHT: 63 IN | HEART RATE: 100 BPM

## 2022-03-10 DIAGNOSIS — D83.9 CVID (COMMON VARIABLE IMMUNODEFICIENCY) (HCC): ICD-10-CM

## 2022-03-10 DIAGNOSIS — D83.9 COMMON VARIABLE IMMUNODEFICIENCY (HCC): Primary | ICD-10-CM

## 2022-03-10 LAB
ALBUMIN SERPL-MCNC: 4.1 G/DL (ref 3.5–5.1)
ALP BLD-CCNC: 158 U/L (ref 38–126)
ALT SERPL-CCNC: 40 U/L (ref 11–66)
ANION GAP SERPL CALCULATED.3IONS-SCNC: 12 MEQ/L (ref 8–16)
AST SERPL-CCNC: 37 U/L (ref 5–40)
BASOPHILS # BLD: 0.3 %
BASOPHILS ABSOLUTE: 0 THOU/MM3 (ref 0–0.1)
BILIRUB SERPL-MCNC: 0.4 MG/DL (ref 0.3–1.2)
BUN BLDV-MCNC: 10 MG/DL (ref 7–22)
CALCIUM SERPL-MCNC: 9.2 MG/DL (ref 8.5–10.5)
CHLORIDE BLD-SCNC: 101 MEQ/L (ref 98–111)
CO2: 23 MEQ/L (ref 23–33)
CREAT SERPL-MCNC: 0.7 MG/DL (ref 0.4–1.2)
EOSINOPHIL # BLD: 2.5 %
EOSINOPHILS ABSOLUTE: 0.2 THOU/MM3 (ref 0–0.4)
ERYTHROCYTE [DISTWIDTH] IN BLOOD BY AUTOMATED COUNT: 14.4 % (ref 11.5–14.5)
ERYTHROCYTE [DISTWIDTH] IN BLOOD BY AUTOMATED COUNT: 43.1 FL (ref 35–45)
GFR SERPL CREATININE-BSD FRML MDRD: > 90 ML/MIN/1.73M2
GLUCOSE BLD-MCNC: 263 MG/DL (ref 70–108)
HCT VFR BLD CALC: 39.4 % (ref 37–47)
HEMOGLOBIN: 12 GM/DL (ref 12–16)
IGG: 725 MG/DL (ref 700–1600)
IMMATURE GRANS (ABS): 0.02 THOU/MM3 (ref 0–0.07)
IMMATURE GRANULOCYTES: 0.3 %
LYMPHOCYTES # BLD: 28.9 %
LYMPHOCYTES ABSOLUTE: 1.8 THOU/MM3 (ref 1–4.8)
MCH RBC QN AUTO: 25.4 PG (ref 26–33)
MCHC RBC AUTO-ENTMCNC: 30.5 GM/DL (ref 32.2–35.5)
MCV RBC AUTO: 83.3 FL (ref 81–99)
MONOCYTES # BLD: 5.7 %
MONOCYTES ABSOLUTE: 0.4 THOU/MM3 (ref 0.4–1.3)
NUCLEATED RED BLOOD CELLS: 0 /100 WBC
PLATELET # BLD: 269 THOU/MM3 (ref 130–400)
PMV BLD AUTO: 11.1 FL (ref 9.4–12.4)
POTASSIUM SERPL-SCNC: 3.6 MEQ/L (ref 3.5–5.2)
RBC # BLD: 4.73 MILL/MM3 (ref 4.2–5.4)
SEG NEUTROPHILS: 62.3 %
SEGMENTED NEUTROPHILS ABSOLUTE COUNT: 4 THOU/MM3 (ref 1.8–7.7)
SODIUM BLD-SCNC: 136 MEQ/L (ref 135–145)
TOTAL PROTEIN: 6.6 G/DL (ref 6.1–8)
WBC # BLD: 6.4 THOU/MM3 (ref 4.8–10.8)

## 2022-03-10 PROCEDURE — 6360000002 HC RX W HCPCS: Performed by: NURSE PRACTITIONER

## 2022-03-10 PROCEDURE — 85025 COMPLETE CBC W/AUTO DIFF WBC: CPT

## 2022-03-10 PROCEDURE — 36591 DRAW BLOOD OFF VENOUS DEVICE: CPT

## 2022-03-10 PROCEDURE — 82784 ASSAY IGA/IGD/IGG/IGM EACH: CPT

## 2022-03-10 PROCEDURE — 80053 COMPREHEN METABOLIC PANEL: CPT

## 2022-03-10 PROCEDURE — 2580000003 HC RX 258: Performed by: NURSE PRACTITIONER

## 2022-03-10 RX ORDER — SODIUM CHLORIDE 0.9 % (FLUSH) 0.9 %
5-40 SYRINGE (ML) INJECTION PRN
Status: CANCELLED | OUTPATIENT
Start: 2022-03-10

## 2022-03-10 RX ORDER — ACETAMINOPHEN 325 MG/1
650 TABLET ORAL ONCE
OUTPATIENT
Start: 2022-04-07 | End: 2022-04-07

## 2022-03-10 RX ORDER — DIPHENHYDRAMINE HCL 25 MG
25 TABLET ORAL ONCE
OUTPATIENT
Start: 2022-04-07 | End: 2022-04-07

## 2022-03-10 RX ORDER — HEPARIN SODIUM (PORCINE) LOCK FLUSH IV SOLN 100 UNIT/ML 100 UNIT/ML
500 SOLUTION INTRAVENOUS PRN
Status: DISCONTINUED | OUTPATIENT
Start: 2022-03-10 | End: 2022-03-11 | Stop reason: HOSPADM

## 2022-03-10 RX ORDER — HEPARIN SODIUM (PORCINE) LOCK FLUSH IV SOLN 100 UNIT/ML 100 UNIT/ML
500 SOLUTION INTRAVENOUS PRN
Status: CANCELLED | OUTPATIENT
Start: 2022-03-10

## 2022-03-10 RX ORDER — SODIUM CHLORIDE 0.9 % (FLUSH) 0.9 %
5-40 SYRINGE (ML) INJECTION PRN
Status: DISCONTINUED | OUTPATIENT
Start: 2022-03-10 | End: 2022-03-11 | Stop reason: HOSPADM

## 2022-03-10 RX ADMIN — SODIUM CHLORIDE, PRESERVATIVE FREE 10 ML: 5 INJECTION INTRAVENOUS at 09:09

## 2022-03-10 RX ADMIN — HEPARIN 500 UNITS: 100 SYRINGE at 09:09

## 2022-03-10 NOTE — TELEPHONE ENCOUNTER
Informed Jh Pulliam DNP on update with pt and [facility]. Continue with poc. Nothing else is needed at this time.

## 2022-03-10 NOTE — ED NOTES
Mediport accessed, flushed, labs drawn per Jay Jay Lopez was deaccessed without problems. Pt. Tolerated well. Pulse regular. Extremities warm. Respirations regular and quiet. Mucous membranes pink & moist. Alert and oriented times 3. No nausea or vomiting. Range of motion within patient's limits. Skin pink, warm and dry. Calm and cooperative.  Met: yes   Safety:         (Environmental)   [place] to environment  [facility] ID band is correct and in place/ allergy band as needed   Assess for fall risk   Initiate fall precautions as applicable (fall band, side rails, etc.)   Call light within reach   Bed in low position/ wheels locked      Met: yes   Knowledge Deficit:   Assess baseline knowledge   Provide teaching at level of understanding   Provide teaching via preferred learning method   Evaluate teaching effectiveness         [facility]ilan [facility]  03/10/22 8773

## 2022-03-10 NOTE — TELEPHONE ENCOUNTER
Called and spoke with April from [address] [place] [address]. Informed pt's last infusion was on 2/10/22. April states her team has contacted the pt and the nurse is setting up time to administer hyqvia to pt. Informed pt still has her port in and Efren Marie instructed to have that still flushed until June. Pt has an office visit with Angelo Nielsen in May. April states Deonte needs nothing else at this time.

## 2022-03-10 NOTE — TELEPHONE ENCOUNTER
Called pt and informed her per [facility] Breezy's orders can proceed with IVIG. Labwork was WDL. Pt verbalizes understanding and has no other questions or concerns at this time.

## 2022-03-10 NOTE — TELEPHONE ENCOUNTER
Called pt and updated her on the process with [facility] injections. Pt is in understanding of everything that is going on and verbalizes understanding. Informed pt to keep getting the port flushed and labs drawn 1 week prior to injection. Also informed pt to keep appointment with [facility] in May. Pt informed me she has her port flushed this morning and also labs drawn. Pt verbalizes understanding and has no other questions at this time.

## 2022-03-10 NOTE — TELEPHONE ENCOUNTER
Received email from [facility] from April stating nursing confirmed injection of date 3/15/22. Order faxed to [phone] per request from [facility]. Scanned into media.

## 2022-03-30 ENCOUNTER — PATIENT MESSAGE (OUTPATIENT)
Dept: FAMILY MEDICINE CLINIC | Age: 42
End: 2022-03-30

## 2022-03-30 NOTE — TELEPHONE ENCOUNTER
From: Ana Maria Hdez  To: Dr. Navarro Yoel: 3/30/2022 2:30 PM EDT  Subject: Metformin    I need a new prescription for metformin.

## 2022-04-07 ENCOUNTER — HOSPITAL ENCOUNTER (OUTPATIENT)
Dept: NURSING | Age: 42
End: 2022-04-07

## 2022-04-12 ENCOUNTER — HOSPITAL ENCOUNTER (OUTPATIENT)
Dept: GENERAL RADIOLOGY | Age: 42
Discharge: HOME | End: 2022-04-12
Payer: MEDICARE

## 2022-04-12 DIAGNOSIS — Z51.81 MEDICATION MONITORING ENCOUNTER: ICD-10-CM

## 2022-04-12 DIAGNOSIS — D83.9 COMMON VARIABLE IMMUNODEFICIENCY (HCC): Primary | ICD-10-CM

## 2022-04-12 LAB
ALBUMIN SERPL-MCNC: 4.1 G/DL (ref 3.5–5.1)
ALP BLD-CCNC: 154 U/L (ref 38–126)
ALT SERPL-CCNC: 34 U/L (ref 11–66)
ANION GAP SERPL CALCULATED.3IONS-SCNC: 16 MEQ/L (ref 8–16)
AST SERPL-CCNC: 32 U/L (ref 5–40)
BASOPHILS # BLD: 0.4 %
BASOPHILS ABSOLUTE: 0 THOU/MM3 (ref 0–0.1)
BILIRUB SERPL-MCNC: 0.3 MG/DL (ref 0.3–1.2)
BUN BLDV-MCNC: 9 MG/DL (ref 7–22)
C-REACTIVE PROTEIN: 0.31 MG/DL (ref 0–1)
CALCIUM SERPL-MCNC: 8.8 MG/DL (ref 8.5–10.5)
CHLORIDE BLD-SCNC: 99 MEQ/L (ref 98–111)
CO2: 24 MEQ/L (ref 23–33)
CREAT SERPL-MCNC: 0.7 MG/DL (ref 0.4–1.2)
EOSINOPHIL # BLD: 2.3 %
EOSINOPHILS ABSOLUTE: 0.1 THOU/MM3 (ref 0–0.4)
ERYTHROCYTE [DISTWIDTH] IN BLOOD BY AUTOMATED COUNT: 14.6 % (ref 11.5–14.5)
ERYTHROCYTE [DISTWIDTH] IN BLOOD BY AUTOMATED COUNT: 44.5 FL (ref 35–45)
GFR SERPL CREATININE-BSD FRML MDRD: > 90 ML/MIN/1.73M2
GLUCOSE BLD-MCNC: 167 MG/DL (ref 70–108)
HCT VFR BLD CALC: 39.4 % (ref 37–47)
HEMOGLOBIN: 11.8 GM/DL (ref 12–16)
IMMATURE GRANS (ABS): 0.02 THOU/MM3 (ref 0–0.07)
IMMATURE GRANULOCYTES: 0.4 %
LYMPHOCYTES # BLD: 33.2 %
LYMPHOCYTES ABSOLUTE: 1.9 THOU/MM3 (ref 1–4.8)
MCH RBC QN AUTO: 24.9 PG (ref 26–33)
MCHC RBC AUTO-ENTMCNC: 29.9 GM/DL (ref 32.2–35.5)
MCV RBC AUTO: 83.3 FL (ref 81–99)
MONOCYTES # BLD: 6.9 %
MONOCYTES ABSOLUTE: 0.4 THOU/MM3 (ref 0.4–1.3)
NUCLEATED RED BLOOD CELLS: 0 /100 WBC
PLATELET # BLD: 237 THOU/MM3 (ref 130–400)
PMV BLD AUTO: 10.8 FL (ref 9.4–12.4)
POTASSIUM SERPL-SCNC: 3.6 MEQ/L (ref 3.5–5.2)
RBC # BLD: 4.73 MILL/MM3 (ref 4.2–5.4)
SEDIMENTATION RATE, ERYTHROCYTE: 10 MM/HR (ref 0–20)
SEG NEUTROPHILS: 56.8 %
SEGMENTED NEUTROPHILS ABSOLUTE COUNT: 3.2 THOU/MM3 (ref 1.8–7.7)
SODIUM BLD-SCNC: 139 MEQ/L (ref 135–145)
TOTAL PROTEIN: 6.3 G/DL (ref 6.1–8)
WBC # BLD: 5.6 THOU/MM3 (ref 4.8–10.8)

## 2022-04-12 PROCEDURE — 6360000002 HC RX W HCPCS: Performed by: NURSE PRACTITIONER

## 2022-04-12 PROCEDURE — 85651 RBC SED RATE NONAUTOMATED: CPT

## 2022-04-12 PROCEDURE — 80053 COMPREHEN METABOLIC PANEL: CPT

## 2022-04-12 PROCEDURE — 86140 C-REACTIVE PROTEIN: CPT

## 2022-04-12 PROCEDURE — 85025 COMPLETE CBC W/AUTO DIFF WBC: CPT

## 2022-04-12 PROCEDURE — 2580000003 HC RX 258: Performed by: NURSE PRACTITIONER

## 2022-04-12 PROCEDURE — 36591 DRAW BLOOD OFF VENOUS DEVICE: CPT

## 2022-04-12 RX ORDER — HEPARIN SODIUM (PORCINE) LOCK FLUSH IV SOLN 100 UNIT/ML 100 UNIT/ML
500 SOLUTION INTRAVENOUS PRN
Status: CANCELLED | OUTPATIENT
Start: 2022-04-12

## 2022-04-12 RX ORDER — SODIUM CHLORIDE 0.9 % (FLUSH) 0.9 %
5-40 SYRINGE (ML) INJECTION PRN
Status: DISCONTINUED | OUTPATIENT
Start: 2022-04-12 | End: 2022-04-13 | Stop reason: HOSPADM

## 2022-04-12 RX ORDER — SODIUM CHLORIDE 0.9 % (FLUSH) 0.9 %
5-40 SYRINGE (ML) INJECTION PRN
Status: CANCELLED | OUTPATIENT
Start: 2022-04-12

## 2022-04-12 RX ORDER — HEPARIN SODIUM (PORCINE) LOCK FLUSH IV SOLN 100 UNIT/ML 100 UNIT/ML
500 SOLUTION INTRAVENOUS PRN
Status: DISCONTINUED | OUTPATIENT
Start: 2022-04-12 | End: 2022-04-13 | Stop reason: HOSPADM

## 2022-04-12 RX ADMIN — HEPARIN 500 UNITS: 100 SYRINGE at 10:42

## 2022-04-12 RX ADMIN — SODIUM CHLORIDE, PRESERVATIVE FREE 10 ML: 5 INJECTION INTRAVENOUS at 10:41

## 2022-04-12 RX ADMIN — SODIUM CHLORIDE, PRESERVATIVE FREE 10 ML: 5 INJECTION INTRAVENOUS at 10:40

## 2022-04-28 ENCOUNTER — TELEPHONE (OUTPATIENT)
Dept: CASE MANAGEMENT | Age: 42
End: 2022-04-28

## 2022-04-28 NOTE — TELEPHONE ENCOUNTER
Name: Katherine Bender  : 1980  MRN: [id number]    Oncology Navigation Follow-Up Note    Contact Type:  Telephone    Notes: Left message with contact number to assess for HRB pathway needs and introduce navigation. Indicated sister has BRCA gene mutation.     Electronically signed by Bharat Hughes RN on 2022 at 9:21 AM

## 2022-04-29 ENCOUNTER — TELEPHONE (OUTPATIENT)
Dept: CASE MANAGEMENT | Age: 42
End: 2022-04-29

## 2022-04-29 NOTE — TELEPHONE ENCOUNTER
Name: Anastacia Doyle  : 1980  MRN: [id number]    Oncology Navigation Follow-Up Note    Contact Type:  Telephone    Notes: Interested in starting breast MRI based on HRB pathway protocol. Has Medicare plan, no prior auth required but may not know until diagnosis code entered on test day when registering per [facility] navigator. Patient states will call insurance to see if gets additional information and let me know.     Electronically signed by Victor Hugo Cano RN on 2022 at 2:45 PM

## 2022-05-02 RX ORDER — ACETAMINOPHEN 325 MG/1
TABLET, COATED ORAL
Qty: 100 TABLET | Refills: 0 | Status: SHIPPED | OUTPATIENT
Start: 2022-05-02 | End: 2022-06-06 | Stop reason: ALTCHOICE

## 2022-05-11 ENCOUNTER — OFFICE VISIT (OUTPATIENT)
Dept: FAMILY MEDICINE CLINIC | Age: 42
End: 2022-05-11
Payer: MEDICARE

## 2022-05-11 VITALS
RESPIRATION RATE: 16 BRPM | HEART RATE: 83 BPM | SYSTOLIC BLOOD PRESSURE: 110 MMHG | OXYGEN SATURATION: 97 % | DIASTOLIC BLOOD PRESSURE: 70 MMHG | BODY MASS INDEX: 40.57 KG/M2 | TEMPERATURE: 97.3 F | WEIGHT: 229 LBS | HEIGHT: 63 IN

## 2022-05-11 DIAGNOSIS — R79.89 ELEVATED TSH: ICD-10-CM

## 2022-05-11 DIAGNOSIS — Z92.29 HISTORY OF HEPATITIS B VACCINATION: ICD-10-CM

## 2022-05-11 DIAGNOSIS — E83.42 HYPOMAGNESEMIA: ICD-10-CM

## 2022-05-11 DIAGNOSIS — D64.9 ANEMIA, UNSPECIFIED TYPE: ICD-10-CM

## 2022-05-11 DIAGNOSIS — M32.9 LUPUS ARTHRITIS (HCC): ICD-10-CM

## 2022-05-11 DIAGNOSIS — R79.89 ELEVATED LFTS: ICD-10-CM

## 2022-05-11 DIAGNOSIS — L30.4 INTERTRIGO: ICD-10-CM

## 2022-05-11 DIAGNOSIS — K21.9 GASTROESOPHAGEAL REFLUX DISEASE WITHOUT ESOPHAGITIS: ICD-10-CM

## 2022-05-11 DIAGNOSIS — D80.3 IGG SUBCLASS DEFICIENCY (HCC): ICD-10-CM

## 2022-05-11 DIAGNOSIS — M79.7 FIBROMYALGIA: ICD-10-CM

## 2022-05-11 DIAGNOSIS — G45.9 TIA (TRANSIENT ISCHEMIC ATTACK): ICD-10-CM

## 2022-05-11 DIAGNOSIS — E11.69 TYPE 2 DIABETES MELLITUS WITH OTHER SPECIFIED COMPLICATION, WITHOUT LONG-TERM CURRENT USE OF INSULIN (HCC): Primary | ICD-10-CM

## 2022-05-11 DIAGNOSIS — J45.40 MODERATE PERSISTENT ASTHMA WITHOUT COMPLICATION: ICD-10-CM

## 2022-05-11 DIAGNOSIS — G40.909 SEIZURE DISORDER (HCC): ICD-10-CM

## 2022-05-11 DIAGNOSIS — G43.809 OTHER MIGRAINE WITHOUT STATUS MIGRAINOSUS, NOT INTRACTABLE: ICD-10-CM

## 2022-05-11 DIAGNOSIS — J30.2 SEASONAL ALLERGIES: ICD-10-CM

## 2022-05-11 DIAGNOSIS — M32.9 LUPUS (HCC): ICD-10-CM

## 2022-05-11 DIAGNOSIS — D83.9 COMMON VARIABLE IMMUNODEFICIENCY (HCC): ICD-10-CM

## 2022-05-11 DIAGNOSIS — E66.9 OBESITY (BMI 30-39.9): ICD-10-CM

## 2022-05-11 DIAGNOSIS — I63.9 CEREBROVASCULAR ACCIDENT (CVA), UNSPECIFIED MECHANISM (HCC): ICD-10-CM

## 2022-05-11 DIAGNOSIS — R91.1 LUNG NODULE, SOLITARY: ICD-10-CM

## 2022-05-11 DIAGNOSIS — E78.00 PURE HYPERCHOLESTEROLEMIA: ICD-10-CM

## 2022-05-11 PROCEDURE — 99214 OFFICE O/P EST MOD 30 MIN: CPT | Performed by: FAMILY MEDICINE

## 2022-05-11 ASSESSMENT — PATIENT HEALTH QUESTIONNAIRE - PHQ9
SUM OF ALL RESPONSES TO PHQ QUESTIONS 1-9: 0
1. LITTLE INTEREST OR PLEASURE IN DOING THINGS: 0
SUM OF ALL RESPONSES TO PHQ9 QUESTIONS 1 & 2: 0
SUM OF ALL RESPONSES TO PHQ QUESTIONS 1-9: 0
2. FEELING DOWN, DEPRESSED OR HOPELESS: 0

## 2022-05-11 NOTE — PROGRESS NOTES
[address] [place] [id number]  Dept: [phone]  Dept Fax: [phone]  Loc: [phone]    Claus Chase is a 42 y.o. female who presents today for:  Chief Complaint   Patient presents with    Follow-up     Diabetes           HPI:     HPI    Diabetes Mellitus: Patient presents for follow up of diabetes. Symptoms: paresthesia of the feet and visual disturbances. Symptoms have gradually worsened. Patient denies increase appetite, polydipsia and weight loss. Evaluation to date has been included: fasting blood sugar, fasting lipid panel, hemoglobin A1C and microalbuminuria. Home sugars: 75-90 am and  pm. Treatment to date: Continued metformin which has been Yes:  , which has been somewhat effective. Lab Results   Component Value Date    LABA1C 6.7 (H) 12/12/2021     No results found for: EAG    Hyperlipidemia: Patient presents with hyperlipidemia. She was tested because screening. Her last labs showed   Lab Results   Component Value Date    CHOL 126 12/12/2021    CHOL 156 04/16/2020    CHOL 135 12/18/2018     Lab Results   Component Value Date    TRIG 97 12/12/2021    TRIG 119 04/16/2020    TRIG 121 12/18/2018     Lab Results   Component Value Date    HDL 33 12/12/2021    HDL 35 04/16/2020    HDL 32 12/18/2018     Lab Results   Component Value Date    LDLCALC 74 12/12/2021    LDLCALC 97 04/16/2020    LDLCALC 79 12/18/2018     No results found for: LABVLDL, VLDL  No results found for: CHOLHDLRATIO  There is a family history of hyperlipidemia. There is not a family history of early ischemia heart disease. GERD: Lili complains of heartburn. This has been associated with early satiety and heartburn. She denies no other symptoms. Symptoms have been present for several years. She denies dysphagia. She has not lost weight. She denies melena, hematochezia, hematemesis, and coffee ground emesis.  Medical therapy in the past has included proton pump inhibitors. Saw neurology ( Dr Yogi Brewster) for seizures. They are still occurring a few times per week. She was thinking about stopping all her medications and restarting \"fresh\". She is highly encouraged NOT to do this for fear of intractable seizure with brain damage. The seizures are usually followed by a headache. 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 She had an \"episode\" in my office in early 2018, she was started on aspirin 325 mg and went from having 8-9 episodes a day to 4 episodes every 2 months. Now also on plavix as well. Migraines are controlled with prn medications less then 2 per month. Fibromyalgia and Lupus and lupus arthritis is under the care of Rheumatology in [facility] and formerly also a patient of Dr Viviana Guerrero who is no longer in practice. She is frustrated because rheumatology took her off the MTX due to elevated LFTs which have been present for many years. We discussed getting GI involved to watch the liver and possibly give approval for the MTX. Allergies are well controlled on current medications. Lung nodule in the left upper lobe under the care of pulmonology. Stable as of 4-2021. As a side note before I left the room, she reports that she has pain in the right knee that is ongoing and would like to have an xray before the next visit but this was never done. Reviewed chart forpast medical history , surgical history , allergies, social history , family history and medications.     Health Maintenance   Topic Date Due    Hepatitis B vaccine (1 of 3 - Risk 3-dose series) Never done    Diabetic foot exam  07/17/2020    COVID-19 Vaccine (3 - Moderna risk 4-dose series) 11/20/2021    Depression Screen  02/25/2022    A1C test (Diabetic or Prediabetic)  12/12/2022    Diabetic microalbuminuria test  12/12/2022    Lipids  12/12/2022    Diabetic retinal exam  02/01/2023    Breast cancer screen  02/14/2023    DTaP/Tdap/Td vaccine (2 - Td or Tdap) 07/11/2030    Pneumococcal 0-64 years Vaccine (4 - PPSV23 or PCV20) 08/10/2044    Flu vaccine  Completed    Hepatitis C screen  Completed    Hepatitis A vaccine  Aged Out    Hib vaccine  Aged Out    Meningococcal (ACWY) vaccine  Aged Out    HIV screen  Discontinued       Subjective:      Constitutional:Negative for fever, chills, diaphoresis, activity change, appetite change and fatigue. HENT: Negative for hearing loss, ear pain, congestion, sore throat, rhinorrhea, postnasal drip and ear discharge. Eyes: Negative for photophobia and visual disturbance. Respiratory: Negative for cough, chest tightness, shortness of breath and wheezing. Cardiovascular: Negative for chest pain and leg swelling. Gastrointestinal: Negative for nausea, vomiting, abdominal pain, diarrhea and constipation. Genitourinary: Negative for dysuria, urgency and frequency. Neurological: Negative for weakness, light-headedness and headaches. Psychiatric/Behavioral: Negative for sleep disturbance.      :     Vitals:    05/11/22 0904   BP: 110/70   Site: Left Upper Arm   Position: Sitting   Cuff Size: Large Adult   Pulse: 83   Resp: 16   Temp: 97.3 °F (36.3 °C)   TempSrc: Temporal   SpO2: 97%   Weight: 229 lb (103.9 kg)   Height: 5' 2.99\" (1.6 m)     Wt Readings from Last 3 Encounters:   05/11/22 229 lb (103.9 kg)   03/10/22 233 lb (105.7 kg)   02/10/22 230 lb (104.3 kg)       Physical Exam  Physical Exam   Constitutional: Vital signs are normal. She appears well-developed and well-nourished. She is active. HENT:   Head: Normocephalic and atraumatic. Right Ear: Tympanic membrane, external ear and ear canal normal. No drainage or tenderness. Left Ear: Tympanic membrane, external ear and ear canal normal. No drainage or tenderness. Nose: Nose normal. No mucosal edema or rhinorrhea.    Mouth/Throat: Uvula is midline, oropharynx is clear and moist and mucous membranes are normal. Mucous membranes are not pale. Normal dentition. No posterior oropharyngeal edema or posterior oropharyngeal erythema. Eyes: Lids are normal. Right eye exhibits no chemosis and no discharge. Left eye exhibits no chemosis and no drainage. Right conjunctiva has no hemorrhage. Left conjunctiva has no hemorrhage. Right eye exhibits normal extraocular motion. Left eye exhibits normal extraocular motion. Right pupil is round and reactive. Left pupil is round and reactive. Pupils are equal.   Cardiovascular: Normal rate, regular rhythm, S1 normal, S2 normal and normal heart sounds. Exam reveals no gallop. No murmur heard. Pulmonary/Chest: Effort normal and breath sounds normal. No respiratory distress. She has no wheezes. She has no rhonchi. She has no rales. Abdominal: Soft. Normal appearance and bowel sounds are normal. She exhibits no distension and no mass. There is no hepatosplenomegaly. No tenderness. She has no rigidity, no rebound and no guarding. No hernia. Musculoskeletal:        Right lower leg: She exhibits no edema. Left lower leg: She exhibits no edema. Neurological: She is alert. Assessment/Plan   Kayden Alex was seen today for follow-up. Diagnoses and all orders for this visit:    Type 2 diabetes mellitus with other specified complication, without long-term current use of insulin ([address]  -     Basic Metabolic Panel; Future  -     Hemoglobin A1C; Future    Pure hypercholesterolemia    Cerebrovascular accident (CVA), unspecified mechanism ([address]    Gastroesophageal reflux disease without esophagitis    TIA (transient ischemic attack)    Fibromyalgia    Lupus arthritis (HCC)    Seasonal allergies    Other migraine without status migrainosus, not intractable    Lung nodule, solitary    Obesity (BMI 30-39. 9)    IgG subclass deficiency (HCC)    Common variable immunodeficiency (HCC)    Lupus (HCC)    Elevated TSH    History of hepatitis B vaccination    Elevated LFTs    Intertrigo    Moderate persistent asthma without complication    Hypomagnesemia    Seizure disorder (HCC)    Anemia, unspecified type  -     Iron Binding Capacity; Future  -     Iron; Future  -     Ferritin; Future  -     CBC; Future  -     Vitamin B12 & Folate; Future    No change to medication   Continue healthy diet and exercise  Yearly eye exam  Daily foot inspection  Yearly flu shot  Monitor glucose regularly  Daily aspirin  Regular labs : A1c quarterly, lipids every 6 months and BMP quaterly     Discussed use, benefit, and side effectsof prescribed medications. All patient questions answered. Pt voiced understanding. Reviewed health maintenance. Instructed to continue current medications, diet and exercise. Patient agreed with treatment plan. Followup as directed.      Electronically signed by Tamica Caal MD

## 2022-05-17 ENCOUNTER — HOSPITAL ENCOUNTER (OUTPATIENT)
Dept: GENERAL RADIOLOGY | Age: 42
Discharge: HOME | End: 2022-05-17
Payer: MEDICARE

## 2022-05-17 ENCOUNTER — HOSPITAL ENCOUNTER (OUTPATIENT)
Age: 42
Discharge: HOME | End: 2022-05-17
Payer: MEDICARE

## 2022-05-17 VITALS
TEMPERATURE: 96.9 F | RESPIRATION RATE: 16 BRPM | SYSTOLIC BLOOD PRESSURE: 100 MMHG | DIASTOLIC BLOOD PRESSURE: 69 MMHG | HEART RATE: 65 BPM

## 2022-05-17 DIAGNOSIS — D83.9 CVID (COMMON VARIABLE IMMUNODEFICIENCY) (HCC): ICD-10-CM

## 2022-05-17 DIAGNOSIS — D83.9 COMMON VARIABLE IMMUNODEFICIENCY (HCC): Primary | ICD-10-CM

## 2022-05-17 LAB
BASOPHILS # BLD: 0.5 %
BASOPHILS ABSOLUTE: 0 THOU/MM3 (ref 0–0.1)
EOSINOPHIL # BLD: 3.5 %
EOSINOPHILS ABSOLUTE: 0.2 THOU/MM3 (ref 0–0.4)
ERYTHROCYTE [DISTWIDTH] IN BLOOD BY AUTOMATED COUNT: 14.4 % (ref 11.5–14.5)
ERYTHROCYTE [DISTWIDTH] IN BLOOD BY AUTOMATED COUNT: 41.8 FL (ref 35–45)
HCT VFR BLD CALC: 37.8 % (ref 37–47)
HEMOGLOBIN: 11.3 GM/DL (ref 12–16)
IMMATURE GRANS (ABS): 0.02 THOU/MM3 (ref 0–0.07)
IMMATURE GRANULOCYTES: 0.4 %
LYMPHOCYTES # BLD: 38.6 %
LYMPHOCYTES ABSOLUTE: 2.2 THOU/MM3 (ref 1–4.8)
MCH RBC QN AUTO: 24.4 PG (ref 26–33)
MCHC RBC AUTO-ENTMCNC: 29.9 GM/DL (ref 32.2–35.5)
MCV RBC AUTO: 81.5 FL (ref 81–99)
MONOCYTES # BLD: 7.4 %
MONOCYTES ABSOLUTE: 0.4 THOU/MM3 (ref 0.4–1.3)
NUCLEATED RED BLOOD CELLS: 0 /100 WBC
PLATELET # BLD: 262 THOU/MM3 (ref 130–400)
PMV BLD AUTO: 10.5 FL (ref 9.4–12.4)
RBC # BLD: 4.64 MILL/MM3 (ref 4.2–5.4)
SEDIMENTATION RATE, ERYTHROCYTE: 11 MM/HR (ref 0–20)
SEG NEUTROPHILS: 49.6 %
SEGMENTED NEUTROPHILS ABSOLUTE COUNT: 2.8 THOU/MM3 (ref 1.8–7.7)
WBC # BLD: 5.7 THOU/MM3 (ref 4.8–10.8)

## 2022-05-17 PROCEDURE — 6360000002 HC RX W HCPCS: Performed by: NURSE PRACTITIONER

## 2022-05-17 PROCEDURE — 85025 COMPLETE CBC W/AUTO DIFF WBC: CPT

## 2022-05-17 PROCEDURE — 80053 COMPREHEN METABOLIC PANEL: CPT

## 2022-05-17 PROCEDURE — 86140 C-REACTIVE PROTEIN: CPT

## 2022-05-17 PROCEDURE — 36591 DRAW BLOOD OFF VENOUS DEVICE: CPT

## 2022-05-17 PROCEDURE — 85651 RBC SED RATE NONAUTOMATED: CPT

## 2022-05-17 PROCEDURE — 82784 ASSAY IGA/IGD/IGG/IGM EACH: CPT

## 2022-05-17 RX ORDER — SODIUM CHLORIDE 0.9 % (FLUSH) 0.9 %
5-40 SYRINGE (ML) INJECTION PRN
OUTPATIENT
Start: 2022-05-17

## 2022-05-17 RX ORDER — HEPARIN SODIUM (PORCINE) LOCK FLUSH IV SOLN 100 UNIT/ML 100 UNIT/ML
500 SOLUTION INTRAVENOUS PRN
OUTPATIENT
Start: 2022-05-17

## 2022-05-17 RX ORDER — SODIUM CHLORIDE 0.9 % (FLUSH) 0.9 %
5-40 SYRINGE (ML) INJECTION PRN
Status: DISCONTINUED | OUTPATIENT
Start: 2022-05-17 | End: 2022-05-18 | Stop reason: HOSPADM

## 2022-05-17 RX ORDER — HEPARIN SODIUM (PORCINE) LOCK FLUSH IV SOLN 100 UNIT/ML 100 UNIT/ML
500 SOLUTION INTRAVENOUS PRN
Status: DISCONTINUED | OUTPATIENT
Start: 2022-05-17 | End: 2022-05-18 | Stop reason: HOSPADM

## 2022-05-17 RX ORDER — ACETAMINOPHEN 325 MG/1
650 TABLET ORAL ONCE
OUTPATIENT
Start: 2022-05-31 | End: 2022-05-31

## 2022-05-17 RX ORDER — DIPHENHYDRAMINE HCL 25 MG
25 TABLET ORAL ONCE
OUTPATIENT
Start: 2022-05-31 | End: 2022-05-31

## 2022-05-17 RX ADMIN — HEPARIN 500 UNITS: 100 SYRINGE at 12:35

## 2022-05-17 NOTE — PROGRESS NOTES
Pulse regular. Extremities warm. Respirations regular and quiet. Mucous membranes pink & moist. Alert and oriented times 3. No nausea or vomiting. Range of motion within patient's limits. Skin pink, warm and dry. Calm and cooperative. Met: yes   Safety:         (Environmental)   [place] to environment  [employer] ID band is correct and in place/ allergy band as needed   Assess for fall risk   Initiate fall precautions as applicable (fall band, side rails, etc.)   Call light within reach   Bed in low position/ wheels locked    Met: yes   Pain:        Assess pain level and characteristics   Administer analgesics as ordered   Assess effectiveness of pain management and report to MD as needed    Met: yes   Knowledge Deficit:   Assess baseline knowledge   Provide teaching at level of understanding   Provide teaching via preferred learning method   Evaluate teaching effectiveness    Met: yes   Hemodynamic/Respiratory Status:       (Pre and Post Procedure Monitoring)   Assess/Monitor vital signs and LOC   Assess Baseline SpO2 prior to any sedation   Obtain weight/height   Assess vital signs/ LOC until patient meets discharge criteria   Monitor procedure site and notify MD of any issues    Met: yes   Infection-Risk of Central Venous Catheter:   Monitor for infection signs and symptoms (catheter site redness, temperature elevation, etc)   Assess for infection risks   Educate regarding infection prevention   Manage central venous catheter (flushes/ dressing changes per protocol)

## 2022-05-18 LAB
ALBUMIN SERPL-MCNC: 4.2 G/DL (ref 3.5–5.1)
ALP BLD-CCNC: 152 U/L (ref 38–126)
ALT SERPL-CCNC: 34 U/L (ref 11–66)
ANION GAP SERPL CALCULATED.3IONS-SCNC: 14 MEQ/L (ref 8–16)
AST SERPL-CCNC: 37 U/L (ref 5–40)
BILIRUB SERPL-MCNC: 0.3 MG/DL (ref 0.3–1.2)
BUN BLDV-MCNC: 11 MG/DL (ref 7–22)
C-REACTIVE PROTEIN: 0.36 MG/DL (ref 0–1)
CALCIUM SERPL-MCNC: 9.1 MG/DL (ref 8.5–10.5)
CHLORIDE BLD-SCNC: 102 MEQ/L (ref 98–111)
CO2: 24 MEQ/L (ref 23–33)
CREAT SERPL-MCNC: 0.6 MG/DL (ref 0.4–1.2)
GFR SERPL CREATININE-BSD FRML MDRD: > 90 ML/MIN/1.73M2
GLUCOSE BLD-MCNC: 106 MG/DL (ref 70–108)
IGG: 719 MG/DL (ref 700–1600)
POTASSIUM SERPL-SCNC: 4.1 MEQ/L (ref 3.5–5.2)
SODIUM BLD-SCNC: 140 MEQ/L (ref 135–145)
TOTAL PROTEIN: 6.2 G/DL (ref 6.1–8)

## 2022-05-19 ENCOUNTER — TELEPHONE (OUTPATIENT)
Dept: ALLERGY | Age: 42
End: 2022-05-19

## 2022-05-19 NOTE — TELEPHONE ENCOUNTER
Called pt and informed her that she can receive IVIG this month. Labwork is WDL. Left a message, waiting on a response.

## 2022-05-19 NOTE — TELEPHONE ENCOUNTER
----- Message from NAHID Palma CNP sent at 5/19/2022  7:27 AM EDT -----  Regarding: IVIG  Patient may receive IVIG this month

## 2022-05-19 NOTE — TELEPHONE ENCOUNTER
Pt called back and verbalized understanding for labwork. Pt states everything is going well for home infusions for IVIG. Pt has no other questions or concerns at this time. Reminded pt of office appointment on 5/31/22 with Jatin Gupta. Pt verbalizes understanding. no

## 2022-05-22 ENCOUNTER — HOSPITAL ENCOUNTER (OUTPATIENT)
Age: 42
Discharge: HOME | End: 2022-05-22
Payer: MEDICARE

## 2022-05-22 DIAGNOSIS — E11.69 TYPE 2 DIABETES MELLITUS WITH OTHER SPECIFIED COMPLICATION, WITHOUT LONG-TERM CURRENT USE OF INSULIN (HCC): ICD-10-CM

## 2022-05-22 DIAGNOSIS — D64.9 ANEMIA, UNSPECIFIED TYPE: ICD-10-CM

## 2022-05-22 LAB
ANION GAP SERPL CALCULATED.3IONS-SCNC: 10 MEQ/L (ref 8–16)
AVERAGE GLUCOSE: 141 MG/DL (ref 70–126)
BUN BLDV-MCNC: 12 MG/DL (ref 7–22)
CALCIUM SERPL-MCNC: 9.3 MG/DL (ref 8.5–10.5)
CHLORIDE BLD-SCNC: 101 MEQ/L (ref 98–111)
CO2: 28 MEQ/L (ref 23–33)
CREAT SERPL-MCNC: 0.6 MG/DL (ref 0.4–1.2)
ERYTHROCYTE [DISTWIDTH] IN BLOOD BY AUTOMATED COUNT: 14.6 % (ref 11.5–14.5)
ERYTHROCYTE [DISTWIDTH] IN BLOOD BY AUTOMATED COUNT: 42 FL (ref 35–45)
FOLATE: 13.7 NG/ML (ref 4.8–24.2)
GFR SERPL CREATININE-BSD FRML MDRD: > 90 ML/MIN/1.73M2
GLUCOSE BLD-MCNC: 103 MG/DL (ref 70–108)
HBA1C MFR BLD: 6.7 % (ref 4.4–6.4)
HCT VFR BLD CALC: 42.3 % (ref 37–47)
HEMOGLOBIN: 12.8 GM/DL (ref 12–16)
MCH RBC QN AUTO: 24.3 PG (ref 26–33)
MCHC RBC AUTO-ENTMCNC: 30.3 GM/DL (ref 32.2–35.5)
MCV RBC AUTO: 80.4 FL (ref 81–99)
PLATELET # BLD: 308 THOU/MM3 (ref 130–400)
PMV BLD AUTO: 11 FL (ref 9.4–12.4)
POTASSIUM SERPL-SCNC: 4.4 MEQ/L (ref 3.5–5.2)
RBC # BLD: 5.26 MILL/MM3 (ref 4.2–5.4)
SODIUM BLD-SCNC: 139 MEQ/L (ref 135–145)
VITAMIN B-12: 363 PG/ML (ref 211–911)
WBC # BLD: 6.2 THOU/MM3 (ref 4.8–10.8)

## 2022-05-22 PROCEDURE — 82728 ASSAY OF FERRITIN: CPT

## 2022-05-22 PROCEDURE — 85027 COMPLETE CBC AUTOMATED: CPT

## 2022-05-22 PROCEDURE — 83550 IRON BINDING TEST: CPT

## 2022-05-22 PROCEDURE — 83036 HEMOGLOBIN GLYCOSYLATED A1C: CPT

## 2022-05-22 PROCEDURE — 82746 ASSAY OF FOLIC ACID SERUM: CPT

## 2022-05-22 PROCEDURE — 83540 ASSAY OF IRON: CPT

## 2022-05-22 PROCEDURE — 82607 VITAMIN B-12: CPT

## 2022-05-22 PROCEDURE — 36415 COLL VENOUS BLD VENIPUNCTURE: CPT

## 2022-05-22 PROCEDURE — 80048 BASIC METABOLIC PNL TOTAL CA: CPT

## 2022-05-23 ENCOUNTER — TELEPHONE (OUTPATIENT)
Dept: FAMILY MEDICINE CLINIC | Age: 42
End: 2022-05-23

## 2022-05-23 LAB
FERRITIN: 27 NG/ML (ref 10–291)
IRON: 39 UG/DL (ref 50–170)
TOTAL IRON BINDING CAPACITY: 419 UG/DL (ref 171–450)

## 2022-05-23 NOTE — TELEPHONE ENCOUNTER
Patient aware and voiced understanding, no concerns voiced at this time.      Pt states she is not taking iron or a multi vitamin

## 2022-05-31 ENCOUNTER — OFFICE VISIT (OUTPATIENT)
Dept: ALLERGY | Age: 42
End: 2022-05-31
Payer: MEDICARE

## 2022-05-31 VITALS
RESPIRATION RATE: 16 BRPM | HEART RATE: 89 BPM | HEIGHT: 63 IN | WEIGHT: 225.7 LBS | SYSTOLIC BLOOD PRESSURE: 124 MMHG | TEMPERATURE: 97 F | OXYGEN SATURATION: 98 % | BODY MASS INDEX: 39.99 KG/M2 | DIASTOLIC BLOOD PRESSURE: 72 MMHG

## 2022-05-31 DIAGNOSIS — M79.7 FIBROMYALGIA: ICD-10-CM

## 2022-05-31 DIAGNOSIS — D83.9 CVID (COMMON VARIABLE IMMUNODEFICIENCY) (HCC): Primary | ICD-10-CM

## 2022-05-31 DIAGNOSIS — Z95.828 PORT-A-CATH IN PLACE: ICD-10-CM

## 2022-05-31 DIAGNOSIS — M32.9 LUPUS (HCC): ICD-10-CM

## 2022-05-31 PROCEDURE — 99213 OFFICE O/P EST LOW 20 MIN: CPT | Performed by: NURSE PRACTITIONER

## 2022-05-31 RX ORDER — DULOXETIN HYDROCHLORIDE 30 MG/1
CAPSULE, DELAYED RELEASE ORAL
Qty: 90 CAPSULE | Refills: 0 | Status: SHIPPED | OUTPATIENT
Start: 2022-05-31 | End: 2022-08-11

## 2022-05-31 NOTE — PROGRESS NOTES
[facility]    Allergy & Asthma   [address] [facility], [address]  Ph:   [phone]  Fax:[phone]    Provider:  Dr. Emma Goodwin:   Chief Complaint   Patient presents with    Follow-up     h&p for port, cvid follow up           HISTORY OF PRESENT ILLNESS: ESTABLISHED PATIENT HERE FOR EVALUATION   79-year-old  female here today to be evaluated for a Port-A-Cath removal at the center of her chest.  Patient has had the port since December 2020. Patient's port was placed with to receive IVIG related to difficulty accessing vein access. Port has been flushed with heparin last flush was May 19, 2022. Patient takes aspirin, Voltaren and Plavix anticoagulants. She has been on Plavix for TIAs. She does not report any bleeding problems. Currently patient is receiving HyQvia via subcutaneous at home. She states that she has had no problems and no infections. Patient was diagnosed with CVID in 2020. She states since she started on Gamunex and then subsequently was switched to HyQvia she is done quite well. She has had no infections and no problems. Severity of CVID is now mild to moderate symptoms are controlled. She states she feels very well. She is does not have any complaints today. Review of Systems:  Review of Systems   HENT: Negative for congestion. Musculoskeletal: Positive for arthralgias. Fibromyalgia   Allergic/Immunologic: Positive for environmental allergies and immunocompromised state. All other systems reviewed and are negative.         Past MedicalHistory:    Past Medical History:   Diagnosis Date    Asthma     Diabetes mellitus ([address] 6/8/2018    Fibromyalgia     GERD (gastroesophageal reflux disease)     Hyperlipidemia     [facility]up[facility] lupus erythematosus) ([facility])     Lupus arthritis [facility])     [facility]    Migraine     Plaquenil adverse reaction in therapeutic use 5/2016    changes in the eyes    TIA (transient ischemic attack)     8/2016       Past Surgical History:  Past Surgical History:   Procedure Laterality Date    HYSTERECTOMY  2009    Total, endometriosis    LAPAROSCOPY      CESIA STEROTACTIC LOC BREAST BIOPSY LEFT Left 02/26/2020    2 sites, benign    SHOULDER ARTHROSCOPY Left 08/20/2015    Debridement of rotator cuff and bone spurs       Family History:   Family History   Problem Relation Age of Onset    High Blood Pressure Mother     Rheum Arthritis Mother     Osteoporosis Mother     Diabetes Father     High Blood Pressure Father     Arthritis Father         gout    Diabetes Sister     Other Sister         Crest Syndrome    Breast Cancer Sister 54    Breast Cancer Sister 40    BRCA 1 Positive Sister     BRCA 2 Positive Sister     Diabetes Brother     High Cholesterol Brother     Breast Cancer Maternal Aunt 61    Breast Cancer Maternal Cousin 22    Cancer Paternal Uncle [de-identified]        leukemia       Social History:   Social History     Tobacco Use    Smoking status: Never Smoker    Smokeless tobacco: Never Used   Substance Use Topics    Alcohol use: No     Alcohol/week: 0.0 standard drinks        Allergies:  Cayenne, Codeine, Tape [adhesive tape], Amoxicillin, and Sulfa antibiotics    CurrentMedications:     Current Outpatient Medications:     SM PAIN RELIEVER 325 MG tablet, TAKE 2 TABLETS BY MOUTH THREE TIMES DAILY FOR 14 DAYS, Disp: 100 tablet, Rfl: 0    metFORMIN (GLUCOPHAGE) 500 MG tablet, TAKE 1 TABLET BY MOUTH TWICE DAILY WITH MEALS, Disp: 180 tablet, Rfl: 3    DULoxetine (CYMBALTA) 30 MG extended release capsule, Take 1 capsule by mouth once daily, Disp: 90 capsule, Rfl: 0    cyclobenzaprine (FLEXERIL) 5 MG tablet, Take 1-2 tablets by mouth 3 times daily as needed for Muscle spasms, Disp: 270 tablet, Rfl: 0    sucralfate (CARAFATE) 1 GM tablet, Take 1 tablet by mouth 4 times daily, Disp: 120 tablet, Rfl: 11    Immune Globulin-Hyaluronidase (HYQVIA) 20 GM/200ML KIT, Inject 20 g into the skin every 28 days, Disp: 1 each, Rfl: 11    montelukast (SINGULAIR) 10 MG tablet, Take 1 tablet by mouth nightly, Disp: 90 tablet, Rfl: 11    Dulaglutide 0.75 MG/0.5ML SOPN, Inject 0.75 mg into the skin once a week, Disp: 15 pen, Rfl: 11    verapamil (CALAN SR) 240 MG extended release tablet, Take 1 tablet by mouth once daily, Disp: 90 tablet, Rfl: 3    clotrimazole-betamethasone (LOTRISONE) 1-0.05 % cream, APPLY  CREAM TOPICALLY TWICE DAILY, Disp: 45 g, Rfl: 0    clopidogrel (PLAVIX) 75 MG tablet, Take 1 tablet by mouth daily, Disp: 90 tablet, Rfl: 3    heparin flush 100 UNIT/ML injection, 1 mL by IntraCATHeter route every 30 days Flush mediport monthly with 5 mls (or 500 units) of Heparin (100 units per ml), Disp: 5 mL, Rfl: 11    methotrexate (RHEUMATREX) 2.5 MG chemo tablet, Take 4 tablets by mouth once a week, Disp: 16 tablet, Rfl: 0    folic acid (FOLVITE) 1 MG tablet, Take 1 tablet by mouth daily, Disp: 90 tablet, Rfl: 1    Continuous Blood Gluc Sensor (FREESTYLE SANDRA 14 DAY SENSOR) MISC, USE TO CHECK BLOOD SUGAR TWICE DAILY, Disp: 3 each, Rfl: 5    Continuous Blood Gluc Sensor (FREESTYLE SANDRA 14 DAY SENSOR) MISC, 1 Units by Subcuticular route every 14 days, Disp: 3 each, Rfl: 11    atorvastatin (LIPITOR) 80 MG tablet, Take 1 tablet by mouth daily, Disp: 90 tablet, Rfl: 3    DULoxetine (CYMBALTA) 60 MG extended release capsule, Take 1 capsule by mouth daily, Disp: 90 capsule, Rfl: 3    potassium chloride (KLOR-CON) 10 MEQ extended release tablet, Take 1 tablet by mouth once daily, Disp: 90 tablet, Rfl: 3    ibuprofen (ADVIL;MOTRIN) 600 MG tablet, Take 1 tablet by mouth every 8 hours as needed for Pain, Disp: 42 tablet, Rfl: 5    traZODone (DESYREL) 50 MG tablet, Take 3 tablets by mouth nightly, Disp: 270 tablet, Rfl: 3    azelastine (ASTELIN) 0.1 % nasal spray, 2 sprays by Nasal route 2 times daily Use in each nostril as directed, Disp: 1 Bottle, Rfl: 11    magnesium oxide (MAG-OX) 400 (241.3 Mg) MG TABS tablet, Take 1 tablet by mouth once daily, Disp: 90 tablet, Rfl: 3    EPINEPHrine (EPIPEN 2-FERNANDO) 0.3 MG/0.3ML SOAJ injection, Inject one pen as directed STAT for allergic reaction, may disp generic NDC 38730-680-61, Disp: 2 each, Rfl: 0    Handicap Placard MISC, by Does not apply route Dx:  Lupus arthritis,  Length 5 years, Disp: 1 each, Rfl: 0    triamcinolone (KENALOG) 0.1 % cream, Apply topically 2 times daily Apply topically 2 times daily. , Disp: 1 Tube, Rfl: 5    levocetirizine (XYZAL) 5 MG tablet, TAKE ONE TABLET BY MOUTH NIGHTLY, Disp: 30 tablet, Rfl: 11    sodium chloride (OCEAN) 0.65 % nasal spray, 1 spray by Nasal route as needed for Congestion, Disp: 1 Bottle, Rfl: 3    NEXIUM 40 MG delayed release capsule, TAKE 1 CAPSULE BY MOUTH ONCE DAILY, Disp: 30 capsule, Rfl: 5    levalbuterol (XOPENEX HFA) 45 MCG/ACT inhaler, Inhale 1-2 puffs into the lungs every 4 hours as needed for Wheezing or Shortness of Breath, Disp: 1 Inhaler, Rfl: 3    levalbuterol (XOPENEX) 0.63 MG/3ML nebulization, Take 3 mLs by nebulization every 6 hours as needed for Wheezing or Shortness of Breath, Disp: 270 mL, Rfl: 5    Respiratory Therapy Supplies (NEBULIZER AIR TUBE/PLUGS) MISC, 1 kit by Does not apply route every 6 hours as needed (Wheezing/shortness of breath) Please provide nebulizer kit and supplies. , Disp: 1 each, Rfl: 0    aspirin (LORENZO ASPIRIN) 325 MG tablet, Take 1 tablet by mouth daily, Disp: 30 tablet, Rfl: 3    diclofenac sodium (VOLTAREN) 1 % GEL, Apply 2 g topically 4 times daily as needed for Pain (topical) , Disp: , Rfl:     acetaminophen (TYLENOL 8 HOUR) 650 MG extended release tablet, Take 1 tablet by mouth once as needed for Pain (give 30 minutes prior to IVIG infusions), Disp: 1 tablet, Rfl: 5      Physical Exam:      Vitals:    Vitals:    05/31/22 0807   BP: 124/72   Pulse: 89   Resp: 16   Temp: 97 °F (36.1 °C)   SpO2: 98%       225 lb 11.2 oz (102.4 kg) Temp: 97 °F (36.1 °C) I @FLOWSTAT(6)@ IHeart Rate: 89 I @FLOWSTAT(8)@ I BP: 124/72 I @SBPMAX(24)@; @DBPMAX(24)@ I Resp: 16 I @FLOWSTAT(9)@ I SpO2: 98 % I @FLOWSTAT(10)@ I   I Height: 5' 3\" (160 cm) I   I Facility age limit for growth percentiles is 20 years. I     Facility age limit for growth percentiles is 20 years. Facility age limit for growth percentiles is 20 years. Facility age limit for growth percentiles is 20 years. Facility age limit for growth percentiles is 20 years. Physical Exam:    Physical Exam  Vitals and nursing note reviewed. Constitutional:       Appearance: Normal appearance. She is normal weight. HENT:      Head: Normocephalic and atraumatic. Right Ear: Ear canal and external ear normal.      Left Ear: Ear canal and external ear normal.      Nose: Nose normal.      Mouth/Throat:      Pharynx: Oropharynx is clear. Comments: Dry mouth  Eyes:      Extraocular Movements: Extraocular movements intact. Conjunctiva/sclera: Conjunctivae normal.      Pupils: Pupils are equal, round, and reactive to light. Cardiovascular:      Rate and Rhythm: Normal rate and regular rhythm. Pulses: Normal pulses. Heart sounds: Normal heart sounds. Comments: Portacath at center of chest without erythema  Pulmonary:      Effort: Pulmonary effort is normal.      Breath sounds: Normal breath sounds. Musculoskeletal:         General: Normal range of motion. Cervical back: Normal range of motion and neck supple. Skin:     General: Skin is warm and dry. Capillary Refill: Capillary refill takes less than 2 seconds. Neurological:      General: No focal deficit present. Mental Status: She is alert and oriented to person, place, and time. Mental status is at baseline. Psychiatric:         Mood and Affect: Mood normal.         Behavior: Behavior normal.         Thought Content:  Thought content normal.         Judgment: Judgment normal.             DATA:  Lab Review:    CBC:   Lab Results   Component Value Date    WBC 6.2 05/22/2022    RBC 5.26 05/22/2022    HGB 12.8 05/22/2022    HCT 42.3 05/22/2022    MCV 80.4 05/22/2022    MCH 24.3 05/22/2022    MCHC 30.3 05/22/2022    RDW 14.1 02/06/2022     05/22/2022          IgE   Date/Time Value Ref Range Status   10/03/2020 09:30 AM 2 <101 IU/mL Final     Comment:     [address] [place] [address] [phone]     Immunoglobulin E   Date/Time Value Ref Range Status   10/03/2020 09:30 AM 4 <=214 kU/L Final     Comment:     REFERENCE INTERVAL: Immunoglobulin E, Serum  Access complete set of age- and/or gender-specific reference  intervals for this test in the [employer] [facility] Test Directory  ([facility]). IgG   Date/Time Value Ref Range Status   05/17/2022 12:35  700 - 1600 mg/dL Final     Comment:     [address] [place] [address] [phone]     IgA   Date/Time Value Ref Range Status   01/26/2022 08:59  70 - 400 mg/dL Final     Comment:     [address] [place] [id number] ([phone]      IgM   Date/Time Value Ref Range Status   10/03/2020 09:30 AM < 25 (L) 40 - 230 mg/dL Final     Comment:     [address] [place] [address] [phone]       No results found for: EVELIN   No results found for: RF       No results found for this or any previous visit. No results found for this or any previous visit. All current and previous medial labs have been reviewed and discussed with patient         Procedures: Allergy Testing: 10/2020  Assessment/Orders:    Diagnosis Orders   1. CVID (common variable immunodeficiency) ([facility] [address]     2. Port-A-Cath in place  Ambulatory referral to Interventional Radiology   3. Lupus ([facility] [address]     4. Fibromyalgia         All diagnostic imaging  have been personally reviewed     Plan:  Follow Up:6 months         I have discussed the Mediport removal with the patient today.   Patient will be referred to interventional radiology to have a Mediport removed. At that time she will be placed under light conscious sedation. Patient has been explained the risk of conscious sedation.     I have also explained the Mediport process to the patient. After Mediport is removed patient should keep incision site clean and dry. Patient expresses understanding. Risk of complications of removal includes scarring, infection, thrombosis,, pneumothorax, pain at the placement site,      .  She understands that she will have to undergo conscious sedation again and have to go back to interventional radiology will then it be removed. Similar complication as above including infection, poor wound healing, pain at the site, and pneumothorax. Patient expresses understanding despite risk of Mediport placement they wish to continue to have Mediport removed.       Patient to stop Plavix for 7 days prior to removal  Patient to restart Plavix 48 hours after port is removed  Patient to stop Voltaren and aspirin 7 days prior to port removal        Spent 30 minutes of face-to-face time with the patient with well more than half of the visit being dedicated to the discussion of the various symptom problems, provided education of medications and disease process, as well as discussion of a therapeutic plan for each. Face-to-face education time does not include any time that may have been spent for procedures.     (Please note that portions of this note may have been completed with a voice recognition program.  Efforts were made to edit the dictation but occasionally words are mis-transcribed.)         Signed:  NAHID Muse CNP  5/31/2022  8:50 AM

## 2022-06-01 ENCOUNTER — OFFICE VISIT (OUTPATIENT)
Dept: RHEUMATOLOGY | Age: 42
End: 2022-06-01
Payer: MEDICARE

## 2022-06-01 VITALS
WEIGHT: 225.6 LBS | SYSTOLIC BLOOD PRESSURE: 132 MMHG | HEART RATE: 89 BPM | OXYGEN SATURATION: 96 % | BODY MASS INDEX: 39.97 KG/M2 | HEIGHT: 63 IN | DIASTOLIC BLOOD PRESSURE: 70 MMHG

## 2022-06-01 DIAGNOSIS — G89.29 CHRONIC BILATERAL LOW BACK PAIN WITHOUT SCIATICA: ICD-10-CM

## 2022-06-01 DIAGNOSIS — D80.1 HYPOGAMMAGLOBULINEMIA (HCC): ICD-10-CM

## 2022-06-01 DIAGNOSIS — M32.9 LUPUS ARTHRITIS (HCC): Primary | ICD-10-CM

## 2022-06-01 DIAGNOSIS — Z51.81 MEDICATION MONITORING ENCOUNTER: ICD-10-CM

## 2022-06-01 DIAGNOSIS — M54.50 CHRONIC BILATERAL LOW BACK PAIN WITHOUT SCIATICA: ICD-10-CM

## 2022-06-01 DIAGNOSIS — M79.7 FIBROMYALGIA: ICD-10-CM

## 2022-06-01 PROCEDURE — 99214 OFFICE O/P EST MOD 30 MIN: CPT | Performed by: NURSE PRACTITIONER

## 2022-06-01 RX ORDER — GABAPENTIN 300 MG/1
CAPSULE ORAL
Qty: 49 CAPSULE | Refills: 0 | Status: SHIPPED | OUTPATIENT
Start: 2022-06-01 | End: 2022-07-16 | Stop reason: SDUPTHER

## 2022-06-01 RX ORDER — FOLIC ACID 1 MG/1
2 TABLET ORAL DAILY
Qty: 60 TABLET | Refills: 2 | Status: SHIPPED | OUTPATIENT
Start: 2022-06-01 | End: 2022-09-26

## 2022-06-01 ASSESSMENT — ENCOUNTER SYMPTOMS
BACK PAIN: 1
SHORTNESS OF BREATH: 0
ABDOMINAL PAIN: 0
DIARRHEA: 0
EYE ITCHING: 0
NAUSEA: 0
TROUBLE SWALLOWING: 0
EYE PAIN: 0
COUGH: 0
CONSTIPATION: 0

## 2022-06-01 NOTE — PROGRESS NOTES
[facility] RHEUMATOLOGY FOLLOW UP NOTE       Date Of Service: 6/1/2022  Provider: NAHID Bermudez - CNP    Name: Gavi Hall   MRN: [id number]    Chief Complaint(s)     Chief Complaint   Patient presents with    Follow-up        History of Present Illness (HPI)     Gavi Hall  is a(n)41 y.o. female with a hx of morbid obesity, CVA x1 (2017), GERD, hiatal hernia, IBS, endometriosis s/p hysterectomy, asthma, hypercholesterolemia, type 2 DM, lupus arthritis, fibromyalgia, IgG subclass deficiency  here for the f/u evaluation of h/o lupus, fibromyalgia     Interval hx:    - here to discuss treatment options, currently off medications. Does not feel like azathioprine was helping, felt best on methotrexate which was stopped due to LFT elevation. pain affecting the fingers, wrists, elbows, shoulders, hips, knees, ankles, toes,neck, back  Pain on a scale 0-10: 8/10  Type of pain: constant  Timing: evenings  Aggravating factors: inactivity, cold, increased use, humid weather  Alleviating factors: CBD gummies    Associated symptoms:  + swelling/  Redness/ warmth (hands, feet, ankles, knees), + AM stiffness lasting ~ 2 hours      REVIEW OF SYSTEMS: (ROS)    Review of Systems   Constitutional: Negative for fatigue, fever and unexpected weight change. HENT: Positive for tinnitus. Negative for congestion and trouble swallowing. Dry mouth   Eyes: Negative for pain and itching. Respiratory: Negative for cough and shortness of breath. Cardiovascular: Negative for chest pain and leg swelling. Gastrointestinal: Negative for abdominal pain, constipation, diarrhea and nausea. Endocrine: Negative for cold intolerance and heat intolerance. Genitourinary: Negative for difficulty urinating, frequency and urgency. Musculoskeletal: Positive for arthralgias, back pain, joint swelling, myalgias and neck pain. Skin: Negative for rash. Neurological: Positive for weakness, numbness and headaches. Negative for dizziness. Psychiatric/Behavioral: Positive for sleep disturbance. The patient is not nervous/anxious.         PAST MEDICAL HISTORY      Past Medical History:   Diagnosis Date    Asthma     Diabetes mellitus ([address] 6/8/2018    Fibromyalgia     GERD (gastroesophageal reflux disease)     Hyperlipidemia     Lupus (systemic lupus erythematosus) (HCC)     Lupus arthritis [facility])     [facility]    Migraine     Plaquenil adverse reaction in therapeutic use 5/2016    changes in the eyes    TIA (transient ischemic attack)     8/2016       PAST SURGICAL HISTORY      Past Surgical History:   Procedure Laterality Date    HYSTERECTOMY  2009    Total, endometriosis    LAPAROSCOPY      CESIA STEROTACTIC LOC BREAST BIOPSY LEFT Left 02/26/2020    2 sites, benign    SHOULDER ARTHROSCOPY Left 08/20/2015    Debridement of rotator cuff and bone spurs       FAMILY HISTORY      Family History   Problem Relation Age of Onset    High Blood Pressure Mother     Rheum Arthritis Mother     Osteoporosis Mother     Diabetes Father     High Blood Pressure Father     Arthritis Father         gout    Diabetes Sister     Other Sister         Crest Syndrome    Breast Cancer Sister 54    Breast Cancer Sister 40    BRCA 1 Positive Sister     BRCA 2 Positive Sister     Diabetes Brother     High Cholesterol Brother     Breast Cancer Maternal Aunt 61    Breast Cancer Maternal Cousin 25    Cancer Paternal Uncle [de-identified]        leukemia       SOCIAL HISTORY      Social History     Tobacco History     Smoking Status  Never Smoker    Smokeless Tobacco Use  Never Used          Alcohol History     Alcohol Use Status  No          Drug Use     Drug Use Status  No          Sexual Activity     Sexually Active  Not Currently                ALLERGIES     Allergies   Allergen Reactions    Cayenne Swelling     Tongue and throat swelling    Codeine Rash    Tape [Adhesive Tape]      Tears skin  Steri strips blisters skin    Amoxicillin Other (See Comments)    Sulfa Antibiotics Other (See Comments)     Can not take medication due to illness       CURRENT MEDICATIONS      Current Outpatient Medications   Medication Sig Dispense Refill    DULoxetine (CYMBALTA) 30 MG extended release capsule Take 1 capsule by mouth once daily 90 capsule 0    SM PAIN RELIEVER 325 MG tablet TAKE 2 TABLETS BY MOUTH THREE TIMES DAILY FOR 14 DAYS 100 tablet 0    metFORMIN (GLUCOPHAGE) 500 MG tablet TAKE 1 TABLET BY MOUTH TWICE DAILY WITH MEALS 180 tablet 3    cyclobenzaprine (FLEXERIL) 5 MG tablet Take 1-2 tablets by mouth 3 times daily as needed for Muscle spasms 270 tablet 0    sucralfate (CARAFATE) 1 GM tablet Take 1 tablet by mouth 4 times daily 120 tablet 11    Immune Globulin-Hyaluronidase (HYQVIA) 20 GM/200ML KIT Inject 20 g into the skin every 28 days 1 each 11    montelukast (SINGULAIR) 10 MG tablet Take 1 tablet by mouth nightly 90 tablet 11    Dulaglutide 0.75 MG/0.5ML SOPN Inject 0.75 mg into the skin once a week 15 pen 11    verapamil (CALAN SR) 240 MG extended release tablet Take 1 tablet by mouth once daily 90 tablet 3    clotrimazole-betamethasone (LOTRISONE) 1-0.05 % cream APPLY  CREAM TOPICALLY TWICE DAILY 45 g 0    clopidogrel (PLAVIX) 75 MG tablet Take 1 tablet by mouth daily 90 tablet 3    heparin flush 100 UNIT/ML injection 1 mL by IntraCATHeter route every 30 days Flush mediport monthly with 5 mls (or 500 units) of Heparin (100 units per ml) 5 mL 11    Continuous Blood Gluc Sensor (FREESTYLE SANDRA 14 DAY SENSOR) MISC USE TO CHECK BLOOD SUGAR TWICE DAILY 3 each 5    Continuous Blood Gluc Sensor (FREESTYLE SANDRA 14 DAY SENSOR) MISC 1 Units by Subcuticular route every 14 days 3 each 11    atorvastatin (LIPITOR) 80 MG tablet Take 1 tablet by mouth daily 90 tablet 3    DULoxetine (CYMBALTA) 60 MG extended release capsule Take 1 capsule by mouth daily 90 capsule 3    potassium chloride (KLOR-CON) 10 MEQ extended release tablet Take 1 tablet by mouth once daily 90 tablet 3    ibuprofen (ADVIL;MOTRIN) 600 MG tablet Take 1 tablet by mouth every 8 hours as needed for Pain 42 tablet 5    traZODone (DESYREL) 50 MG tablet Take 3 tablets by mouth nightly 270 tablet 3    azelastine (ASTELIN) 0.1 % nasal spray 2 sprays by Nasal route 2 times daily Use in each nostril as directed 1 Bottle 11    magnesium oxide (MAG-OX) 400 (241.3 Mg) MG TABS tablet Take 1 tablet by mouth once daily 90 tablet 3    EPINEPHrine (EPIPEN 2-FERNANDO) 0.3 MG/0.3ML SOAJ injection Inject one pen as directed STAT for allergic reaction, may disp generic NDC 54627-830-48 2 each 0    Handicap Placard MISC by Does not apply route Dx:  Lupus arthritis,  Length 5 years 1 each 0    triamcinolone (KENALOG) 0.1 % cream Apply topically 2 times daily Apply topically 2 times daily. 1 Tube 5    levocetirizine (XYZAL) 5 MG tablet TAKE ONE TABLET BY MOUTH NIGHTLY 30 tablet 11    sodium chloride (OCEAN) 0.65 % nasal spray 1 spray by Nasal route as needed for Congestion 1 Bottle 3    NEXIUM 40 MG delayed release capsule TAKE 1 CAPSULE BY MOUTH ONCE DAILY 30 capsule 5    levalbuterol (XOPENEX HFA) 45 MCG/ACT inhaler Inhale 1-2 puffs into the lungs every 4 hours as needed for Wheezing or Shortness of Breath 1 Inhaler 3    levalbuterol (XOPENEX) 0.63 MG/3ML nebulization Take 3 mLs by nebulization every 6 hours as needed for Wheezing or Shortness of Breath 270 mL 5    Respiratory Therapy Supplies (NEBULIZER AIR TUBE/PLUGS) MISC 1 kit by Does not apply route every 6 hours as needed (Wheezing/shortness of breath) Please provide nebulizer kit and supplies.  1 each 0    aspirin (LORENZO ASPIRIN) 325 MG tablet Take 1 tablet by mouth daily 30 tablet 3    diclofenac sodium (VOLTAREN) 1 % GEL Apply 2 g topically 4 times daily as needed for Pain (topical)       acetaminophen (TYLENOL 8 HOUR) 650 MG extended release tablet Take 1 tablet by mouth once as needed for Pain (give 30 minutes prior to IVIG infusions) 1 tablet 5    methotrexate (RHEUMATREX) 2.5 MG chemo tablet Take 4 tablets by mouth once a week (Patient not taking: Reported on 6/1/2022) 16 tablet 0    folic acid (FOLVITE) 1 MG tablet Take 1 tablet by mouth daily (Patient not taking: Reported on 6/1/2022) 90 tablet 1     No current facility-administered medications for this visit. PHYSICAL EXAMINATION / OBJECTIVE   Objective:  /70 (Site: Left Upper Arm, Position: Sitting, Cuff Size: Medium Adult)   Pulse 89   Ht 5' 2.99\" (1.6 m)   Wt 225 lb 9.6 oz (102.3 kg)   SpO2 96%   BMI 39.97 kg/m²     Physical Exam  Vitals reviewed. Constitutional:       Appearance: She is well-developed. Cardiovascular:      Rate and Rhythm: Normal rate and regular rhythm. Pulmonary:      Effort: Pulmonary effort is normal.      Breath sounds: Normal breath sounds. Musculoskeletal:      Cervical back: Normal range of motion and neck supple. Skin:     General: Skin is warm and dry. Findings: No rash. Neurological:      Mental Status: She is alert and oriented to person, place, and time. Psychiatric:         Thought Content:  Thought content normal.       Upper extremities:    SHOULDERS + tender bilat, no swelling ,   ELBOWS + tender, no swelling,   WRISTS + tender bilat, no swelling,   HANDS/FINGERS tender bilat MCPs, PIPs, DIPs. + Mild fullness right 2-4 PIPs    Lower extremities:  HIPS + tender bilat  KNEES + tender bilat, no swelling  ANKLES + tender bilat, no swelling   FEET : + tender bilat, + bogginess bilat MTPs    Spine:   + tender throughout      RAPID 3:   6/1/2022 --- RAPID 3: 3.3 + 7.5 + 8.5 = 19.3     Remission: <3  Low Disease Activity: <6  Moderate Disease Activity: >=6 and <=12  High Disease Activity: >12     LABS    CBC  Lab Results   Component Value Date    WBC 6.2 05/22/2022    RBC 5.26 05/22/2022    HGB 12.8 05/22/2022    HCT 42.3 05/22/2022    MCV 80.4 05/22/2022    MCH 24.3 05/22/2022    MCHC 30.3 05/22/2022    RDW 14.1 02/06/2022     05/22/2022       CMP  Lab Results   Component Value Date    CALCIUM 9.3 05/22/2022    LABALBU 4.2 05/17/2022    PROT 6.2 05/17/2022     05/22/2022    K 4.4 05/22/2022    CO2 28 05/22/2022     05/22/2022    BUN 12 05/22/2022    CREATININE 0.6 05/22/2022    ALKPHOS 152 05/17/2022    ALT 34 05/17/2022    AST 37 05/17/2022       HgBA1c: No components found for: HGBA1C    No results found for: VITD25      Lab Results   Component Value Date    ANASCRN None Detected 01/26/2022     No results found for: SSA  No results found for: SSB  No results found for: ANTI-SMITH  No results found for: DSDNAAB   No results found for: ANTIRNP  No results found for: C3, C4  No results found for: CCPAB  No results found for: RF    No components found for: CANCASCRN, APANCASCRN  Lab Results   Component Value Date    SEDRATE 11 05/17/2022     Lab Results   Component Value Date    CRP 0.36 05/17/2022       RADIOLOGY:         ASSESSMENT/PLAN    Assessment   Plan     undiff inflammatory arthritis  H/o lupus  - h/o lupus dx'd around 14 yoa. H/o reported nasal sores, oral sores, malar rash, inflammatory arthritis, episodic fevers. 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 + sicca symptoms. Reported raynauds. Mild joint swelling, warmth noted on exam. Last [facility] note w/ diagnosis of inflammatory poly-arthropathy. Repeat blood testing with EVELIN 1:80 dense fine speckled  - prior tx: methotrexate, arava (somre relief)   - restart methotrexate 7.5 mg PO weekly and folic acid 2 mg daily    Fibromyalgia- active  - prior tx: PT (no relief), gabapentin, lyrica   - weaning off cymbalta, 60 mg x7 days, 30 mg x7 days, then stop   - retrial gabapentin 300 mg nightly x 7 days, then 300 mg BID    Chronic low back pain  - June 2019 CT with sclerosis around bilat Si joints given inflammatory arthritis and inflammatory back pain sx's.  MRI pelvis w/o inflammation    Carpal tunnel syndrome bilat  Cubital tunnel bilat  - + tinel bilat, intermittent numbness/tingling   - continue wrist splinting, elbow splinting   - patient previously declined EMG/NCV    Immunoglobulin deg- IgG, IgM def  - reports recurrent sinus infections   - following with allergy/immunology    Medication monitoring   - CBC, CMP, sed rate, CRP q 4 weeks x3 w/ MTX start      No follow-ups on file. Electronically signed by NAHID Barnes CNP on 6/1/2022 at 9:44 AM    New Prescriptions    No medications on file       Thank you for allowing me to participate in the care of this patient. Please call if there are any questions.

## 2022-06-06 ENCOUNTER — HOSPITAL ENCOUNTER (OUTPATIENT)
Dept: INTERVENTIONAL RADIOLOGY/VASCULAR | Age: 42
Discharge: HOME | End: 2022-06-06
Payer: MEDICARE

## 2022-06-06 VITALS
OXYGEN SATURATION: 96 % | SYSTOLIC BLOOD PRESSURE: 141 MMHG | DIASTOLIC BLOOD PRESSURE: 89 MMHG | BODY MASS INDEX: 39.87 KG/M2 | WEIGHT: 225 LBS | HEART RATE: 87 BPM | TEMPERATURE: 97.5 F | RESPIRATION RATE: 18 BRPM

## 2022-06-06 PROCEDURE — 2580000003 HC RX 258: Performed by: RADIOLOGY

## 2022-06-06 PROCEDURE — 6360000002 HC RX W HCPCS: Performed by: RADIOLOGY

## 2022-06-06 PROCEDURE — 77001 FLUOROGUIDE FOR VEIN DEVICE: CPT

## 2022-06-06 PROCEDURE — 2709999900 IR FLUORO GUIDED CVA DEVICE PLMT/REPLACE/REMOVAL

## 2022-06-06 PROCEDURE — 36590 REMOVAL TUNNELED CV CATH: CPT

## 2022-06-06 RX ORDER — MIDAZOLAM HYDROCHLORIDE 1 MG/ML
1 INJECTION INTRAMUSCULAR; INTRAVENOUS ONCE
Status: COMPLETED | OUTPATIENT
Start: 2022-06-06 | End: 2022-06-06

## 2022-06-06 RX ORDER — FENTANYL CITRATE 50 UG/ML
50 INJECTION, SOLUTION INTRAMUSCULAR; INTRAVENOUS ONCE
Status: COMPLETED | OUTPATIENT
Start: 2022-06-06 | End: 2022-06-06

## 2022-06-06 RX ORDER — SODIUM CHLORIDE 450 MG/100ML
INJECTION, SOLUTION INTRAVENOUS CONTINUOUS
Status: DISCONTINUED | OUTPATIENT
Start: 2022-06-06 | End: 2022-06-07 | Stop reason: HOSPADM

## 2022-06-06 RX ADMIN — CEFAZOLIN SODIUM 2000 MG: 10 INJECTION, POWDER, FOR SOLUTION INTRAVENOUS at 08:27

## 2022-06-06 RX ADMIN — SODIUM CHLORIDE: 4.5 INJECTION, SOLUTION INTRAVENOUS at 08:23

## 2022-06-06 RX ADMIN — MIDAZOLAM HYDROCHLORIDE 1 MG: 1 INJECTION, SOLUTION INTRAMUSCULAR; INTRAVENOUS at 09:59

## 2022-06-06 RX ADMIN — MIDAZOLAM HYDROCHLORIDE 1 MG: 1 INJECTION, SOLUTION INTRAMUSCULAR; INTRAVENOUS at 10:04

## 2022-06-06 RX ADMIN — FENTANYL CITRATE 50 MCG: 50 INJECTION, SOLUTION INTRAMUSCULAR; INTRAVENOUS at 10:04

## 2022-06-06 RX ADMIN — FENTANYL CITRATE 50 MCG: 50 INJECTION, SOLUTION INTRAMUSCULAR; INTRAVENOUS at 09:59

## 2022-06-06 ASSESSMENT — PAIN - FUNCTIONAL ASSESSMENT: PAIN_FUNCTIONAL_ASSESSMENT: 0-10

## 2022-06-06 NOTE — PROGRESS NOTES
_M___ Safety:       (Environmental)   [place] to environment   Ensure ID band is correct and in place/ allergy band as needed   Assess for fall risk   Initiate fall precautions as applicable (fall band, side rails, etc.)   Call light within reach   Bed in low position/ wheels locked    _M___ Pain:        Assess pain level and characteristics   Administer analgesics as ordered   Assess effectiveness of pain management and report to MD as needed    _M___ Knowledge Deficit:   Assess baseline knowledge   Provide teaching at level of understanding   Provide teaching via preferred learning method   Evaluate teaching effectiveness    _M___ Hemodynamic/Respiratory Status:       (Pre and Post Procedure Monitoring)   Assess/Monitor vital signs and LOC   Assess Baseline SpO2 prior to any sedation   Obtain weight/height   Assess vital signs/ LOC until patient meets discharge criteria   Monitor procedure site and notify MD of any issues    M____ Infection-Risk of Central Venous Catheter:   Monitor for infection signs and symptoms (catheter site redness, temperature elevation, etc)   Assess for infection risks   Educate regarding infection prevention   Manage central venous catheter (flushes/ dressing changes per protocol)

## 2022-06-06 NOTE — H&P
Formulation and discussion of sedation / procedure plans, risks, benefits, side effects and alternatives with patient and/or responsible adult completed.     Electronically signed by Eric Carmen MD on 6/6/2022 at 10:32 AM

## 2022-06-06 NOTE — PROGRESS NOTES
7535 Patient received in IR for mediport removal.   0945 This procedure has been fully reviewed with the patient and written informed consent has been obtained. 8918 Procedure started with Dr. Bashir Tavares. 1015 Procedure completed; patient tolerated well. Band aid to OPN; no bleeding noted. 1022 Patient on cart; comfort ensured. 1025 Patient taken to OPN via cart.

## 2022-06-06 NOTE — OP NOTE
Department of Radiology  Post Procedure Progress Note       Pre-Procedure Diagnosis:  History of SLE    Procedure Performed:  Chest wall infusion port removal    Anesthesia: local / versed and fentanyl    Findings: successful    Immediate Complications:  None    Estimated Blood Loss: minimal    SEE DICTATED PROCEDURE NOTE FOR COMPLETE DETAILS.     Electronically signed by Shereen Haji MD on 6/6/2022 at 10:34 AM

## 2022-06-06 NOTE — H&P
[address] [address]  Sedation/Analgesia History & Physical    Pt Name: Moni Vera  MRN: [id number]  YOB: 1980  Provider Performing Procedure: Caleb Swenson MD, MD  Primary Care Physician: Lisa Cantrell MD    PRE-PROCEDURE   DNR-CCA/DNR-CC []Yes [x]No  Brief History/Pre-Procedure Diagnosis: History of SLE          MEDICAL HISTORY  []CAD/Valve  []Liver Disease  []Lung Disease [x]Diabetes  []Hypertension []Renal Disease  []Additional information:       has a past medical history of Asthma, Diabetes mellitus ([facility] [address] Fibromyalgia, GERD (gastroesophageal reflux disease), Hyperlipidemia, Lupus (systemic lupus erythematosus) ([facility] [address] Lupus arthritis ([facility] [address] Migraine, Plaquenil adverse reaction in therapeutic use, and TIA (transient ischemic attack). SURGICAL HISTORY   has a past surgical history that includes Hysterectomy (2009); laparoscopy; Shoulder arthroscopy (Left, 08/20/2015); and [facility] STEREO BREAST BX W LOC DEVICE 1ST LESION LEFT (Left, 02/26/2020). Additional information:       ALLERGIES   Allergies as of 06/06/2022 - Fully Reviewed 06/06/2022   Allergen Reaction Noted    Cayenne Swelling 01/27/2016    Codeine Rash 09/15/2012    Tape Russel Finder tape]  09/15/2012    Amoxicillin Other (See Comments) 03/30/2017    Sulfa antibiotics Other (See Comments) 05/23/2016     Additional information:       MEDICATIONS   Coumadin Use Last 5 Days [x]No []Yes  Antiplatelet drug therapy use last 5 days  [x]No []Yes  Other anticoagulant use last 5 days  [x]No []Yes    Current Outpatient Medications:     methotrexate (RHEUMATREX) 2.5 MG chemo tablet, Take 3 tablets by mouth once a week, Disp: 12 tablet, Rfl: 0    folic acid (FOLVITE) 1 MG tablet, Take 2 tablets by mouth daily, Disp: 60 tablet, Rfl: 2    gabapentin (NEURONTIN) 300 MG capsule, Take 1 capsule by mouth nightly for 7 days, THEN 1 capsule in the morning and at bedtime for 21 days. Intended supply: 90 days. , Disp: 49 capsule, Rfl: 0   DULoxetine (CYMBALTA) 30 MG extended release capsule, Take 1 capsule by mouth once daily, Disp: 90 capsule, Rfl: 0    metFORMIN (GLUCOPHAGE) 500 MG tablet, TAKE 1 TABLET BY MOUTH TWICE DAILY WITH MEALS, Disp: 180 tablet, Rfl: 3    cyclobenzaprine (FLEXERIL) 5 MG tablet, Take 1-2 tablets by mouth 3 times daily as needed for Muscle spasms, Disp: 270 tablet, Rfl: 0    sucralfate (CARAFATE) 1 GM tablet, Take 1 tablet by mouth 4 times daily, Disp: 120 tablet, Rfl: 11    acetaminophen (TYLENOL 8 HOUR) 650 MG extended release tablet, Take 1 tablet by mouth once as needed for Pain (give 30 minutes prior to IVIG infusions), Disp: 1 tablet, Rfl: 5    Immune Globulin-Hyaluronidase (HYQVIA) 20 GM/200ML KIT, Inject 20 g into the skin every 28 days, Disp: 1 each, Rfl: 11    montelukast (SINGULAIR) 10 MG tablet, Take 1 tablet by mouth nightly, Disp: 90 tablet, Rfl: 11    Dulaglutide 0.75 MG/0.5ML SOPN, Inject 0.75 mg into the skin once a week, Disp: 15 pen, Rfl: 11    verapamil (CALAN SR) 240 MG extended release tablet, Take 1 tablet by mouth once daily, Disp: 90 tablet, Rfl: 3    clotrimazole-betamethasone (LOTRISONE) 1-0.05 % cream, APPLY  CREAM TOPICALLY TWICE DAILY, Disp: 45 g, Rfl: 0    clopidogrel (PLAVIX) 75 MG tablet, Take 1 tablet by mouth daily, Disp: 90 tablet, Rfl: 3    heparin flush 100 UNIT/ML injection, 1 mL by IntraCATHeter route every 30 days Flush mediport monthly with 5 mls (or 500 units) of Heparin (100 units per ml), Disp: 5 mL, Rfl: 11    Continuous Blood Gluc Sensor (FREESTYLE SANDRA 14 DAY SENSOR) MISC, USE TO CHECK BLOOD SUGAR TWICE DAILY, Disp: 3 each, Rfl: 5    Continuous Blood Gluc Sensor (FREESTYLE SANDRA 14 DAY SENSOR) MISC, 1 Units by Subcuticular route every 14 days, Disp: 3 each, Rfl: 11    atorvastatin (LIPITOR) 80 MG tablet, Take 1 tablet by mouth daily, Disp: 90 tablet, Rfl: 3    DULoxetine (CYMBALTA) 60 MG extended release capsule, Take 1 capsule by mouth daily, Disp: 90 capsule, Rfl: 3    potassium chloride (KLOR-CON) 10 MEQ extended release tablet, Take 1 tablet by mouth once daily, Disp: 90 tablet, Rfl: 3    ibuprofen (ADVIL;MOTRIN) 600 MG tablet, Take 1 tablet by mouth every 8 hours as needed for Pain, Disp: 42 tablet, Rfl: 5    traZODone (DESYREL) 50 MG tablet, Take 3 tablets by mouth nightly, Disp: 270 tablet, Rfl: 3    azelastine (ASTELIN) 0.1 % nasal spray, 2 sprays by Nasal route 2 times daily Use in each nostril as directed, Disp: 1 Bottle, Rfl: 11    magnesium oxide (MAG-OX) 400 (241.3 Mg) MG TABS tablet, Take 1 tablet by mouth once daily, Disp: 90 tablet, Rfl: 3    EPINEPHrine (EPIPEN 2-FERNANDO) 0.3 MG/0.3ML SOAJ injection, Inject one pen as directed STAT for allergic reaction, may disp generic NDC 16731-757-43, Disp: 2 each, Rfl: 0    Handicap Placard MISC, by Does not apply route Dx:  Lupus arthritis,  Length 5 years, Disp: 1 each, Rfl: 0    triamcinolone (KENALOG) 0.1 % cream, Apply topically 2 times daily Apply topically 2 times daily. , Disp: 1 Tube, Rfl: 5    levocetirizine (XYZAL) 5 MG tablet, TAKE ONE TABLET BY MOUTH NIGHTLY, Disp: 30 tablet, Rfl: 11    sodium chloride (OCEAN) 0.65 % nasal spray, 1 spray by Nasal route as needed for Congestion, Disp: 1 Bottle, Rfl: 3    NEXIUM 40 MG delayed release capsule, TAKE 1 CAPSULE BY MOUTH ONCE DAILY, Disp: 30 capsule, Rfl: 5    levalbuterol (XOPENEX HFA) 45 MCG/ACT inhaler, Inhale 1-2 puffs into the lungs every 4 hours as needed for Wheezing or Shortness of Breath, Disp: 1 Inhaler, Rfl: 3    levalbuterol (XOPENEX) 0.63 MG/3ML nebulization, Take 3 mLs by nebulization every 6 hours as needed for Wheezing or Shortness of Breath, Disp: 270 mL, Rfl: 5    Respiratory Therapy Supplies (NEBULIZER AIR TUBE/PLUGS) MISC, 1 kit by Does not apply route every 6 hours as needed (Wheezing/shortness of breath) Please provide nebulizer kit and supplies. , Disp: 1 each, Rfl: 0    aspirin (LORENZO ASPIRIN) 325 MG tablet, Take 1 tablet by mouth daily, Disp: 30 tablet, Rfl: 3    diclofenac sodium (VOLTAREN) 1 % GEL, Apply 2 g topically 4 times daily as needed for Pain (topical) , Disp: , Rfl:     Current Facility-Administered Medications:     0.45 % sodium chloride infusion, , IntraVENous, Continuous, Karrie Richards MD, Last Rate: 20 mL/hr at 06/06/22 0823, New Bag at 06/06/22 0823    ceFAZolin (ANCEF) 2000 mg in dextrose 5 % 50 mL IVPB, 2,000 mg, IntraVENous, 60 Min Pre-Op, Karrie Richards MD, Stopped at 06/06/22 7070  Prior to Admission medications    Medication Sig Start Date End Date Taking? Authorizing Provider   methotrexate (RHEUMATREX) 2.5 MG chemo tablet Take 3 tablets by mouth once a week 6/1/22   NAHID Pickett CNP   folic acid (FOLVITE) 1 MG tablet Take 2 tablets by mouth daily 6/1/22   NAHID Pickett CNP   gabapentin (NEURONTIN) 300 MG capsule Take 1 capsule by mouth nightly for 7 days, THEN 1 capsule in the morning and at bedtime for 21 days. Intended supply: 90 days.  6/1/22 6/29/22  NAHID Pickett CNP   DULoxetine (CYMBALTA) 30 MG extended release capsule Take 1 capsule by mouth once daily 5/31/22   NAHID Pickett CNP   metFORMIN (GLUCOPHAGE) 500 MG tablet TAKE 1 TABLET BY MOUTH TWICE DAILY WITH MEALS 3/30/22   Kale Richmond MD   cyclobenzaprine (FLEXERIL) 5 MG tablet Take 1-2 tablets by mouth 3 times daily as needed for Muscle spasms 3/1/22   Kale Richmond MD   sucralfate (CARAFATE) 1 GM tablet Take 1 tablet by mouth 4 times daily 3/1/22   Kale Richmond MD   acetaminophen (TYLENOL 8 HOUR) 650 MG extended release tablet Take 1 tablet by mouth once as needed for Pain (give 30 minutes prior to IVIG infusions) 2/24/22 2/24/22  NAHID Villaseñor CNP   Immune Globulin-Hyaluronidase (HYQVIA) 20 GM/200ML KIT Inject 20 g into the skin every 28 days 2/24/22   NAHID Villaseñor CNP   montelukast (SINGULAIR) 10 MG tablet Take 1 tablet by mouth nightly 1/11/22   Kale Richmond MD Dulaglutide 0.75 MG/0.5ML SOPN Inject 0.75 mg into the skin once a week 1/11/22   Kiki Leonardo MD   verapamil (CALAN SR) 240 MG extended release tablet Take 1 tablet by mouth once daily 12/30/21   Kiki Leonardo MD   clotrimazole-betamethasone (LOTRISONE) 1-0.05 % cream APPLY  CREAM TOPICALLY TWICE DAILY 12/20/21   Kiki Leonardo MD   clopidogrel (PLAVIX) 75 MG tablet Take 1 tablet by mouth daily 11/29/21   Kiki Leonardo MD   heparin flush 100 UNIT/ML injection 1 mL by IntraCATHeter route every 30 days Flush mediport monthly with 5 mls (or 500 units) of Heparin (100 units per ml) 11/15/21   NAHID Shepherd - CNP   Continuous Blood Gluc Sensor (FREESTYLE SANDRA 14 DAY SENSOR) MISC USE TO CHECK BLOOD SUGAR TWICE DAILY 11/1/21   Kiki Leonardo MD   Continuous Blood Gluc Sensor (FREESTYLE SANDRA 14 DAY SENSOR) MISC 1 Units by Subcuticular route every 14 days 11/1/21   Kiki Leonardo MD   atorvastatin (LIPITOR) 80 MG tablet Take 1 tablet by mouth daily 9/29/21   Kiki Leonardo MD   DULoxetine (CYMBALTA) 60 MG extended release capsule Take 1 capsule by mouth daily 9/1/21   Kiki eLonardo MD   potassium chloride (KLOR-CON) 10 MEQ extended release tablet Take 1 tablet by mouth once daily 9/1/21   Kiki Leonardo MD   ibuprofen (ADVIL;MOTRIN) 600 MG tablet Take 1 tablet by mouth every 8 hours as needed for Pain 8/30/21   Kiki Leonardo MD   traZODone (DESYREL) 50 MG tablet Take 3 tablets by mouth nightly 8/30/21   Kiki Leonardo MD   azelastine (ASTELIN) 0.1 % nasal spray 2 sprays by Nasal route 2 times daily Use in each nostril as directed 6/2/21   Kiki Leonardo MD   magnesium oxide (MAG-OX) 400 (241.3 Mg) MG TABS tablet Take 1 tablet by mouth once daily 3/1/21   Kiki Leonardo MD   EPINEPHrine (EPIPEN 2-FERNANDO) 0.3 MG/0.3ML SOAJ injection Inject one pen as directed STAT for allergic reaction, may [facility] [id number] 11/12/20   Radha Post, APRN - EVE   Handicap Placard MISC by Does not apply route Dx:  Lupus arthritis,  Length 5 years 10/6/20   Twyla Larios MD   triamcinolone (KENALOG) 0.1 % cream Apply topically 2 times daily Apply topically 2 times daily. 12/20/19   Twyla Larios MD   levocetirizine (XYZAL) 5 MG tablet TAKE ONE TABLET BY MOUTH NIGHTLY 7/30/19   Twyla Larios MD   sodium chloride (OCEAN) 0.65 % nasal spray 1 spray by Nasal route as needed for Congestion 5/1/19   Twyla Larios MD   NEXIUM 40 MG delayed release capsule TAKE 1 CAPSULE BY MOUTH ONCE DAILY 4/29/19   Twyla Larios MD   levalbuterol [facility]) 45 MCG/ACT inhaler Inhale 1-2 puffs into the lungs every 4 hours as needed for Wheezing or Shortness of Breath 1/3/19   NAHID Johnston CNP   levalbuterol (XOPENEX) 0.63 MG/3ML nebulization Take 3 mLs by nebulization every 6 hours as needed for Wheezing or Shortness of Breath 12/6/18   NAHID Johnston CNP   Respiratory Therapy Supplies (NEBULIZER AIR TUBE/PLUGS) MISC 1 kit by Does not apply route every 6 hours as needed (Wheezing/shortness of breath) Please provide nebulizer kit and supplies.  12/6/18   NAHID Johnston CNP   aspirin (LORENZO ASPIRIN) 325 MG tablet Take 1 tablet by mouth daily 9/11/17   Twyla Larios MD   diclofenac sodium (VOLTAREN) 1 % GEL Apply 2 g topically 4 times daily as needed for Pain (topical)     Historical Provider, MD     Additional information:       VITAL SIGNS   Vitals:    06/06/22 1031   BP: (!) 143/84   Pulse: 86   Resp: 18   Temp:    SpO2: 94%       PHYSICAL:   Heart:  [x]Regular rate and rhythm  []Other:    Lungs:  [x]Clear    []Other:    Abdomen: [x]Soft    []Other:    Mental Status: [x]Alert & Oriented  []Other:      PLANNED PROCEDURE   []Biospy []Arteriogram              []Drainage   [x]Mediport removal  []Fistulogram []IV access       []Vertebroplasty / Augmentation  []IVC filter []Dialysis catheter []Biliary drainage  []Other: []CAPD Catheter []Nephrostomy Tube / Stent  SEDATION  Planned agent: [x]Midazolam []Meperidine [x]Sublimaze []Dilaudid []Morphine     []Diazepam  []Other:     ASA Classification:  []1 [x]2 []3 []4 []5  Class 1: A normal healthy patient  Class 2: Pt with mild to moderate systemic disease  Class 3: Severe systemic disease or disturbance  Class 4: Severe systemic disorders that are already life threatening. Class 5: Moribund pt with little chances of survival, for more than 24 hours. Mallampati I Airway Classification:   []1 [x]2 []3 []4    [x]Pre-procedure diagnostic studies complete and results available. Comment:    [x]Previous sedation/anesthesia experiences assessed. Comment:    [x]The patient is an appropriate candidate to undergo the planned procedure sedation and anesthesia. (Refer to nursing sedation/analgesia documentation record)  [x]Formulation and discussion of sedation/procedure plan, risks, and expectations with patient and/or responsible adult completed. [x]Patient examined immediately prior to the procedure.  (Refer to nursing sedation/analgesia documentation record)    Elise Coleman MD, MD  Electronically signed 6/6/2022 at 10:33 AM

## 2022-06-11 ENCOUNTER — HOSPITAL ENCOUNTER (OUTPATIENT)
Age: 42
Discharge: HOME | End: 2022-06-11
Payer: MEDICARE

## 2022-06-11 DIAGNOSIS — Z51.81 MEDICATION MONITORING ENCOUNTER: ICD-10-CM

## 2022-06-11 LAB
ALBUMIN SERPL-MCNC: 4.5 G/DL (ref 3.5–5.1)
ALP BLD-CCNC: 169 U/L (ref 38–126)
ALT SERPL-CCNC: 34 U/L (ref 11–66)
ANION GAP SERPL CALCULATED.3IONS-SCNC: 12 MEQ/L (ref 8–16)
AST SERPL-CCNC: 27 U/L (ref 5–40)
BASOPHILS # BLD: 0.5 %
BASOPHILS ABSOLUTE: 0 THOU/MM3 (ref 0–0.1)
BILIRUB SERPL-MCNC: 0.3 MG/DL (ref 0.3–1.2)
BUN BLDV-MCNC: 10 MG/DL (ref 7–22)
C-REACTIVE PROTEIN: < 0.3 MG/DL (ref 0–1)
CALCIUM SERPL-MCNC: 9.4 MG/DL (ref 8.5–10.5)
CHLORIDE BLD-SCNC: 102 MEQ/L (ref 98–111)
CO2: 27 MEQ/L (ref 23–33)
CREAT SERPL-MCNC: 0.7 MG/DL (ref 0.4–1.2)
EOSINOPHIL # BLD: 3 %
EOSINOPHILS ABSOLUTE: 0.2 THOU/MM3 (ref 0–0.4)
ERYTHROCYTE [DISTWIDTH] IN BLOOD BY AUTOMATED COUNT: 14.9 % (ref 11.5–14.5)
ERYTHROCYTE [DISTWIDTH] IN BLOOD BY AUTOMATED COUNT: 43.2 FL (ref 35–45)
GFR SERPL CREATININE-BSD FRML MDRD: > 90 ML/MIN/1.73M2
GLUCOSE BLD-MCNC: 170 MG/DL (ref 70–108)
HCT VFR BLD CALC: 38.6 % (ref 37–47)
HEMOGLOBIN: 11.8 GM/DL (ref 12–16)
IMMATURE GRANS (ABS): 0.02 THOU/MM3 (ref 0–0.07)
IMMATURE GRANULOCYTES: 0.3 %
LYMPHOCYTES # BLD: 32.2 %
LYMPHOCYTES ABSOLUTE: 2.4 THOU/MM3 (ref 1–4.8)
MCH RBC QN AUTO: 24.5 PG (ref 26–33)
MCHC RBC AUTO-ENTMCNC: 30.6 GM/DL (ref 32.2–35.5)
MCV RBC AUTO: 80.1 FL (ref 81–99)
MONOCYTES # BLD: 6.4 %
MONOCYTES ABSOLUTE: 0.5 THOU/MM3 (ref 0.4–1.3)
NUCLEATED RED BLOOD CELLS: 0 /100 WBC
PLATELET # BLD: 278 THOU/MM3 (ref 130–400)
PMV BLD AUTO: 10.8 FL (ref 9.4–12.4)
POTASSIUM SERPL-SCNC: 4.3 MEQ/L (ref 3.5–5.2)
RBC # BLD: 4.82 MILL/MM3 (ref 4.2–5.4)
SEDIMENTATION RATE, ERYTHROCYTE: 10 MM/HR (ref 0–20)
SEG NEUTROPHILS: 57.6 %
SEGMENTED NEUTROPHILS ABSOLUTE COUNT: 4.2 THOU/MM3 (ref 1.8–7.7)
SODIUM BLD-SCNC: 141 MEQ/L (ref 135–145)
TOTAL PROTEIN: 6.6 G/DL (ref 6.1–8)
WBC # BLD: 7.3 THOU/MM3 (ref 4.8–10.8)

## 2022-06-11 PROCEDURE — 85651 RBC SED RATE NONAUTOMATED: CPT

## 2022-06-11 PROCEDURE — 80053 COMPREHEN METABOLIC PANEL: CPT

## 2022-06-11 PROCEDURE — 36415 COLL VENOUS BLD VENIPUNCTURE: CPT

## 2022-06-11 PROCEDURE — 85025 COMPLETE CBC W/AUTO DIFF WBC: CPT

## 2022-06-11 PROCEDURE — 86140 C-REACTIVE PROTEIN: CPT

## 2022-06-11 PROCEDURE — 82784 ASSAY IGA/IGD/IGG/IGM EACH: CPT

## 2022-06-12 LAB — IGG: 701 MG/DL (ref 700–1600)

## 2022-06-20 RX ORDER — CYCLOBENZAPRINE HCL 5 MG
TABLET ORAL
Qty: 270 TABLET | Refills: 3 | Status: SHIPPED | OUTPATIENT
Start: 2022-06-20

## 2022-06-28 ENCOUNTER — APPOINTMENT (OUTPATIENT)
Dept: GENERAL RADIOLOGY | Age: 42
End: 2022-06-28
Payer: MEDICARE

## 2022-06-28 ENCOUNTER — HOSPITAL ENCOUNTER (EMERGENCY)
Age: 42
Discharge: HOME | End: 2022-06-28
Attending: EMERGENCY MEDICINE
Payer: MEDICARE

## 2022-06-28 VITALS
HEIGHT: 63 IN | OXYGEN SATURATION: 97 % | DIASTOLIC BLOOD PRESSURE: 85 MMHG | HEART RATE: 82 BPM | TEMPERATURE: 97.7 F | SYSTOLIC BLOOD PRESSURE: 136 MMHG | WEIGHT: 223 LBS | RESPIRATION RATE: 16 BRPM | BODY MASS INDEX: 39.51 KG/M2

## 2022-06-28 DIAGNOSIS — S50.02XA CONTUSION OF LEFT ELBOW, INITIAL ENCOUNTER: Primary | ICD-10-CM

## 2022-06-28 DIAGNOSIS — S60.00XA CONTUSION OF FINGER OF LEFT HAND, UNSPECIFIED FINGER, INITIAL ENCOUNTER: ICD-10-CM

## 2022-06-28 PROCEDURE — 73080 X-RAY EXAM OF ELBOW: CPT

## 2022-06-28 PROCEDURE — 6370000000 HC RX 637 (ALT 250 FOR IP): Performed by: EMERGENCY MEDICINE

## 2022-06-28 PROCEDURE — 99283 EMERGENCY DEPT VISIT LOW MDM: CPT

## 2022-06-28 PROCEDURE — 73130 X-RAY EXAM OF HAND: CPT

## 2022-06-28 RX ORDER — IBUPROFEN 800 MG/1
800 TABLET ORAL ONCE
Status: COMPLETED | OUTPATIENT
Start: 2022-06-28 | End: 2022-06-28

## 2022-06-28 RX ADMIN — IBUPROFEN 800 MG: 800 TABLET, FILM COATED ORAL at 09:27

## 2022-06-28 ASSESSMENT — PAIN DESCRIPTION - FREQUENCY
FREQUENCY: CONTINUOUS
FREQUENCY: CONTINUOUS

## 2022-06-28 ASSESSMENT — PAIN SCALES - GENERAL
PAINLEVEL_OUTOF10: 7
PAINLEVEL_OUTOF10: 5
PAINLEVEL_OUTOF10: 7

## 2022-06-28 ASSESSMENT — PAIN DESCRIPTION - ORIENTATION
ORIENTATION: LEFT

## 2022-06-28 ASSESSMENT — PAIN DESCRIPTION - LOCATION
LOCATION: HAND;ELBOW
LOCATION: HAND;ELBOW
LOCATION: ELBOW;HAND

## 2022-06-28 ASSESSMENT — PAIN DESCRIPTION - PAIN TYPE
TYPE: ACUTE PAIN
TYPE: ACUTE PAIN

## 2022-06-28 ASSESSMENT — PAIN DESCRIPTION - ONSET: ONSET: SUDDEN

## 2022-06-28 NOTE — ED NOTES
Medicated. Left arm propped up on a pillow for comfort. Call light in reach.       Trevor Morales RN  06/28/22 1508

## 2022-06-28 NOTE — ED PROVIDER NOTES
[address]  [address] [address] [address]  Phone: [phone]    eMERGENCY dEPARTMENT eNCOUnter           [address] [facility]       Chief Complaint   Patient presents with    Fall     6/27/22 at 1700    Elbow Injury     Left    Hand Injury     Left       Nurses Notes reviewed and I agree except as noted in the HPI. HISTORY OF PRESENT ILLNESS    Black Salazar is a 42 y.o. female who presented via private vehicle with above-mentioned complaints. She stated that she tripped over her dog along garage yesterday, she fell on her left elbow. She is complaining of moderate, sharp, intermittent pain of the medial aspect of the left elbow. Pain is worse with movement. Pain radiates down her hand. She also injured her left hand. Is complaining of pain and numbness of the fourth and fifth fingers. She denies, neck, back or hip pain. She denies other complaints. REVIEW OF SYSTEMS     Negative except as mentioned above    PAST MEDICAL HISTORY    has a past medical history of Asthma, Diabetes mellitus ([address] Fibromyalgia, GERD (gastroesophageal reflux disease), Hyperlipidemia, Lupus (systemic lupus erythematosus) ([address] Lupus arthritis ([address] Migraine, Plaquenil adverse reaction in therapeutic use, and TIA (transient ischemic attack). SURGICAL HISTORY      has a past surgical history that includes Hysterectomy (2009); laparoscopy; Shoulder arthroscopy (Left, 08/20/2015); and [facility] STEREO BREAST BX W LOC DEVICE 1ST LESION LEFT (Left, 02/26/2020).     CURRENT MEDICATIONS       Previous Medications    ACETAMINOPHEN (TYLENOL 8 HOUR) 650 MG EXTENDED RELEASE TABLET    Take 1 tablet by mouth once as needed for Pain (give 30 minutes prior to IVIG infusions)    ASPIRIN (LORENZO ASPIRIN) 325 MG TABLET    Take 1 tablet by mouth daily    ATORVASTATIN (LIPITOR) 80 MG TABLET    Take 1 tablet by mouth daily    AZELASTINE (ASTELIN) 0.1 % NASAL SPRAY    2 sprays by Nasal route 2 times daily Use in each nostril as directed    CLOPIDOGREL (PLAVIX) 75 MG TABLET    Take 1 tablet by mouth daily    CLOTRIMAZOLE-BETAMETHASONE (LOTRISONE) 1-0.05 % CREAM    APPLY  CREAM TOPICALLY TWICE DAILY    CONTINUOUS BLOOD GLUC SENSOR (FREESTYLE SANDRA 14 DAY SENSOR) MISC    USE TO CHECK BLOOD SUGAR TWICE DAILY    CONTINUOUS BLOOD GLUC SENSOR (FREESTYLE SANDRA 14 DAY SENSOR) MISC    1 Units by Subcuticular route every 14 days    CYCLOBENZAPRINE (FLEXERIL) 5 MG TABLET    TAKE 1 TO 2 TABLETS BY MOUTH THREE TIMES DAILY AS NEEDED FOR MUSCLE SPASM    DICLOFENAC SODIUM (VOLTAREN) 1 % GEL    Apply 2 g topically 4 times daily as needed for Pain (topical)     DULAGLUTIDE 0.75 MG/0.5ML SOPN    Inject 0.75 mg into the skin once a week    DULOXETINE (CYMBALTA) 30 MG EXTENDED RELEASE CAPSULE    Take 1 capsule by mouth once daily    DULOXETINE (CYMBALTA) 60 MG EXTENDED RELEASE CAPSULE    Take 1 capsule by mouth daily    EPINEPHRINE (EPIPEN 2-FERNANDO) 0.3 MG/0.3ML SOAJ INJECTION    Inject one pen as directed STAT for allergic reaction, may disp generic NDC 29460-207-80    FOLIC ACID (FOLVITE) 1 MG TABLET    Take 2 tablets by mouth daily    GABAPENTIN (NEURONTIN) 300 MG CAPSULE    Take 1 capsule by mouth nightly for 7 days, THEN 1 capsule in the morning and at bedtime for 21 days. Intended supply: 90 days.     HANDICAP PLACARD MISC    by Does not apply route Dx:  Lupus arthritis,  Length 5 years    HEPARIN FLUSH 100 UNIT/ML INJECTION    1 mL by IntraCATHeter route every 30 days Flush mediport monthly with 5 mls (or 500 units) of Heparin (100 units per ml)    IBUPROFEN (ADVIL;MOTRIN) 600 MG TABLET    Take 1 tablet by mouth every 8 hours as needed for Pain    IMMUNE GLOBULIN-HYALURONIDASE (HYQVIA) 20 GM/200ML KIT    Inject 20 g into the skin every 28 days    LEVALBUTEROL (XOPENEX HFA) 45 MCG/ACT INHALER    Inhale 1-2 puffs into the lungs every 4 hours as needed for Wheezing or Shortness of Breath    LEVALBUTEROL (XOPENEX) 0.63 MG/3ML NEBULIZATION    Take father; BRCA 1 Positive in her sister; BRCA 2 Positive in her sister; Breast Cancer (age of onset: 22) in her maternal cousin; Breast Cancer (age of onset: 40) in her sister; Breast Cancer (age of onset: 54) in her sister; Breast Cancer (age of onset: 61) in her maternal aunt; Cancer (age of onset: [de-identified]) in her paternal uncle; Diabetes in her brother, father, and sister; High Blood Pressure in her father and mother; High Cholesterol in her brother; Osteoporosis in her mother; Other in her sister; Rheum Arthritis in her mother. SOCIAL HISTORY      reports that she has never smoked. She has never used smokeless tobacco. She reports that she does not drink alcohol and does not use drugs. PHYSICAL EXAM     INITIAL VITALS:  height is 5' 3\" (1.6 m) and weight is 223 lb (101.2 kg). Her temporal temperature is 97.7 °F (36.5 °C). Her blood pressure is 136/85 and her pulse is 82. Her respiration is 16 and oxygen saturation is 97%. Physical Exam  Vitals and nursing note reviewed. Constitutional:       General: She is not in acute distress. Appearance: Normal appearance. She is not ill-appearing. HENT:      Head: Atraumatic. Musculoskeletal:      Cervical back: Neck supple. No tenderness. Comments: Examination of the left elbow showed no swelling or sign of injury. There is tenderness palpation of the medial condyle. She has normal full range of motion of the elbow. The left wrist and forearm are unremarkable. Examination of the left hand showed swelling, ecchymosis and tenderness of the middle phalanx of left fifth finger. She has reduced pinprick sensation over the third fourth and fifth fingers. No motor deficit. Neurological:      Mental Status: She is alert. DIFFERENTIAL DIAGNOSIS:       DIAGNOSTIC RESULTS       RADIOLOGY:   Left elbow and left hand x-rays were unremarkable.   LABS:   Labs Reviewed - No data to display    EMERGENCY DEPARTMENT COURSE:   Vitals:    Vitals:    06/28/22 0855   BP: 136/85   Pulse: 82   Resp: 16   Temp: 97.7 °F (36.5 °C)   TempSrc: Temporal   SpO2: 97%   Weight: 223 lb (101.2 kg)   Height: 5' 3\" (1.6 m)     Patient received Motrin 800 mg p.o. She reported improvement. I discussed diagnosis and treatment plan with her. FINAL IMPRESSION      1. Contusion of left elbow, initial encounter    2. Contusion of finger of left hand, unspecified finger, initial encounter          DISPOSITION/PLAN   Discharged home in good condition.     PATIENT REFERRED TO:  Lila Ferris , [address]  [address] [place] [address]  [phone]      As needed      DISCHARGE MEDICATIONS:  New Prescriptions    No medications on file       (Please note that portions of this note were completed with a voice recognition program.  Efforts were made to edit the dictations but occasionally words are mis-transcribed.)    MD Khushbu Beltran MD  06/28/22 8388

## 2022-06-28 NOTE — ED TRIAGE NOTES
Arrives to [facility] for the evaluation of left elbow and left hand injury/pain after having a fall last evening around 1700. Patient was knocked over by her beagle puppy and landed on her left side. Denies pain in shoulder, neck, back. Reports numbness/tingling in the left ring and left pinky fingers. Has a Hx of Lupus. Has limited movement of the left arm at the elbow. Left radial pulse palpable. Has been applying ice. Took tylenol last night but no medications today. Pain is rated 7/10 in severity. Sitting side of cot. Mom in room. Call light in reach. Will monitor.

## 2022-07-07 ENCOUNTER — HOSPITAL ENCOUNTER (EMERGENCY)
Age: 42
Discharge: HOME | End: 2022-07-07
Attending: EMERGENCY MEDICINE
Payer: MEDICARE

## 2022-07-07 VITALS
WEIGHT: 223 LBS | HEART RATE: 93 BPM | BODY MASS INDEX: 39.51 KG/M2 | TEMPERATURE: 98.2 F | SYSTOLIC BLOOD PRESSURE: 153 MMHG | OXYGEN SATURATION: 98 % | DIASTOLIC BLOOD PRESSURE: 89 MMHG | HEIGHT: 63 IN | RESPIRATION RATE: 18 BRPM

## 2022-07-07 DIAGNOSIS — J06.9 UPPER RESPIRATORY TRACT INFECTION, UNSPECIFIED TYPE: Primary | ICD-10-CM

## 2022-07-07 DIAGNOSIS — Z87.09 HISTORY OF ASTHMA: ICD-10-CM

## 2022-07-07 PROCEDURE — 99283 EMERGENCY DEPT VISIT LOW MDM: CPT

## 2022-07-07 RX ORDER — AZITHROMYCIN 250 MG/1
TABLET, FILM COATED ORAL
Qty: 1 PACKET | Refills: 0 | Status: SHIPPED | OUTPATIENT
Start: 2022-07-07 | End: 2022-08-11

## 2022-07-07 RX ORDER — PREDNISONE 10 MG/1
TABLET ORAL
Qty: 30 TABLET | Refills: 0 | Status: SHIPPED | OUTPATIENT
Start: 2022-07-07 | End: 2022-08-11

## 2022-07-07 NOTE — ED NOTES
Patient presents today complaining of a cough and dry scratchy throat for about 1 week. She reports having 2 negative covid tests at home earlier in week. She reports it feels like its \"getting in my chest and I have a history of getting bronchitis. \"     Jarret Golden, EDWIN  07/07/22 2062

## 2022-07-07 NOTE — ED PROVIDER NOTES
[address]  [address] [facility],[address] [address]  Phone: [place] 12 COMPLAINT    Chief Complaint   Patient presents with    Pharyngitis    Cough       HPI    Raquel Cisse is a 42 y.o. female who presents above-noted complaint. Patient's been doing okay has cough congestion for last week. She has history of asthma. She was referred she was gone and developed some bronchitis and pneumonia. She is got no better with over-the-counter stuff.   Denies high fever chills chest pain or other problems    PAST MEDICAL HISTORY    Past Medical History:   Diagnosis Date    Asthma     Diabetes mellitus ([address] 6/8/2018    Fibromyalgia     GERD (gastroesophageal reflux disease)     Hyperlipidemia     [facility] lupus erythematosus) [facility]     Lupus arthritis ([facility]     [facility]    Migraine     Plaquenil adverse reaction in therapeutic use 5/2016    changes in the eyes    TIA (transient ischemic attack)     8/2016       SURGICAL HISTORY    Past Surgical History:   Procedure Laterality Date    HYSTERECTOMY (CERVIX STATUS UNKNOWN)  2009    Total, endometriosis    LAPAROSCOPY      CESIA STEROTACTIC LOC BREAST BIOPSY LEFT Left 02/26/2020    2 sites, benign    SHOULDER ARTHROSCOPY Left 08/20/2015    Debridement of rotator cuff and bone spurs       CURRENT MEDICATIONS    Current Outpatient Rx   Medication Sig Dispense Refill    azithromycin (ZITHROMAX) 250 MG tablet Take as directed 1 packet 0    predniSONE (DELTASONE) 10 MG tablet 4 p.o. q. day for 3 days, 3 p.o. q. day for 3 days, 2 p.o. q. day for 3 days, one p.o. q. day for 3 days 30 tablet 0    methotrexate Sodium 50 MG/2ML chemo injection Inject 0.3 mLs into the skin once a week 4 each 0    Tuberculin-Allergy Syringes 28G X 1/2\" 1 ML MISC 1 each by Does not apply route once a week 10 each 3    cyclobenzaprine (FLEXERIL) 5 MG tablet TAKE 1 TO 2 TABLETS BY MOUTH THREE TIMES DAILY AS NEEDED FOR MUSCLE SPASM 574 tablet 3    folic acid (FOLVITE) 1 MG tablet Take 2 tablets by mouth daily 60 tablet 2    gabapentin (NEURONTIN) 300 MG capsule Take 1 capsule by mouth nightly for 7 days, THEN 1 capsule in the morning and at bedtime for 21 days. Intended supply: 90 days.  49 capsule 0    DULoxetine (CYMBALTA) 30 MG extended release capsule Take 1 capsule by mouth once daily 90 capsule 0    metFORMIN (GLUCOPHAGE) 500 MG tablet TAKE 1 TABLET BY MOUTH TWICE DAILY WITH MEALS 180 tablet 3    sucralfate (CARAFATE) 1 GM tablet Take 1 tablet by mouth 4 times daily 120 tablet 11    acetaminophen (TYLENOL 8 HOUR) 650 MG extended release tablet Take 1 tablet by mouth once as needed for Pain (give 30 minutes prior to IVIG infusions) 1 tablet 5    Immune Globulin-Hyaluronidase (HYQVIA) 20 GM/200ML KIT Inject 20 g into the skin every 28 days 1 each 11    montelukast (SINGULAIR) 10 MG tablet Take 1 tablet by mouth nightly 90 tablet 11    Dulaglutide 0.75 MG/0.5ML SOPN Inject 0.75 mg into the skin once a week 15 pen 11    verapamil (CALAN SR) 240 MG extended release tablet Take 1 tablet by mouth once daily 90 tablet 3    clotrimazole-betamethasone (LOTRISONE) 1-0.05 % cream APPLY  CREAM TOPICALLY TWICE DAILY 45 g 0    clopidogrel (PLAVIX) 75 MG tablet Take 1 tablet by mouth daily 90 tablet 3    heparin flush 100 UNIT/ML injection 1 mL by IntraCATHeter route every 30 days Flush mediport monthly with 5 mls (or 500 units) of Heparin (100 units per ml) 5 mL 11    Continuous Blood Gluc Sensor (FREESTYLE SANDRA 14 DAY SENSOR) MISC USE TO CHECK BLOOD SUGAR TWICE DAILY 3 each 5    Continuous Blood Gluc Sensor (FREESTYLE SANDRA 14 DAY SENSOR) MISC 1 Units by Subcuticular route every 14 days 3 each 11    atorvastatin (LIPITOR) 80 MG tablet Take 1 tablet by mouth daily 90 tablet 3    DULoxetine (CYMBALTA) 60 MG extended release capsule Take 1 capsule by mouth daily 90 capsule 3    potassium chloride (KLOR-CON) 10 MEQ extended release tablet Take 1 tablet by mouth once daily 90 tablet 3    ibuprofen (ADVIL;MOTRIN) 600 MG tablet Take 1 tablet by mouth every 8 hours as needed for Pain 42 tablet 5    traZODone (DESYREL) 50 MG tablet Take 3 tablets by mouth nightly 270 tablet 3    azelastine (ASTELIN) 0.1 % nasal spray 2 sprays by Nasal route 2 times daily Use in each nostril as directed 1 Bottle 11    magnesium oxide (MAG-OX) 400 (241.3 Mg) MG TABS tablet Take 1 tablet by mouth once daily 90 tablet 3    EPINEPHrine (EPIPEN 2-FERNANDO) 0.3 MG/0.3ML SOAJ injection Inject one pen as directed STAT for allergic reaction, may disp generic NDC 58959-287-56 2 each 0    Handicap Placard MISC by Does not apply route Dx:  Lupus arthritis,  Length 5 years 1 each 0    triamcinolone (KENALOG) 0.1 % cream Apply topically 2 times daily Apply topically 2 times daily. 1 Tube 5    levocetirizine (XYZAL) 5 MG tablet TAKE ONE TABLET BY MOUTH NIGHTLY 30 tablet 11    sodium chloride (OCEAN) 0.65 % nasal spray 1 spray by Nasal route as needed for Congestion 1 Bottle 3    NEXIUM 40 MG delayed release capsule TAKE 1 CAPSULE BY MOUTH ONCE DAILY 30 capsule 5    levalbuterol (XOPENEX HFA) 45 MCG/ACT inhaler Inhale 1-2 puffs into the lungs every 4 hours as needed for Wheezing or Shortness of Breath 1 Inhaler 3    levalbuterol (XOPENEX) 0.63 MG/3ML nebulization Take 3 mLs by nebulization every 6 hours as needed for Wheezing or Shortness of Breath 270 mL 5    Respiratory Therapy Supplies (NEBULIZER AIR TUBE/PLUGS) MISC 1 kit by Does not apply route every 6 hours as needed (Wheezing/shortness of breath) Please provide nebulizer kit and supplies.  1 each 0    aspirin (LORENZO ASPIRIN) 325 MG tablet Take 1 tablet by mouth daily 30 tablet 3    diclofenac sodium (VOLTAREN) 1 % GEL Apply 2 g topically 4 times daily as needed for Pain (topical)          ALLERGIES    Allergies   Allergen Reactions    Cayenne Swelling     Tongue and throat swelling    Codeine Rash    Tape Tari Constant Tape]      Tears skin  Steri strips blisters skin    Amoxicillin Other (See Comments)    Sulfa Antibiotics Other (See Comments)     Can not take medication due to illness       FAMILY HISTORY    Family History   Problem Relation Age of Onset    High Blood Pressure Mother     Rheum Arthritis Mother     Osteoporosis Mother     Diabetes Father     High Blood Pressure Father     Arthritis Father         gout    Diabetes Sister     Other Sister         Crest Syndrome    Breast Cancer Sister 54    Breast Cancer Sister 40    BRCA 1 Positive Sister     BRCA 2 Positive Sister     Diabetes Brother     High Cholesterol Brother     Breast Cancer Maternal Aunt 61    Breast Cancer Maternal Cousin 22    Cancer Paternal Uncle [de-identified]        leukemia       SOCIAL HISTORY    Social History     Socioeconomic History    Marital status: Single     Spouse name: None    Number of children: 0    Years of education: [address] [employer] [facility] [employer]    Highest education level: Associate degree: academic program   Occupational History    None   Tobacco Use    Smoking status: Never Smoker    Smokeless tobacco: Never Used   Vaping Use    Vaping Use: Never used   Substance and Sexual Activity    Alcohol use: No     Alcohol/week: 0.0 standard drinks    Drug use: No    Sexual activity: Not Currently   Other Topics Concern    None   Social History Narrative    None     Social Determinants of Health     Financial Resource Strain: Low Risk     Difficulty of Paying Living Expenses: Not hard at all   Food Insecurity: No Food Insecurity    Worried About Running Out of Food in the Last Year: Never true    Grant of Food in the Last Year: Never true   Transportation Needs:     Lack of Transportation (Medical): Not on file    Lack of Transportation (Non-Medical):  Not on file   Physical Activity:     Days of Exercise per Week: Not on file    Minutes of Exercise per Session: Not on file   Stress:     Feeling of Stress : Not on file   Social Connections:     Frequency of Communication with Friends and Family: Not on file    Frequency of Social Gatherings with Friends and Family: Not on file    Attends [employer] Services: Not on file    Active Member of Clubs or Organizations: Not on file    Attends Club or Organization Meetings: Not on file    Marital Status: Not on file   Intimate Partner Violence:     Fear of Current or Ex-Partner: Not on file    Emotionally Abused: Not on file    Physically Abused: Not on file    Sexually Abused: Not on file   Housing Stability:     Unable to Pay for Housing in the Last Year: Not on file    Number of [place] in the Last Year: Not on file    Unstable Housing in the Last Year: Not on file       REVIEW OF SYSTEMS    Positive for cough and URI will sore throat and neck pain with coughing. No abdominal pain or vomiting  All systems negative except as marked. PHYSICAL EXAM    VITAL SIGNS: BP (!) 153/89   Pulse 93   Temp 98.2 °F (36.8 °C) (Oral)   Resp 18   Ht 5' 3\" (1.6 m)   Wt 223 lb (101.2 kg)   SpO2 98%   BMI 39.50 kg/m²    Constitutional:  Alert not toxtic or ill, able to give coherent history  HENT:  Normocephalic, Atraumatic, TMs are normal, oropharynx normal  Cervical Spine: Normal range of motion,  No stridor. Eyes:  No discharge or  Swelling  Respiratory: No respiratory distress, clear  Musculoskeletal:  Intact distal pulses, No edema, No tenderness, No cyanosis, No clubbing. . No tenderness to palpation or major deformities noted.    Integument:  Warm, Dry, No erythema, No rash (on exposed areas)   Neurologic:  Alert & appropriate   Psychiatric:  Affect normal    EKG                      RADIOLOGY    No orders to display           SCREENINGS  BP (!) 153/89   Pulse 93   Temp 98.2 °F (36.8 °C) (Oral)   Resp 18   Ht 5' 3\" (1.6 m)   Wt 223 lb (101.2 kg)   SpO2 98%   BMI 39.50 kg/m²      No orders to display       Screening For Hypertension and Follow-up (#154)  patient informed that blood pressure is abnormal and in the pre-hypertension range and should be rechecked by primary care      Screening For Tobacco Use and Cessation Intervention (#226):   reports that she has never smoked. She has never used smokeless tobacco.  Non-smoker not applicable for screen    Antibiotic usage for acute bronchitis (age 24 years to 58 years) (#116)  History of asthma      PROCEDURES    none      CONSULTS:  None        ED COURSE & MEDICAL DECISION MAKING    Pertinent Labs & Imaging studies reviewed. (See chart for details)  Patient with URI cough. No associate symptoms such as high fever chills or other problems. No chest pain or shortness of breath. Feels typical for her when she is had allergic component in her bronchitis and or reactive airway in the past.  No signs of pharyngitis on clinical exam.  She has history of asthma in the past.  During to cover her with prednisone and Z-Donell. Counseled her in regards to her care and need for close follow-up. Offered her testing of strep and COVID and she deferred            FINAL IMPRESSION    1. Upper respiratory tract infection, unspecified type    2.  History of asthma         PATIENT REFERRED TO:  [facility] [address] [address]  [phone]    Call   For evaluation      DISCHARGE MEDICATIONS:  New Prescriptions    AZITHROMYCIN (ZITHROMAX) 250 MG TABLET    Take as directed    PREDNISONE (DELTASONE) 10 MG TABLET    4 p.o. q. day for 3 days, 3 p.o. q. day for 3 days, 2 p.o. q. day for 3 days, one p.o. q. day for 3 days           Karen De Souza MD  07/07/22 0053

## 2022-07-13 ENCOUNTER — HOSPITAL ENCOUNTER (OUTPATIENT)
Age: 42
Discharge: HOME | End: 2022-07-13
Payer: MEDICARE

## 2022-07-13 DIAGNOSIS — Z51.81 MEDICATION MONITORING ENCOUNTER: ICD-10-CM

## 2022-07-13 DIAGNOSIS — D83.9 CVID (COMMON VARIABLE IMMUNODEFICIENCY) (HCC): ICD-10-CM

## 2022-07-13 LAB
ALBUMIN SERPL-MCNC: 4.3 G/DL (ref 3.5–5.1)
ALP BLD-CCNC: 148 U/L (ref 38–126)
ALT SERPL-CCNC: 45 U/L (ref 11–66)
ANION GAP SERPL CALCULATED.3IONS-SCNC: 12 MEQ/L (ref 8–16)
AST SERPL-CCNC: 27 U/L (ref 5–40)
ATYPICAL LYMPHOCYTES: ABNORMAL %
BASOPHILS # BLD: 0.4 %
BASOPHILS ABSOLUTE: 0 THOU/MM3 (ref 0–0.1)
BILIRUB SERPL-MCNC: 0.3 MG/DL (ref 0.3–1.2)
BUN BLDV-MCNC: 12 MG/DL (ref 7–22)
C-REACTIVE PROTEIN: < 0.3 MG/DL (ref 0–1)
CALCIUM SERPL-MCNC: 9.5 MG/DL (ref 8.5–10.5)
CHLORIDE BLD-SCNC: 102 MEQ/L (ref 98–111)
CO2: 27 MEQ/L (ref 23–33)
CREAT SERPL-MCNC: 0.7 MG/DL (ref 0.4–1.2)
ELLIPTOCYTES: ABNORMAL
EOSINOPHIL # BLD: 0.9 %
EOSINOPHILS ABSOLUTE: 0.1 THOU/MM3 (ref 0–0.4)
ERYTHROCYTE [DISTWIDTH] IN BLOOD BY AUTOMATED COUNT: 15.7 % (ref 11.5–14.5)
ERYTHROCYTE [DISTWIDTH] IN BLOOD BY AUTOMATED COUNT: 44 FL (ref 35–45)
GFR SERPL CREATININE-BSD FRML MDRD: > 90 ML/MIN/1.73M2
GLUCOSE BLD-MCNC: 120 MG/DL (ref 70–108)
HCT VFR BLD CALC: 40 % (ref 37–47)
HEMOGLOBIN: 12.4 GM/DL (ref 12–16)
IGG: 708 MG/DL (ref 700–1600)
IMMATURE GRANS (ABS): 0.08 THOU/MM3 (ref 0–0.07)
IMMATURE GRANULOCYTES: 0.7 %
LYMPHOCYTES # BLD: 42.8 %
LYMPHOCYTES ABSOLUTE: 5.3 THOU/MM3 (ref 1–4.8)
MCH RBC QN AUTO: 24.3 PG (ref 26–33)
MCHC RBC AUTO-ENTMCNC: 31 GM/DL (ref 32.2–35.5)
MCV RBC AUTO: 78.4 FL (ref 81–99)
MONOCYTES # BLD: 4.8 %
MONOCYTES ABSOLUTE: 0.6 THOU/MM3 (ref 0.4–1.3)
NUCLEATED RED BLOOD CELLS: 0 /100 WBC
PATHOLOGIST REVIEW: ABNORMAL
PLATELET # BLD: 300 THOU/MM3 (ref 130–400)
PLATELET ESTIMATE: ADEQUATE
PMV BLD AUTO: 10.7 FL (ref 9.4–12.4)
POTASSIUM SERPL-SCNC: 3.6 MEQ/L (ref 3.5–5.2)
RBC # BLD: 5.1 MILL/MM3 (ref 4.2–5.4)
SCAN OF BLOOD SMEAR: NORMAL
SEDIMENTATION RATE, ERYTHROCYTE: 6 MM/HR (ref 0–20)
SEG NEUTROPHILS: 50.4 %
SEGMENTED NEUTROPHILS ABSOLUTE COUNT: 6.2 THOU/MM3 (ref 1.8–7.7)
SODIUM BLD-SCNC: 141 MEQ/L (ref 135–145)
TOTAL PROTEIN: 6.8 G/DL (ref 6.1–8)
WBC # BLD: 12.3 THOU/MM3 (ref 4.8–10.8)

## 2022-07-13 PROCEDURE — 82784 ASSAY IGA/IGD/IGG/IGM EACH: CPT

## 2022-07-13 PROCEDURE — 80053 COMPREHEN METABOLIC PANEL: CPT

## 2022-07-13 PROCEDURE — 85651 RBC SED RATE NONAUTOMATED: CPT

## 2022-07-13 PROCEDURE — 86140 C-REACTIVE PROTEIN: CPT

## 2022-07-13 PROCEDURE — 36415 COLL VENOUS BLD VENIPUNCTURE: CPT

## 2022-07-13 PROCEDURE — 85025 COMPLETE CBC W/AUTO DIFF WBC: CPT

## 2022-07-18 RX ORDER — GABAPENTIN 300 MG/1
300 CAPSULE ORAL 2 TIMES DAILY
Qty: 60 CAPSULE | Refills: 3 | Status: SHIPPED | OUTPATIENT
Start: 2022-07-18 | End: 2022-08-11

## 2022-08-01 RX ORDER — POTASSIUM CHLORIDE 750 MG/1
TABLET, FILM COATED, EXTENDED RELEASE ORAL
Qty: 90 TABLET | Refills: 3 | Status: SHIPPED | OUTPATIENT
Start: 2022-08-01

## 2022-08-09 ENCOUNTER — HOSPITAL ENCOUNTER (OUTPATIENT)
Age: 42
Discharge: HOME | End: 2022-08-09
Payer: MEDICARE

## 2022-08-09 DIAGNOSIS — Z51.81 MEDICATION MONITORING ENCOUNTER: ICD-10-CM

## 2022-08-09 DIAGNOSIS — D83.9 CVID (COMMON VARIABLE IMMUNODEFICIENCY) (HCC): ICD-10-CM

## 2022-08-09 LAB
ALBUMIN SERPL-MCNC: 4.5 G/DL (ref 3.5–5.1)
ALP BLD-CCNC: 175 U/L (ref 38–126)
ALT SERPL-CCNC: 58 U/L (ref 11–66)
ANION GAP SERPL CALCULATED.3IONS-SCNC: 15 MEQ/L (ref 8–16)
AST SERPL-CCNC: 36 U/L (ref 5–40)
BASOPHILS # BLD: 0.6 %
BASOPHILS ABSOLUTE: 0 THOU/MM3 (ref 0–0.1)
BILIRUB SERPL-MCNC: 0.4 MG/DL (ref 0.3–1.2)
BUN BLDV-MCNC: 13 MG/DL (ref 7–22)
C-REACTIVE PROTEIN: < 0.3 MG/DL (ref 0–1)
CALCIUM SERPL-MCNC: 9.7 MG/DL (ref 8.5–10.5)
CHLORIDE BLD-SCNC: 101 MEQ/L (ref 98–111)
CO2: 25 MEQ/L (ref 23–33)
CREAT SERPL-MCNC: 0.6 MG/DL (ref 0.4–1.2)
EOSINOPHIL # BLD: 2.5 %
EOSINOPHILS ABSOLUTE: 0.2 THOU/MM3 (ref 0–0.4)
ERYTHROCYTE [DISTWIDTH] IN BLOOD BY AUTOMATED COUNT: 16 % (ref 11.5–14.5)
ERYTHROCYTE [DISTWIDTH] IN BLOOD BY AUTOMATED COUNT: 46.2 FL (ref 35–45)
GFR SERPL CREATININE-BSD FRML MDRD: > 90 ML/MIN/1.73M2
GLUCOSE BLD-MCNC: 158 MG/DL (ref 70–108)
HCT VFR BLD CALC: 41.5 % (ref 37–47)
HEMOGLOBIN: 12.6 GM/DL (ref 12–16)
IGG: 674 MG/DL (ref 700–1600)
IMMATURE GRANS (ABS): 0.02 THOU/MM3 (ref 0–0.07)
IMMATURE GRANULOCYTES: 0.3 %
LYMPHOCYTES # BLD: 32.9 %
LYMPHOCYTES ABSOLUTE: 2.2 THOU/MM3 (ref 1–4.8)
MCH RBC QN AUTO: 24.6 PG (ref 26–33)
MCHC RBC AUTO-ENTMCNC: 30.4 GM/DL (ref 32.2–35.5)
MCV RBC AUTO: 81.1 FL (ref 81–99)
MONOCYTES # BLD: 8.1 %
MONOCYTES ABSOLUTE: 0.5 THOU/MM3 (ref 0.4–1.3)
NUCLEATED RED BLOOD CELLS: 0 /100 WBC
PLATELET # BLD: 281 THOU/MM3 (ref 130–400)
PMV BLD AUTO: 10.9 FL (ref 9.4–12.4)
POTASSIUM SERPL-SCNC: 4.2 MEQ/L (ref 3.5–5.2)
RBC # BLD: 5.12 MILL/MM3 (ref 4.2–5.4)
SEDIMENTATION RATE, ERYTHROCYTE: 8 MM/HR (ref 0–20)
SEG NEUTROPHILS: 55.6 %
SEGMENTED NEUTROPHILS ABSOLUTE COUNT: 3.7 THOU/MM3 (ref 1.8–7.7)
SODIUM BLD-SCNC: 141 MEQ/L (ref 135–145)
TOTAL PROTEIN: 7.1 G/DL (ref 6.1–8)
WBC # BLD: 6.7 THOU/MM3 (ref 4.8–10.8)

## 2022-08-09 PROCEDURE — 36415 COLL VENOUS BLD VENIPUNCTURE: CPT

## 2022-08-09 PROCEDURE — 80053 COMPREHEN METABOLIC PANEL: CPT

## 2022-08-09 PROCEDURE — 86140 C-REACTIVE PROTEIN: CPT

## 2022-08-09 PROCEDURE — 85651 RBC SED RATE NONAUTOMATED: CPT

## 2022-08-09 PROCEDURE — 82784 ASSAY IGA/IGD/IGG/IGM EACH: CPT

## 2022-08-09 PROCEDURE — 85025 COMPLETE CBC W/AUTO DIFF WBC: CPT

## 2022-08-09 RX ORDER — METHOTREXATE 25 MG/ML
7.5 INJECTION, SOLUTION INTRA-ARTERIAL; INTRAMUSCULAR; INTRAVENOUS WEEKLY
Qty: 4 EACH | Refills: 0 | Status: SHIPPED | OUTPATIENT
Start: 2022-08-09

## 2022-08-10 NOTE — PROGRESS NOTES
[address] [place] [id number]  Dept: [phone]  Dept Fax: [phone]  Loc: [phone]    Marvin Castro is a 36 y.o. female who presents today for:  Chief Complaint   Patient presents with    3 Month Follow-Up           HPI:     HPI    Diabetes Mellitus: Patient presents for follow up of diabetes. Symptoms: paresthesia of the feet and visual disturbances. Symptoms have gradually worsened. Patient denies increase appetite, polydipsia and weight loss. Evaluation to date has been included: fasting blood sugar, fasting lipid panel, hemoglobin A1C and microalbuminuria. Home sugars: 75-90 am and  pm. Treatment to date: Continued metformin which has been Yes:  , which has been somewhat effective. Lab Results   Component Value Date    LABA1C 7.2 (H) 08/11/2022     No results found for: EAG    Hyperlipidemia: Patient presents with hyperlipidemia. She was tested because screening. Her last labs showed   Lab Results   Component Value Date    CHOL 126 12/12/2021    CHOL 156 04/16/2020    CHOL 135 12/18/2018     Lab Results   Component Value Date    TRIG 97 12/12/2021    TRIG 119 04/16/2020    TRIG 121 12/18/2018     Lab Results   Component Value Date    HDL 33 12/12/2021    HDL 35 04/16/2020    HDL 32 12/18/2018     Lab Results   Component Value Date    LDLCALC 74 12/12/2021    LDLCALC 97 04/16/2020    LDLCALC 79 12/18/2018     No results found for: LABVLDL, VLDL  No results found for: CHOLHDLRATIO  There is a family history of hyperlipidemia. There is not a family history of early ischemia heart disease. GERD: Lili complains of heartburn. This has been associated with early satiety and heartburn. She denies no other symptoms. Symptoms have been present for several years. She denies dysphagia. She has not lost weight. She denies melena, hematochezia, hematemesis, and coffee ground emesis.  Medical therapy in the past has included proton pump inhibitors. Saw neurology ( Dr Adolfo Wan) for seizures. They are still occurring a few times per week. She was thinking about stopping all her medications and restarting \"fresh\". She is highly encouraged NOT to do this for fear of intractable seizure with brain damage. The seizures are usually followed by a headache. 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 She had an \"episode\" in my office in early 2018, she was started on aspirin 325 mg and went from having 8-9 episodes a day to 4 episodes every 2 months. Now also on plavix as well. Migraines are controlled with prn medications less then 2 per month. Fibromyalgia and Lupus and lupus arthritis is under the care of Rheumatology in [facility] and formerly also a patient of Dr Clancy Gitelman who is no longer in practice. She is frustrated because rheumatology took her off the MTX due to elevated LFTs which have been present for many years. We discussed getting GI involved to watch the liver and possibly give approval for the MTX. Allergies are well controlled on current medications. Lung nodule in the left upper lobe under the care of pulmonology. Stable as of 4-2021 for 5 years. Reviewed chart forpast medical history , surgical history , allergies, social history , family history and medications.     Health Maintenance   Topic Date Due    Hepatitis B vaccine (1 of 3 - Risk 3-dose series) Never done    Diabetic foot exam  07/17/2020    COVID-19 Vaccine (3 - Moderna risk series) 11/20/2021    Flu vaccine (1) 09/01/2022    Diabetic microalbuminuria test  12/12/2022    Lipids  12/12/2022    Diabetic retinal exam  02/01/2023    Breast cancer screen  02/14/2023    Depression Screen  05/11/2023    A1C test (Diabetic or Prediabetic)  08/11/2023    DTaP/Tdap/Td vaccine (2 - Td or Tdap) 07/11/2030    Pneumococcal 0-64 years Vaccine (4 - PPSV23 or PCV20) 08/10/2044    Hepatitis C screen  Completed    Hepatitis A vaccine  Aged Out    Hib vaccine  Aged Out    Meningococcal (ACWY) vaccine  Aged Out    HIV screen  Discontinued       Subjective:      Constitutional:Negative for fever, chills, diaphoresis, activity change, appetite change and fatigue. HENT: Negative for hearing loss, ear pain, congestion, sore throat, rhinorrhea, postnasal drip and ear discharge. Eyes: Negative for photophobia and visual disturbance. Respiratory: Negative for cough, chest tightness, shortness of breath and wheezing. Cardiovascular: Negative for chest pain and leg swelling. Gastrointestinal: Negative for nausea, vomiting, abdominal pain, diarrhea and constipation. Genitourinary: Negative for dysuria, urgency and frequency. Neurological: Negative for weakness, light-headedness and headaches. Psychiatric/Behavioral: Negative for sleep disturbance.      :     Vitals:    08/11/22 0935   BP: 127/70   Site: Left Upper Arm   Position: Sitting   Cuff Size: Large Adult   Pulse: 93   Resp: 17   Temp: 97.1 °F (36.2 °C)   TempSrc: Temporal   SpO2: 95%   Weight: 232 lb (105.2 kg)   Height: 5' 3\" (1.6 m)     Wt Readings from Last 3 Encounters:   08/11/22 232 lb (105.2 kg)   07/07/22 223 lb (101.2 kg)   06/28/22 223 lb (101.2 kg)       Physical Exam  Constitutional: Vital signs are normal. She appears well-developed and well-nourished. She is active. HENT:   Head: Normocephalic and atraumatic. Right Ear: Tympanic membrane, external ear and ear canal normal. No drainage or tenderness. Left Ear: Tympanic membrane, external ear and ear canal normal. No drainage or tenderness. Nose: Nose normal. No mucosal edema or rhinorrhea. Mouth/Throat: Uvula is midline, oropharynx is clear and moist and mucous membranes are normal. Mucous membranes are not pale. Normal dentition. No posterior oropharyngeal edema or posterior oropharyngeal erythema.    Eyes: Lids are normal. Right eye exhibits no chemosis and no discharge. Left eye exhibits no chemosis and no drainage. Right conjunctiva has no hemorrhage. Left conjunctiva has no hemorrhage. Right eye exhibits normal extraocular motion. Left eye exhibits normal extraocular motion. Right pupil is round and reactive. Left pupil is round and reactive. Pupils are equal.   Cardiovascular: Normal rate, regular rhythm, S1 normal, S2 normal and normal heart sounds. Exam reveals no gallop. No murmur heard. Pulmonary/Chest: Effort normal and breath sounds normal. No respiratory distress. She has no wheezes. She has no rhonchi. She has no rales. Abdominal: Soft. Normal appearance and bowel sounds are normal. She exhibits no distension and no mass. There is no hepatosplenomegaly. No tenderness. She has no rigidity, no rebound and no guarding. No hernia. Musculoskeletal:        Right lower leg: She exhibits no edema. Left lower leg: She exhibits no edema. Neurological: She is alert. Assessment/Plan   Soraya Echavarria was seen today for 3 month follow-up. Diagnoses and all orders for this visit:    Type 2 diabetes mellitus with other specified complication, without long-term current use of insulin ([address]  -     Hemoglobin A1C; Future  -     Comprehensive Metabolic Panel, Fasting; Future  -     Hemoglobin A1C; Future  -     Microalbumin / Creatinine Urine Ratio; Future    Pure hypercholesterolemia  -     Comprehensive Metabolic Panel, Fasting; Future  -     Lipid Panel; Future    Gastroesophageal reflux disease without esophagitis    Seasonal allergies    Other migraine without status migrainosus, not intractable    Fibromyalgia    Cerebrovascular accident (CVA), unspecified mechanism ([address]    Lung nodule, solitary  -     Cancel: CT CHEST W CONTRAST; Future    Obesity (BMI 30-39. 9)    IgG subclass deficiency (HCC)    Elevated TSH  -     TSH with Reflex;  Future    Intertrigo    Seizure disorder ([address]    Morbid obesity due to excess

## 2022-08-11 ENCOUNTER — HOSPITAL ENCOUNTER (OUTPATIENT)
Age: 42
Discharge: HOME | End: 2022-08-11
Payer: MEDICARE

## 2022-08-11 ENCOUNTER — NURSE ONLY (OUTPATIENT)
Dept: LAB | Age: 42
End: 2022-08-11

## 2022-08-11 ENCOUNTER — OFFICE VISIT (OUTPATIENT)
Dept: FAMILY MEDICINE CLINIC | Age: 42
End: 2022-08-11
Payer: MEDICARE

## 2022-08-11 ENCOUNTER — TELEPHONE (OUTPATIENT)
Dept: FAMILY MEDICINE CLINIC | Age: 42
End: 2022-08-11

## 2022-08-11 ENCOUNTER — HOSPITAL ENCOUNTER (OUTPATIENT)
Dept: GENERAL RADIOLOGY | Age: 42
Discharge: HOME | End: 2022-08-11
Payer: MEDICARE

## 2022-08-11 ENCOUNTER — APPOINTMENT (OUTPATIENT)
Dept: GENERAL RADIOLOGY | Age: 42
End: 2022-08-11
Payer: MEDICARE

## 2022-08-11 VITALS
OXYGEN SATURATION: 95 % | HEART RATE: 93 BPM | TEMPERATURE: 97.1 F | SYSTOLIC BLOOD PRESSURE: 127 MMHG | WEIGHT: 232 LBS | RESPIRATION RATE: 17 BRPM | DIASTOLIC BLOOD PRESSURE: 70 MMHG | BODY MASS INDEX: 41.11 KG/M2 | HEIGHT: 63 IN

## 2022-08-11 DIAGNOSIS — M79.7 FIBROMYALGIA: ICD-10-CM

## 2022-08-11 DIAGNOSIS — E11.69 TYPE 2 DIABETES MELLITUS WITH OTHER SPECIFIED COMPLICATION, WITHOUT LONG-TERM CURRENT USE OF INSULIN (HCC): ICD-10-CM

## 2022-08-11 DIAGNOSIS — E66.01 MORBID OBESITY DUE TO EXCESS CALORIES (HCC): ICD-10-CM
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
## 2022-08-11 DIAGNOSIS — G56.22 ULNAR TUNNEL SYNDROME OF LEFT WRIST: ICD-10-CM

## 2022-08-11 DIAGNOSIS — L30.4 INTERTRIGO: ICD-10-CM

## 2022-08-11 DIAGNOSIS — D80.3 IGG SUBCLASS DEFICIENCY (HCC): ICD-10-CM

## 2022-08-11 DIAGNOSIS — R79.89 ELEVATED TSH: ICD-10-CM

## 2022-08-11 DIAGNOSIS — K21.9 GASTROESOPHAGEAL REFLUX DISEASE WITHOUT ESOPHAGITIS: ICD-10-CM

## 2022-08-11 DIAGNOSIS — D83.9 COMMON VARIABLE IMMUNODEFICIENCY (HCC): ICD-10-CM

## 2022-08-11 DIAGNOSIS — M32.9 LUPUS (HCC): ICD-10-CM

## 2022-08-11 DIAGNOSIS — D64.9 ANEMIA, UNSPECIFIED TYPE: ICD-10-CM

## 2022-08-11 DIAGNOSIS — E66.9 OBESITY (BMI 30-39.9): ICD-10-CM

## 2022-08-11 DIAGNOSIS — E11.69 TYPE 2 DIABETES MELLITUS WITH OTHER SPECIFIED COMPLICATION, WITHOUT LONG-TERM CURRENT USE OF INSULIN (HCC): Primary | ICD-10-CM

## 2022-08-11 DIAGNOSIS — J30.2 SEASONAL ALLERGIES: ICD-10-CM

## 2022-08-11 DIAGNOSIS — E78.00 PURE HYPERCHOLESTEROLEMIA: ICD-10-CM

## 2022-08-11 DIAGNOSIS — E83.42 HYPOMAGNESEMIA: ICD-10-CM

## 2022-08-11 DIAGNOSIS — J45.40 MODERATE PERSISTENT ASTHMA WITHOUT COMPLICATION: ICD-10-CM

## 2022-08-11 DIAGNOSIS — I63.9 CEREBROVASCULAR ACCIDENT (CVA), UNSPECIFIED MECHANISM (HCC): ICD-10-CM

## 2022-08-11 DIAGNOSIS — R91.1 LUNG NODULE, SOLITARY: ICD-10-CM

## 2022-08-11 DIAGNOSIS — G40.909 SEIZURE DISORDER (HCC): ICD-10-CM

## 2022-08-11 DIAGNOSIS — M32.9 LUPUS ARTHRITIS (HCC): ICD-10-CM

## 2022-08-11 DIAGNOSIS — R79.89 ELEVATED LFTS: ICD-10-CM

## 2022-08-11 DIAGNOSIS — G43.809 OTHER MIGRAINE WITHOUT STATUS MIGRAINOSUS, NOT INTRACTABLE: ICD-10-CM

## 2022-08-11 LAB
AVERAGE GLUCOSE: 156 MG/DL (ref 70–126)
HBA1C MFR BLD: 7.2 % (ref 4.4–6.4)

## 2022-08-11 PROCEDURE — 3051F HG A1C>EQUAL 7.0%<8.0%: CPT | Performed by: FAMILY MEDICINE

## 2022-08-11 PROCEDURE — 99214 OFFICE O/P EST MOD 30 MIN: CPT | Performed by: FAMILY MEDICINE

## 2022-08-11 PROCEDURE — 73080 X-RAY EXAM OF ELBOW: CPT

## 2022-08-11 RX ORDER — LEVALBUTEROL INHALATION SOLUTION 0.63 MG/3ML
0.63 SOLUTION RESPIRATORY (INHALATION) EVERY 6 HOURS PRN
Qty: 270 ML | Refills: 5 | Status: SHIPPED | OUTPATIENT
Start: 2022-08-11

## 2022-08-11 NOTE — TELEPHONE ENCOUNTER
A1c higher   Record BID blood sugars and bring them with her to the next appointment in 12/2022  Belle Power MD

## 2022-09-05 ENCOUNTER — HOSPITAL ENCOUNTER (OUTPATIENT)
Age: 42
Discharge: HOME | End: 2022-09-05
Payer: MEDICARE

## 2022-09-05 DIAGNOSIS — Z51.81 MEDICATION MONITORING ENCOUNTER: ICD-10-CM

## 2022-09-05 DIAGNOSIS — D83.9 CVID (COMMON VARIABLE IMMUNODEFICIENCY) (HCC): ICD-10-CM

## 2022-09-05 LAB
ALBUMIN SERPL-MCNC: 4.1 G/DL (ref 3.5–5.1)
ALP BLD-CCNC: 155 U/L (ref 38–126)
ALT SERPL-CCNC: 54 U/L (ref 11–66)
ANION GAP SERPL CALCULATED.3IONS-SCNC: 13 MEQ/L (ref 8–16)
AST SERPL-CCNC: 46 U/L (ref 5–40)
BASOPHILS # BLD: 0.8 %
BASOPHILS ABSOLUTE: 0.1 THOU/MM3 (ref 0–0.1)
BILIRUB SERPL-MCNC: 0.4 MG/DL (ref 0.3–1.2)
BUN BLDV-MCNC: 12 MG/DL (ref 7–22)
C-REACTIVE PROTEIN: < 0.3 MG/DL (ref 0–1)
CALCIUM SERPL-MCNC: 8.8 MG/DL (ref 8.5–10.5)
CHLORIDE BLD-SCNC: 104 MEQ/L (ref 98–111)
CO2: 23 MEQ/L (ref 23–33)
CREAT SERPL-MCNC: 0.6 MG/DL (ref 0.4–1.2)
EOSINOPHIL # BLD: 3.3 %
EOSINOPHILS ABSOLUTE: 0.3 THOU/MM3 (ref 0–0.4)
ERYTHROCYTE [DISTWIDTH] IN BLOOD BY AUTOMATED COUNT: 16.4 % (ref 11.5–14.5)
ERYTHROCYTE [DISTWIDTH] IN BLOOD BY AUTOMATED COUNT: 46.8 FL (ref 35–45)
GFR SERPL CREATININE-BSD FRML MDRD: > 90 ML/MIN/1.73M2
GLUCOSE BLD-MCNC: 132 MG/DL (ref 70–108)
HCT VFR BLD CALC: 41.1 % (ref 37–47)
HEMOGLOBIN: 12.8 GM/DL (ref 12–16)
IMMATURE GRANS (ABS): 0.02 THOU/MM3 (ref 0–0.07)
IMMATURE GRANULOCYTES: 0.3 %
LYMPHOCYTES # BLD: 37.1 %
LYMPHOCYTES ABSOLUTE: 2.9 THOU/MM3 (ref 1–4.8)
MCH RBC QN AUTO: 25 PG (ref 26–33)
MCHC RBC AUTO-ENTMCNC: 31.1 GM/DL (ref 32.2–35.5)
MCV RBC AUTO: 80.1 FL (ref 81–99)
MONOCYTES # BLD: 6.5 %
MONOCYTES ABSOLUTE: 0.5 THOU/MM3 (ref 0.4–1.3)
NUCLEATED RED BLOOD CELLS: 0 /100 WBC
PLATELET # BLD: 298 THOU/MM3 (ref 130–400)
PMV BLD AUTO: 11.1 FL (ref 9.4–12.4)
POTASSIUM SERPL-SCNC: 3.7 MEQ/L (ref 3.5–5.2)
RBC # BLD: 5.13 MILL/MM3 (ref 4.2–5.4)
SEDIMENTATION RATE, ERYTHROCYTE: 10 MM/HR (ref 0–20)
SEG NEUTROPHILS: 52 %
SEGMENTED NEUTROPHILS ABSOLUTE COUNT: 4.1 THOU/MM3 (ref 1.8–7.7)
SODIUM BLD-SCNC: 140 MEQ/L (ref 135–145)
TOTAL PROTEIN: 6.6 G/DL (ref 6.1–8)
WBC # BLD: 7.8 THOU/MM3 (ref 4.8–10.8)

## 2022-09-05 PROCEDURE — 86140 C-REACTIVE PROTEIN: CPT

## 2022-09-05 PROCEDURE — 36415 COLL VENOUS BLD VENIPUNCTURE: CPT

## 2022-09-05 PROCEDURE — 85651 RBC SED RATE NONAUTOMATED: CPT

## 2022-09-05 PROCEDURE — 80053 COMPREHEN METABOLIC PANEL: CPT

## 2022-09-05 PROCEDURE — 82784 ASSAY IGA/IGD/IGG/IGM EACH: CPT

## 2022-09-05 PROCEDURE — 85025 COMPLETE CBC W/AUTO DIFF WBC: CPT

## 2022-09-06 LAB — IGG: 676 MG/DL (ref 700–1600)

## 2022-09-26 DIAGNOSIS — M79.7 FIBROMYALGIA: ICD-10-CM

## 2022-09-26 RX ORDER — TRAZODONE HYDROCHLORIDE 50 MG/1
150 TABLET ORAL NIGHTLY
Qty: 270 TABLET | Refills: 1 | Status: SHIPPED | OUTPATIENT
Start: 2022-09-26

## 2022-09-26 RX ORDER — FOLIC ACID 1 MG/1
TABLET ORAL
Qty: 60 TABLET | Refills: 2 | Status: SHIPPED | OUTPATIENT
Start: 2022-09-26 | End: 2022-11-30

## 2022-10-02 ENCOUNTER — HOSPITAL ENCOUNTER (OUTPATIENT)
Age: 42
Discharge: HOME | End: 2022-10-02
Payer: MEDICARE

## 2022-10-02 DIAGNOSIS — D83.9 CVID (COMMON VARIABLE IMMUNODEFICIENCY) (HCC): ICD-10-CM

## 2022-10-02 LAB
ALBUMIN SERPL-MCNC: 4 G/DL (ref 3.5–5.1)
ALP BLD-CCNC: 174 U/L (ref 38–126)
ALT SERPL-CCNC: 51 U/L (ref 11–66)
ANION GAP SERPL CALCULATED.3IONS-SCNC: 12 MEQ/L (ref 8–16)
AST SERPL-CCNC: 51 U/L (ref 5–40)
BASOPHILS # BLD: 0.5 %
BASOPHILS ABSOLUTE: 0 THOU/MM3 (ref 0–0.1)
BILIRUB SERPL-MCNC: 0.5 MG/DL (ref 0.3–1.2)
BUN BLDV-MCNC: 9 MG/DL (ref 7–22)
C-REACTIVE PROTEIN: 0.39 MG/DL (ref 0–1)
CALCIUM SERPL-MCNC: 9.5 MG/DL (ref 8.5–10.5)
CHLORIDE BLD-SCNC: 100 MEQ/L (ref 98–111)
CO2: 26 MEQ/L (ref 23–33)
CREAT SERPL-MCNC: 0.6 MG/DL (ref 0.4–1.2)
EOSINOPHIL # BLD: 3.9 %
EOSINOPHILS ABSOLUTE: 0.3 THOU/MM3 (ref 0–0.4)
ERYTHROCYTE [DISTWIDTH] IN BLOOD BY AUTOMATED COUNT: 16 % (ref 11.5–14.5)
ERYTHROCYTE [DISTWIDTH] IN BLOOD BY AUTOMATED COUNT: 45.1 FL (ref 35–45)
GFR SERPL CREATININE-BSD FRML MDRD: > 90 ML/MIN/1.73M2
GLUCOSE BLD-MCNC: 157 MG/DL (ref 70–108)
HCT VFR BLD CALC: 40.4 % (ref 37–47)
HEMOGLOBIN: 13 GM/DL (ref 12–16)
IGG: 717 MG/DL (ref 700–1600)
IMMATURE GRANS (ABS): 0.02 THOU/MM3 (ref 0–0.07)
IMMATURE GRANULOCYTES: 0.3 %
LYMPHOCYTES # BLD: 26.7 %
LYMPHOCYTES ABSOLUTE: 2 THOU/MM3 (ref 1–4.8)
MCH RBC QN AUTO: 25.4 PG (ref 26–33)
MCHC RBC AUTO-ENTMCNC: 32.2 GM/DL (ref 32.2–35.5)
MCV RBC AUTO: 78.9 FL (ref 81–99)
MONOCYTES # BLD: 8.5 %
MONOCYTES ABSOLUTE: 0.6 THOU/MM3 (ref 0.4–1.3)
NUCLEATED RED BLOOD CELLS: 0 /100 WBC
PLATELET # BLD: 312 THOU/MM3 (ref 130–400)
PMV BLD AUTO: 10.7 FL (ref 9.4–12.4)
POTASSIUM SERPL-SCNC: 3.9 MEQ/L (ref 3.5–5.2)
RBC # BLD: 5.12 MILL/MM3 (ref 4.2–5.4)
SEDIMENTATION RATE, ERYTHROCYTE: 10 MM/HR (ref 0–20)
SEG NEUTROPHILS: 60.1 %
SEGMENTED NEUTROPHILS ABSOLUTE COUNT: 4.4 THOU/MM3 (ref 1.8–7.7)
SODIUM BLD-SCNC: 138 MEQ/L (ref 135–145)
TOTAL PROTEIN: 6.7 G/DL (ref 6.1–8)
WBC # BLD: 7.4 THOU/MM3 (ref 4.8–10.8)

## 2022-10-02 PROCEDURE — 86140 C-REACTIVE PROTEIN: CPT

## 2022-10-02 PROCEDURE — 80053 COMPREHEN METABOLIC PANEL: CPT

## 2022-10-02 PROCEDURE — 85651 RBC SED RATE NONAUTOMATED: CPT

## 2022-10-02 PROCEDURE — 82784 ASSAY IGA/IGD/IGG/IGM EACH: CPT

## 2022-10-02 PROCEDURE — 36415 COLL VENOUS BLD VENIPUNCTURE: CPT

## 2022-10-02 PROCEDURE — 85025 COMPLETE CBC W/AUTO DIFF WBC: CPT

## 2022-10-05 ENCOUNTER — OFFICE VISIT (OUTPATIENT)
Dept: RHEUMATOLOGY | Age: 42
End: 2022-10-05
Payer: MEDICARE

## 2022-10-05 ENCOUNTER — PATIENT MESSAGE (OUTPATIENT)
Dept: RHEUMATOLOGY | Age: 42
End: 2022-10-05

## 2022-10-05 VITALS
HEART RATE: 87 BPM | SYSTOLIC BLOOD PRESSURE: 132 MMHG | WEIGHT: 237 LBS | DIASTOLIC BLOOD PRESSURE: 86 MMHG | BODY MASS INDEX: 41.99 KG/M2 | HEIGHT: 63 IN | OXYGEN SATURATION: 98 %

## 2022-10-05 DIAGNOSIS — M79.7 FIBROMYALGIA: ICD-10-CM

## 2022-10-05 DIAGNOSIS — Z51.81 MEDICATION MONITORING ENCOUNTER: Primary | ICD-10-CM

## 2022-10-05 DIAGNOSIS — M54.50 CHRONIC BILATERAL LOW BACK PAIN WITHOUT SCIATICA: ICD-10-CM

## 2022-10-05 DIAGNOSIS — D80.1 HYPOGAMMAGLOBULINEMIA (HCC): ICD-10-CM

## 2022-10-05 DIAGNOSIS — M32.9 H/O SYSTEMIC LUPUS ERYTHEMATOSUS (SLE) (HCC): ICD-10-CM

## 2022-10-05 DIAGNOSIS — Z51.81 MEDICATION MONITORING ENCOUNTER: ICD-10-CM

## 2022-10-05 DIAGNOSIS — G89.29 CHRONIC BILATERAL LOW BACK PAIN WITHOUT SCIATICA: ICD-10-CM

## 2022-10-05 DIAGNOSIS — M19.90 INFLAMMATORY ARTHRITIS: Primary | ICD-10-CM

## 2022-10-05 PROCEDURE — 99214 OFFICE O/P EST MOD 30 MIN: CPT | Performed by: NURSE PRACTITIONER

## 2022-10-05 ASSESSMENT — ENCOUNTER SYMPTOMS
NAUSEA: 0
TROUBLE SWALLOWING: 0
CONSTIPATION: 0
EYE PAIN: 0
SHORTNESS OF BREATH: 1
ABDOMINAL PAIN: 0
BACK PAIN: 1
EYE ITCHING: 0
COUGH: 0
DIARRHEA: 0

## 2022-10-05 NOTE — PROGRESS NOTES
[facility] RHEUMATOLOGY FOLLOW UP NOTE       Date Of Service: 10/5/2022  Provider: NAHID Chung - CNP    Name: Rashad Quiros   MRN: [id number]    Chief Complaint(s)     Chief Complaint   Patient presents with    Follow-up     3 month f/u Fibromyalgia    Fibro pain, bilateral hand rt hand worse had fall in July tripped over dog         History of Present Illness (HPI)     Rashad Quiros  is a(n)42 y.o. female with a hx of morbid obesity, CVA x1 (2017), GERD, hiatal hernia, IBS, endometriosis s/p hysterectomy, asthma, hypercholesterolemia, type 2 DM, lupus arthritis, fibromyalgia, IgG subclass deficiency  here for the f/u evaluation of h/o lupus, fibromyalgia     Interval hx:    - relief with methotrexate but LFT elevation so it was stopped     pain affecting the fingers, wrists, left elbow, right hip, knees, ankles, shoulders  Pain on a scale 0-10: 7.5/10  Type of pain: constant  Timing: evenings  Aggravating factors: inactivity, cold, increased use, humid weather  Alleviating factors: CBD gummies    Associated symptoms:  + swelling/  Redness/ warmth (hands, feet, ankles), + AM stiffness lasting ~ 1-2 hours      REVIEW OF SYSTEMS: (ROS)    Review of Systems   Constitutional:  Negative for fatigue, fever and unexpected weight change. HENT:  Positive for tinnitus. Negative for congestion and trouble swallowing. Dry mouth   Eyes:  Negative for pain and itching. Respiratory:  Positive for shortness of breath (asthma). Negative for cough. Cardiovascular:  Negative for chest pain and leg swelling. Gastrointestinal:  Negative for abdominal pain, constipation, diarrhea and nausea. Endocrine: Negative for cold intolerance and heat intolerance. Genitourinary:  Negative for difficulty urinating, frequency and urgency. Musculoskeletal:  Positive for arthralgias, back pain, joint swelling, myalgias and neck pain. Skin:  Negative for rash.    Neurological:  Positive for weakness, numbness and headaches. Negative for dizziness. Psychiatric/Behavioral:  Positive for sleep disturbance. The patient is not nervous/anxious.       PAST MEDICAL HISTORY      Past Medical History:   Diagnosis Date    Asthma     Diabetes mellitus ([facility] [address] 6/8/2018    Fibromyalgia     GERD (gastroesophageal reflux disease)     Hyperlipidemia     Lupus (systemic lupus erythematosus) (HCC)     Lupus arthritis ([facility] [address]     [facility]    Migraine     Plaquenil adverse reaction in therapeutic use 5/2016    changes in the eyes    TIA (transient ischemic attack)     8/2016       PAST SURGICAL HISTORY      Past Surgical History:   Procedure Laterality Date    HYSTERECTOMY (CERVIX STATUS UNKNOWN)  2009    Total, endometriosis    LAPAROSCOPY      CESIA STEROTACTIC LOC BREAST BIOPSY LEFT Left 02/26/2020    2 sites, benign    SHOULDER ARTHROSCOPY Left 08/20/2015    Debridement of rotator cuff and bone spurs       FAMILY HISTORY      Family History   Problem Relation Age of Onset    High Blood Pressure Mother     Rheum Arthritis Mother     Osteoporosis Mother     Diabetes Father     High Blood Pressure Father     Arthritis Father         gout    Diabetes Sister     Other Sister         Crest Syndrome    Breast Cancer Sister 54    Breast Cancer Sister 40    BRCA 1 Positive Sister     BRCA 2 Positive Sister     Diabetes Brother     High Cholesterol Brother     Breast Cancer Maternal Aunt 61    Breast Cancer Maternal Cousin 22    Cancer Paternal Uncle [de-identified]        leukemia       SOCIAL HISTORY      Social History       Tobacco History       Smoking Status  Never Smoker      Smokeless Tobacco Use  Never Used              Alcohol History       Alcohol Use Status  No              Drug Use       Drug Use Status  No              Sexual Activity       Sexually Active  Not Currently                    ALLERGIES     Allergies   Allergen Reactions    Cayenne Swelling     Tongue and throat swelling    Codeine Rash    Tape [Adhesive Tape]      Tears skin  Steri strips blisters skin    Amoxicillin Other (See Comments)    Sulfa Antibiotics Other (See Comments)     Can not take medication due to illness       CURRENT MEDICATIONS      Current Outpatient Medications   Medication Sig Dispense Refill    traZODone (DESYREL) 50 MG tablet TAKE 3 TABLETS BY MOUTH NIGHTLY 891 tablet 1    folic acid (FOLVITE) 1 MG tablet Take 2 tablets by mouth once daily 60 tablet 2    levalbuterol (XOPENEX) 0.63 MG/3ML nebulization Take 3 mLs by nebulization every 6 hours as needed for Wheezing or Shortness of Breath 270 mL 5    potassium chloride (KLOR-CON) 10 MEQ extended release tablet Take 1 tablet by mouth once daily 90 tablet 3    Tuberculin-Allergy Syringes 28G X 1/2\" 1 ML MISC 1 each by Does not apply route once a week 10 each 3    cyclobenzaprine (FLEXERIL) 5 MG tablet TAKE 1 TO 2 TABLETS BY MOUTH THREE TIMES DAILY AS NEEDED FOR MUSCLE SPASM 270 tablet 3    metFORMIN (GLUCOPHAGE) 500 MG tablet TAKE 1 TABLET BY MOUTH TWICE DAILY WITH MEALS 180 tablet 3    sucralfate (CARAFATE) 1 GM tablet Take 1 tablet by mouth 4 times daily 120 tablet 11    Immune Globulin-Hyaluronidase (HYQVIA) 20 GM/200ML KIT Inject 20 g into the skin every 28 days 1 each 11    montelukast (SINGULAIR) 10 MG tablet Take 1 tablet by mouth nightly 90 tablet 11    Dulaglutide 0.75 MG/0.5ML SOPN Inject 0.75 mg into the skin once a week 15 pen 11    verapamil (CALAN SR) 240 MG extended release tablet Take 1 tablet by mouth once daily 90 tablet 3    clotrimazole-betamethasone (LOTRISONE) 1-0.05 % cream APPLY  CREAM TOPICALLY TWICE DAILY 45 g 0    clopidogrel (PLAVIX) 75 MG tablet Take 1 tablet by mouth daily 90 tablet 3    heparin flush 100 UNIT/ML injection 1 mL by IntraCATHeter route every 30 days Flush mediport monthly with 5 mls (or 500 units) of Heparin (100 units per ml) 5 mL 11    Continuous Blood Gluc Sensor (FREESTYLE SANDRA 14 DAY SENSOR) [facility] [place] TO [place] BLOOD SUGAR TWICE DAILY 3 each 5    Continuous Blood Gluc Sensor (FREESTYLE SANDRA 14 DAY SENSOR) MISC 1 Units by Subcuticular route every 14 days 3 each 11    atorvastatin (LIPITOR) 80 MG tablet Take 1 tablet by mouth daily 90 tablet 3    ibuprofen (ADVIL;MOTRIN) 600 MG tablet Take 1 tablet by mouth every 8 hours as needed for Pain 42 tablet 5    azelastine (ASTELIN) 0.1 % nasal spray 2 sprays by Nasal route 2 times daily Use in each nostril as directed 1 Bottle 11    magnesium oxide (MAG-OX) 400 (241.3 Mg) MG TABS tablet Take 1 tablet by mouth once daily 90 tablet 3    EPINEPHrine (EPIPEN 2-FERNANDO) 0.3 MG/0.3ML SOAJ injection Inject one pen as directed STAT for allergic reaction, may disp generic NDC 51144-905-85 2 each 0    Handicap Placard MISC by Does not apply route Dx:  Lupus arthritis,  Length 5 years 1 each 0    triamcinolone (KENALOG) 0.1 % cream Apply topically 2 times daily Apply topically 2 times daily. 1 Tube 5    levocetirizine (XYZAL) 5 MG tablet TAKE ONE TABLET BY MOUTH NIGHTLY 30 tablet 11    sodium chloride (OCEAN) 0.65 % nasal spray 1 spray by Nasal route as needed for Congestion 1 Bottle 3    NEXIUM 40 MG delayed release capsule TAKE 1 CAPSULE BY MOUTH ONCE DAILY 30 capsule 5    levalbuterol (XOPENEX HFA) 45 MCG/ACT inhaler Inhale 1-2 puffs into the lungs every 4 hours as needed for Wheezing or Shortness of Breath 1 Inhaler 3    Respiratory Therapy Supplies (NEBULIZER AIR TUBE/PLUGS) MISC 1 kit by Does not apply route every 6 hours as needed (Wheezing/shortness of breath) Please provide nebulizer kit and supplies.  1 each 0    aspirin (LORENZO ASPIRIN) 325 MG tablet Take 1 tablet by mouth daily 30 tablet 3    diclofenac sodium (VOLTAREN) 1 % GEL Apply 2 g topically 4 times daily as needed for Pain (topical)       methotrexate Sodium 50 MG/2ML chemo injection Inject 0.3 mLs into the skin once a week (Patient not taking: Reported on 10/5/2022) 4 each 0    gabapentin (NEURONTIN) 300 MG capsule Take 1 capsule by mouth in the morning and at bedtime for 7 days. Intended supply: 90 days 60 capsule 3    acetaminophen (TYLENOL 8 HOUR) 650 MG extended release tablet Take 1 tablet by mouth once as needed for Pain (give 30 minutes prior to IVIG infusions) 1 tablet 5     No current facility-administered medications for this visit. PHYSICAL EXAMINATION / OBJECTIVE   Objective:  /86 (Site: Left Lower Arm, Position: Sitting, Cuff Size: Medium Adult)   Pulse 87   Ht 5' 2.99\" (1.6 m)   Wt 237 lb (107.5 kg)   SpO2 98%   BMI 41.99 kg/m²     Physical Exam  Vitals reviewed. Constitutional:       Appearance: She is well-developed. Cardiovascular:      Rate and Rhythm: Normal rate and regular rhythm. Pulmonary:      Effort: Pulmonary effort is normal.      Breath sounds: Normal breath sounds. Musculoskeletal:      Cervical back: Normal range of motion and neck supple. Skin:     General: Skin is warm and dry. Findings: No rash. Neurological:      Mental Status: She is alert and oriented to person, place, and time. Psychiatric:         Thought Content:  Thought content normal.     Upper extremities:    SHOULDERS + tender bilat, no swelling ,   ELBOWS + tender, no swelling,   WRISTS + tender bilat, no swelling,   HANDS/FINGERS tender bilat MCPs, PIPs, DIPs. + Mild fullness right 2-4 PIPs    Lower extremities:  HIPS + tender bilat  KNEES + tender bilat, no swelling  ANKLES + tender bilat, no swelling   FEET : + tender bilat, + bogginess bilat MTPs    Spine:   + tender throughout       LABS    CBC  Lab Results   Component Value Date/Time    WBC 7.4 10/02/2022 09:48 AM    RBC 5.12 10/02/2022 09:48 AM    HGB 13.0 10/02/2022 09:48 AM    HCT 40.4 10/02/2022 09:48 AM    MCV 78.9 10/02/2022 09:48 AM    MCH 25.4 10/02/2022 09:48 AM    MCHC 32.2 10/02/2022 09:48 AM    RDW 14.1 02/06/2022 10:33 AM     10/02/2022 09:48 AM       CMP  Lab Results   Component Value Date/Time    CALCIUM 9.5 10/02/2022 09:48 AM    LABALBU 4.0 10/02/2022 09:48 AM    PROT 6.7 10/02/2022 09:48 AM     10/02/2022 09:48 AM    K 3.9 10/02/2022 09:48 AM    CO2 26 10/02/2022 09:48 AM     10/02/2022 09:48 AM    BUN 9 10/02/2022 09:48 AM    CREATININE 0.6 10/02/2022 09:48 AM    ALKPHOS 174 10/02/2022 09:48 AM    ALT 51 10/02/2022 09:48 AM    AST 51 10/02/2022 09:48 AM       HgBA1c: No components found for: HGBA1C    No results found for: VITD25      Lab Results   Component Value Date    ANASCRN None Detected 01/26/2022     No results found for: SSA  No results found for: SSB  No results found for: ANTI-SMITH  No results found for: DSDNAAB   No results found for: ANTIRNP  No results found for: C3, C4  No results found for: CCPAB  No results found for: RF    No components found for: CANCASCRN, APANCASCRN  Lab Results   Component Value Date    SEDRATE 10 10/02/2022     Lab Results   Component Value Date    CRP 0.39 10/02/2022       RADIOLOGY:         ASSESSMENT/PLAN    Assessment   Plan     undiff inflammatory arthritis  H/o lupus  - h/o lupus dx'd around 14 yoa. H/o reported nasal sores, oral sores, malar rash, inflammatory arthritis, episodic fevers. 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 + sicca symptoms. Reported raynauds. Mild joint swelling, warmth noted on exam. Last [facility] note w/ diagnosis of inflammatory poly-arthropathy. Repeat blood testing with EVELIN 1:80 dense fine speckled  - prior tx: plaquenil (retinal toxicity), methotrexate 7.5 mg weekly (LFT elevation), arava(no relief, leukopenia), azathioprine (no relief)   - start sulfasalazine 500 mg BID x 7 days, then 1000 mg BID thereafter. Side effects include GI intolerance, rash, orange urine color, low blood counts, liver injury, increased risk of infections, pancreatitis, RARE inflammation of the lung.  Discussed that there is a risk of sulfa allergy with systemic lupus, but given negative subserologies and low concern for active systemic lupus at this time will attempt to gain better control of inflammatory arthritis    Fibromyalgia- active  - prior tx: PT (no relief), gabapentin, lyrica, cymbalta (? Relief)   - gabapentin 300 BID (6/2022)    Chronic low back pain  - June 2019 CT with sclerosis around bilat Si joints given inflammatory arthritis and inflammatory back pain sx's. MRI pelvis w/o inflammation    Carpal tunnel syndrome bilat  Cubital tunnel bilat  - + tinel bilat, intermittent numbness/tingling   - continue wrist splinting, elbow splinting   - patient previously declined EMG/NCV    Immunoglobulin deg- IgG, IgM def  - reports recurrent sinus infections   - following with allergy/immunology    Medication monitoring   - CBC, CMP, sed rate, CRP q 4 weeks x3 w/ SSZ start      No follow-ups on file. Electronically signed by NAHID Erickson CNP on 10/5/2022 at 9:15 AM    New Prescriptions    No medications on file       Thank you for allowing me to participate in the care of this patient. Please call if there are any questions.

## 2022-10-06 RX ORDER — SULFASALAZINE 500 MG/1
TABLET ORAL
Qty: 98 TABLET | Refills: 0 | Status: SHIPPED | OUTPATIENT
Start: 2022-10-06 | End: 2022-11-30 | Stop reason: SDUPTHER

## 2022-10-21 NOTE — TELEPHONE ENCOUNTER
From: Bhargav  To: Abilio Carrizales  Sent: 10/5/2022 4:53 PM EDT  Subject: new medication    Hi Joan Brandon,     I discussed your case with Dr. Sangita Miguel, and we would like to attempt sulfasalazine which is a twice daily medication to gain better control of the inflammatory arthritis. As discussed at your appointment, this is a sulfa based medication, however given low concern for active systemic lupus manifestations, would like to see if this helps with the inflammatory arthritis component. I think this is the best option at this point to help with the joint inflammation. Side effects include: GI intolerance, rash, orange urine color, low blood counts, liver injury, increased risk of infections, pancreatitis, RARE inflammation of the lung. If you are okay with pursuing this, please let us know.      NAHID Durant - CNP

## 2022-10-25 NOTE — TELEPHONE ENCOUNTER
Last visit- 1/11/2022  Next visit- 5/11/2022    Requested Prescriptions     Pending Prescriptions Disp Refills    metFORMIN (GLUCOPHAGE) 500 MG tablet 180 tablet 3     Sig: TAKE 1 TABLET BY MOUTH TWICE DAILY WITH MEALS Yes

## 2022-10-28 RX ORDER — LEFLUNOMIDE 10 MG/1
10 TABLET ORAL DAILY
Qty: 30 TABLET | Refills: 0 | Status: SHIPPED | OUTPATIENT
Start: 2022-10-28 | End: 2022-11-30

## 2022-10-31 RX ORDER — ATORVASTATIN CALCIUM 80 MG/1
TABLET, FILM COATED ORAL
Qty: 90 TABLET | Refills: 3 | Status: SHIPPED | OUTPATIENT
Start: 2022-10-31

## 2022-11-02 ENCOUNTER — HOSPITAL ENCOUNTER (OUTPATIENT)
Age: 42
Discharge: HOME | End: 2022-11-02
Payer: MEDICARE

## 2022-11-02 DIAGNOSIS — Z51.81 MEDICATION MONITORING ENCOUNTER: ICD-10-CM

## 2022-11-02 LAB
ALBUMIN SERPL-MCNC: 4.5 G/DL (ref 3.5–5.1)
ALP BLD-CCNC: 175 U/L (ref 38–126)
ALT SERPL-CCNC: 43 U/L (ref 11–66)
ANION GAP SERPL CALCULATED.3IONS-SCNC: 15 MEQ/L (ref 8–16)
AST SERPL-CCNC: 37 U/L (ref 5–40)
BASOPHILS # BLD: 1 %
BASOPHILS ABSOLUTE: 0.1 THOU/MM3 (ref 0–0.1)
BILIRUB SERPL-MCNC: 0.5 MG/DL (ref 0.3–1.2)
BUN BLDV-MCNC: 10 MG/DL (ref 7–22)
C-REACTIVE PROTEIN: 0.32 MG/DL (ref 0–1)
CALCIUM SERPL-MCNC: 9.5 MG/DL (ref 8.5–10.5)
CHLORIDE BLD-SCNC: 103 MEQ/L (ref 98–111)
CO2: 24 MEQ/L (ref 23–33)
CREAT SERPL-MCNC: 0.7 MG/DL (ref 0.4–1.2)
EOSINOPHIL # BLD: 2.6 %
EOSINOPHILS ABSOLUTE: 0.2 THOU/MM3 (ref 0–0.4)
ERYTHROCYTE [DISTWIDTH] IN BLOOD BY AUTOMATED COUNT: 15.4 % (ref 11.5–14.5)
ERYTHROCYTE [DISTWIDTH] IN BLOOD BY AUTOMATED COUNT: 44.5 FL (ref 35–45)
GFR SERPL CREATININE-BSD FRML MDRD: > 60 ML/MIN/1.73M2
GLUCOSE BLD-MCNC: 102 MG/DL (ref 70–108)
HCT VFR BLD CALC: 42 % (ref 37–47)
HEMOGLOBIN: 12.6 GM/DL (ref 12–16)
IMMATURE GRANS (ABS): 0.02 THOU/MM3 (ref 0–0.07)
IMMATURE GRANULOCYTES: 0.3 %
LYMPHOCYTES # BLD: 37.5 %
LYMPHOCYTES ABSOLUTE: 2.6 THOU/MM3 (ref 1–4.8)
MCH RBC QN AUTO: 24 PG (ref 26–33)
MCHC RBC AUTO-ENTMCNC: 30 GM/DL (ref 32.2–35.5)
MCV RBC AUTO: 80 FL (ref 81–99)
MONOCYTES # BLD: 7.5 %
MONOCYTES ABSOLUTE: 0.5 THOU/MM3 (ref 0.4–1.3)
NUCLEATED RED BLOOD CELLS: 0 /100 WBC
PLATELET # BLD: 281 THOU/MM3 (ref 130–400)
PMV BLD AUTO: 11.2 FL (ref 9.4–12.4)
POTASSIUM SERPL-SCNC: 4.4 MEQ/L (ref 3.5–5.2)
RBC # BLD: 5.25 MILL/MM3 (ref 4.2–5.4)
SEDIMENTATION RATE, ERYTHROCYTE: 8 MM/HR (ref 0–20)
SEG NEUTROPHILS: 51.1 %
SEGMENTED NEUTROPHILS ABSOLUTE COUNT: 3.5 THOU/MM3 (ref 1.8–7.7)
SODIUM BLD-SCNC: 142 MEQ/L (ref 135–145)
TOTAL PROTEIN: 6.5 G/DL (ref 6.1–8)
WBC # BLD: 6.8 THOU/MM3 (ref 4.8–10.8)

## 2022-11-02 PROCEDURE — 36415 COLL VENOUS BLD VENIPUNCTURE: CPT

## 2022-11-02 PROCEDURE — 85025 COMPLETE CBC W/AUTO DIFF WBC: CPT

## 2022-11-02 PROCEDURE — 80053 COMPREHEN METABOLIC PANEL: CPT

## 2022-11-02 PROCEDURE — 82784 ASSAY IGA/IGD/IGG/IGM EACH: CPT

## 2022-11-02 PROCEDURE — 86140 C-REACTIVE PROTEIN: CPT

## 2022-11-02 PROCEDURE — 85651 RBC SED RATE NONAUTOMATED: CPT

## 2022-11-03 LAB — IGG: 747 MG/DL (ref 700–1600)

## 2022-11-29 ENCOUNTER — HOSPITAL ENCOUNTER (OUTPATIENT)
Age: 42
Discharge: HOME | End: 2022-11-29
Payer: MEDICARE

## 2022-11-29 LAB
BASOPHILS # BLD: 0.7 %
BASOPHILS ABSOLUTE: 0 THOU/MM3 (ref 0–0.1)
EOSINOPHIL # BLD: 2 %
EOSINOPHILS ABSOLUTE: 0.1 THOU/MM3 (ref 0–0.4)
ERYTHROCYTE [DISTWIDTH] IN BLOOD BY AUTOMATED COUNT: 15.1 % (ref 11.5–14.5)
ERYTHROCYTE [DISTWIDTH] IN BLOOD BY AUTOMATED COUNT: 43 FL (ref 35–45)
HCT VFR BLD CALC: 40.6 % (ref 37–47)
HEMOGLOBIN: 12.2 GM/DL (ref 12–16)
IMMATURE GRANS (ABS): 0.01 THOU/MM3 (ref 0–0.07)
IMMATURE GRANULOCYTES: 0.1 %
LYMPHOCYTES # BLD: 34.9 %
LYMPHOCYTES ABSOLUTE: 2.5 THOU/MM3 (ref 1–4.8)
MCH RBC QN AUTO: 23.6 PG (ref 26–33)
MCHC RBC AUTO-ENTMCNC: 30 GM/DL (ref 32.2–35.5)
MCV RBC AUTO: 78.7 FL (ref 81–99)
MONOCYTES # BLD: 7.2 %
MONOCYTES ABSOLUTE: 0.5 THOU/MM3 (ref 0.4–1.3)
NUCLEATED RED BLOOD CELLS: 0 /100 WBC
PLATELET # BLD: 242 THOU/MM3 (ref 130–400)
PMV BLD AUTO: 11.8 FL (ref 9.4–12.4)
RBC # BLD: 5.16 MILL/MM3 (ref 4.2–5.4)
SEDIMENTATION RATE, ERYTHROCYTE: 8 MM/HR (ref 0–20)
SEG NEUTROPHILS: 55.1 %
SEGMENTED NEUTROPHILS ABSOLUTE COUNT: 3.9 THOU/MM3 (ref 1.8–7.7)
WBC # BLD: 7.1 THOU/MM3 (ref 4.8–10.8)

## 2022-11-29 PROCEDURE — 85025 COMPLETE CBC W/AUTO DIFF WBC: CPT

## 2022-11-29 PROCEDURE — 86140 C-REACTIVE PROTEIN: CPT

## 2022-11-29 PROCEDURE — 85651 RBC SED RATE NONAUTOMATED: CPT

## 2022-11-29 PROCEDURE — 80053 COMPREHEN METABOLIC PANEL: CPT

## 2022-11-29 PROCEDURE — 36415 COLL VENOUS BLD VENIPUNCTURE: CPT

## 2022-11-29 PROCEDURE — 82784 ASSAY IGA/IGD/IGG/IGM EACH: CPT

## 2022-11-29 RX ORDER — GABAPENTIN 300 MG/1
CAPSULE ORAL
Qty: 60 CAPSULE | Refills: 2 | Status: SHIPPED | OUTPATIENT
Start: 2022-11-29 | End: 2023-02-27

## 2022-11-29 RX ORDER — LEFLUNOMIDE 10 MG/1
TABLET ORAL
Qty: 30 TABLET | Refills: 0 | OUTPATIENT
Start: 2022-11-29

## 2022-11-30 LAB
ALBUMIN SERPL-MCNC: 4.4 G/DL (ref 3.5–5.1)
ALP BLD-CCNC: 160 U/L (ref 38–126)
ALT SERPL-CCNC: 48 U/L (ref 11–66)
ANION GAP SERPL CALCULATED.3IONS-SCNC: 14 MEQ/L (ref 8–16)
AST SERPL-CCNC: 43 U/L (ref 5–40)
BILIRUB SERPL-MCNC: 0.2 MG/DL (ref 0.3–1.2)
BUN BLDV-MCNC: 12 MG/DL (ref 7–22)
C-REACTIVE PROTEIN: < 0.3 MG/DL (ref 0–1)
CALCIUM SERPL-MCNC: 9.3 MG/DL (ref 8.5–10.5)
CHLORIDE BLD-SCNC: 101 MEQ/L (ref 98–111)
CO2: 25 MEQ/L (ref 23–33)
CREAT SERPL-MCNC: 0.8 MG/DL (ref 0.4–1.2)
GFR SERPL CREATININE-BSD FRML MDRD: > 60 ML/MIN/1.73M2
GLUCOSE BLD-MCNC: 174 MG/DL (ref 70–108)
IGG: 709 MG/DL (ref 700–1600)
POTASSIUM SERPL-SCNC: 4.1 MEQ/L (ref 3.5–5.2)
SODIUM BLD-SCNC: 140 MEQ/L (ref 135–145)
TOTAL PROTEIN: 6.2 G/DL (ref 6.1–8)

## 2022-11-30 RX ORDER — SULFASALAZINE 500 MG/1
1000 TABLET ORAL 2 TIMES DAILY
Qty: 120 TABLET | Refills: 0 | Status: SHIPPED | OUTPATIENT
Start: 2022-11-30 | End: 2022-12-01

## 2022-12-01 RX ORDER — LEFLUNOMIDE 10 MG/1
10 TABLET ORAL DAILY
Qty: 30 TABLET | Refills: 0 | Status: SHIPPED | OUTPATIENT
Start: 2022-12-01

## 2022-12-11 ENCOUNTER — HOSPITAL ENCOUNTER (OUTPATIENT)
Age: 42
Discharge: HOME | End: 2022-12-11
Payer: MEDICARE

## 2022-12-11 DIAGNOSIS — D64.9 ANEMIA, UNSPECIFIED TYPE: ICD-10-CM

## 2022-12-11 DIAGNOSIS — E78.00 PURE HYPERCHOLESTEROLEMIA: ICD-10-CM

## 2022-12-11 DIAGNOSIS — R79.89 ELEVATED TSH: ICD-10-CM

## 2022-12-11 DIAGNOSIS — E11.69 TYPE 2 DIABETES MELLITUS WITH OTHER SPECIFIED COMPLICATION, WITHOUT LONG-TERM CURRENT USE OF INSULIN (HCC): ICD-10-CM

## 2022-12-11 LAB
ALBUMIN SERPL-MCNC: 4.2 G/DL (ref 3.5–5.1)
ALP BLD-CCNC: 172 U/L (ref 38–126)
ALT SERPL-CCNC: 65 U/L (ref 11–66)
ANION GAP SERPL CALCULATED.3IONS-SCNC: 13 MEQ/L (ref 8–16)
AST SERPL-CCNC: 62 U/L (ref 5–40)
AVERAGE GLUCOSE: 150 MG/DL (ref 70–126)
BILIRUB SERPL-MCNC: 0.3 MG/DL (ref 0.3–1.2)
BUN BLDV-MCNC: 9 MG/DL (ref 7–22)
CALCIUM SERPL-MCNC: 9.9 MG/DL (ref 8.5–10.5)
CHLORIDE BLD-SCNC: 104 MEQ/L (ref 98–111)
CHOLESTEROL, TOTAL: 152 MG/DL (ref 100–199)
CO2: 23 MEQ/L (ref 23–33)
CREAT SERPL-MCNC: 0.7 MG/DL (ref 0.4–1.2)
ERYTHROCYTE [DISTWIDTH] IN BLOOD BY AUTOMATED COUNT: 15.1 % (ref 11.5–14.5)
ERYTHROCYTE [DISTWIDTH] IN BLOOD BY AUTOMATED COUNT: 41.9 FL (ref 35–45)
FERRITIN: 21 NG/ML (ref 10–291)
FOLATE: > 20 NG/ML (ref 4.8–24.2)
GFR SERPL CREATININE-BSD FRML MDRD: > 60 ML/MIN/1.73M2
GLUCOSE FASTING: 120 MG/DL (ref 70–108)
HBA1C MFR BLD: 7 % (ref 4.4–6.4)
HCT VFR BLD CALC: 42.9 % (ref 37–47)
HDLC SERPL-MCNC: 40 MG/DL
HEMOGLOBIN: 13.3 GM/DL (ref 12–16)
IRON: 37 UG/DL (ref 50–170)
LDL CHOLESTEROL CALCULATED: 91 MG/DL
MCH RBC QN AUTO: 23.9 PG (ref 26–33)
MCHC RBC AUTO-ENTMCNC: 31 GM/DL (ref 32.2–35.5)
MCV RBC AUTO: 77.2 FL (ref 81–99)
PLATELET # BLD: 256 THOU/MM3 (ref 130–400)
PMV BLD AUTO: 11.6 FL (ref 9.4–12.4)
POTASSIUM SERPL-SCNC: 4 MEQ/L (ref 3.5–5.2)
RBC # BLD: 5.56 MILL/MM3 (ref 4.2–5.4)
SODIUM BLD-SCNC: 140 MEQ/L (ref 135–145)
TOTAL IRON BINDING CAPACITY: 430 UG/DL (ref 171–450)
TOTAL PROTEIN: 7.1 G/DL (ref 6.1–8)
TRIGL SERPL-MCNC: 104 MG/DL (ref 0–199)
TSH SERPL DL<=0.05 MIU/L-ACNC: 3.22 UIU/ML (ref 0.4–4.2)
VITAMIN B-12: 379 PG/ML (ref 211–911)
WBC # BLD: 6.4 THOU/MM3 (ref 4.8–10.8)

## 2022-12-11 PROCEDURE — 80053 COMPREHEN METABOLIC PANEL: CPT

## 2022-12-11 PROCEDURE — 82746 ASSAY OF FOLIC ACID SERUM: CPT

## 2022-12-11 PROCEDURE — 85027 COMPLETE CBC AUTOMATED: CPT

## 2022-12-11 PROCEDURE — 82728 ASSAY OF FERRITIN: CPT

## 2022-12-11 PROCEDURE — 36415 COLL VENOUS BLD VENIPUNCTURE: CPT

## 2022-12-11 PROCEDURE — 84443 ASSAY THYROID STIM HORMONE: CPT

## 2022-12-11 PROCEDURE — 80061 LIPID PANEL: CPT

## 2022-12-11 PROCEDURE — 83550 IRON BINDING TEST: CPT

## 2022-12-11 PROCEDURE — 83540 ASSAY OF IRON: CPT

## 2022-12-11 PROCEDURE — 82607 VITAMIN B-12: CPT

## 2022-12-11 PROCEDURE — 83036 HEMOGLOBIN GLYCOSYLATED A1C: CPT

## 2022-12-27 ENCOUNTER — HOSPITAL ENCOUNTER (OUTPATIENT)
Age: 42
Discharge: HOME | End: 2022-12-27
Payer: MEDICARE

## 2022-12-27 DIAGNOSIS — D83.9 CVID (COMMON VARIABLE IMMUNODEFICIENCY) (HCC): ICD-10-CM

## 2022-12-27 DIAGNOSIS — Z51.81 MEDICATION MONITORING ENCOUNTER: ICD-10-CM

## 2022-12-27 LAB
ALBUMIN SERPL-MCNC: 4.5 G/DL (ref 3.5–5.1)
ALP BLD-CCNC: 162 U/L (ref 38–126)
ALT SERPL-CCNC: 64 U/L (ref 11–66)
ANION GAP SERPL CALCULATED.3IONS-SCNC: 14 MEQ/L (ref 8–16)
AST SERPL-CCNC: 73 U/L (ref 5–40)
BASOPHILS # BLD: 0.8 %
BASOPHILS ABSOLUTE: 0 THOU/MM3 (ref 0–0.1)
BILIRUB SERPL-MCNC: 0.3 MG/DL (ref 0.3–1.2)
BUN BLDV-MCNC: 7 MG/DL (ref 7–22)
C-REACTIVE PROTEIN: < 0.3 MG/DL (ref 0–1)
CALCIUM SERPL-MCNC: 9.7 MG/DL (ref 8.5–10.5)
CHLORIDE BLD-SCNC: 103 MEQ/L (ref 98–111)
CO2: 23 MEQ/L (ref 23–33)
CREAT SERPL-MCNC: 0.6 MG/DL (ref 0.4–1.2)
CREAT SERPL-MCNC: 0.6 MG/DL (ref 0.4–1.2)
EOSINOPHIL # BLD: 3.2 %
EOSINOPHILS ABSOLUTE: 0.2 THOU/MM3 (ref 0–0.4)
ERYTHROCYTE [DISTWIDTH] IN BLOOD BY AUTOMATED COUNT: 15.6 % (ref 11.5–14.5)
ERYTHROCYTE [DISTWIDTH] IN BLOOD BY AUTOMATED COUNT: 42.8 FL (ref 35–45)
GFR SERPL CREATININE-BSD FRML MDRD: > 60 ML/MIN/1.73M2
GFR SERPL CREATININE-BSD FRML MDRD: > 60 ML/MIN/1.73M2
GLUCOSE BLD-MCNC: 169 MG/DL (ref 70–108)
HCT VFR BLD CALC: 40.2 % (ref 37–47)
HEMOGLOBIN: 12.3 GM/DL (ref 12–16)
IMMATURE GRANS (ABS): 0.01 THOU/MM3 (ref 0–0.07)
IMMATURE GRANULOCYTES: 0.2 %
LYMPHOCYTES # BLD: 34.9 %
LYMPHOCYTES ABSOLUTE: 2.1 THOU/MM3 (ref 1–4.8)
MCH RBC QN AUTO: 23.6 PG (ref 26–33)
MCHC RBC AUTO-ENTMCNC: 30.6 GM/DL (ref 32.2–35.5)
MCV RBC AUTO: 77.2 FL (ref 81–99)
MONOCYTES # BLD: 8.1 %
MONOCYTES ABSOLUTE: 0.5 THOU/MM3 (ref 0.4–1.3)
NUCLEATED RED BLOOD CELLS: 0 /100 WBC
PLATELET # BLD: 256 THOU/MM3 (ref 130–400)
PMV BLD AUTO: 11.5 FL (ref 9.4–12.4)
POTASSIUM SERPL-SCNC: 4 MEQ/L (ref 3.5–5.2)
RBC # BLD: 5.21 MILL/MM3 (ref 4.2–5.4)
SEDIMENTATION RATE, ERYTHROCYTE: 9 MM/HR (ref 0–20)
SEG NEUTROPHILS: 52.8 %
SEGMENTED NEUTROPHILS ABSOLUTE COUNT: 3.2 THOU/MM3 (ref 1.8–7.7)
SODIUM BLD-SCNC: 140 MEQ/L (ref 135–145)
TOTAL PROTEIN: 7.1 G/DL (ref 6.1–8)
WBC # BLD: 6 THOU/MM3 (ref 4.8–10.8)

## 2022-12-27 PROCEDURE — 85651 RBC SED RATE NONAUTOMATED: CPT

## 2022-12-27 PROCEDURE — 80053 COMPREHEN METABOLIC PANEL: CPT

## 2022-12-27 PROCEDURE — 86140 C-REACTIVE PROTEIN: CPT

## 2022-12-27 PROCEDURE — 36415 COLL VENOUS BLD VENIPUNCTURE: CPT

## 2022-12-27 PROCEDURE — 82784 ASSAY IGA/IGD/IGG/IGM EACH: CPT

## 2022-12-27 PROCEDURE — 85025 COMPLETE CBC W/AUTO DIFF WBC: CPT

## 2022-12-28 ENCOUNTER — PATIENT MESSAGE (OUTPATIENT)
Dept: RHEUMATOLOGY | Age: 42
End: 2022-12-28

## 2022-12-28 ENCOUNTER — TELEPHONE (OUTPATIENT)
Dept: RHEUMATOLOGY | Age: 42
End: 2022-12-28

## 2022-12-28 DIAGNOSIS — R79.89 LFT ELEVATION: Primary | ICD-10-CM

## 2022-12-28 LAB — IGG: 734 MG/DL (ref 700–1600)

## 2022-12-28 RX ORDER — SUCRALFATE 1 G/1
1 TABLET ORAL 4 TIMES DAILY
Qty: 120 TABLET | Refills: 11 | Status: SHIPPED | OUTPATIENT
Start: 2022-12-28

## 2022-12-28 NOTE — TELEPHONE ENCOUNTER
LFT elevation:  --Leflunomide stopped around mid December. Continued elevation of the LFTs. --Would like to reevaluate the liver function test in 4 weeks as there is a prolonged biliary metabolism with this particular medication.

## 2022-12-28 NOTE — TELEPHONE ENCOUNTER
From: Jose Ramon Oconnell  To: Camille Mckeon  Sent: 12/28/2022 1:25 PM EST  Subject: Question regarding COMPREHENSIVE METABOLIC PANEL    You already had me stop the arava.

## 2022-12-28 NOTE — TELEPHONE ENCOUNTER
----- Message from Masoud Law DO sent at 12/28/2022  1:06 PM EST -----  The liver function tests have worsened. Please stop the arava and have your labs repeated in 2 weeks.

## 2023-01-24 ENCOUNTER — HOSPITAL ENCOUNTER (OUTPATIENT)
Age: 43
Discharge: HOME | End: 2023-01-24
Payer: MEDICARE

## 2023-01-24 DIAGNOSIS — R79.89 LFT ELEVATION: ICD-10-CM

## 2023-01-24 DIAGNOSIS — Z51.81 MEDICATION MONITORING ENCOUNTER: ICD-10-CM

## 2023-01-24 LAB
ALBUMIN SERPL BCG-MCNC: 4.3 G/DL (ref 3.5–5.1)
ALP SERPL-CCNC: 173 U/L (ref 38–126)
ALT SERPL W/O P-5'-P-CCNC: 59 U/L (ref 11–66)
ANION GAP SERPL CALC-SCNC: 10 MEQ/L (ref 8–16)
AST SERPL-CCNC: 57 U/L (ref 5–40)
BASOPHILS ABSOLUTE: 0 THOU/MM3 (ref 0–0.1)
BASOPHILS NFR BLD AUTO: 0.7 %
BILIRUB CONJ SERPL-MCNC: < 0.2 MG/DL (ref 0–0.3)
BILIRUB SERPL-MCNC: 0.4 MG/DL (ref 0.3–1.2)
BUN SERPL-MCNC: 11 MG/DL (ref 7–22)
CALCIUM SERPL-MCNC: 9.2 MG/DL (ref 8.5–10.5)
CHLORIDE SERPL-SCNC: 102 MEQ/L (ref 98–111)
CO2 SERPL-SCNC: 29 MEQ/L (ref 23–33)
CREAT SERPL-MCNC: 0.6 MG/DL (ref 0.4–1.2)
CRP SERPL-MCNC: 0.46 MG/DL (ref 0–1)
DEPRECATED RDW RBC AUTO: 42.5 FL (ref 35–45)
EOSINOPHIL NFR BLD AUTO: 3.1 %
EOSINOPHILS ABSOLUTE: 0.2 THOU/MM3 (ref 0–0.4)
ERYTHROCYTE [DISTWIDTH] IN BLOOD BY AUTOMATED COUNT: 15.8 % (ref 11.5–14.5)
GFR SERPL CREATININE-BSD FRML MDRD: > 60 ML/MIN/1.73M2
GLUCOSE SERPL-MCNC: 172 MG/DL (ref 70–108)
HCT VFR BLD AUTO: 42.3 % (ref 37–47)
HGB BLD-MCNC: 13 GM/DL (ref 12–16)
IMM GRANULOCYTES # BLD AUTO: 0.02 THOU/MM3 (ref 0–0.07)
IMM GRANULOCYTES NFR BLD AUTO: 0.3 %
LYMPHOCYTES ABSOLUTE: 2.3 THOU/MM3 (ref 1–4.8)
LYMPHOCYTES NFR BLD AUTO: 34.5 %
MCH RBC QN AUTO: 23.6 PG (ref 26–33)
MCHC RBC AUTO-ENTMCNC: 30.7 GM/DL (ref 32.2–35.5)
MCV RBC AUTO: 76.6 FL (ref 81–99)
MONOCYTES ABSOLUTE: 0.5 THOU/MM3 (ref 0.4–1.3)
MONOCYTES NFR BLD AUTO: 7.1 %
NEUTROPHILS NFR BLD AUTO: 54.3 %
NRBC BLD AUTO-RTO: 0 /100 WBC
PLATELET # BLD AUTO: 266 THOU/MM3 (ref 130–400)
PMV BLD AUTO: 11.3 FL (ref 9.4–12.4)
POTASSIUM SERPL-SCNC: 4.3 MEQ/L (ref 3.5–5.2)
PROT SERPL-MCNC: 7 G/DL (ref 6.1–8)
RBC # BLD AUTO: 5.52 MILL/MM3 (ref 4.2–5.4)
SEGMENTED NEUTROPHILS ABSOLUTE COUNT: 3.6 THOU/MM3 (ref 1.8–7.7)
SODIUM SERPL-SCNC: 141 MEQ/L (ref 135–145)
WBC # BLD AUTO: 6.7 THOU/MM3 (ref 4.8–10.8)

## 2023-01-24 PROCEDURE — 36415 COLL VENOUS BLD VENIPUNCTURE: CPT

## 2023-01-24 PROCEDURE — 80053 COMPREHEN METABOLIC PANEL: CPT

## 2023-01-24 PROCEDURE — 82248 BILIRUBIN DIRECT: CPT

## 2023-01-24 PROCEDURE — 86140 C-REACTIVE PROTEIN: CPT

## 2023-01-24 PROCEDURE — 85025 COMPLETE CBC W/AUTO DIFF WBC: CPT

## 2023-01-24 NOTE — RESULT ENCOUNTER NOTE
Liver function tests are within normal limits. Please notify the office if we need any medications refilled.

## 2023-01-26 RX ORDER — LEFLUNOMIDE 10 MG/1
TABLET ORAL
Qty: 30 TABLET | Refills: 0 | Status: SHIPPED | OUTPATIENT
Start: 2023-01-26

## 2023-02-01 DIAGNOSIS — J30.2 SEASONAL ALLERGIES: ICD-10-CM

## 2023-02-01 DIAGNOSIS — E11.69 TYPE 2 DIABETES MELLITUS WITH OTHER SPECIFIED COMPLICATION, WITHOUT LONG-TERM CURRENT USE OF INSULIN (HCC): ICD-10-CM

## 2023-02-01 RX ORDER — VERAPAMIL HYDROCHLORIDE 240 MG/1
TABLET, FILM COATED, EXTENDED RELEASE ORAL
Qty: 90 TABLET | Refills: 0 | Status: SHIPPED | OUTPATIENT
Start: 2023-02-01

## 2023-02-01 RX ORDER — MONTELUKAST SODIUM 10 MG/1
10 TABLET ORAL NIGHTLY
Qty: 90 TABLET | Refills: 0 | Status: SHIPPED | OUTPATIENT
Start: 2023-02-01

## 2023-02-07 ENCOUNTER — OFFICE VISIT (OUTPATIENT)
Dept: RHEUMATOLOGY | Age: 43
End: 2023-02-07
Payer: MEDICARE

## 2023-02-07 ENCOUNTER — PATIENT MESSAGE (OUTPATIENT)
Dept: RHEUMATOLOGY | Age: 43
End: 2023-02-07

## 2023-02-07 VITALS
HEART RATE: 89 BPM | HEIGHT: 63 IN | DIASTOLIC BLOOD PRESSURE: 88 MMHG | OXYGEN SATURATION: 96 % | BODY MASS INDEX: 42.28 KG/M2 | SYSTOLIC BLOOD PRESSURE: 136 MMHG | WEIGHT: 238.6 LBS

## 2023-02-07 DIAGNOSIS — Z51.81 MEDICATION MONITORING ENCOUNTER: ICD-10-CM

## 2023-02-07 DIAGNOSIS — M54.50 CHRONIC BILATERAL LOW BACK PAIN WITHOUT SCIATICA: ICD-10-CM

## 2023-02-07 DIAGNOSIS — M32.9 H/O SYSTEMIC LUPUS ERYTHEMATOSUS (SLE) (HCC): Primary | ICD-10-CM

## 2023-02-07 DIAGNOSIS — M79.7 FIBROMYALGIA: ICD-10-CM

## 2023-02-07 DIAGNOSIS — G89.29 CHRONIC BILATERAL LOW BACK PAIN WITHOUT SCIATICA: ICD-10-CM

## 2023-02-07 DIAGNOSIS — E66.01 OBESITY, CLASS III, BMI 40-49.9 (MORBID OBESITY) (HCC): ICD-10-CM

## 2023-02-07 PROCEDURE — 3079F DIAST BP 80-89 MM HG: CPT | Performed by: INTERNAL MEDICINE

## 2023-02-07 PROCEDURE — 3075F SYST BP GE 130 - 139MM HG: CPT | Performed by: INTERNAL MEDICINE

## 2023-02-07 PROCEDURE — 99214 OFFICE O/P EST MOD 30 MIN: CPT | Performed by: INTERNAL MEDICINE

## 2023-02-07 RX ORDER — MYCOPHENOLIC ACID 360 MG/1
360 TABLET, DELAYED RELEASE ORAL 2 TIMES DAILY
Qty: 120 TABLET | Refills: 0 | Status: SHIPPED | OUTPATIENT
Start: 2023-02-07

## 2023-02-07 RX ORDER — GABAPENTIN 400 MG/1
400 CAPSULE ORAL 2 TIMES DAILY
Qty: 60 CAPSULE | Refills: 1 | Status: SHIPPED | OUTPATIENT
Start: 2023-02-07 | End: 2023-05-08

## 2023-02-07 ASSESSMENT — ENCOUNTER SYMPTOMS
TROUBLE SWALLOWING: 0
BACK PAIN: 1
GASTROINTESTINAL NEGATIVE: 1
EYE PAIN: 0
SHORTNESS OF BREATH: 1
EYE ITCHING: 0
COUGH: 0

## 2023-02-07 NOTE — TELEPHONE ENCOUNTER
Key: [id number] '  Approved: 11/8/2022 - 2/7/2024 The patient is a 7y6m Male complaining of see chief complaint quote.

## 2023-02-07 NOTE — TELEPHONE ENCOUNTER
From: Camille Oates  To: Dr. Modesto Antunez: 2/7/2023 12:09 PM EST  Subject: New medication     The pharmacy called to tell me that the new medicine you prescribed is not covered on my insurance.

## 2023-02-07 NOTE — PROGRESS NOTES
[facility] RHEUMATOLOGY FOLLOW UP NOTE       Date Of Service: 2/7/2023  Provider: Rashmi Torres DO    Name: Mindy Giraldo   MRN: [id number]    Chief Complaint(s)     Chief Complaint   Patient presents with    Follow-up     3 month follow up        History of Present Illness (HPI)     Mindy Class  is a(n)42 y.o. female with a hx of morbid obesity, CVA x1 (2017), GERD, hiatal hernia, IBS, endometriosis s/p hysterectomy, asthma, hypercholesterolemia, type 2 DM, lupus arthritis, fibromyalgia, IgG subclass deficiency  here for the f/u evaluation of h/o lupus, fibromyalgia     Interval hx:   -- Leflunomide stopped around mid December. Continued elevation of the LFTs. -- CBD gummies stopped b/c of cost and decreased effectivness     pain affecting the fingers, wrists, shoulder, , knees, ankles, feet , neck and back   Pain on a scale 0-10: 7.5/10  Type of pain: constant  Timing: evenings  Aggravating factors: inactivity, cold, increased use, humid weather / weathers. Wt bearing - back, knees. Feet. Alleviating factors: flexeril    Swelling daily reported to affect the pips , mcps, , wrists, knees, feet   AM stiffness 2 hours. + gelling   Mild tingling in the arm - mainly at ngith   Intermittent rash along the elbows and scalp - improvement w/ topical triamcinolone --- dermatology evaluation in [place] diagnosed with psoriasis  or eczema. REVIEW OF SYSTEMS: (ROS)    Review of Systems   Constitutional:  Positive for fatigue. Negative for fever and unexpected weight change. HENT:  Positive for nosebleeds (mainly in the winter w/ dry weather) and tinnitus. Negative for congestion, mouth sores and trouble swallowing. Dry mouth   Eyes:  Negative for pain and itching. Respiratory:  Positive for shortness of breath (asthma). Negative for cough. Cardiovascular:  Negative for chest pain and leg swelling. Gastrointestinal: Negative. Endocrine: Negative for cold intolerance and heat intolerance. Genitourinary:  Negative for difficulty urinating, frequency and urgency. Musculoskeletal:  Positive for arthralgias, back pain, joint swelling, myalgias and neck pain. Skin:  Negative for rash. Neurological:  Positive for weakness, numbness and headaches. Negative for dizziness. Psychiatric/Behavioral:  Positive for sleep disturbance. The patient is not nervous/anxious. PmHx:  has a past medical history of Asthma, Diabetes mellitus ([facility] Fibromyalgia, GERD (gastroesophageal reflux disease), Hyperlipidemia, Lupus (systemic lupus erythematosus) ([facility] Lupus arthritis ([facility] Migraine, Plaquenil adverse reaction in therapeutic use, and TIA (transient ischemic attack). Social History:  reports that she has never smoked. She has never used smokeless tobacco. She reports that she does not drink alcohol and does not use drugs.     Allergies   Allergen Reactions    Cayenne Swelling     Tongue and throat swelling    Codeine Rash    Tape [Adhesive Tape]      Tears skin  Steri strips blisters skin    Amoxicillin Other (See Comments)    Sulfa Antibiotics Other (See Comments)     Can not take medication due to illness       CURRENT MEDICATIONS      Current Outpatient Medications:     dulaglutide (TRULICITY) 1.5 NI/7.2BA SC injection, Inject 0.5 mLs into the skin once a week, Disp: 4 Adjustable Dose Pre-filled Pen Syringe, Rfl: 5    montelukast (SINGULAIR) 10 MG tablet, Take 1 tablet by mouth nightly, Disp: 90 tablet, Rfl: 0    verapamil (CALAN SR) 240 MG extended release tablet, Take 1 tablet by mouth once daily, Disp: 90 tablet, Rfl: 0    sucralfate (CARAFATE) 1 GM tablet, Take 1 tablet by mouth 4 times daily, Disp: 120 tablet, Rfl: 11    gabapentin (NEURONTIN) 300 MG capsule, TALE 1 CAPSULE BY MOUTH IN THE MORNING AND 1 CAPSULE AT BEDTIME, Disp: 60 capsule, Rfl: 2    atorvastatin (LIPITOR) 80 MG tablet, Take 1 tablet by mouth once daily, Disp: 90 tablet, Rfl: 3    traZODone (DESYREL) 50 MG tablet, TAKE 3 TABLETS BY MOUTH NIGHTLY, Disp: 270 tablet, Rfl: 1    levalbuterol (XOPENEX) 0.63 MG/3ML nebulization, Take 3 mLs by nebulization every 6 hours as needed for Wheezing or Shortness of Breath, Disp: 270 mL, Rfl: 5    potassium chloride (KLOR-CON) 10 MEQ extended release tablet, Take 1 tablet by mouth once daily, Disp: 90 tablet, Rfl: 3    cyclobenzaprine (FLEXERIL) 5 MG tablet, TAKE 1 TO 2 TABLETS BY MOUTH THREE TIMES DAILY AS NEEDED FOR MUSCLE SPASM, Disp: 270 tablet, Rfl: 3    metFORMIN (GLUCOPHAGE) 500 MG tablet, TAKE 1 TABLET BY MOUTH TWICE DAILY WITH MEALS, Disp: 180 tablet, Rfl: 3    Immune Globulin-Hyaluronidase (HYQVIA) 20 GM/200ML KIT, Inject 20 g into the skin every 28 days, Disp: 1 each, Rfl: 11    clotrimazole-betamethasone (LOTRISONE) 1-0.05 % cream, APPLY  CREAM TOPICALLY TWICE DAILY, Disp: 45 g, Rfl: 0    clopidogrel (PLAVIX) 75 MG tablet, Take 1 tablet by mouth daily, Disp: 90 tablet, Rfl: 3    heparin flush 100 UNIT/ML injection, 1 mL by IntraCATHeter route every 30 days Flush mediport monthly with 5 mls (or 500 units) of Heparin (100 units per ml), Disp: 5 mL, Rfl: 11    Continuous Blood Gluc Sensor (FREESTYLE SANDRA 14 DAY SENSOR) MISC, USE TO CHECK BLOOD SUGAR TWICE DAILY, Disp: 3 each, Rfl: 5    Continuous Blood Gluc Sensor (FREESTYLE SANDRA 14 DAY SENSOR) MISC, 1 Units by Subcuticular route every 14 days, Disp: 3 each, Rfl: 11    ibuprofen (ADVIL;MOTRIN) 600 MG tablet, Take 1 tablet by mouth every 8 hours as needed for Pain, Disp: 42 tablet, Rfl: 5    azelastine (ASTELIN) 0.1 % nasal spray, 2 sprays by Nasal route 2 times daily Use in each nostril as directed, Disp: 1 Bottle, Rfl: 11    magnesium oxide (MAG-OX) 400 (241.3 Mg) MG TABS tablet, Take 1 tablet by mouth once daily, Disp: 90 tablet, Rfl: 3    EPINEPHrine (EPIPEN 2-FERNANDO) 0.3 MG/0.3ML SOAJ injection, Inject one pen as directed STAT for allergic reaction, may disp generic NDC 15179-399-13, Disp: 2 each, Rfl: 0    Handicap Placard MISC, by Does not apply route Dx:  Lupus arthritis,  Length 5 years, Disp: 1 each, Rfl: 0    triamcinolone (KENALOG) 0.1 % cream, Apply topically 2 times daily Apply topically 2 times daily. , Disp: 1 Tube, Rfl: 5    levocetirizine (XYZAL) 5 MG tablet, TAKE ONE TABLET BY MOUTH NIGHTLY, Disp: 30 tablet, Rfl: 11    sodium chloride (OCEAN) 0.65 % nasal spray, 1 spray by Nasal route as needed for Congestion, Disp: 1 Bottle, Rfl: 3    NEXIUM 40 MG delayed release capsule, TAKE 1 CAPSULE BY MOUTH ONCE DAILY, Disp: 30 capsule, Rfl: 5    levalbuterol (XOPENEX HFA) 45 MCG/ACT inhaler, Inhale 1-2 puffs into the lungs every 4 hours as needed for Wheezing or Shortness of Breath, Disp: 1 Inhaler, Rfl: 3    Respiratory Therapy Supplies (NEBULIZER AIR TUBE/PLUGS) MISC, 1 kit by Does not apply route every 6 hours as needed (Wheezing/shortness of breath) Please provide nebulizer kit and supplies. , Disp: 1 each, Rfl: 0    aspirin (LORENZO ASPIRIN) 325 MG tablet, Take 1 tablet by mouth daily, Disp: 30 tablet, Rfl: 3    diclofenac sodium (VOLTAREN) 1 % GEL, Apply 2 g topically 4 times daily as needed for Pain (topical) , Disp: , Rfl:     acetaminophen (TYLENOL 8 HOUR) 650 MG extended release tablet, Take 1 tablet by mouth once as needed for Pain (give 30 minutes prior to IVIG infusions), Disp: 1 tablet, Rfl: 5        PHYSICAL EXAMINATION / OBJECTIVE   Objective:  /88 (Site: Left Upper Arm, Position: Sitting, Cuff Size: Large Adult)   Pulse 89   Ht 5' 2.99\" (1.6 m)   Wt 238 lb 9.6 oz (108.2 kg)   SpO2 96%   BMI 42.28 kg/m²     Physical Exam  Vitals reviewed. Constitutional:       Appearance: She is well-developed. Cardiovascular:      Rate and Rhythm: Normal rate and regular rhythm. Pulmonary:      Effort: Pulmonary effort is normal.      Breath sounds: Normal breath sounds. Musculoskeletal:      Cervical back: Normal range of motion and neck supple. Skin:     General: Skin is warm and dry. Findings: No rash. Neurological:      Mental Status: She is alert and oriented to person, place, and time. Psychiatric:         Thought Content: Thought content normal.       Generalized myofascial pain     Upper extremities:    SHOULDERS + tender bilat,  ELBOWS + tender,   WRISTS + tender bilat,   HANDS/FINGERS tender bilat MCPs, PIPs,  + Mild fullness right 2-4 PIPs    Lower extremities:  HIPS + tender bilat  KNEES + tender bilat, no swelling  ANKLES + tender bilat, no swelling   FEET : + tender bilat, + bogginess bilat MTPs    Spine:   + tender throughout       LABS    CBC  Lab Results   Component Value Date/Time    WBC 6.7 01/24/2023 10:14 AM    RBC 5.52 01/24/2023 10:14 AM    HGB 13.0 01/24/2023 10:14 AM    HCT 42.3 01/24/2023 10:14 AM    MCV 76.6 01/24/2023 10:14 AM    MCH 23.6 01/24/2023 10:14 AM    MCHC 30.7 01/24/2023 10:14 AM    RDW 14.1 02/06/2022 10:33 AM     01/24/2023 10:14 AM       CMP  Lab Results   Component Value Date/Time    CALCIUM 9.2 01/24/2023 10:14 AM    LABALBU 4.3 01/24/2023 10:14 AM    PROT 7.0 01/24/2023 10:14 AM     01/24/2023 10:14 AM    K 4.3 01/24/2023 10:14 AM    CO2 29 01/24/2023 10:14 AM     01/24/2023 10:14 AM    BUN 11 01/24/2023 10:14 AM    CREATININE 0.6 01/24/2023 10:14 AM    ALKPHOS 173 01/24/2023 10:14 AM    ALT 59 01/24/2023 10:14 AM    AST 57 01/24/2023 10:14 AM     Lab Results   Component Value Date    ANASCRN None Detected 01/26/2022     Lab Results   Component Value Date    SEDRATE 9 12/27/2022     Lab Results   Component Value Date    CRP 0.46 01/24/2023       RADIOLOGY:         ASSESSMENT/PLAN    Assessment   Plan     undiff inflammatory arthritis  H/o lupus  - h/o lupus dx'd around 14 yoa. H/o reported nasal sores, oral sores, malar rash, inflammatory arthritis, episodic fevers. 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 + sicca symptoms. Reported raynauds.  Mild joint swelling, warmth noted on exam. Last [facility] w/ diagnosis of inflammatory poly-arthropathy. Repeat blood testing with EVELIN 1:80 dense fine speckled  - prior tx: plaquenil (retinal toxicity), methotrexate 7.5 mg weekly (LFT elevation), arava(no relief, leukopenia), azathioprine (no relief),  Sulfasalazine (headaches)     -- discussed Myfortic vs other biologic.   -- will pursue myfortic 360mg twice daily. 2/7/23  b/c of the active arthrialgia and pip fullness.       Fibromyalgia- active  - prior tx: PT (no relief), gabapentin, lyrica, cymbalta (? Relief)   - increase gabapentin 400 BID (6/2022)   -     Chronic low back pain  - June 2019 CT with sclerosis around bilat Si joints given inflammatory arthritis and inflammatory back pain sx's. MRI pelvis w/o inflammation    Carpal tunnel syndrome bilat  Cubital tunnel bilat  - + tinel bilat, intermittent numbness/tingling   - continue wrist splinting, elbow splinting   - patient previously declined EMG/NCV    Immunoglobulin deg- IgG, IgM def  - reports recurrent sinus infections   - following with allergy/immunology    Medication monitoring   - CBC, CMP, sed rate, CRP q 4 weeks x3 w/ Mycophenolate  start      Return in about 2 months (around 4/7/2023).        Electronically signed by SJ SILVER DO on 2/7/2023 at 10:11 AM    New Prescriptions    No medications on file       Thank you for allowing me to participate in the care of this patient.   Please call if there are any questions.

## 2023-02-08 ENCOUNTER — TELEPHONE (OUTPATIENT)
Dept: RHEUMATOLOGY | Age: 43
End: 2023-02-08

## 2023-02-08 DIAGNOSIS — D83.9 CVID (COMMON VARIABLE IMMUNODEFICIENCY) (HCC): Primary | ICD-10-CM

## 2023-02-08 NOTE — PROGRESS NOTES
[address] [place] [id number]  Dept: [phone]  Dept Fax: [phone]  Loc: [phone]    Kellie Roque is a 36 y.o. female who presents today for:  Chief Complaint   Patient presents with    Follow-up       HPI:     HPI    Diabetes Mellitus: Patient presents for follow up of diabetes. Symptoms: paresthesia of the feet and visual disturbances. Symptoms have gradually worsened. Patient denies increase appetite, polydipsia and weight loss. Evaluation to date has been included: fasting blood sugar, fasting lipid panel, hemoglobin A1C and microalbuminuria. Home sugars:  am and 130-180 pm. Treatment to date: Continued metformin which has been Yes:  , which has been somewhat effective. Lab Results   Component Value Date    LABA1C 7.0 (H) 12/11/2022     No results found for: EAG    Hyperlipidemia: Patient presents with hyperlipidemia. She was tested because screening. Her last labs showed   Lab Results   Component Value Date    CHOL 152 12/11/2022    CHOL 126 12/12/2021    CHOL 156 04/16/2020     Lab Results   Component Value Date    TRIG 104 12/11/2022    TRIG 97 12/12/2021    TRIG 119 04/16/2020     Lab Results   Component Value Date    HDL 40 12/11/2022    HDL 33 12/12/2021    HDL 35 04/16/2020     Lab Results   Component Value Date    LDLCALC 91 12/11/2022    LDLCALC 74 12/12/2021    [address] 97 04/16/2020     No results found for: LABVLDL, VLDL  No results found for: CHOLHDLRATIO  There is a family history of hyperlipidemia. There is not a family history of early ischemia heart disease. GERD: Lili complains of heartburn. This has been associated with early satiety and heartburn. She denies no other symptoms. Symptoms have been present for several years. She denies dysphagia. She has not lost weight. She denies melena, hematochezia, hematemesis, and coffee ground emesis.  Medical therapy in the past has included proton pump inhibitors. Saw neurology ( Dr Bernardo Carr) for seizures. They are still occurring a few times per week. She was thinking about stopping all her medications and restarting \"fresh\". She is highly encouraged NOT to do this for fear of intractable seizure with brain damage. The seizures are usually followed by a headache. 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 She had an \"episode\" in my office in early 2018, she was started on aspirin 325 mg and went from having 8-9 episodes a day to 4 episodes every 2 months. Now also on plavix as well. Migraines are controlled with prn medications less then 2 per month. Fibromyalgia and Lupus and lupus arthritis is under the care of Rheumatology in [place] and formerly also a patient of Dr Marylu Worthy who is no longer in practice. She is frustrated because rheumatology took her off the MTX due to elevated LFTs which have been present for many years. We discussed getting GI involved to watch the liver and possibly give approval for the MTX. Allergies are well controlled on current medications. Lung nodule in the left upper lobe under the care of pulmonology. Stable as of 4-2021 for 5 years. Reviewed chart forpast medical history , surgical history , allergies, social history , family history and medications.     Health Maintenance   Topic Date Due    Hepatitis B vaccine (1 of 3 - Risk 3-dose series) Never done    Shingles vaccine (1 of 2) Never done    Diabetic foot exam  07/17/2020    COVID-19 Vaccine (4 - Booster) 11/24/2022    Diabetic Alb to Cr ratio (uACR) test  12/12/2022    Diabetic retinal exam  02/01/2023    Breast cancer screen  02/14/2023    Depression Screen  05/11/2023    A1C test (Diabetic or Prediabetic)  12/11/2023    Lipids  12/11/2023    GFR test (Diabetes, CKD 3-4, OR last GFR 15-59)  01/24/2024    DTaP/Tdap/Td vaccine (2 - Td or Tdap) 07/11/2030    Pneumococcal 0-64 years Vaccine (4 - PPSV23 if available, else PCV20) 08/10/2044    Flu vaccine  Completed    Hepatitis C screen  Completed    Hepatitis A vaccine  Aged Out    Hib vaccine  Aged Out    Meningococcal (ACWY) vaccine  Aged Out    HIV screen  Discontinued       Subjective:      Constitutional:Negative for fever, chills, diaphoresis, activity change, appetite change and fatigue. HENT: Negative for hearing loss, ear pain, congestion, sore throat, rhinorrhea, postnasal drip and ear discharge. Eyes: Negative for photophobia and visual disturbance. Respiratory: Negative for cough, chest tightness, shortness of breath and wheezing. Cardiovascular: Negative for chest pain and leg swelling. Gastrointestinal: Negative for nausea, vomiting, abdominal pain, diarrhea and constipation. Genitourinary: Negative for dysuria, urgency and frequency. Neurological: Negative for weakness, light-headedness and headaches. Psychiatric/Behavioral: Negative for sleep disturbance.      :     Vitals:    02/09/23 1252 02/09/23 1336   BP: (!) 138/100 (!) 150/102   Site: Right Upper Arm Right Upper Arm   Position: Sitting Sitting   Cuff Size: Large Adult Large Adult   Pulse: 90 94   Resp: 18    Temp: 97.6 °F (36.4 °C)    TempSrc: Temporal    SpO2: 95%    Weight: 234 lb (106.1 kg)    Height: 5' 2.99\" (1.6 m)      Wt Readings from Last 3 Encounters:   02/09/23 234 lb (106.1 kg)   02/07/23 238 lb 9.6 oz (108.2 kg)   10/05/22 237 lb (107.5 kg)       Physical Exam  Constitutional: Vital signs are normal. She appears well-developed and well-nourished. She is active. HENT:   Head: Normocephalic and atraumatic. Right Ear: Tympanic membrane, external ear and ear canal normal. No drainage or tenderness. Left Ear: Tympanic membrane, external ear and ear canal normal. No drainage or tenderness. Nose: Nose normal. No mucosal edema or rhinorrhea.    Mouth/Throat: Uvula is midline, oropharynx is clear and moist and mucous membranes are normal. Mucous membranes are not pale. Normal dentition. No posterior oropharyngeal edema or posterior oropharyngeal erythema. Eyes: Lids are normal. Right eye exhibits no chemosis and no discharge. Left eye exhibits no chemosis and no drainage. Right conjunctiva has no hemorrhage. Left conjunctiva has no hemorrhage. Right eye exhibits normal extraocular motion. Left eye exhibits normal extraocular motion. Right pupil is round and reactive. Left pupil is round and reactive. Pupils are equal.   Cardiovascular: Normal rate, regular rhythm, S1 normal, S2 normal and normal heart sounds. Exam reveals no gallop. No murmur heard. Pulmonary/Chest: Effort normal and breath sounds normal. No respiratory distress. She has no wheezes. She has no rhonchi. She has no rales. Abdominal: Soft. Normal appearance and bowel sounds are normal. She exhibits no distension and no mass. There is no hepatosplenomegaly. No tenderness. She has no rigidity, no rebound and no guarding. No hernia. Musculoskeletal:        Right lower leg: She exhibits no edema. Left lower leg: She exhibits no edema. Neurological: She is alert. Assessment/Plan   Reji Whitehead was seen today for follow-up. Diagnoses and all orders for this visit:    Type 2 diabetes mellitus with other specified complication, without long-term current use of insulin ([address]  -     Hemoglobin A1C; Future  -     Comprehensive Metabolic Panel, Fasting; Future    Hypogammaglobulinemia (HCC)    Seizure disorder ([address]    Pure hypercholesterolemia    Gastroesophageal reflux disease without esophagitis    Other migraine without status migrainosus, not intractable    Seasonal allergies    Fibromyalgia    Cerebrovascular accident (CVA), unspecified mechanism ([address]    Lung nodule, solitary    Obesity (BMI 30-39. 9)    IgG subclass deficiency (HCC)    Elevated TSH    Intertrigo    TIA (transient ischemic attack)    Common variable immunodeficiency ([facility]    Lupus arthritis ([facility] [facility]    Hypomagnesemia    Chronic pain of right knee    Fall, subsequent encounter    Moderate persistent asthma without complication    Lupus ([facility] [facility]    Morbid obesity due to excess calories [facility]    History of hepatitis B vaccination    Ulnar tunnel syndrome of left wrist    Anemia, unspecified type  -     Iron Binding Capacity; Future  -     Iron; Future  -     Ferritin; Future  -     CBC; Future  -     Vitamin B12 & Folate; Future    Elevated LFTs    Iron deficiency  -     Iron Binding Capacity; Future  -     Iron; Future  -     Ferritin; Future    Other orders  -     ondansetron (ZOFRAN-ODT) 8 MG TBDP disintegrating tablet; Place 1 tablet under the tongue every 8 hours as needed for Nausea or Vomiting    No change to medication   Continue healthy diet and exercise  Yearly eye exam  Daily foot inspection  Yearly flu shot  Monitor glucose regularly  Daily aspirin  Regular labs : A1c quarterly, lipids every 6 months and BMP quaterly    Discussed use, benefit, and side effectsof prescribed medications. All patient questions answered. Pt voiced understanding. Reviewed health maintenance. Instructed to continue current medications, diet and exercise. Patient agreed with treatment plan. Followup as directed.      Electronically signed by Lee Ann Granda MD

## 2023-02-08 NOTE — TELEPHONE ENCOUNTER
Tried to initiate a PA for pt's mychophenolate but per CMM the member recently filled this medication and will be able to return for their next refill according to their plan limits.

## 2023-02-09 ENCOUNTER — OFFICE VISIT (OUTPATIENT)
Dept: FAMILY MEDICINE CLINIC | Age: 43
End: 2023-02-09

## 2023-02-09 VITALS
BODY MASS INDEX: 41.46 KG/M2 | RESPIRATION RATE: 18 BRPM | DIASTOLIC BLOOD PRESSURE: 102 MMHG | SYSTOLIC BLOOD PRESSURE: 150 MMHG | HEIGHT: 63 IN | WEIGHT: 234 LBS | TEMPERATURE: 97.6 F | OXYGEN SATURATION: 95 % | HEART RATE: 94 BPM

## 2023-02-09 DIAGNOSIS — E61.1 IRON DEFICIENCY: ICD-10-CM

## 2023-02-09 DIAGNOSIS — M32.9 LUPUS (HCC): ICD-10-CM

## 2023-02-09 DIAGNOSIS — E66.01 MORBID OBESITY DUE TO EXCESS CALORIES (HCC): ICD-10-CM

## 2023-02-09 DIAGNOSIS — M79.7 FIBROMYALGIA: ICD-10-CM

## 2023-02-09 DIAGNOSIS — J45.40 MODERATE PERSISTENT ASTHMA WITHOUT COMPLICATION: ICD-10-CM

## 2023-02-09 DIAGNOSIS — J30.2 SEASONAL ALLERGIES: ICD-10-CM

## 2023-02-09 DIAGNOSIS — D64.9 ANEMIA, UNSPECIFIED TYPE: ICD-10-CM

## 2023-02-09 DIAGNOSIS — E78.00 PURE HYPERCHOLESTEROLEMIA: ICD-10-CM

## 2023-02-09 DIAGNOSIS — G89.29 CHRONIC PAIN OF RIGHT KNEE: ICD-10-CM

## 2023-02-09 DIAGNOSIS — R91.1 LUNG NODULE, SOLITARY: ICD-10-CM

## 2023-02-09 DIAGNOSIS — D80.1 HYPOGAMMAGLOBULINEMIA (HCC): ICD-10-CM

## 2023-02-09 DIAGNOSIS — Z92.29 HISTORY OF HEPATITIS B VACCINATION: ICD-10-CM

## 2023-02-09 DIAGNOSIS — E83.42 HYPOMAGNESEMIA: ICD-10-CM

## 2023-02-09 DIAGNOSIS — M25.561 CHRONIC PAIN OF RIGHT KNEE: ICD-10-CM

## 2023-02-09 DIAGNOSIS — R79.89 ELEVATED LFTS: ICD-10-CM

## 2023-02-09 DIAGNOSIS — L30.4 INTERTRIGO: ICD-10-CM

## 2023-02-09 DIAGNOSIS — D80.3 IGG SUBCLASS DEFICIENCY (HCC): ICD-10-CM

## 2023-02-09 DIAGNOSIS — G45.9 TIA (TRANSIENT ISCHEMIC ATTACK): ICD-10-CM

## 2023-02-09 DIAGNOSIS — G56.22 ULNAR TUNNEL SYNDROME OF LEFT WRIST: ICD-10-CM
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
## 2023-02-09 DIAGNOSIS — D83.9 COMMON VARIABLE IMMUNODEFICIENCY (HCC): ICD-10-CM

## 2023-02-09 DIAGNOSIS — G43.809 OTHER MIGRAINE WITHOUT STATUS MIGRAINOSUS, NOT INTRACTABLE: ICD-10-CM

## 2023-02-09 DIAGNOSIS — I63.9 CEREBROVASCULAR ACCIDENT (CVA), UNSPECIFIED MECHANISM (HCC): ICD-10-CM

## 2023-02-09 DIAGNOSIS — E11.69 TYPE 2 DIABETES MELLITUS WITH OTHER SPECIFIED COMPLICATION, WITHOUT LONG-TERM CURRENT USE OF INSULIN (HCC): Primary | ICD-10-CM

## 2023-02-09 DIAGNOSIS — K21.9 GASTROESOPHAGEAL REFLUX DISEASE WITHOUT ESOPHAGITIS: ICD-10-CM

## 2023-02-09 DIAGNOSIS — M32.9 LUPUS ARTHRITIS (HCC): ICD-10-CM

## 2023-02-09 DIAGNOSIS — R79.89 ELEVATED TSH: ICD-10-CM

## 2023-02-09 DIAGNOSIS — E66.9 OBESITY (BMI 30-39.9): ICD-10-CM

## 2023-02-09 DIAGNOSIS — G40.909 SEIZURE DISORDER (HCC): ICD-10-CM

## 2023-02-09 RX ORDER — ONDANSETRON 8 MG/1
8 TABLET, ORALLY DISINTEGRATING ORAL EVERY 8 HOURS PRN
Qty: 40 TABLET | Refills: 3 | Status: SHIPPED | OUTPATIENT
Start: 2023-02-09

## 2023-02-09 SDOH — ECONOMIC STABILITY: HOUSING INSECURITY
IN THE LAST 12 MONTHS, WAS THERE A TIME WHEN YOU DID NOT HAVE A STEADY PLACE TO SLEEP OR SLEPT IN A SHELTER (INCLUDING NOW)?: NO

## 2023-02-09 SDOH — ECONOMIC STABILITY: INCOME INSECURITY: HOW HARD IS IT FOR YOU TO PAY FOR THE VERY BASICS LIKE FOOD, HOUSING, MEDICAL CARE, AND HEATING?: NOT HARD AT ALL

## 2023-02-09 SDOH — ECONOMIC STABILITY: FOOD INSECURITY: WITHIN THE PAST 12 MONTHS, THE FOOD YOU BOUGHT JUST DIDN'T LAST AND YOU DIDN'T HAVE MONEY TO GET MORE.: NEVER TRUE

## 2023-02-09 SDOH — ECONOMIC STABILITY: FOOD INSECURITY: WITHIN THE PAST 12 MONTHS, YOU WORRIED THAT YOUR FOOD WOULD RUN OUT BEFORE YOU GOT MONEY TO BUY MORE.: NEVER TRUE

## 2023-02-09 ASSESSMENT — PATIENT HEALTH QUESTIONNAIRE - PHQ9
1. LITTLE INTEREST OR PLEASURE IN DOING THINGS: 0
SUM OF ALL RESPONSES TO PHQ QUESTIONS 1-9: 0
SUM OF ALL RESPONSES TO PHQ QUESTIONS 1-9: 0
SUM OF ALL RESPONSES TO PHQ9 QUESTIONS 1 & 2: 0
SUM OF ALL RESPONSES TO PHQ QUESTIONS 1-9: 0
SUM OF ALL RESPONSES TO PHQ QUESTIONS 1-9: 0
2. FEELING DOWN, DEPRESSED OR HOPELESS: 0

## 2023-02-20 ENCOUNTER — HOSPITAL ENCOUNTER (OUTPATIENT)
Age: 43
Discharge: HOME | End: 2023-02-20
Payer: MEDICARE

## 2023-02-20 DIAGNOSIS — D83.9 CVID (COMMON VARIABLE IMMUNODEFICIENCY) (HCC): ICD-10-CM

## 2023-02-20 DIAGNOSIS — Z51.81 MEDICATION MONITORING ENCOUNTER: ICD-10-CM

## 2023-02-20 LAB
ALBUMIN SERPL BCG-MCNC: 4.3 G/DL (ref 3.5–5.1)
ALP SERPL-CCNC: 150 U/L (ref 38–126)
ALT SERPL W/O P-5'-P-CCNC: 60 U/L (ref 11–66)
AMORPH SED URNS QL MICRO: NORMAL
ANION GAP SERPL CALC-SCNC: 16 MEQ/L (ref 8–16)
AST SERPL-CCNC: 56 U/L (ref 5–40)
BACTERIA URNS QL MICRO: NORMAL
BASOPHILS ABSOLUTE: 0 THOU/MM3 (ref 0–0.1)
BASOPHILS NFR BLD AUTO: 0.5 %
BILIRUB SERPL-MCNC: 0.5 MG/DL (ref 0.3–1.2)
BILIRUB UR QL STRIP.AUTO: ABNORMAL
BUN SERPL-MCNC: 14 MG/DL (ref 7–22)
CALCIUM SERPL-MCNC: 9.8 MG/DL (ref 8.5–10.5)
CASTS #/AREA URNS LPF: NORMAL /LPF
CHARACTER UR: CLEAR
CHLORIDE SERPL-SCNC: 102 MEQ/L (ref 98–111)
CO2 SERPL-SCNC: 23 MEQ/L (ref 23–33)
COLOR UR AUTO: YELLOW
CREAT SERPL-MCNC: 0.7 MG/DL (ref 0.4–1.2)
CRYSTALS VITF MICRO: NORMAL
DEPRECATED RDW RBC AUTO: 48.9 FL (ref 35–45)
EOSINOPHIL NFR BLD AUTO: 2.3 %
EOSINOPHILS ABSOLUTE: 0.1 THOU/MM3 (ref 0–0.4)
EPI CELLS #/AREA URNS HPF: NORMAL /HPF
ERYTHROCYTE [DISTWIDTH] IN BLOOD BY AUTOMATED COUNT: 18.6 % (ref 11.5–14.5)
ERYTHROCYTE [SEDIMENTATION RATE] IN BLOOD BY WESTERGREN METHOD: 12 MM/HR (ref 0–20)
GFR SERPL CREATININE-BSD FRML MDRD: > 60 ML/MIN/1.73M2
GLUCOSE SERPL-MCNC: 129 MG/DL (ref 70–108)
GLUCOSE UR QL STRIP.AUTO: NEGATIVE MG/DL
HCT VFR BLD AUTO: 43.5 % (ref 37–47)
HGB BLD-MCNC: 13.7 GM/DL (ref 12–16)
HGB UR STRIP.AUTO-MCNC: NEGATIVE MG/L
ICTOTEST: NEGATIVE
IMM GRANULOCYTES # BLD AUTO: 0.01 THOU/MM3 (ref 0–0.07)
IMM GRANULOCYTES NFR BLD AUTO: 0.2 %
KETONES UR QL STRIP.AUTO: 15
LEUKOCYTE ESTERASE UR QL STRIP.AUTO: ABNORMAL
LYMPHOCYTES ABSOLUTE: 2 THOU/MM3 (ref 1–4.8)
LYMPHOCYTES NFR BLD AUTO: 32.5 %
MCH RBC QN AUTO: 24.5 PG (ref 26–33)
MCHC RBC AUTO-ENTMCNC: 31.5 GM/DL (ref 32.2–35.5)
MCV RBC AUTO: 77.7 FL (ref 81–99)
MONOCYTES ABSOLUTE: 0.4 THOU/MM3 (ref 0.4–1.3)
MONOCYTES NFR BLD AUTO: 6.6 %
MUCOUS THREADS URNS QL MICRO: NORMAL
NEUTROPHILS NFR BLD AUTO: 57.9 %
NITRITE UR QL STRIP.AUTO: NEGATIVE
NRBC BLD AUTO-RTO: 0 /100 WBC
PH UR STRIP.AUTO: 5.5 [PH] (ref 5–9)
PLATELET # BLD AUTO: 310 THOU/MM3 (ref 130–400)
PMV BLD AUTO: 11.5 FL (ref 9.4–12.4)
POTASSIUM SERPL-SCNC: 4.1 MEQ/L (ref 3.5–5.2)
PROT SERPL-MCNC: 7.3 G/DL (ref 6.1–8)
PROT UR STRIP.AUTO-MCNC: ABNORMAL MG/DL
RBC # BLD AUTO: 5.6 MILL/MM3 (ref 4.2–5.4)
RBC #/AREA URNS HPF: NORMAL /HPF
SEGMENTED NEUTROPHILS ABSOLUTE COUNT: 3.6 THOU/MM3 (ref 1.8–7.7)
SODIUM SERPL-SCNC: 141 MEQ/L (ref 135–145)
SP GR UR STRIP.AUTO: >= 1.03 (ref 1–1.03)
UROBILINOGEN UR STRIP-ACNC: 0.2 EU/DL (ref 0.2–1)
WBC # BLD AUTO: 6.2 THOU/MM3 (ref 4.8–10.8)
WBC # UR STRIP.AUTO: NORMAL /HPF

## 2023-02-20 PROCEDURE — 85025 COMPLETE CBC W/AUTO DIFF WBC: CPT

## 2023-02-20 PROCEDURE — 86160 COMPLEMENT ANTIGEN: CPT

## 2023-02-20 PROCEDURE — 81001 URINALYSIS AUTO W/SCOPE: CPT

## 2023-02-20 PROCEDURE — 82784 ASSAY IGA/IGD/IGG/IGM EACH: CPT

## 2023-02-20 PROCEDURE — 84156 ASSAY OF PROTEIN URINE: CPT

## 2023-02-20 PROCEDURE — 80053 COMPREHEN METABOLIC PANEL: CPT

## 2023-02-20 PROCEDURE — 82570 ASSAY OF URINE CREATININE: CPT

## 2023-02-20 PROCEDURE — 36415 COLL VENOUS BLD VENIPUNCTURE: CPT

## 2023-02-20 PROCEDURE — 81003 URINALYSIS AUTO W/O SCOPE: CPT

## 2023-02-20 PROCEDURE — 85651 RBC SED RATE NONAUTOMATED: CPT

## 2023-02-21 LAB
C3C SERPL-MCNC: 209 MG/DL (ref 90–180)
C4 SERPL-MCNC: 32 MG/DL (ref 10–40)
CREAT UR-MCNC: 386.7 MG/DL
IGG SERPL-MCNC: 747 MG/DL (ref 700–1600)
PROT UR-MCNC: 26.9 MG/DL
PROT/CREAT 24H UR: 0.07 MG/G{CREAT}

## 2023-02-23 ENCOUNTER — TELEPHONE (OUTPATIENT)
Dept: FAMILY MEDICINE CLINIC | Age: 43
End: 2023-02-23

## 2023-02-23 ENCOUNTER — OFFICE VISIT (OUTPATIENT)
Dept: ALLERGY | Age: 43
End: 2023-02-23
Payer: MEDICARE

## 2023-02-23 ENCOUNTER — TELEPHONE (OUTPATIENT)
Dept: ALLERGY | Age: 43
End: 2023-02-23

## 2023-02-23 VITALS
OXYGEN SATURATION: 92 % | BODY MASS INDEX: 41.21 KG/M2 | HEIGHT: 63 IN | RESPIRATION RATE: 16 BRPM | DIASTOLIC BLOOD PRESSURE: 78 MMHG | SYSTOLIC BLOOD PRESSURE: 124 MMHG | TEMPERATURE: 97.3 F | WEIGHT: 232.6 LBS

## 2023-02-23 DIAGNOSIS — M79.7 FIBROMYALGIA: ICD-10-CM

## 2023-02-23 DIAGNOSIS — J45.40 MODERATE PERSISTENT ASTHMA WITHOUT COMPLICATION: ICD-10-CM

## 2023-02-23 DIAGNOSIS — D80.3 IGG SUBCLASS DEFICIENCY (HCC): ICD-10-CM

## 2023-02-23 DIAGNOSIS — D83.9 COMMON VARIABLE IMMUNODEFICIENCY (HCC): ICD-10-CM

## 2023-02-23 DIAGNOSIS — M32.9 LUPUS (HCC): ICD-10-CM

## 2023-02-23 DIAGNOSIS — D83.9 CVID (COMMON VARIABLE IMMUNODEFICIENCY) (HCC): Primary | ICD-10-CM

## 2023-02-23 PROCEDURE — 3074F SYST BP LT 130 MM HG: CPT | Performed by: NURSE PRACTITIONER

## 2023-02-23 PROCEDURE — 99213 OFFICE O/P EST LOW 20 MIN: CPT | Performed by: NURSE PRACTITIONER

## 2023-02-23 PROCEDURE — 3078F DIAST BP <80 MM HG: CPT | Performed by: NURSE PRACTITIONER

## 2023-02-23 RX ORDER — DIPHENHYDRAMINE HCL 25 MG
25 TABLET ORAL
Qty: 1 TABLET | Refills: 11 | COMMUNITY
Start: 2023-02-23 | End: 2023-02-23

## 2023-02-23 RX ORDER — IMMUNE GLOBULIN 10 PERCENT (HUMAN) WITH RECOMBINANT HUMAN HYALURONIDASE 20 G/200ML
20 KIT SUBCUTANEOUS
Qty: 1 EACH | Refills: 11 | Status: SHIPPED | OUTPATIENT
Start: 2023-02-23 | End: 2023-02-23

## 2023-02-23 RX ORDER — SENNOSIDES 8.6 MG
650 CAPSULE ORAL
Qty: 1 TABLET | Refills: 11 | COMMUNITY
Start: 2023-02-23 | End: 2023-02-23

## 2023-02-23 RX ORDER — IMMUNE GLOBULIN 10 PERCENT (HUMAN) WITH RECOMBINANT HUMAN HYALURONIDASE 20 G/200ML
20 KIT SUBCUTANEOUS
Qty: 1 EACH | Refills: 11 | Status: SHIPPED | OUTPATIENT
Start: 2023-02-23

## 2023-02-23 ASSESSMENT — ENCOUNTER SYMPTOMS
WHEEZING: 0
CHOKING: 0
SINUS PRESSURE: 0
STRIDOR: 0
VOICE CHANGE: 0
CHEST TIGHTNESS: 0
FACIAL SWELLING: 0
ABDOMINAL PAIN: 0
COUGH: 0
DIARRHEA: 0
SORE THROAT: 0
APNEA: 0
VOMITING: 0
SHORTNESS OF BREATH: 0
TROUBLE SWALLOWING: 0
RHINORRHEA: 0
NAUSEA: 0
COLOR CHANGE: 0

## 2023-02-23 NOTE — TELEPHONE ENCOUNTER
Received fax from [facility] on prescriber orders for IVIG. Completed by provider and faxed to [facility]. Fax confirmation scanned into media.

## 2023-02-23 NOTE — TELEPHONE ENCOUNTER
Patient states she was suppose to have a BP today but was just seen by Dr Ahmet Mayorga and they checked her BP at appt (in [place]) and was 124/78    Pt asking if still needs to come into office for BP check    Cancelled todays nurse visit and informed would reschedule if BP check was still needed

## 2023-02-23 NOTE — PROGRESS NOTES
@[facility]@    Allergy & Asthma   [address] [facility], [address]  Ph:   [phone]  Fax:[phone]    Provider:  Dr. Rudolpho Scheuermann:   Chief Complaint   Patient presents with    Follow-up     IVIG            HISTORY OF PRESENT ILLNESS: ESTABLISHED PATIENT HERE FOR EVALUATION   Vannessa Oliveros  is a 36 y.o.   y/o female here today for chronic CVID. Has been receiving immunoglobulin now for several year(s). Patient was initially seen and evaluated for chronic infections which led to the findings of a low IgG level. After further diagnostic evaluation including labs and symptoms it was determined that patient was a candidate for IVIG infusions. Patient started IVIG infusions and has remained stable on those infusions. IgG levels have slowly increased over time and patient has tolerated well. Patient illness is chronic in nature and without IVIG infusions patient does have frequent infections, more than 12 severe infections per year. IVIG infusions have help patient mitigate chronic infections. Patient denies have any problems with receiving IVIG. Reports since receiving IVIG has felt much better. Is now receiving hyquvia at home. Denies any anaphylaxis           Review of Systems:  Review of Systems   Constitutional:  Negative for activity change, appetite change, chills, diaphoresis, fatigue, fever and unexpected weight change. HENT:  Negative for congestion, dental problem, ear discharge, ear pain, facial swelling, hearing loss, mouth sores, nosebleeds, postnasal drip, rhinorrhea, sinus pressure, sneezing, sore throat, tinnitus, trouble swallowing and voice change. Eyes:  Negative for visual disturbance. Respiratory:  Negative for apnea, cough, choking, chest tightness, shortness of breath, wheezing and stridor. Cardiovascular:  Negative for chest pain, palpitations and leg swelling.    Gastrointestinal:  Negative for abdominal pain, diarrhea, nausea and vomiting. Endocrine: Negative for cold intolerance, heat intolerance, polydipsia and polyuria. Genitourinary:  Negative for difficulty urinating, dysuria, enuresis, hematuria and urgency. Musculoskeletal:  Negative for arthralgias, gait problem, neck pain and neck stiffness. Skin:  Negative for color change and rash. Allergic/Immunologic: Negative for environmental allergies, food allergies and immunocompromised state. Neurological:  Negative for dizziness, syncope, facial asymmetry, speech difficulty, light-headedness and headaches. Hematological:  Negative for adenopathy. Does not bruise/bleed easily. Psychiatric/Behavioral:  Negative for confusion and sleep disturbance. The patient is not nervous/anxious. All other systems reviewed and are negative.       Past MedicalHistory:    Past Medical History:   Diagnosis Date    Asthma     Diabetes mellitus ([facility] 6/8/2018    Fibromyalgia     GERD (gastroesophageal reflux disease)     Hyperlipidemia     Lupus (systemic lupus erythematosus) ([facility]     Lupus arthritis ([facility]     [facility]    Migraine     Plaquenil adverse reaction in therapeutic use 5/2016    changes in the eyes    TIA (transient ischemic attack)     8/2016       Past Surgical History:  Past Surgical History:   Procedure Laterality Date    HYSTERECTOMY (CERVIX STATUS UNKNOWN)  2009    Total, endometriosis    LAPAROSCOPY      CESIA STEROTACTIC LOC BREAST BIOPSY LEFT Left 02/26/2020    2 sites, benign    SHOULDER ARTHROSCOPY Left 08/20/2015    Debridement of rotator cuff and bone spurs       Family History:   Family History   Problem Relation Age of Onset    High Blood Pressure Mother     Rheum Arthritis Mother     Osteoporosis Mother     Diabetes Father     High Blood Pressure Father     Arthritis Father         gout    Diabetes Sister     Other Sister         Crest Syndrome    Breast Cancer Sister 54    Breast Cancer Sister 40    BRCA 1 Positive Sister     BRCA 2 Positive Sister     Diabetes Brother     High Cholesterol Brother     Breast Cancer Maternal Aunt 61    Breast Cancer Maternal Cousin 22    Cancer Paternal Uncle [de-identified]        leukemia       Social History:   Social History     Tobacco Use    Smoking status: Never    Smokeless tobacco: Never   Substance Use Topics    Alcohol use: No     Alcohol/week: 0.0 standard drinks        Allergies:  Cayenne, Codeine, Tape [adhesive tape], Amoxicillin, and Sulfa antibiotics    CurrentMedications:     Current Outpatient Medications:     Immune Globulin-Hyaluronidase (HYQVIA) 20 GM/200ML KIT, Inject 20 g into the skin every 28 days, Disp: 1 each, Rfl: 11    diphenhydrAMINE (BENADRYL ALLERGY) 25 MG tablet, Take 1 tablet by mouth once as needed for Itching (give 30 minutes prior to IVIG infusions), Disp: 1 tablet, Rfl: 11    acetaminophen (TYLENOL 8 HOUR) 650 MG extended release tablet, Take 1 tablet by mouth once as needed for Pain (give 30 minutes prior to IVIG infusions), Disp: 1 tablet, Rfl: 11    ondansetron (ZOFRAN-ODT) 8 MG TBDP disintegrating tablet, Place 1 tablet under the tongue every 8 hours as needed for Nausea or Vomiting, Disp: 40 tablet, Rfl: 3    Mycophenolate Sodium 360 MG TBEC, Take 1 tablet by mouth 2 times daily, Disp: 120 tablet, Rfl: 0    gabapentin (NEURONTIN) 400 MG capsule, Take 1 capsule by mouth in the morning and at bedtime for 90 days. , Disp: 60 capsule, Rfl: 1    dulaglutide (TRULICITY) 1.5 BQ/8.8FN SC injection, Inject 0.5 mLs into the skin once a week, Disp: 4 Adjustable Dose Pre-filled Pen Syringe, Rfl: 5    montelukast (SINGULAIR) 10 MG tablet, Take 1 tablet by mouth nightly, Disp: 90 tablet, Rfl: 0    verapamil (CALAN SR) 240 MG extended release tablet, Take 1 tablet by mouth once daily, Disp: 90 tablet, Rfl: 0    sucralfate (CARAFATE) 1 GM tablet, Take 1 tablet by mouth 4 times daily, Disp: 120 tablet, Rfl: 11    atorvastatin (LIPITOR) 80 MG tablet, Take 1 tablet by mouth once daily, Disp: 90 tablet, Rfl: 3    traZODone (DESYREL) 50 MG tablet, TAKE 3 TABLETS BY MOUTH NIGHTLY, Disp: 270 tablet, Rfl: 1    potassium chloride (KLOR-CON) 10 MEQ extended release tablet, Take 1 tablet by mouth once daily, Disp: 90 tablet, Rfl: 3    cyclobenzaprine (FLEXERIL) 5 MG tablet, TAKE 1 TO 2 TABLETS BY MOUTH THREE TIMES DAILY AS NEEDED FOR MUSCLE SPASM, Disp: 270 tablet, Rfl: 3    metFORMIN (GLUCOPHAGE) 500 MG tablet, TAKE 1 TABLET BY MOUTH TWICE DAILY WITH MEALS, Disp: 180 tablet, Rfl: 3    clotrimazole-betamethasone (LOTRISONE) 1-0.05 % cream, APPLY  CREAM TOPICALLY TWICE DAILY, Disp: 45 g, Rfl: 0    clopidogrel (PLAVIX) 75 MG tablet, Take 1 tablet by mouth daily, Disp: 90 tablet, Rfl: 3    Continuous Blood Gluc Sensor (FREESTYLE SANDRA 14 DAY SENSOR) [facility], USE TO CHECK BLOOD SUGAR TWICE DAILY, Disp: 3 each, Rfl: 5    Continuous Blood Gluc Sensor (FREESTYLE SANDRA 14 DAY SENSOR) [facility], 1 Units by Subcuticular route every 14 days, Disp: 3 each, Rfl: 11    ibuprofen (ADVIL;MOTRIN) 600 MG tablet, Take 1 tablet by mouth every 8 hours as needed for Pain, Disp: 42 tablet, Rfl: 5    azelastine (ASTELIN) 0.1 % nasal spray, 2 sprays by Nasal route 2 times daily Use in each nostril as directed, Disp: 1 Bottle, Rfl: 11    magnesium oxide (MAG-OX) 400 (241.3 Mg) MG TABS tablet, Take 1 tablet by mouth once daily, Disp: 90 tablet, Rfl: 3    EPINEPHrine (EPIPEN 2-FERNANDO) 0.3 MG/0.3ML SOAJ injection, Inject one pen as directed STAT for allergic reaction, may disp generic NDC 24708-480-53, Disp: 2 each, Rfl: 0    Handicap Placard MISC, by Does not apply route Dx:  Lupus arthritis,  Length 5 years, Disp: 1 each, Rfl: 0    triamcinolone (KENALOG) 0.1 % cream, Apply topically 2 times daily Apply topically 2 times daily. , Disp: 1 Tube, Rfl: 5    levocetirizine (XYZAL) 5 MG tablet, TAKE ONE TABLET BY MOUTH NIGHTLY, Disp: 30 tablet, Rfl: 11    sodium chloride (OCEAN) 0.65 % nasal spray, 1 spray by Nasal route as needed for Congestion, Disp: 1 Bottle, Rfl: 3 NEXIUM 40 MG delayed release capsule, TAKE 1 CAPSULE BY MOUTH ONCE DAILY, Disp: 30 capsule, Rfl: 5    aspirin (LORENZO ASPIRIN) 325 MG tablet, Take 1 tablet by mouth daily, Disp: 30 tablet, Rfl: 3    diclofenac sodium (VOLTAREN) 1 % GEL, Apply 2 g topically 4 times daily as needed for Pain (topical) , Disp: , Rfl:       Physical Exam:      Vitals:    Vitals:    02/23/23 0918   BP: 124/78   Resp: 16   Temp: 97.3 °F (36.3 °C)   SpO2: 92%       232 lb 9.6 oz (105.5 kg)       Temp: 97.3 °F (36.3 °C) I @FLOWSTAT(6)@ I  I @FLOWSTAT(8)@ I BP: 124/78 I @KGNZTJ(34)@; @INQNJI(09)@ I Resp: 16 I @FLOWSTAT(9)@ I SpO2: 92 % I @FLOWSTAT(10)@ I   I Height: 5' 3\" (160 cm) I   I Facility age limit for growth percentiles is 20 years. I     Facility age limit for growth percentiles is 20 years. Facility age limit for growth percentiles is 20 years. Facility age limit for growth percentiles is 20 years. Facility age limit for growth percentiles is 20 years. Physical Exam:    Physical Exam  Vitals and nursing note reviewed. Constitutional:       Appearance: Normal appearance. She is obese. HENT:      Head: Normocephalic and atraumatic. Right Ear: Ear canal and external ear normal.      Left Ear: Ear canal and external ear normal.      Nose: Nose normal.      Mouth/Throat:      Mouth: Mucous membranes are moist.      Pharynx: Oropharynx is clear. Eyes:      Extraocular Movements: Extraocular movements intact. Conjunctiva/sclera: Conjunctivae normal.      Pupils: Pupils are equal, round, and reactive to light. Cardiovascular:      Rate and Rhythm: Normal rate and regular rhythm. Pulses: Normal pulses. Heart sounds: Normal heart sounds. Pulmonary:      Effort: Pulmonary effort is normal.      Breath sounds: Normal breath sounds. Musculoskeletal:         General: Normal range of motion. Cervical back: Normal range of motion and neck supple. Skin:     General: Skin is warm and dry.       Capillary Refill: Capillary refill takes less than 2 seconds. Neurological:      General: No focal deficit present. Mental Status: She is alert and oriented to person, place, and time. Mental status is at baseline. Psychiatric:         Mood and Affect: Mood normal.         Behavior: Behavior normal.         Thought Content: Thought content normal.         Judgment: Judgment normal.           DATA:  Lab Review:  CBC:   Lab Results   Component Value Date/Time    WBC 6.2 02/20/2023 11:30 AM    RBC 5.60 02/20/2023 11:30 AM    HGB 13.7 02/20/2023 11:30 AM    HCT 43.5 02/20/2023 11:30 AM    MCV 77.7 02/20/2023 11:30 AM    MCH 24.5 02/20/2023 11:30 AM    MCHC 31.5 02/20/2023 11:30 AM    RDW 14.1 02/06/2022 10:33 AM     02/20/2023 11:30 AM     BMP:    Lab Results   Component Value Date/Time    GLUCOSE 129 02/20/2023 11:30 AM     02/20/2023 11:30 AM    K 4.1 02/20/2023 11:30 AM     02/20/2023 11:30 AM    CO2 23 02/20/2023 11:30 AM    ANIONGAP 16.0 02/20/2023 11:30 AM    BUN 14 02/20/2023 11:30 AM    CREATININE 0.7 02/20/2023 11:30 AM    CALCIUM 9.8 02/20/2023 11:30 AM    LABGLOM >60 02/20/2023 11:30 AM    GFR > 90 08/07/2018 12:57 PM          Immunoglobulin E   Date/Time Value Ref Range Status   10/03/2020 09:30 AM 4 <=214 kU/L Final     Comment:     REFERENCE INTERVAL: Immunoglobulin E, Serum  Access complete set of age- and/or gender-specific reference  intervals for this test in the [facility] Test Directory  ([email or web address].com).        IgE   Date/Time Value Ref Range Status   10/03/2020 09:30 AM 2 <101 IU/mL Final     Comment:     [facility] [address] [facility], [address] [place] [address] [phone]      IgG   Date/Time Value Ref Range Status   02/20/2023 11:31  700 - 1600 mg/dL Final     Comment:     [facility] [address] [facility], [address] [place] [address] [phone]     IgA   Date/Time Value Ref Range Status   01/26/2022 08:59  70 - 400 mg/dL Final     Comment:     [facility] [address] [facility] [place], [address] [facility] [phone]      IgM   Date/Time Value Ref Range Status   10/03/2020 09:30 AM < 25 (L) 40 - 230 mg/dL Final     Comment:     [facility] [address] [facility] [place], [address] [place] [address] [phone]       No results found for: EVELIN   No results found for: RF       No results found for this or any previous visit. No results found for this or any previous visit. All current and previous medial labs have been reviewed and discussed with patient         Assessment/Orders:    Diagnosis Orders   1. CVID (common variable immunodeficiency) (HCC)  Immune Globulin-Hyaluronidase (HYQVIA) 20 GM/200ML KIT    CBC with Auto Differential    Comprehensive Metabolic Panel    diphenhydrAMINE (BENADRYL ALLERGY) 25 MG tablet    Glomerular Filtration Rate, Estimated    IgG    acetaminophen (TYLENOL 8 HOUR) 650 MG extended release tablet    S. pneumoniae, 23 Serotypes    Diphtheria / Tetanus Antibody Panel    Hemophilus Influenza B AB    Immunoglobulin G Subclasses    DISCONTINUED: Immune Globulin-Hyaluronidase (HYQVIA) 20 GM/200ML KIT    DISCONTINUED: Immune Globulin-Hyaluronidase (HYQVIA) 20 GM/200ML KIT      2. Lupus ([address]        3. Fibromyalgia        4. IgG subclass deficiency (HCC)        5. Common variable immunodeficiency ([address]        6. Moderate persistent asthma without complication            All diagnostic imaging  have been personally reviewed     Plan:  Follow Up:1 year    Spent 20  minutes of face-to-face time with the patient with well more than half of the visit being dedicated to the discussion of the various symptom problems, provided education of medications and disease process, as well as discussion of a therapeutic plan for each. Face-to-face education time does not include any time that may have been spent for procedures.     (Please note that portions of this note may have been completed with a voice recognition program.  Efforts were made to edit the dictation but occasionally words are mis-transcribed.)         Signed:  NAHID Wilson - CNP  2/23/2023  10:26 AM

## 2023-02-27 DIAGNOSIS — M79.7 FIBROMYALGIA: ICD-10-CM

## 2023-02-28 RX ORDER — CLOPIDOGREL BISULFATE 75 MG/1
TABLET ORAL
Qty: 90 TABLET | Refills: 0 | Status: SHIPPED | OUTPATIENT
Start: 2023-02-28

## 2023-02-28 RX ORDER — TRAZODONE HYDROCHLORIDE 50 MG/1
150 TABLET ORAL NIGHTLY
Qty: 270 TABLET | Refills: 0 | Status: SHIPPED | OUTPATIENT
Start: 2023-02-28

## 2023-03-01 RX ORDER — FLASH GLUCOSE SENSOR
1 KIT MISCELLANEOUS
Qty: 3 EACH | Refills: 5 | Status: SHIPPED | OUTPATIENT
Start: 2023-03-01

## 2023-03-02 ENCOUNTER — HOSPITAL ENCOUNTER (OUTPATIENT)
Dept: WOMENS IMAGING | Age: 43
Discharge: HOME | End: 2023-03-02
Payer: MEDICARE

## 2023-03-02 DIAGNOSIS — Z12.31 VISIT FOR SCREENING MAMMOGRAM: ICD-10-CM

## 2023-03-02 PROCEDURE — 77063 BREAST TOMOSYNTHESIS BI: CPT

## 2023-03-20 ENCOUNTER — NURSE ONLY (OUTPATIENT)
Dept: FAMILY MEDICINE CLINIC | Age: 43
End: 2023-03-20
Payer: MEDICARE

## 2023-03-20 ENCOUNTER — HOSPITAL ENCOUNTER (OUTPATIENT)
Age: 43
Discharge: HOME | End: 2023-03-20
Payer: MEDICARE

## 2023-03-20 DIAGNOSIS — D83.9 CVID (COMMON VARIABLE IMMUNODEFICIENCY) (HCC): ICD-10-CM

## 2023-03-20 DIAGNOSIS — J02.9 SORE THROAT: Primary | ICD-10-CM

## 2023-03-20 DIAGNOSIS — Z51.81 MEDICATION MONITORING ENCOUNTER: ICD-10-CM

## 2023-03-20 DIAGNOSIS — R50.9 FEVER, UNSPECIFIED FEVER CAUSE: ICD-10-CM

## 2023-03-20 LAB
ALBUMIN SERPL BCG-MCNC: 4.7 G/DL (ref 3.5–5.1)
ALP SERPL-CCNC: 174 U/L (ref 38–126)
ALT SERPL W/O P-5'-P-CCNC: 96 U/L (ref 11–66)
ANION GAP SERPL CALC-SCNC: 17 MEQ/L (ref 8–16)
AST SERPL-CCNC: 120 U/L (ref 5–40)
BASOPHILS ABSOLUTE: 0 THOU/MM3 (ref 0–0.1)
BASOPHILS NFR BLD AUTO: 0.5 %
BILIRUB SERPL-MCNC: 0.7 MG/DL (ref 0.3–1.2)
BUN SERPL-MCNC: 9 MG/DL (ref 7–22)
CALCIUM SERPL-MCNC: 9.8 MG/DL (ref 8.5–10.5)
CHLORIDE SERPL-SCNC: 100 MEQ/L (ref 98–111)
CO2 SERPL-SCNC: 23 MEQ/L (ref 23–33)
CREAT SERPL-MCNC: 0.7 MG/DL (ref 0.4–1.2)
CREAT UR-MCNC: 437.4 MG/DL
DEPRECATED RDW RBC AUTO: 54.9 FL (ref 35–45)
EOSINOPHIL NFR BLD AUTO: 0.3 %
EOSINOPHILS ABSOLUTE: 0 THOU/MM3 (ref 0–0.4)
ERYTHROCYTE [DISTWIDTH] IN BLOOD BY AUTOMATED COUNT: 19.9 % (ref 11.5–14.5)
ERYTHROCYTE [SEDIMENTATION RATE] IN BLOOD BY WESTERGREN METHOD: 13 MM/HR (ref 0–20)
GFR SERPL CREATININE-BSD FRML MDRD: > 60 ML/MIN/1.73M2
GLUCOSE SERPL-MCNC: 135 MG/DL (ref 70–108)
HCT VFR BLD AUTO: 47.1 % (ref 37–47)
HGB BLD-MCNC: 14.2 GM/DL (ref 12–16)
IMM GRANULOCYTES # BLD AUTO: 0.02 THOU/MM3 (ref 0–0.07)
IMM GRANULOCYTES NFR BLD AUTO: 0.3 %
INFLUENZA VIRUS A RNA: NORMAL
INFLUENZA VIRUS B RNA: NORMAL
LYMPHOCYTES ABSOLUTE: 1.4 THOU/MM3 (ref 1–4.8)
LYMPHOCYTES NFR BLD AUTO: 18.5 %
MCH RBC QN AUTO: 24.4 PG (ref 26–33)
MCHC RBC AUTO-ENTMCNC: 30.1 GM/DL (ref 32.2–35.5)
MCV RBC AUTO: 80.8 FL (ref 81–99)
MONOCYTES ABSOLUTE: 0.8 THOU/MM3 (ref 0.4–1.3)
MONOCYTES NFR BLD AUTO: 10.7 %
NEUTROPHILS NFR BLD AUTO: 69.7 %
NRBC BLD AUTO-RTO: 0 /100 WBC
PLATELET # BLD AUTO: 268 THOU/MM3 (ref 130–400)
PMV BLD AUTO: 11.6 FL (ref 9.4–12.4)
POTASSIUM SERPL-SCNC: 4 MEQ/L (ref 3.5–5.2)
PROT SERPL-MCNC: 8 G/DL (ref 6.1–8)
PROT UR-MCNC: 58.9 MG/DL
PROT/CREAT 24H UR: 0.13 MG/G{CREAT}
RBC # BLD AUTO: 5.83 MILL/MM3 (ref 4.2–5.4)
SEGMENTED NEUTROPHILS ABSOLUTE COUNT: 5.4 THOU/MM3 (ref 1.8–7.7)
SODIUM SERPL-SCNC: 140 MEQ/L (ref 135–145)
STREPTOCOCCUS A RNA: NEGATIVE
WBC # BLD AUTO: 7.7 THOU/MM3 (ref 4.8–10.8)

## 2023-03-20 PROCEDURE — 36415 COLL VENOUS BLD VENIPUNCTURE: CPT

## 2023-03-20 PROCEDURE — 99211 OFF/OP EST MAY X REQ PHY/QHP: CPT | Performed by: FAMILY MEDICINE

## 2023-03-20 PROCEDURE — 84156 ASSAY OF PROTEIN URINE: CPT

## 2023-03-20 PROCEDURE — 87651 STREP A DNA AMP PROBE: CPT | Performed by: FAMILY MEDICINE

## 2023-03-20 PROCEDURE — 85651 RBC SED RATE NONAUTOMATED: CPT

## 2023-03-20 PROCEDURE — 82784 ASSAY IGA/IGD/IGG/IGM EACH: CPT

## 2023-03-20 PROCEDURE — 81001 URINALYSIS AUTO W/SCOPE: CPT

## 2023-03-20 PROCEDURE — 80053 COMPREHEN METABOLIC PANEL: CPT

## 2023-03-20 PROCEDURE — 86317 IMMUNOASSAY INFECTIOUS AGENT: CPT

## 2023-03-20 PROCEDURE — 82787 IGG 1 2 3 OR 4 EACH: CPT

## 2023-03-20 PROCEDURE — 82570 ASSAY OF URINE CREATININE: CPT

## 2023-03-20 PROCEDURE — 85025 COMPLETE CBC W/AUTO DIFF WBC: CPT

## 2023-03-20 PROCEDURE — 86160 COMPLEMENT ANTIGEN: CPT

## 2023-03-20 PROCEDURE — 87502 INFLUENZA DNA AMP PROBE: CPT | Performed by: FAMILY MEDICINE

## 2023-03-20 NOTE — PROGRESS NOTES
Here for strep and flu swab due to starting Saturday with vomiting and then turned into sore throat and fever. Not taking any medication OTC. Negative swabs. Advised to push fluids, OTC motrin and tylenol as needed, cool mist humidifier in room at night, warm salt water gargles, and to return to office if worsens or fails to improve.

## 2023-03-20 NOTE — PROGRESS NOTES
Chief Complaint   Patient presents with    Pharyngitis    Fever       Results for POC orders placed in visit on 03/20/23   POCT Rapid Strep A DNA (Alere i)   Result Value Ref Range    Streptococcus A RNA negative    POCT Influenza A/B DNA (Alere i)   Result Value Ref Range    Influenza virus A RNA neg     Influenza virus B RNA neg        Push fluids  Tylenol or ibuprofen prn fever  Cool mist Humidifier in the bedroom  flonase every morning 2 sprays each side and saline nasal spray 4-5 times daily    Follow up if not better in 1 week or if symptoms get worse.

## 2023-03-21 LAB
BACTERIA: ABNORMAL
BILIRUB UR QL STRIP: NEGATIVE
CASTS #/AREA URNS LPF: ABNORMAL /LPF
CHARACTER UR: CLEAR
COLOR UR: YELLOW
CRYSTALS URNS QL MICRO: ABNORMAL
EPITHELIAL CELLS, UA: ABNORMAL /HPF
GLUCOSE UR QL STRIP.AUTO: NEGATIVE MG/DL
HGB UR QL STRIP.AUTO: NEGATIVE
IGG SERPL-MCNC: 789 MG/DL (ref 700–1600)
KETONES UR QL STRIP.AUTO: NEGATIVE
LEUKOCYTE ESTERASE UR QL STRIP.AUTO: ABNORMAL
NITRITE UR QL STRIP.AUTO: NEGATIVE
PH UR STRIP.AUTO: 6.5 [PH] (ref 5–9)
PROT UR STRIP.AUTO-MCNC: 30 MG/DL
RBC #/AREA URNS HPF: ABNORMAL /HPF
SP GR UR REFRACT.AUTO: 1.02 (ref 1–1.03)
UROBILINOGEN UR QL STRIP.AUTO: 0.2 EU/DL (ref 0–1)
WBC #/AREA URNS HPF: ABNORMAL /HPF

## 2023-03-22 DIAGNOSIS — R79.89 LFT ELEVATION: Primary | ICD-10-CM

## 2023-03-22 LAB
C3C SERPL-MCNC: 255 MG/DL (ref 90–180)
C4 SERPL-MCNC: 43 MG/DL (ref 10–40)

## 2023-03-22 NOTE — PROGRESS NOTES
Diagnosis Orders   1. LFT elevation  Hepatic Function Panel      question if Myfortic related.   Holding medication reevaluate in 2 weeks

## 2023-03-23 LAB
IGG SUBCLASSES: NORMAL
MISC. #1 REFERENCE GROUP TEST: NORMAL

## 2023-03-25 LAB — MISC. #1 REFERENCE GROUP TEST: NORMAL

## 2023-03-29 ENCOUNTER — PATIENT MESSAGE (OUTPATIENT)
Dept: FAMILY MEDICINE CLINIC | Age: 43
End: 2023-03-29

## 2023-03-30 DIAGNOSIS — M79.7 FIBROMYALGIA: ICD-10-CM

## 2023-03-30 DIAGNOSIS — G89.29 CHRONIC BILATERAL LOW BACK PAIN WITHOUT SCIATICA: ICD-10-CM

## 2023-03-30 DIAGNOSIS — M32.9 H/O SYSTEMIC LUPUS ERYTHEMATOSUS (SLE) (HCC): ICD-10-CM

## 2023-03-30 DIAGNOSIS — M54.50 CHRONIC BILATERAL LOW BACK PAIN WITHOUT SCIATICA: ICD-10-CM

## 2023-03-30 RX ORDER — CEFDINIR 300 MG/1
300 CAPSULE ORAL 2 TIMES DAILY
Qty: 20 CAPSULE | Refills: 0 | Status: SHIPPED | OUTPATIENT
Start: 2023-03-30 | End: 2023-04-09

## 2023-03-30 NOTE — TELEPHONE ENCOUNTER
From: Ximena Michelle  To: Dr. Rosas Page: 3/29/2023 5:52 PM EDT  Subject: My cold. My cold is still not any better. Is there something else I can try?

## 2023-03-31 RX ORDER — MYCOPHENOLIC ACID 360 MG/1
360 TABLET, DELAYED RELEASE ORAL 2 TIMES DAILY
Qty: 120 TABLET | Refills: 0 | OUTPATIENT
Start: 2023-03-31

## 2023-03-31 RX ORDER — GABAPENTIN 400 MG/1
CAPSULE ORAL
Qty: 60 CAPSULE | Refills: 2 | Status: SHIPPED | OUTPATIENT
Start: 2023-03-31 | End: 2023-06-29

## 2023-04-03 ENCOUNTER — TELEPHONE (OUTPATIENT)
Dept: ALLERGY | Age: 43
End: 2023-04-03

## 2023-04-03 NOTE — TELEPHONE ENCOUNTER
Received fax from Malia Yost on request of notes for reauthorization for pt's hyqvia. Faxed to [facility] at [phone]. Fax confirmation scanned into media.
No

## 2023-04-11 ENCOUNTER — HOSPITAL ENCOUNTER (OUTPATIENT)
Age: 43
Discharge: HOME | End: 2023-04-11
Payer: MEDICARE

## 2023-04-11 DIAGNOSIS — M32.9 H/O SYSTEMIC LUPUS ERYTHEMATOSUS (SLE) (HCC): ICD-10-CM

## 2023-04-11 DIAGNOSIS — Z51.81 MEDICATION MONITORING ENCOUNTER: ICD-10-CM

## 2023-04-11 DIAGNOSIS — D83.9 CVID (COMMON VARIABLE IMMUNODEFICIENCY) (HCC): ICD-10-CM

## 2023-04-11 LAB
BACTERIA: ABNORMAL
BASOPHILS ABSOLUTE: 0 THOU/MM3 (ref 0–0.1)
BASOPHILS NFR BLD AUTO: 0.5 %
BILIRUB UR QL STRIP: NEGATIVE
CASTS #/AREA URNS LPF: ABNORMAL /LPF
CASTS #/AREA URNS LPF: ABNORMAL /LPF
CHARACTER UR: CLEAR
CHARCOAL URNS QL MICRO: ABNORMAL
COLOR UR: YELLOW
CREAT UR-MCNC: 121.3 MG/DL
CRYSTALS URNS QL MICRO: ABNORMAL
DEPRECATED RDW RBC AUTO: 55.5 FL (ref 35–45)
EOSINOPHIL NFR BLD AUTO: 2 %
EOSINOPHILS ABSOLUTE: 0.2 THOU/MM3 (ref 0–0.4)
EPITHELIAL CELLS, UA: ABNORMAL /HPF
ERYTHROCYTE [DISTWIDTH] IN BLOOD BY AUTOMATED COUNT: 19.2 % (ref 11.5–14.5)
ERYTHROCYTE [SEDIMENTATION RATE] IN BLOOD BY WESTERGREN METHOD: 7 MM/HR (ref 0–20)
GLUCOSE UR QL STRIP.AUTO: NEGATIVE MG/DL
HCT VFR BLD AUTO: 43.9 % (ref 37–47)
HGB BLD-MCNC: 13.7 GM/DL (ref 12–16)
HGB UR QL STRIP.AUTO: ABNORMAL
IMM GRANULOCYTES # BLD AUTO: 0.02 THOU/MM3 (ref 0–0.07)
IMM GRANULOCYTES NFR BLD AUTO: 0.2 %
KETONES UR QL STRIP.AUTO: NEGATIVE
LEUKOCYTE ESTERASE UR QL STRIP.AUTO: ABNORMAL
LYMPHOCYTES ABSOLUTE: 2.6 THOU/MM3 (ref 1–4.8)
LYMPHOCYTES NFR BLD AUTO: 26.3 %
MCH RBC QN AUTO: 25.9 PG (ref 26–33)
MCHC RBC AUTO-ENTMCNC: 31.2 GM/DL (ref 32.2–35.5)
MCV RBC AUTO: 83.1 FL (ref 81–99)
MONOCYTES ABSOLUTE: 0.6 THOU/MM3 (ref 0.4–1.3)
MONOCYTES NFR BLD AUTO: 6.2 %
NEUTROPHILS NFR BLD AUTO: 64.8 %
NITRITE UR QL STRIP.AUTO: NEGATIVE
NRBC BLD AUTO-RTO: 0 /100 WBC
PH UR STRIP.AUTO: 6.5 [PH] (ref 5–9)
PLATELET # BLD AUTO: 243 THOU/MM3 (ref 130–400)
PMV BLD AUTO: 11.5 FL (ref 9.4–12.4)
PROT UR STRIP.AUTO-MCNC: NEGATIVE MG/DL
PROT UR-MCNC: 15.6 MG/DL
PROT/CREAT 24H UR: 0.13 MG/G{CREAT}
RBC # BLD AUTO: 5.28 MILL/MM3 (ref 4.2–5.4)
RBC #/AREA URNS HPF: ABNORMAL /HPF
RENAL EPI CELLS #/AREA URNS HPF: ABNORMAL /[HPF]
SEGMENTED NEUTROPHILS ABSOLUTE COUNT: 6.3 THOU/MM3 (ref 1.8–7.7)
SP GR UR REFRACT.AUTO: 1.01 (ref 1–1.03)
UROBILINOGEN UR QL STRIP.AUTO: 0.2 EU/DL (ref 0–1)
WBC # BLD AUTO: 9.7 THOU/MM3 (ref 4.8–10.8)
WBC #/AREA URNS HPF: ABNORMAL /HPF
YEAST LIKE FUNGI URNS QL MICRO: ABNORMAL

## 2023-04-11 PROCEDURE — 85651 RBC SED RATE NONAUTOMATED: CPT

## 2023-04-11 PROCEDURE — 82570 ASSAY OF URINE CREATININE: CPT

## 2023-04-11 PROCEDURE — 36415 COLL VENOUS BLD VENIPUNCTURE: CPT

## 2023-04-11 PROCEDURE — 86480 TB TEST CELL IMMUN MEASURE: CPT

## 2023-04-11 PROCEDURE — 85025 COMPLETE CBC W/AUTO DIFF WBC: CPT

## 2023-04-11 PROCEDURE — 80053 COMPREHEN METABOLIC PANEL: CPT

## 2023-04-11 PROCEDURE — 84156 ASSAY OF PROTEIN URINE: CPT

## 2023-04-11 PROCEDURE — 86160 COMPLEMENT ANTIGEN: CPT

## 2023-04-11 PROCEDURE — 81001 URINALYSIS AUTO W/SCOPE: CPT

## 2023-04-12 LAB
ALBUMIN SERPL BCG-MCNC: 4.4 G/DL (ref 3.5–5.1)
ALP SERPL-CCNC: 157 U/L (ref 38–126)
ALT SERPL W/O P-5'-P-CCNC: 93 U/L (ref 11–66)
ANION GAP SERPL CALC-SCNC: 12 MEQ/L (ref 8–16)
AST SERPL-CCNC: 93 U/L (ref 5–40)
BILIRUB SERPL-MCNC: 0.4 MG/DL (ref 0.3–1.2)
BUN SERPL-MCNC: 10 MG/DL (ref 7–22)
C3C SERPL-MCNC: 202 MG/DL (ref 90–180)
C4 SERPL-MCNC: 32 MG/DL (ref 10–40)
CALCIUM SERPL-MCNC: 9.7 MG/DL (ref 8.5–10.5)
CHLORIDE SERPL-SCNC: 102 MEQ/L (ref 98–111)
CO2 SERPL-SCNC: 24 MEQ/L (ref 23–33)
CREAT SERPL-MCNC: 0.5 MG/DL (ref 0.4–1.2)
GFR SERPL CREATININE-BSD FRML MDRD: > 60 ML/MIN/1.73M2
GLUCOSE SERPL-MCNC: 105 MG/DL (ref 70–108)
POTASSIUM SERPL-SCNC: 3.8 MEQ/L (ref 3.5–5.2)
PROT SERPL-MCNC: 7.1 G/DL (ref 6.1–8)
SODIUM SERPL-SCNC: 138 MEQ/L (ref 135–145)

## 2023-04-12 NOTE — RESULT ENCOUNTER NOTE
Lab testing revealing some moderate amount of blood in the urine. Are you currently on your menstrual period? The liver function test elevation continues. I would like for you to follow-up with your primary care provider for further evaluation as the mycophenolate was stopped approximately 2 weeks ago and should be out of your system.

## 2023-04-12 NOTE — TELEPHONE ENCOUNTER
Mani is requesting a peer to peer  MRI Pelvis  Peer to peer# [phone]  Ref# [id number]    Please advise.     Patient scheduled 9/13 Private car

## 2023-04-13 ENCOUNTER — PATIENT MESSAGE (OUTPATIENT)
Dept: RHEUMATOLOGY | Age: 43
End: 2023-04-13

## 2023-04-13 DIAGNOSIS — M46.90 AXIAL SPONDYLOARTHRITIS (HCC): Primary | ICD-10-CM

## 2023-04-15 LAB
GAMMA INTERFERON BACKGROUND BLD IA-ACNC: 0.02 IU/ML
M TB IFN-G BLD-IMP: NEGATIVE
M TB IFN-G CD4+ BCKGRND COR BLD-ACNC: 0 IU/ML (ref 0–0.34)
M TB IFN-G CD4+CD8+ BCKGRND COR BLD-ACNC: 0 IU/ML (ref 0–0.34)
MITOGEN IGNF BCKGRD COR BLD-ACNC: > 10 IU/ML

## 2023-04-24 NOTE — TELEPHONE ENCOUNTER
From: Caitlyn Welsh  To: Lulu Ayala  Sent: 4/13/2023 1:15 PM EDT  Subject: My appointment     Have you spoken to Dr mccullough about what medication to try next? Just wondering.

## 2023-04-27 RX ORDER — SECUKINUMAB 150 MG/ML
INJECTION SUBCUTANEOUS
Qty: 5 ML | Refills: 0 | Status: ACTIVE | OUTPATIENT
Start: 2023-04-27

## 2023-04-27 NOTE — TELEPHONE ENCOUNTER
Called and spoke with Angus at [facility] in [place] to verify script wasn't received. Please resend script.

## 2023-04-28 NOTE — TELEPHONE ENCOUNTER
Prescription is not routed until PA is approved. Just received approval, will call patient , does not need resent.

## 2023-04-30 DIAGNOSIS — J30.2 SEASONAL ALLERGIES: ICD-10-CM

## 2023-05-01 RX ORDER — VERAPAMIL HYDROCHLORIDE 240 MG/1
240 TABLET, FILM COATED, EXTENDED RELEASE ORAL DAILY
Qty: 90 TABLET | Refills: 1 | Status: SHIPPED | OUTPATIENT
Start: 2023-05-01 | End: 2023-05-02 | Stop reason: SDUPTHER

## 2023-05-01 RX ORDER — MONTELUKAST SODIUM 10 MG/1
10 TABLET ORAL NIGHTLY
Qty: 90 TABLET | Refills: 1 | Status: SHIPPED | OUTPATIENT
Start: 2023-05-01

## 2023-05-02 RX ORDER — VERAPAMIL HYDROCHLORIDE 240 MG/1
240 TABLET, FILM COATED, EXTENDED RELEASE ORAL DAILY
Qty: 90 TABLET | Refills: 1 | Status: SHIPPED | OUTPATIENT
Start: 2023-05-02

## 2023-05-08 ENCOUNTER — HOSPITAL ENCOUNTER (OUTPATIENT)
Age: 43
Discharge: HOME | End: 2023-05-08
Payer: MEDICARE

## 2023-05-08 DIAGNOSIS — D64.9 ANEMIA, UNSPECIFIED TYPE: ICD-10-CM

## 2023-05-08 DIAGNOSIS — E61.1 IRON DEFICIENCY: ICD-10-CM

## 2023-05-08 DIAGNOSIS — E11.69 TYPE 2 DIABETES MELLITUS WITH OTHER SPECIFIED COMPLICATION, WITHOUT LONG-TERM CURRENT USE OF INSULIN (HCC): ICD-10-CM

## 2023-05-08 DIAGNOSIS — Z51.81 MEDICATION MONITORING ENCOUNTER: ICD-10-CM

## 2023-05-08 LAB
BACTERIA: ABNORMAL
BASOPHILS ABSOLUTE: 0.1 THOU/MM3 (ref 0–0.1)
BASOPHILS NFR BLD AUTO: 0.9 %
BILIRUB UR QL STRIP: NEGATIVE
CASTS #/AREA URNS LPF: ABNORMAL /LPF
CASTS #/AREA URNS LPF: ABNORMAL /LPF
CHARACTER UR: CLEAR
CHARCOAL URNS QL MICRO: ABNORMAL
COLOR UR: YELLOW
CREAT UR-MCNC: 55.3 MG/DL
CRYSTALS URNS QL MICRO: ABNORMAL
DEPRECATED RDW RBC AUTO: 50.8 FL (ref 35–45)
EOSINOPHIL NFR BLD AUTO: 2.4 %
EOSINOPHILS ABSOLUTE: 0.2 THOU/MM3 (ref 0–0.4)
EPITHELIAL CELLS, UA: ABNORMAL /HPF
ERYTHROCYTE [DISTWIDTH] IN BLOOD BY AUTOMATED COUNT: 16.9 % (ref 11.5–14.5)
ERYTHROCYTE [SEDIMENTATION RATE] IN BLOOD BY WESTERGREN METHOD: 7 MM/HR (ref 0–20)
GLUCOSE UR QL STRIP.AUTO: NEGATIVE MG/DL
HCT VFR BLD AUTO: 47.2 % (ref 37–47)
HGB BLD-MCNC: 14.7 GM/DL (ref 12–16)
HGB UR QL STRIP.AUTO: NEGATIVE
IMM GRANULOCYTES # BLD AUTO: 0.02 THOU/MM3 (ref 0–0.07)
IMM GRANULOCYTES NFR BLD AUTO: 0.3 %
KETONES UR QL STRIP.AUTO: NEGATIVE
LEUKOCYTE ESTERASE UR QL STRIP.AUTO: ABNORMAL
LYMPHOCYTES ABSOLUTE: 2.7 THOU/MM3 (ref 1–4.8)
LYMPHOCYTES NFR BLD AUTO: 34.1 %
MCH RBC QN AUTO: 26 PG (ref 26–33)
MCHC RBC AUTO-ENTMCNC: 31.1 GM/DL (ref 32.2–35.5)
MCV RBC AUTO: 83.4 FL (ref 81–99)
MONOCYTES ABSOLUTE: 0.5 THOU/MM3 (ref 0.4–1.3)
MONOCYTES NFR BLD AUTO: 6.7 %
NEUTROPHILS NFR BLD AUTO: 55.6 %
NITRITE UR QL STRIP.AUTO: NEGATIVE
NRBC BLD AUTO-RTO: 0 /100 WBC
PH UR STRIP.AUTO: 7 [PH] (ref 5–9)
PLATELET # BLD AUTO: 272 THOU/MM3 (ref 130–400)
PMV BLD AUTO: 11.5 FL (ref 9.4–12.4)
PROT UR STRIP.AUTO-MCNC: NEGATIVE MG/DL
PROT UR-MCNC: 7.6 MG/DL
PROT/CREAT 24H UR: 0.14 MG/G{CREAT}
RBC # BLD AUTO: 5.66 MILL/MM3 (ref 4.2–5.4)
RBC #/AREA URNS HPF: ABNORMAL /HPF
RENAL EPI CELLS #/AREA URNS HPF: ABNORMAL /[HPF]
SEGMENTED NEUTROPHILS ABSOLUTE COUNT: 4.4 THOU/MM3 (ref 1.8–7.7)
SP GR UR REFRACT.AUTO: 1.01 (ref 1–1.03)
UROBILINOGEN UR QL STRIP.AUTO: 0.2 EU/DL (ref 0–1)
WBC # BLD AUTO: 7.9 THOU/MM3 (ref 4.8–10.8)
WBC #/AREA URNS HPF: ABNORMAL /HPF
YEAST LIKE FUNGI URNS QL MICRO: ABNORMAL

## 2023-05-08 PROCEDURE — 84156 ASSAY OF PROTEIN URINE: CPT

## 2023-05-08 PROCEDURE — 82570 ASSAY OF URINE CREATININE: CPT

## 2023-05-08 PROCEDURE — 83550 IRON BINDING TEST: CPT

## 2023-05-08 PROCEDURE — 85025 COMPLETE CBC W/AUTO DIFF WBC: CPT

## 2023-05-08 PROCEDURE — 82746 ASSAY OF FOLIC ACID SERUM: CPT

## 2023-05-08 PROCEDURE — 36415 COLL VENOUS BLD VENIPUNCTURE: CPT

## 2023-05-08 PROCEDURE — 82728 ASSAY OF FERRITIN: CPT

## 2023-05-08 PROCEDURE — 83036 HEMOGLOBIN GLYCOSYLATED A1C: CPT

## 2023-05-08 PROCEDURE — 81001 URINALYSIS AUTO W/SCOPE: CPT

## 2023-05-08 PROCEDURE — 83540 ASSAY OF IRON: CPT

## 2023-05-08 PROCEDURE — 80053 COMPREHEN METABOLIC PANEL: CPT

## 2023-05-08 PROCEDURE — 86160 COMPLEMENT ANTIGEN: CPT

## 2023-05-08 PROCEDURE — 85651 RBC SED RATE NONAUTOMATED: CPT

## 2023-05-08 PROCEDURE — 82607 VITAMIN B-12: CPT

## 2023-05-09 LAB
ALBUMIN SERPL BCG-MCNC: 4.3 G/DL (ref 3.5–5.1)
ALP SERPL-CCNC: 158 U/L (ref 38–126)
ALT SERPL W/O P-5'-P-CCNC: 70 U/L (ref 11–66)
ANION GAP SERPL CALC-SCNC: 14 MEQ/L (ref 8–16)
AST SERPL-CCNC: 55 U/L (ref 5–40)
BILIRUB SERPL-MCNC: 0.5 MG/DL (ref 0.3–1.2)
BUN SERPL-MCNC: 10 MG/DL (ref 7–22)
C3C SERPL-MCNC: 193 MG/DL (ref 90–180)
C4 SERPL-MCNC: 32 MG/DL (ref 10–40)
CALCIUM SERPL-MCNC: 9.3 MG/DL (ref 8.5–10.5)
CHLORIDE SERPL-SCNC: 104 MEQ/L (ref 98–111)
CO2 SERPL-SCNC: 24 MEQ/L (ref 23–33)
CREAT SERPL-MCNC: 0.5 MG/DL (ref 0.4–1.2)
DEPRECATED MEAN GLUCOSE BLD GHB EST-ACNC: 138 MG/DL (ref 70–126)
FERRITIN SERPL IA-MCNC: 62 NG/ML (ref 10–291)
FOLATE SERPL-MCNC: 12.9 NG/ML (ref 4.8–24.2)
GFR SERPL CREATININE-BSD FRML MDRD: > 60 ML/MIN/1.73M2
GLUCOSE FASTING: 130 MG/DL (ref 70–108)
HBA1C MFR BLD HPLC: 6.6 % (ref 4.4–6.4)
IRON SERPL-MCNC: 46 UG/DL (ref 50–170)
POTASSIUM SERPL-SCNC: 3.6 MEQ/L (ref 3.5–5.2)
PROT SERPL-MCNC: 6.9 G/DL (ref 6.1–8)
SODIUM SERPL-SCNC: 142 MEQ/L (ref 135–145)
TIBC SERPL-MCNC: 383 UG/DL (ref 171–450)
VIT B12 SERPL-MCNC: 372 PG/ML (ref 211–911)

## 2023-05-09 NOTE — PROGRESS NOTES
[address] [place] [address]  Dept: [phone]  Dept Fax: [phone]  Loc: [phone]    Jillian Leo is a 36 y.o. female who presents today for:  Chief Complaint   Patient presents with    3 Month Follow-Up     DMII       HPI:     HPI    Diabetes Mellitus: Patient presents for follow up of diabetes. Symptoms: paresthesia of the feet and visual disturbances. Symptoms have gradually worsened. Patient denies increase appetite, polydipsia and weight loss. Evaluation to date has been included: fasting blood sugar, fasting lipid panel, hemoglobin A1C and microalbuminuria. Home sugars: 85-95 am and  pm. Treatment to date: Continued metformin which has been Yes:  , which has been somewhat effective. Lab Results   Component Value Date    LABA1C 6.6 (H) 05/08/2023     No results found for: EAG    Hyperlipidemia: Patient presents with hyperlipidemia. She was tested because screening. Her last labs showed   Lab Results   Component Value Date    CHOL 152 12/11/2022    CHOL 126 12/12/2021    CHOL 156 04/16/2020     Lab Results   Component Value Date    TRIG 104 12/11/2022    TRIG 97 12/12/2021    TRIG 119 04/16/2020     Lab Results   Component Value Date    HDL 40 12/11/2022    HDL 33 12/12/2021    HDL 35 04/16/2020     Lab Results   Component Value Date    LDLCALC 91 12/11/2022    LDLCALC 74 12/12/2021    [address] 97 04/16/2020     No results found for: LABVLDL, VLDL  No results found for: CHOLHDLRATIO  There is a family history of hyperlipidemia. There is not a family history of early ischemia heart disease. GERD: Lili complains of heartburn. This has been associated with early satiety and heartburn. She denies no other symptoms. Symptoms have been present for several years. She denies dysphagia. She has not lost weight. She denies melena, hematochezia, hematemesis, and coffee ground emesis.  Medical

## 2023-05-10 ENCOUNTER — OFFICE VISIT (OUTPATIENT)
Dept: FAMILY MEDICINE CLINIC | Age: 43
End: 2023-05-10
Payer: MEDICARE

## 2023-05-10 VITALS
DIASTOLIC BLOOD PRESSURE: 80 MMHG | TEMPERATURE: 97.1 F | BODY MASS INDEX: 40.58 KG/M2 | RESPIRATION RATE: 14 BRPM | SYSTOLIC BLOOD PRESSURE: 124 MMHG | HEART RATE: 83 BPM | WEIGHT: 229 LBS

## 2023-05-10 DIAGNOSIS — E66.01 MORBID OBESITY DUE TO EXCESS CALORIES (HCC): ICD-10-CM

## 2023-05-10 DIAGNOSIS — R79.89 ELEVATED TSH: ICD-10-CM

## 2023-05-10 DIAGNOSIS — L30.4 INTERTRIGO: ICD-10-CM

## 2023-05-10 DIAGNOSIS — E61.1 IRON DEFICIENCY: ICD-10-CM

## 2023-05-10 DIAGNOSIS — D80.1 HYPOGAMMAGLOBULINEMIA (HCC): ICD-10-CM

## 2023-05-10 DIAGNOSIS — D80.3 IGG SUBCLASS DEFICIENCY (HCC): ICD-10-CM

## 2023-05-10 DIAGNOSIS — G43.809 OTHER MIGRAINE WITHOUT STATUS MIGRAINOSUS, NOT INTRACTABLE: ICD-10-CM

## 2023-05-10 DIAGNOSIS — E66.9 OBESITY (BMI 30-39.9): ICD-10-CM

## 2023-05-10 DIAGNOSIS — J45.40 MODERATE PERSISTENT ASTHMA WITHOUT COMPLICATION: ICD-10-CM

## 2023-05-10 DIAGNOSIS — R91.1 LUNG NODULE, SOLITARY: ICD-10-CM

## 2023-05-10 DIAGNOSIS — E78.00 PURE HYPERCHOLESTEROLEMIA: ICD-10-CM

## 2023-05-10 DIAGNOSIS — I63.9 CEREBROVASCULAR ACCIDENT (CVA), UNSPECIFIED MECHANISM (HCC): ICD-10-CM

## 2023-05-10 DIAGNOSIS — J30.2 SEASONAL ALLERGIES: ICD-10-CM

## 2023-05-10 DIAGNOSIS — M25.561 CHRONIC PAIN OF RIGHT KNEE: ICD-10-CM

## 2023-05-10 DIAGNOSIS — G45.9 TIA (TRANSIENT ISCHEMIC ATTACK): ICD-10-CM

## 2023-05-10 DIAGNOSIS — G89.29 CHRONIC PAIN OF RIGHT KNEE: ICD-10-CM

## 2023-05-10 DIAGNOSIS — K21.9 GASTROESOPHAGEAL REFLUX DISEASE WITHOUT ESOPHAGITIS: ICD-10-CM

## 2023-05-10 DIAGNOSIS — G56.22 ULNAR TUNNEL SYNDROME OF LEFT WRIST: ICD-10-CM

## 2023-05-10 DIAGNOSIS — E83.42 HYPOMAGNESEMIA: ICD-10-CM

## 2023-05-10 DIAGNOSIS — G40.909 SEIZURE DISORDER (HCC): ICD-10-CM

## 2023-05-10 DIAGNOSIS — M32.9 LUPUS ARTHRITIS (HCC): ICD-10-CM

## 2023-05-10 DIAGNOSIS — M32.9 LUPUS (HCC): ICD-10-CM

## 2023-05-10 DIAGNOSIS — M79.7 FIBROMYALGIA: ICD-10-CM

## 2023-05-10 DIAGNOSIS — D83.9 COMMON VARIABLE IMMUNODEFICIENCY (HCC): ICD-10-CM

## 2023-05-10 DIAGNOSIS — Z92.29 HISTORY OF HEPATITIS B VACCINATION: ICD-10-CM

## 2023-05-10 DIAGNOSIS — R79.89 ELEVATED LFTS: ICD-10-CM

## 2023-05-10 DIAGNOSIS — E11.69 TYPE 2 DIABETES MELLITUS WITH OTHER SPECIFIED COMPLICATION, WITHOUT LONG-TERM CURRENT USE OF INSULIN (HCC): Primary | ICD-10-CM

## 2023-05-10 PROCEDURE — 3044F HG A1C LEVEL LT 7.0%: CPT | Performed by: FAMILY MEDICINE

## 2023-05-10 PROCEDURE — 3074F SYST BP LT 130 MM HG: CPT | Performed by: FAMILY MEDICINE

## 2023-05-10 PROCEDURE — 3079F DIAST BP 80-89 MM HG: CPT | Performed by: FAMILY MEDICINE

## 2023-05-10 PROCEDURE — 99214 OFFICE O/P EST MOD 30 MIN: CPT | Performed by: FAMILY MEDICINE

## 2023-05-10 RX ORDER — DULAGLUTIDE 3 MG/.5ML
3 INJECTION, SOLUTION SUBCUTANEOUS WEEKLY
Qty: 12 ADJUSTABLE DOSE PRE-FILLED PEN SYRINGE | Refills: 5 | Status: SHIPPED | OUTPATIENT
Start: 2023-05-10

## 2023-05-16 DIAGNOSIS — R21 RASH: Primary | ICD-10-CM

## 2023-05-30 RX ORDER — CLOPIDOGREL BISULFATE 75 MG/1
TABLET ORAL
Qty: 90 TABLET | Refills: 0 | Status: SHIPPED | OUTPATIENT
Start: 2023-05-30

## 2023-05-30 RX ORDER — GABAPENTIN 300 MG/1
CAPSULE ORAL
Qty: 60 CAPSULE | Refills: 0 | OUTPATIENT
Start: 2023-05-30

## 2023-06-10 ENCOUNTER — HOSPITAL ENCOUNTER (OUTPATIENT)
Age: 43
Discharge: HOME | End: 2023-06-10
Payer: MEDICARE

## 2023-06-10 DIAGNOSIS — Z51.81 MEDICATION MONITORING ENCOUNTER: ICD-10-CM

## 2023-06-10 DIAGNOSIS — D83.9 CVID (COMMON VARIABLE IMMUNODEFICIENCY) (HCC): ICD-10-CM

## 2023-06-10 LAB
ALBUMIN SERPL BCG-MCNC: 4.9 G/DL (ref 3.5–5.1)
ALP SERPL-CCNC: 155 U/L (ref 38–126)
ALT SERPL W/O P-5'-P-CCNC: 76 U/L (ref 11–66)
ANION GAP SERPL CALC-SCNC: 16 MEQ/L (ref 8–16)
AST SERPL-CCNC: 66 U/L (ref 5–40)
BACTERIA: ABNORMAL
BASOPHILS ABSOLUTE: 0 THOU/MM3 (ref 0–0.1)
BASOPHILS NFR BLD AUTO: 0.5 %
BILIRUB SERPL-MCNC: 0.6 MG/DL (ref 0.3–1.2)
BILIRUB UR QL STRIP: NEGATIVE
BUN SERPL-MCNC: 6 MG/DL (ref 7–22)
CALCIUM SERPL-MCNC: 9.7 MG/DL (ref 8.5–10.5)
CASTS #/AREA URNS LPF: ABNORMAL /LPF
CASTS #/AREA URNS LPF: ABNORMAL /LPF
CHARACTER UR: CLEAR
CHARCOAL URNS QL MICRO: ABNORMAL
CHLORIDE SERPL-SCNC: 99 MEQ/L (ref 98–111)
CO2 SERPL-SCNC: 22 MEQ/L (ref 23–33)
COLOR UR: ABNORMAL
CREAT SERPL-MCNC: 0.5 MG/DL (ref 0.4–1.2)
CREAT UR-MCNC: 60.2 MG/DL
CRYSTALS URNS QL MICRO: ABNORMAL
DEPRECATED RDW RBC AUTO: 44.5 FL (ref 35–45)
EOSINOPHIL NFR BLD AUTO: 2.3 %
EOSINOPHILS ABSOLUTE: 0.2 THOU/MM3 (ref 0–0.4)
EPITHELIAL CELLS, UA: ABNORMAL /HPF
ERYTHROCYTE [DISTWIDTH] IN BLOOD BY AUTOMATED COUNT: 14.8 % (ref 11.5–14.5)
ERYTHROCYTE [SEDIMENTATION RATE] IN BLOOD BY WESTERGREN METHOD: 6 MM/HR (ref 0–20)
GFR SERPL CREATININE-BSD FRML MDRD: > 60 ML/MIN/1.73M2
GLUCOSE SERPL-MCNC: 97 MG/DL (ref 70–108)
GLUCOSE UR QL STRIP.AUTO: NEGATIVE MG/DL
HCT VFR BLD AUTO: 48.4 % (ref 37–47)
HGB BLD-MCNC: 15.5 GM/DL (ref 12–16)
HGB UR QL STRIP.AUTO: ABNORMAL
IMM GRANULOCYTES # BLD AUTO: 0.02 THOU/MM3 (ref 0–0.07)
IMM GRANULOCYTES NFR BLD AUTO: 0.3 %
KETONES UR QL STRIP.AUTO: NEGATIVE
LEUKOCYTE ESTERASE UR QL STRIP.AUTO: ABNORMAL
LYMPHOCYTES ABSOLUTE: 2.4 THOU/MM3 (ref 1–4.8)
LYMPHOCYTES NFR BLD AUTO: 31.2 %
MCH RBC QN AUTO: 26.9 PG (ref 26–33)
MCHC RBC AUTO-ENTMCNC: 32 GM/DL (ref 32.2–35.5)
MCV RBC AUTO: 83.9 FL (ref 81–99)
MONOCYTES ABSOLUTE: 0.5 THOU/MM3 (ref 0.4–1.3)
MONOCYTES NFR BLD AUTO: 6.3 %
NEUTROPHILS NFR BLD AUTO: 59.4 %
NITRITE UR QL STRIP.AUTO: NEGATIVE
NRBC BLD AUTO-RTO: 0 /100 WBC
PH UR STRIP.AUTO: 6.5 [PH] (ref 5–9)
PLATELET # BLD AUTO: 290 THOU/MM3 (ref 130–400)
PMV BLD AUTO: 11.1 FL (ref 9.4–12.4)
POTASSIUM SERPL-SCNC: 3.9 MEQ/L (ref 3.5–5.2)
PROT SERPL-MCNC: 7.5 G/DL (ref 6.1–8)
PROT UR STRIP.AUTO-MCNC: 30 MG/DL
PROT UR-MCNC: 13 MG/DL
PROT/CREAT 24H UR: 0.22 MG/G{CREAT}
RBC # BLD AUTO: 5.77 MILL/MM3 (ref 4.2–5.4)
RBC #/AREA URNS HPF: ABNORMAL /HPF
RENAL EPI CELLS #/AREA URNS HPF: ABNORMAL /[HPF]
SEGMENTED NEUTROPHILS ABSOLUTE COUNT: 4.6 THOU/MM3 (ref 1.8–7.7)
SODIUM SERPL-SCNC: 137 MEQ/L (ref 135–145)
SP GR UR REFRACT.AUTO: 1.01 (ref 1–1.03)
UROBILINOGEN UR QL STRIP.AUTO: 0.2 EU/DL (ref 0–1)
WBC # BLD AUTO: 7.7 THOU/MM3 (ref 4.8–10.8)
WBC #/AREA URNS HPF: ABNORMAL /HPF
YEAST LIKE FUNGI URNS QL MICRO: ABNORMAL

## 2023-06-10 PROCEDURE — 85025 COMPLETE CBC W/AUTO DIFF WBC: CPT

## 2023-06-10 PROCEDURE — 84156 ASSAY OF PROTEIN URINE: CPT

## 2023-06-10 PROCEDURE — 36415 COLL VENOUS BLD VENIPUNCTURE: CPT

## 2023-06-10 PROCEDURE — 82570 ASSAY OF URINE CREATININE: CPT

## 2023-06-10 PROCEDURE — 81001 URINALYSIS AUTO W/SCOPE: CPT

## 2023-06-10 PROCEDURE — 86160 COMPLEMENT ANTIGEN: CPT

## 2023-06-10 PROCEDURE — 85651 RBC SED RATE NONAUTOMATED: CPT

## 2023-06-10 PROCEDURE — 80053 COMPREHEN METABOLIC PANEL: CPT

## 2023-06-10 PROCEDURE — 82784 ASSAY IGA/IGD/IGG/IGM EACH: CPT

## 2023-06-11 LAB
C3C SERPL-MCNC: 213 MG/DL (ref 90–180)
C4 SERPL-MCNC: 35 MG/DL (ref 10–40)
IGG SERPL-MCNC: 809 MG/DL (ref 700–1600)

## 2023-06-19 ENCOUNTER — HOSPITAL ENCOUNTER (OUTPATIENT)
Dept: CT IMAGING | Age: 43
Discharge: HOME | End: 2023-06-19
Attending: FAMILY MEDICINE
Payer: MEDICARE

## 2023-06-19 DIAGNOSIS — R31.9 HEMATURIA, UNSPECIFIED TYPE: Primary | ICD-10-CM

## 2023-06-19 DIAGNOSIS — R31.9 HEMATURIA, UNSPECIFIED TYPE: ICD-10-CM

## 2023-06-19 PROCEDURE — 74178 CT ABD&PLV WO CNTR FLWD CNTR: CPT | Performed by: FAMILY MEDICINE

## 2023-06-19 PROCEDURE — 6360000004 HC RX CONTRAST MEDICATION: Performed by: FAMILY MEDICINE

## 2023-06-19 RX ADMIN — IOPAMIDOL 100 ML: 755 INJECTION, SOLUTION INTRAVENOUS at 10:16

## 2023-06-23 DIAGNOSIS — M46.90 AXIAL SPONDYLOARTHRITIS (HCC): ICD-10-CM

## 2023-06-26 RX ORDER — SECUKINUMAB 150 MG/ML
INJECTION SUBCUTANEOUS
Qty: 1 ML | Refills: 0 | OUTPATIENT
Start: 2023-06-26

## 2023-06-26 RX ORDER — SECUKINUMAB 150 MG/ML
INJECTION SUBCUTANEOUS
Qty: 5 ML | Refills: 0 | Status: ACTIVE | OUTPATIENT
Start: 2023-06-26

## 2023-06-27 RX ORDER — SECUKINUMAB 150 MG/ML
150 INJECTION SUBCUTANEOUS
Qty: 1 ADJUSTABLE DOSE PRE-FILLED PEN SYRINGE | Refills: 3 | Status: ACTIVE | OUTPATIENT
Start: 2023-06-27

## 2023-06-29 DIAGNOSIS — M54.50 CHRONIC BILATERAL LOW BACK PAIN WITHOUT SCIATICA: ICD-10-CM

## 2023-06-29 DIAGNOSIS — G89.29 CHRONIC BILATERAL LOW BACK PAIN WITHOUT SCIATICA: ICD-10-CM

## 2023-06-29 DIAGNOSIS — M79.7 FIBROMYALGIA: ICD-10-CM

## 2023-06-30 RX ORDER — GABAPENTIN 400 MG/1
CAPSULE ORAL
Qty: 60 CAPSULE | Refills: 0 | Status: SHIPPED | OUTPATIENT
Start: 2023-06-30 | End: 2023-09-28

## 2023-07-11 ENCOUNTER — HOSPITAL ENCOUNTER (OUTPATIENT)
Age: 43
Discharge: HOME | End: 2023-07-11
Payer: MEDICARE

## 2023-07-11 DIAGNOSIS — D83.9 CVID (COMMON VARIABLE IMMUNODEFICIENCY) (HCC): ICD-10-CM

## 2023-07-11 LAB
ALBUMIN SERPL BCG-MCNC: 4.5 G/DL (ref 3.5–5.1)
ALP SERPL-CCNC: 158 U/L (ref 38–126)
ALT SERPL W/O P-5'-P-CCNC: 52 U/L (ref 11–66)
ANION GAP SERPL CALC-SCNC: 14 MEQ/L (ref 8–16)
AST SERPL-CCNC: 50 U/L (ref 5–40)
BASOPHILS ABSOLUTE: 0.1 THOU/MM3 (ref 0–0.1)
BASOPHILS NFR BLD AUTO: 0.7 %
BILIRUB SERPL-MCNC: 0.6 MG/DL (ref 0.3–1.2)
BUN SERPL-MCNC: 10 MG/DL (ref 7–22)
CALCIUM SERPL-MCNC: 9.5 MG/DL (ref 8.5–10.5)
CHLORIDE SERPL-SCNC: 102 MEQ/L (ref 98–111)
CO2 SERPL-SCNC: 23 MEQ/L (ref 23–33)
CREAT SERPL-MCNC: 0.5 MG/DL (ref 0.4–1.2)
CREAT UR-MCNC: 242.9 MG/DL
DEPRECATED RDW RBC AUTO: 40.9 FL (ref 35–45)
EOSINOPHIL NFR BLD AUTO: 1.8 %
EOSINOPHILS ABSOLUTE: 0.1 THOU/MM3 (ref 0–0.4)
ERYTHROCYTE [DISTWIDTH] IN BLOOD BY AUTOMATED COUNT: 13.4 % (ref 11.5–14.5)
ERYTHROCYTE [SEDIMENTATION RATE] IN BLOOD BY WESTERGREN METHOD: 11 MM/HR (ref 0–20)
GFR SERPL CREATININE-BSD FRML MDRD: > 60 ML/MIN/1.73M2
GLUCOSE SERPL-MCNC: 108 MG/DL (ref 70–108)
HCT VFR BLD AUTO: 46.2 % (ref 37–47)
HGB BLD-MCNC: 14.9 GM/DL (ref 12–16)
IMM GRANULOCYTES # BLD AUTO: 0.03 THOU/MM3 (ref 0–0.07)
IMM GRANULOCYTES NFR BLD AUTO: 0.4 %
LYMPHOCYTES ABSOLUTE: 2.6 THOU/MM3 (ref 1–4.8)
LYMPHOCYTES NFR BLD AUTO: 31.8 %
MCH RBC QN AUTO: 27 PG (ref 26–33)
MCHC RBC AUTO-ENTMCNC: 32.3 GM/DL (ref 32.2–35.5)
MCV RBC AUTO: 83.8 FL (ref 81–99)
MONOCYTES ABSOLUTE: 0.6 THOU/MM3 (ref 0.4–1.3)
MONOCYTES NFR BLD AUTO: 7.1 %
NEUTROPHILS NFR BLD AUTO: 58.2 %
NRBC BLD AUTO-RTO: 0 /100 WBC
PLATELET # BLD AUTO: 289 THOU/MM3 (ref 130–400)
PMV BLD AUTO: 11.5 FL (ref 9.4–12.4)
POTASSIUM SERPL-SCNC: 4 MEQ/L (ref 3.5–5.2)
PROT SERPL-MCNC: 7.5 G/DL (ref 6.1–8)
PROT UR-MCNC: 47 MG/DL
PROT/CREAT 24H UR: 0.19 MG/G{CREAT}
RBC # BLD AUTO: 5.51 MILL/MM3 (ref 4.2–5.4)
SEGMENTED NEUTROPHILS ABSOLUTE COUNT: 4.8 THOU/MM3 (ref 1.8–7.7)
SODIUM SERPL-SCNC: 139 MEQ/L (ref 135–145)
WBC # BLD AUTO: 8.2 THOU/MM3 (ref 4.8–10.8)

## 2023-07-11 PROCEDURE — 82570 ASSAY OF URINE CREATININE: CPT

## 2023-07-11 PROCEDURE — 81001 URINALYSIS AUTO W/SCOPE: CPT

## 2023-07-11 PROCEDURE — 86160 COMPLEMENT ANTIGEN: CPT

## 2023-07-11 PROCEDURE — 85651 RBC SED RATE NONAUTOMATED: CPT

## 2023-07-11 PROCEDURE — 80053 COMPREHEN METABOLIC PANEL: CPT

## 2023-07-11 PROCEDURE — 85025 COMPLETE CBC W/AUTO DIFF WBC: CPT

## 2023-07-11 PROCEDURE — 36415 COLL VENOUS BLD VENIPUNCTURE: CPT

## 2023-07-11 PROCEDURE — 82784 ASSAY IGA/IGD/IGG/IGM EACH: CPT

## 2023-07-11 PROCEDURE — 84156 ASSAY OF PROTEIN URINE: CPT

## 2023-07-12 LAB
BACTERIA: ABNORMAL
BILIRUB UR QL STRIP: NEGATIVE
C3C SERPL-MCNC: 210 MG/DL (ref 90–180)
C4 SERPL-MCNC: 37 MG/DL (ref 10–40)
CASTS #/AREA URNS LPF: ABNORMAL /LPF
CASTS #/AREA URNS LPF: ABNORMAL /LPF
CHARACTER UR: CLEAR
CHARCOAL URNS QL MICRO: ABNORMAL
COLOR UR: YELLOW
CRYSTALS URNS QL MICRO: ABNORMAL
EPITHELIAL CELLS, UA: ABNORMAL /HPF
GLUCOSE UR QL STRIP.AUTO: NEGATIVE MG/DL
HGB UR QL STRIP.AUTO: NEGATIVE
IGG SERPL-MCNC: 808 MG/DL (ref 700–1600)
KETONES UR QL STRIP.AUTO: ABNORMAL
LEUKOCYTE ESTERASE UR QL STRIP.AUTO: ABNORMAL
MUCOUS THREADS URNS QL MICRO: ABNORMAL
NITRITE UR QL STRIP.AUTO: NEGATIVE
PH UR STRIP.AUTO: 6.5 [PH] (ref 5–9)
PROT UR STRIP.AUTO-MCNC: 30 MG/DL
RBC #/AREA URNS HPF: ABNORMAL /HPF
RENAL EPI CELLS #/AREA URNS HPF: ABNORMAL /[HPF]
SP GR UR REFRACT.AUTO: 1.02 (ref 1–1.03)
UROBILINOGEN UR QL STRIP.AUTO: 1 EU/DL (ref 0–1)
WBC #/AREA URNS HPF: ABNORMAL /HPF
YEAST LIKE FUNGI URNS QL MICRO: ABNORMAL

## 2023-07-29 DIAGNOSIS — G89.29 CHRONIC BILATERAL LOW BACK PAIN WITHOUT SCIATICA: ICD-10-CM

## 2023-07-29 DIAGNOSIS — L30.4 INTERTRIGO: ICD-10-CM

## 2023-07-29 DIAGNOSIS — M54.50 CHRONIC BILATERAL LOW BACK PAIN WITHOUT SCIATICA: ICD-10-CM

## 2023-07-29 DIAGNOSIS — M79.7 FIBROMYALGIA: ICD-10-CM

## 2023-07-31 RX ORDER — CLOTRIMAZOLE AND BETAMETHASONE DIPROPIONATE 10; .64 MG/G; MG/G
CREAM TOPICAL
Qty: 45 G | Refills: 1 | Status: SHIPPED | OUTPATIENT
Start: 2023-07-31

## 2023-07-31 RX ORDER — GABAPENTIN 400 MG/1
CAPSULE ORAL
Qty: 60 CAPSULE | Refills: 0 | Status: SHIPPED | OUTPATIENT
Start: 2023-07-31 | End: 2023-10-29

## 2023-08-07 ENCOUNTER — CLINICAL DOCUMENTATION (OUTPATIENT)
Dept: CASE MANAGEMENT | Age: 43
End: 2023-08-07

## 2023-08-07 NOTE — PROGRESS NOTES
Name: Ana M Ch  : 1980  MRN: [id number]    Oncology Navigation Follow-Up Note    Diagnosis: High Risk for Breast Cancer     High Risk Category: Known Genetic Mutation in first degree relative, sister BRCA1 POSITIVE per patient     Last mammogram-3/2/2023  Last MRI-never had. Per recommendations can start age 26. Currently age 36  Lifetime TC score: 25.8% per Penrad today. Genetic evaluation and testing-meets criteria, patient's mother untested and other sister, see family history    Notes: High risk navigation and compliance tracking continues. Follows with PCP-Dr. Jaciel Jacobson. Meets criteria for MRI, clinical breast exams every 6-12 mo, risk reductions strategies, and genetic testing per NCCN. No history of medical risk reduction-chemoprevention. Mailed  packet/letter with my contact number including:  High Risk Lifestyle Modifications, MRI, self-breast exam guide, ACS Cancer Screening Guidelines, and [employer] genetic testing brochure with cost information. Asked patient to call if interested in any of above recommendations for closer surveillance. Will follow and assist with care coordination as needed.    Electronically signed by Mauricio Tomlinson RN on 2023 at 11:14 AM

## 2023-08-09 ENCOUNTER — HOSPITAL ENCOUNTER (OUTPATIENT)
Age: 43
Discharge: HOME | End: 2023-08-09
Payer: MEDICARE

## 2023-08-09 DIAGNOSIS — E11.69 TYPE 2 DIABETES MELLITUS WITH OTHER SPECIFIED COMPLICATION, WITHOUT LONG-TERM CURRENT USE OF INSULIN (HCC): ICD-10-CM

## 2023-08-09 DIAGNOSIS — D83.9 CVID (COMMON VARIABLE IMMUNODEFICIENCY) (HCC): ICD-10-CM

## 2023-08-09 LAB
ALBUMIN SERPL BCG-MCNC: 4.4 G/DL (ref 3.5–5.1)
ALP SERPL-CCNC: 172 U/L (ref 38–126)
ALT SERPL W/O P-5'-P-CCNC: 50 U/L (ref 11–66)
ANION GAP SERPL CALC-SCNC: 15 MEQ/L (ref 8–16)
AST SERPL-CCNC: 44 U/L (ref 5–40)
BASOPHILS ABSOLUTE: 0.1 THOU/MM3 (ref 0–0.1)
BASOPHILS NFR BLD AUTO: 0.8 %
BILIRUB SERPL-MCNC: 0.7 MG/DL (ref 0.3–1.2)
BUN SERPL-MCNC: 10 MG/DL (ref 7–22)
C3C SERPL-MCNC: 193 MG/DL (ref 90–180)
C4 SERPL-MCNC: 31 MG/DL (ref 10–40)
CALCIUM SERPL-MCNC: 9.7 MG/DL (ref 8.5–10.5)
CHLORIDE SERPL-SCNC: 101 MEQ/L (ref 98–111)
CO2 SERPL-SCNC: 24 MEQ/L (ref 23–33)
CREAT SERPL-MCNC: 0.6 MG/DL (ref 0.4–1.2)
DEPRECATED MEAN GLUCOSE BLD GHB EST-ACNC: 120 MG/DL (ref 70–126)
DEPRECATED RDW RBC AUTO: 40.1 FL (ref 35–45)
EOSINOPHIL NFR BLD AUTO: 1.9 %
EOSINOPHILS ABSOLUTE: 0.1 THOU/MM3 (ref 0–0.4)
ERYTHROCYTE [DISTWIDTH] IN BLOOD BY AUTOMATED COUNT: 13.4 % (ref 11.5–14.5)
ERYTHROCYTE [SEDIMENTATION RATE] IN BLOOD BY WESTERGREN METHOD: 6 MM/HR (ref 0–20)
GFR SERPL CREATININE-BSD FRML MDRD: > 60 ML/MIN/1.73M2
GLUCOSE FASTING: 113 MG/DL (ref 70–108)
HBA1C MFR BLD HPLC: 6 % (ref 4.4–6.4)
HCT VFR BLD AUTO: 45.2 % (ref 37–47)
HGB BLD-MCNC: 14.7 GM/DL (ref 12–16)
IMM GRANULOCYTES # BLD AUTO: 0.02 THOU/MM3 (ref 0–0.07)
IMM GRANULOCYTES NFR BLD AUTO: 0.3 %
LYMPHOCYTES ABSOLUTE: 2.5 THOU/MM3 (ref 1–4.8)
LYMPHOCYTES NFR BLD AUTO: 33.6 %
MCH RBC QN AUTO: 27 PG (ref 26–33)
MCHC RBC AUTO-ENTMCNC: 32.5 GM/DL (ref 32.2–35.5)
MCV RBC AUTO: 82.9 FL (ref 81–99)
MONOCYTES ABSOLUTE: 0.5 THOU/MM3 (ref 0.4–1.3)
MONOCYTES NFR BLD AUTO: 6 %
NEUTROPHILS NFR BLD AUTO: 57.4 %
NRBC BLD AUTO-RTO: 0 /100 WBC
PLATELET # BLD AUTO: 283 THOU/MM3 (ref 130–400)
PMV BLD AUTO: 11.2 FL (ref 9.4–12.4)
POTASSIUM SERPL-SCNC: 3.8 MEQ/L (ref 3.5–5.2)
PROT SERPL-MCNC: 7.3 G/DL (ref 6.1–8)
RBC # BLD AUTO: 5.45 MILL/MM3 (ref 4.2–5.4)
SEGMENTED NEUTROPHILS ABSOLUTE COUNT: 4.3 THOU/MM3 (ref 1.8–7.7)
SODIUM SERPL-SCNC: 140 MEQ/L (ref 135–145)
WBC # BLD AUTO: 7.5 THOU/MM3 (ref 4.8–10.8)

## 2023-08-09 PROCEDURE — 86160 COMPLEMENT ANTIGEN: CPT

## 2023-08-09 PROCEDURE — 85025 COMPLETE CBC W/AUTO DIFF WBC: CPT

## 2023-08-09 PROCEDURE — 83036 HEMOGLOBIN GLYCOSYLATED A1C: CPT

## 2023-08-09 PROCEDURE — 80053 COMPREHEN METABOLIC PANEL: CPT

## 2023-08-09 PROCEDURE — 36415 COLL VENOUS BLD VENIPUNCTURE: CPT

## 2023-08-09 PROCEDURE — 85651 RBC SED RATE NONAUTOMATED: CPT

## 2023-08-17 ENCOUNTER — OFFICE VISIT (OUTPATIENT)
Dept: RHEUMATOLOGY | Age: 43
End: 2023-08-17
Payer: MEDICARE

## 2023-08-17 ENCOUNTER — PATIENT MESSAGE (OUTPATIENT)
Dept: RHEUMATOLOGY | Age: 43
End: 2023-08-17

## 2023-08-17 VITALS
WEIGHT: 217.8 LBS | OXYGEN SATURATION: 97 % | HEART RATE: 80 BPM | SYSTOLIC BLOOD PRESSURE: 130 MMHG | HEIGHT: 63 IN | BODY MASS INDEX: 38.59 KG/M2 | DIASTOLIC BLOOD PRESSURE: 80 MMHG

## 2023-08-17 DIAGNOSIS — G89.29 CHRONIC BILATERAL LOW BACK PAIN WITHOUT SCIATICA: ICD-10-CM

## 2023-08-17 DIAGNOSIS — M79.602 PARESTHESIA AND PAIN OF BOTH UPPER EXTREMITIES: Primary | ICD-10-CM

## 2023-08-17 DIAGNOSIS — M79.7 FIBROMYALGIA: ICD-10-CM

## 2023-08-17 DIAGNOSIS — R20.2 PARESTHESIA AND PAIN OF BOTH UPPER EXTREMITIES: Primary | ICD-10-CM

## 2023-08-17 DIAGNOSIS — Z51.81 MEDICATION MONITORING ENCOUNTER: ICD-10-CM

## 2023-08-17 DIAGNOSIS — M46.90 AXIAL SPONDYLOARTHRITIS (HCC): Primary | ICD-10-CM

## 2023-08-17 DIAGNOSIS — M79.602 PARESTHESIA AND PAIN OF BOTH UPPER EXTREMITIES: ICD-10-CM

## 2023-08-17 DIAGNOSIS — M79.601 PARESTHESIA AND PAIN OF BOTH UPPER EXTREMITIES: ICD-10-CM

## 2023-08-17 DIAGNOSIS — M54.50 CHRONIC BILATERAL LOW BACK PAIN WITHOUT SCIATICA: ICD-10-CM

## 2023-08-17 DIAGNOSIS — R20.2 PARESTHESIA AND PAIN OF BOTH UPPER EXTREMITIES: ICD-10-CM

## 2023-08-17 DIAGNOSIS — E66.01 OBESITY, CLASS III, BMI 40-49.9 (MORBID OBESITY) (HCC): ICD-10-CM

## 2023-08-17 DIAGNOSIS — M79.601 PARESTHESIA AND PAIN OF BOTH UPPER EXTREMITIES: Primary | ICD-10-CM

## 2023-08-17 PROCEDURE — 3075F SYST BP GE 130 - 139MM HG: CPT | Performed by: INTERNAL MEDICINE

## 2023-08-17 PROCEDURE — 99214 OFFICE O/P EST MOD 30 MIN: CPT | Performed by: INTERNAL MEDICINE

## 2023-08-17 PROCEDURE — 3079F DIAST BP 80-89 MM HG: CPT | Performed by: INTERNAL MEDICINE

## 2023-08-17 RX ORDER — SECUKINUMAB 150 MG/ML
300 INJECTION SUBCUTANEOUS
Qty: 2 ADJUSTABLE DOSE PRE-FILLED PEN SYRINGE | Refills: 3 | Status: ACTIVE | OUTPATIENT
Start: 2023-08-17

## 2023-08-17 ASSESSMENT — ENCOUNTER SYMPTOMS
GASTROINTESTINAL NEGATIVE: 1
EYES NEGATIVE: 1
RESPIRATORY NEGATIVE: 1

## 2023-08-23 NOTE — TELEPHONE ENCOUNTER
Dr. Lucius Cee office called and stated they received a referral for patient for an emg. Stated that they had sent her to collections for not paying her bills. Stated that they informed patient that she would need to pay her bill prior to being seen. Stated that patient said \"okay\" and hung up. Just wanted to inform us they did try to schedule.

## 2023-08-25 NOTE — TELEPHONE ENCOUNTER
Diagnosis Orders   1.  Paresthesia and pain of both upper extremities  Becca Faulkner DO, Physical Medicine & Rehab, Millie Zuñiga MD, Neurology, [facility]

## 2023-08-26 DIAGNOSIS — G89.29 CHRONIC BILATERAL LOW BACK PAIN WITHOUT SCIATICA: ICD-10-CM

## 2023-08-26 DIAGNOSIS — M54.50 CHRONIC BILATERAL LOW BACK PAIN WITHOUT SCIATICA: ICD-10-CM

## 2023-08-26 DIAGNOSIS — M79.7 FIBROMYALGIA: ICD-10-CM

## 2023-08-28 RX ORDER — CLOPIDOGREL BISULFATE 75 MG/1
TABLET ORAL
Qty: 90 TABLET | Refills: 1 | Status: SHIPPED | OUTPATIENT
Start: 2023-08-28

## 2023-08-29 RX ORDER — GABAPENTIN 400 MG/1
CAPSULE ORAL
Qty: 60 CAPSULE | Refills: 0 | Status: SHIPPED | OUTPATIENT
Start: 2023-08-29 | End: 2023-11-27

## 2023-09-06 ENCOUNTER — HOSPITAL ENCOUNTER (OUTPATIENT)
Age: 43
Discharge: HOME | End: 2023-09-06
Payer: MEDICARE

## 2023-09-06 DIAGNOSIS — D83.9 CVID (COMMON VARIABLE IMMUNODEFICIENCY) (HCC): ICD-10-CM

## 2023-09-06 LAB
ALBUMIN SERPL BCG-MCNC: 4.5 G/DL (ref 3.5–5.1)
ALP SERPL-CCNC: 167 U/L (ref 38–126)
ALT SERPL W/O P-5'-P-CCNC: 53 U/L (ref 11–66)
ANION GAP SERPL CALC-SCNC: 14 MEQ/L (ref 8–16)
AST SERPL-CCNC: 41 U/L (ref 5–40)
BACTERIA: ABNORMAL
BASOPHILS ABSOLUTE: 0.1 THOU/MM3 (ref 0–0.1)
BASOPHILS NFR BLD AUTO: 0.8 %
BILIRUB SERPL-MCNC: 0.7 MG/DL (ref 0.3–1.2)
BILIRUB UR QL STRIP: NEGATIVE
BUN SERPL-MCNC: 9 MG/DL (ref 7–22)
CALCIUM SERPL-MCNC: 9.7 MG/DL (ref 8.5–10.5)
CASTS #/AREA URNS LPF: ABNORMAL /LPF
CASTS #/AREA URNS LPF: ABNORMAL /LPF
CHARACTER UR: CLEAR
CHARCOAL URNS QL MICRO: ABNORMAL
CHLORIDE SERPL-SCNC: 102 MEQ/L (ref 98–111)
CO2 SERPL-SCNC: 27 MEQ/L (ref 23–33)
COLOR UR: YELLOW
CREAT SERPL-MCNC: 0.6 MG/DL (ref 0.4–1.2)
CREAT UR-MCNC: 155.9 MG/DL
CRYSTALS URNS QL MICRO: ABNORMAL
DEPRECATED RDW RBC AUTO: 42.2 FL (ref 35–45)
EOSINOPHIL NFR BLD AUTO: 1.9 %
EOSINOPHILS ABSOLUTE: 0.2 THOU/MM3 (ref 0–0.4)
EPITHELIAL CELLS, UA: ABNORMAL /HPF
ERYTHROCYTE [DISTWIDTH] IN BLOOD BY AUTOMATED COUNT: 13.7 % (ref 11.5–14.5)
ERYTHROCYTE [SEDIMENTATION RATE] IN BLOOD BY WESTERGREN METHOD: 7 MM/HR (ref 0–20)
GFR SERPL CREATININE-BSD FRML MDRD: > 60 ML/MIN/1.73M2
GLUCOSE SERPL-MCNC: 104 MG/DL (ref 70–108)
GLUCOSE UR QL STRIP.AUTO: NEGATIVE MG/DL
HCT VFR BLD AUTO: 46 % (ref 37–47)
HGB BLD-MCNC: 14.6 GM/DL (ref 12–16)
HGB UR QL STRIP.AUTO: NEGATIVE
IMM GRANULOCYTES # BLD AUTO: 0.03 THOU/MM3 (ref 0–0.07)
IMM GRANULOCYTES NFR BLD AUTO: 0.4 %
KETONES UR QL STRIP.AUTO: NEGATIVE
LEUKOCYTE ESTERASE UR QL STRIP.AUTO: ABNORMAL
LYMPHOCYTES ABSOLUTE: 2.4 THOU/MM3 (ref 1–4.8)
LYMPHOCYTES NFR BLD AUTO: 30.3 %
MCH RBC QN AUTO: 26.9 PG (ref 26–33)
MCHC RBC AUTO-ENTMCNC: 31.7 GM/DL (ref 32.2–35.5)
MCV RBC AUTO: 84.7 FL (ref 81–99)
MONOCYTES ABSOLUTE: 0.5 THOU/MM3 (ref 0.4–1.3)
MONOCYTES NFR BLD AUTO: 6.5 %
MUCOUS THREADS URNS QL MICRO: ABNORMAL
NEUTROPHILS NFR BLD AUTO: 60.1 %
NITRITE UR QL STRIP.AUTO: NEGATIVE
NRBC BLD AUTO-RTO: 0 /100 WBC
PH UR STRIP.AUTO: 7 [PH] (ref 5–9)
PLATELET # BLD AUTO: 280 THOU/MM3 (ref 130–400)
PMV BLD AUTO: 11.1 FL (ref 9.4–12.4)
POTASSIUM SERPL-SCNC: 4.4 MEQ/L (ref 3.5–5.2)
PROT SERPL-MCNC: 7.1 G/DL (ref 6.1–8)
PROT UR STRIP.AUTO-MCNC: NEGATIVE MG/DL
PROT UR-MCNC: 23.8 MG/DL
PROT/CREAT 24H UR: 0.15 MG/G{CREAT}
RBC # BLD AUTO: 5.43 MILL/MM3 (ref 4.2–5.4)
RBC #/AREA URNS HPF: ABNORMAL /HPF
RENAL EPI CELLS #/AREA URNS HPF: ABNORMAL /[HPF]
SEGMENTED NEUTROPHILS ABSOLUTE COUNT: 4.7 THOU/MM3 (ref 1.8–7.7)
SODIUM SERPL-SCNC: 143 MEQ/L (ref 135–145)
SP GR UR REFRACT.AUTO: 1.02 (ref 1–1.03)
UROBILINOGEN UR QL STRIP.AUTO: 0.2 EU/DL (ref 0–1)
WBC # BLD AUTO: 7.9 THOU/MM3 (ref 4.8–10.8)
WBC #/AREA URNS HPF: ABNORMAL /HPF
YEAST LIKE FUNGI URNS QL MICRO: ABNORMAL

## 2023-09-06 PROCEDURE — 80053 COMPREHEN METABOLIC PANEL: CPT

## 2023-09-06 PROCEDURE — 36415 COLL VENOUS BLD VENIPUNCTURE: CPT

## 2023-09-06 PROCEDURE — 84156 ASSAY OF PROTEIN URINE: CPT

## 2023-09-06 PROCEDURE — 85651 RBC SED RATE NONAUTOMATED: CPT

## 2023-09-06 PROCEDURE — 81001 URINALYSIS AUTO W/SCOPE: CPT

## 2023-09-06 PROCEDURE — 85025 COMPLETE CBC W/AUTO DIFF WBC: CPT

## 2023-09-06 PROCEDURE — 86160 COMPLEMENT ANTIGEN: CPT

## 2023-09-06 PROCEDURE — 82570 ASSAY OF URINE CREATININE: CPT

## 2023-09-06 PROCEDURE — 82784 ASSAY IGA/IGD/IGG/IGM EACH: CPT

## 2023-09-08 LAB
C3C SERPL-MCNC: 186 MG/DL (ref 90–180)
C4 SERPL-MCNC: 32 MG/DL (ref 10–40)
IGG SERPL-MCNC: 801 MG/DL (ref 700–1600)

## 2023-09-29 DIAGNOSIS — L30.4 INTERTRIGO: ICD-10-CM

## 2023-09-29 DIAGNOSIS — M79.7 FIBROMYALGIA: ICD-10-CM

## 2023-09-29 DIAGNOSIS — G89.29 CHRONIC BILATERAL LOW BACK PAIN WITHOUT SCIATICA: ICD-10-CM

## 2023-09-29 DIAGNOSIS — M54.50 CHRONIC BILATERAL LOW BACK PAIN WITHOUT SCIATICA: ICD-10-CM

## 2023-09-29 RX ORDER — GABAPENTIN 400 MG/1
CAPSULE ORAL
Qty: 60 CAPSULE | Refills: 0 | Status: SHIPPED | OUTPATIENT
Start: 2023-09-29 | End: 2023-12-28

## 2023-09-29 RX ORDER — GABAPENTIN 400 MG/1
CAPSULE ORAL
Qty: 60 CAPSULE | Refills: 0 | OUTPATIENT
Start: 2023-09-29 | End: 2023-12-28

## 2023-10-01 RX ORDER — CLOTRIMAZOLE AND BETAMETHASONE DIPROPIONATE 10; .64 MG/G; MG/G
CREAM TOPICAL
Qty: 45 G | Refills: 1 | Status: SHIPPED | OUTPATIENT
Start: 2023-10-01

## 2023-10-01 RX ORDER — POTASSIUM CHLORIDE 750 MG/1
TABLET, FILM COATED, EXTENDED RELEASE ORAL
Qty: 90 TABLET | Refills: 1 | Status: SHIPPED | OUTPATIENT
Start: 2023-10-01

## 2023-10-04 ENCOUNTER — PATIENT MESSAGE (OUTPATIENT)
Dept: RHEUMATOLOGY | Age: 43
End: 2023-10-04

## 2023-10-04 ENCOUNTER — HOSPITAL ENCOUNTER (OUTPATIENT)
Age: 43
Discharge: HOME | End: 2023-10-04
Payer: MEDICARE

## 2023-10-04 DIAGNOSIS — Z51.81 MEDICATION MONITORING ENCOUNTER: Primary | ICD-10-CM

## 2023-10-04 DIAGNOSIS — D83.9 CVID (COMMON VARIABLE IMMUNODEFICIENCY) (HCC): ICD-10-CM

## 2023-10-04 LAB
ALBUMIN SERPL BCG-MCNC: 4.2 G/DL (ref 3.5–5.1)
ALP SERPL-CCNC: 158 U/L (ref 38–126)
ALT SERPL W/O P-5'-P-CCNC: 33 U/L (ref 11–66)
ANION GAP SERPL CALC-SCNC: 13 MEQ/L (ref 8–16)
AST SERPL-CCNC: 32 U/L (ref 5–40)
BASOPHILS ABSOLUTE: 0.1 THOU/MM3 (ref 0–0.1)
BASOPHILS NFR BLD AUTO: 0.8 %
BILIRUB SERPL-MCNC: 0.6 MG/DL (ref 0.3–1.2)
BILIRUB UR QL STRIP: NEGATIVE
BUN SERPL-MCNC: 12 MG/DL (ref 7–22)
C3C SERPL-MCNC: 181 MG/DL (ref 90–180)
C4 SERPL-MCNC: 30 MG/DL (ref 10–40)
CALCIUM SERPL-MCNC: 9.2 MG/DL (ref 8.5–10.5)
CHARACTER UR: CLEAR
CHLORIDE SERPL-SCNC: 99 MEQ/L (ref 98–111)
CO2 SERPL-SCNC: 25 MEQ/L (ref 23–33)
COLOR UR: YELLOW
CREAT SERPL-MCNC: 0.6 MG/DL (ref 0.4–1.2)
CREAT UR-MCNC: 35.9 MG/DL
DEPRECATED RDW RBC AUTO: 39 FL (ref 35–45)
EOSINOPHIL NFR BLD AUTO: 2.7 %
EOSINOPHILS ABSOLUTE: 0.2 THOU/MM3 (ref 0–0.4)
ERYTHROCYTE [DISTWIDTH] IN BLOOD BY AUTOMATED COUNT: 13.2 % (ref 11.5–14.5)
ERYTHROCYTE [SEDIMENTATION RATE] IN BLOOD BY WESTERGREN METHOD: 10 MM/HR (ref 0–20)
GFR SERPL CREATININE-BSD FRML MDRD: > 60 ML/MIN/1.73M2
GLUCOSE SERPL-MCNC: 135 MG/DL (ref 70–108)
GLUCOSE UR QL STRIP.AUTO: NEGATIVE MG/DL
HCT VFR BLD AUTO: 42.9 % (ref 37–47)
HGB BLD-MCNC: 14.2 GM/DL (ref 12–16)
HGB UR QL STRIP.AUTO: NEGATIVE
IMM GRANULOCYTES # BLD AUTO: 0.02 THOU/MM3 (ref 0–0.07)
IMM GRANULOCYTES NFR BLD AUTO: 0.3 %
KETONES UR QL STRIP.AUTO: NEGATIVE
LEUKOCYTE ESTERASE UR QL STRIP.AUTO: NEGATIVE
LYMPHOCYTES ABSOLUTE: 2.6 THOU/MM3 (ref 1–4.8)
LYMPHOCYTES NFR BLD AUTO: 34.2 %
MCH RBC QN AUTO: 27.1 PG (ref 26–33)
MCHC RBC AUTO-ENTMCNC: 33.1 GM/DL (ref 32.2–35.5)
MCV RBC AUTO: 81.9 FL (ref 81–99)
MONOCYTES ABSOLUTE: 0.5 THOU/MM3 (ref 0.4–1.3)
MONOCYTES NFR BLD AUTO: 6.6 %
NEUTROPHILS NFR BLD AUTO: 55.4 %
NITRITE UR QL STRIP.AUTO: NEGATIVE
NRBC BLD AUTO-RTO: 0 /100 WBC
PH UR STRIP.AUTO: 6.5 [PH] (ref 5–9)
PLATELET # BLD AUTO: 246 THOU/MM3 (ref 130–400)
PMV BLD AUTO: 10.6 FL (ref 9.4–12.4)
POTASSIUM SERPL-SCNC: 3.9 MEQ/L (ref 3.5–5.2)
PROT SERPL-MCNC: 6.8 G/DL (ref 6.1–8)
PROT UR STRIP.AUTO-MCNC: NEGATIVE MG/DL
PROT UR-MCNC: 4.2 MG/DL
PROT/CREAT 24H UR: 0.12 MG/G{CREAT}
RBC # BLD AUTO: 5.24 MILL/MM3 (ref 4.2–5.4)
SEGMENTED NEUTROPHILS ABSOLUTE COUNT: 4.3 THOU/MM3 (ref 1.8–7.7)
SODIUM SERPL-SCNC: 137 MEQ/L (ref 135–145)
SP GR UR REFRACT.AUTO: 1.01 (ref 1–1.03)
UROBILINOGEN UR QL STRIP.AUTO: 0.2 EU/DL (ref 0–1)
WBC # BLD AUTO: 7.7 THOU/MM3 (ref 4.8–10.8)

## 2023-10-04 PROCEDURE — 86160 COMPLEMENT ANTIGEN: CPT

## 2023-10-04 PROCEDURE — 85651 RBC SED RATE NONAUTOMATED: CPT

## 2023-10-04 PROCEDURE — 82570 ASSAY OF URINE CREATININE: CPT

## 2023-10-04 PROCEDURE — 80053 COMPREHEN METABOLIC PANEL: CPT

## 2023-10-04 PROCEDURE — 36415 COLL VENOUS BLD VENIPUNCTURE: CPT

## 2023-10-04 PROCEDURE — 85025 COMPLETE CBC W/AUTO DIFF WBC: CPT

## 2023-10-04 PROCEDURE — 81003 URINALYSIS AUTO W/O SCOPE: CPT

## 2023-10-04 PROCEDURE — 84156 ASSAY OF PROTEIN URINE: CPT

## 2023-10-04 NOTE — PROGRESS NOTES
[address] [address]  Dept: [phone]  Dept Fax: [phone]  Loc: [phone]    Kip Cochran is a 39 y.o. female who presents today for:  Chief Complaint   Patient presents with    6 Month Follow-Up     DMII        HPI:     HPI    Diabetes Mellitus: Patient presents for follow up of diabetes. Symptoms: paresthesia of the feet and visual disturbances. Symptoms have gradually worsened. Patient denies increase appetite, polydipsia and weight loss. Evaluation to date has been included: fasting blood sugar, fasting lipid panel, hemoglobin A1C and microalbuminuria. Home sugars: 85-95 am and  pm. Treatment to date: Continued metformin which has been Yes:  , which has been somewhat effective. Lab Results   Component Value Date    LABA1C 6.0 08/09/2023     No results found for: \"EAG\"    Hyperlipidemia: Patient presents with hyperlipidemia. She was tested because screening. Her last labs showed   Lab Results   Component Value Date    CHOL 152 12/11/2022    CHOL 126 12/12/2021    CHOL 156 04/16/2020     Lab Results   Component Value Date    TRIG 104 12/11/2022    TRIG 97 12/12/2021    TRIG 119 04/16/2020     Lab Results   Component Value Date    HDL 40 12/11/2022    HDL 33 12/12/2021    HDL 35 04/16/2020     Lab Results   Component Value Date    LDLCALC 91 12/11/2022    [address] [facility] 74 12/12/2021    [address] [facility] 97 04/16/2020     No results found for: \"LABVLDL\", \"VLDL\"  No results found for: \"CHOLHDLRATIO\"  There is a family history of hyperlipidemia. There is not a family history of early ischemia heart disease. GERD: Liil complains of heartburn. This has been associated with early satiety and heartburn. She denies no other symptoms. Symptoms have been present for several years. She denies dysphagia. She has not lost weight. She denies melena, hematochezia, hematemesis, and coffee ground emesis.  Medical

## 2023-10-04 NOTE — TELEPHONE ENCOUNTER
From: Yrn Payne  To: Vitaliy Gramajo  Sent: 10/4/2023 4:02 PM EDT  Subject: New lab orders. They told me at the lab today that this time was the last on my recurring labs. I will need a new order for next month.  Thanks

## 2023-10-09 ENCOUNTER — OFFICE VISIT (OUTPATIENT)
Dept: FAMILY MEDICINE CLINIC | Age: 43
End: 2023-10-09
Payer: MEDICARE

## 2023-10-09 VITALS
TEMPERATURE: 97.7 F | BODY MASS INDEX: 38.63 KG/M2 | HEART RATE: 99 BPM | WEIGHT: 218 LBS | DIASTOLIC BLOOD PRESSURE: 80 MMHG | RESPIRATION RATE: 16 BRPM | SYSTOLIC BLOOD PRESSURE: 124 MMHG

## 2023-10-09 DIAGNOSIS — E78.00 PURE HYPERCHOLESTEROLEMIA: ICD-10-CM

## 2023-10-09 DIAGNOSIS — R79.89 ELEVATED TSH: ICD-10-CM

## 2023-10-09 DIAGNOSIS — G43.809 OTHER MIGRAINE WITHOUT STATUS MIGRAINOSUS, NOT INTRACTABLE: ICD-10-CM

## 2023-10-09 DIAGNOSIS — K21.9 GASTROESOPHAGEAL REFLUX DISEASE WITHOUT ESOPHAGITIS: ICD-10-CM

## 2023-10-09 DIAGNOSIS — Z92.29 HISTORY OF HEPATITIS B VACCINATION: ICD-10-CM

## 2023-10-09 DIAGNOSIS — M25.561 CHRONIC PAIN OF RIGHT KNEE: ICD-10-CM

## 2023-10-09 DIAGNOSIS — R91.1 LUNG NODULE, SOLITARY: ICD-10-CM

## 2023-10-09 DIAGNOSIS — G89.29 CHRONIC PAIN OF RIGHT KNEE: ICD-10-CM

## 2023-10-09 DIAGNOSIS — D80.1 HYPOGAMMAGLOBULINEMIA (HCC): ICD-10-CM

## 2023-10-09 DIAGNOSIS — E66.9 OBESITY (BMI 30-39.9): ICD-10-CM

## 2023-10-09 DIAGNOSIS — M79.7 FIBROMYALGIA: ICD-10-CM

## 2023-10-09 DIAGNOSIS — I63.9 CEREBROVASCULAR ACCIDENT (CVA), UNSPECIFIED MECHANISM (HCC): ICD-10-CM

## 2023-10-09 DIAGNOSIS — D80.3 IGG SUBCLASS DEFICIENCY (HCC): ICD-10-CM

## 2023-10-09 DIAGNOSIS — E61.1 IRON DEFICIENCY: ICD-10-CM

## 2023-10-09 DIAGNOSIS — E11.69 TYPE 2 DIABETES MELLITUS WITH OTHER SPECIFIED COMPLICATION, WITHOUT LONG-TERM CURRENT USE OF INSULIN (HCC): Primary | ICD-10-CM

## 2023-10-09 DIAGNOSIS — D83.9 COMMON VARIABLE IMMUNODEFICIENCY (HCC): ICD-10-CM

## 2023-10-09 DIAGNOSIS — J45.40 MODERATE PERSISTENT ASTHMA WITHOUT COMPLICATION: ICD-10-CM

## 2023-10-09 DIAGNOSIS — J30.2 SEASONAL ALLERGIES: ICD-10-CM

## 2023-10-09 DIAGNOSIS — L30.4 INTERTRIGO: ICD-10-CM

## 2023-10-09 DIAGNOSIS — G45.9 TIA (TRANSIENT ISCHEMIC ATTACK): ICD-10-CM

## 2023-10-09 DIAGNOSIS — G56.22 ULNAR TUNNEL SYNDROME OF LEFT WRIST: ICD-10-CM

## 2023-10-09 DIAGNOSIS — M32.9 LUPUS ARTHRITIS (HCC): ICD-10-CM

## 2023-10-09 DIAGNOSIS — G40.909 SEIZURE DISORDER (HCC): ICD-10-CM

## 2023-10-09 PROCEDURE — 3079F DIAST BP 80-89 MM HG: CPT | Performed by: FAMILY MEDICINE

## 2023-10-09 PROCEDURE — 2022F DILAT RTA XM EVC RTNOPTHY: CPT | Performed by: FAMILY MEDICINE

## 2023-10-09 PROCEDURE — 3074F SYST BP LT 130 MM HG: CPT | Performed by: FAMILY MEDICINE

## 2023-10-09 PROCEDURE — G8417 CALC BMI ABV UP PARAM F/U: HCPCS | Performed by: FAMILY MEDICINE

## 2023-10-09 PROCEDURE — 99215 OFFICE O/P EST HI 40 MIN: CPT | Performed by: FAMILY MEDICINE

## 2023-10-09 PROCEDURE — 3044F HG A1C LEVEL LT 7.0%: CPT | Performed by: FAMILY MEDICINE

## 2023-10-09 PROCEDURE — G8427 DOCREV CUR MEDS BY ELIG CLIN: HCPCS | Performed by: FAMILY MEDICINE

## 2023-10-09 PROCEDURE — G8482 FLU IMMUNIZE ORDER/ADMIN: HCPCS | Performed by: FAMILY MEDICINE

## 2023-10-09 PROCEDURE — 1036F TOBACCO NON-USER: CPT | Performed by: FAMILY MEDICINE

## 2023-10-09 RX ORDER — BLOOD-GLUCOSE SENSOR
1 EACH MISCELLANEOUS
Qty: 6 EACH | Refills: 3 | Status: SHIPPED | OUTPATIENT
Start: 2023-10-09

## 2023-10-09 RX ORDER — FLASH GLUCOSE SENSOR
1 KIT MISCELLANEOUS DAILY
Qty: 1 EACH | Refills: 0 | Status: SHIPPED | OUTPATIENT
Start: 2023-10-09

## 2023-10-29 DIAGNOSIS — M79.7 FIBROMYALGIA: ICD-10-CM

## 2023-10-29 DIAGNOSIS — J30.2 SEASONAL ALLERGIES: ICD-10-CM

## 2023-10-29 DIAGNOSIS — M54.50 CHRONIC BILATERAL LOW BACK PAIN WITHOUT SCIATICA: ICD-10-CM

## 2023-10-29 DIAGNOSIS — G89.29 CHRONIC BILATERAL LOW BACK PAIN WITHOUT SCIATICA: ICD-10-CM

## 2023-10-30 RX ORDER — GABAPENTIN 400 MG/1
CAPSULE ORAL
Qty: 60 CAPSULE | Refills: 3 | Status: SHIPPED | OUTPATIENT
Start: 2023-10-30 | End: 2024-01-28

## 2023-10-30 RX ORDER — ATORVASTATIN CALCIUM 80 MG/1
TABLET, FILM COATED ORAL
Qty: 90 TABLET | Refills: 1 | Status: SHIPPED | OUTPATIENT
Start: 2023-10-30

## 2023-10-30 RX ORDER — MONTELUKAST SODIUM 10 MG/1
10 TABLET ORAL NIGHTLY
Qty: 90 TABLET | Refills: 1 | Status: SHIPPED | OUTPATIENT
Start: 2023-10-30

## 2023-11-01 ENCOUNTER — HOSPITAL ENCOUNTER (OUTPATIENT)
Age: 43
Discharge: HOME | End: 2023-11-01
Payer: MEDICARE

## 2023-11-01 DIAGNOSIS — D83.9 CVID (COMMON VARIABLE IMMUNODEFICIENCY) (HCC): ICD-10-CM

## 2023-11-01 DIAGNOSIS — Z51.81 MEDICATION MONITORING ENCOUNTER: ICD-10-CM

## 2023-11-01 LAB
ALBUMIN SERPL BCG-MCNC: 4.7 G/DL (ref 3.5–5.1)
ALP SERPL-CCNC: 161 U/L (ref 38–126)
ALT SERPL W/O P-5'-P-CCNC: 46 U/L (ref 11–66)
ANION GAP SERPL CALC-SCNC: 22 MEQ/L (ref 8–16)
AST SERPL-CCNC: 41 U/L (ref 5–40)
BASOPHILS ABSOLUTE: 0 THOU/MM3 (ref 0–0.1)
BASOPHILS NFR BLD AUTO: 0.6 %
BILIRUB SERPL-MCNC: 0.8 MG/DL (ref 0.3–1.2)
BUN SERPL-MCNC: 16 MG/DL (ref 7–22)
CALCIUM SERPL-MCNC: 10.2 MG/DL (ref 8.5–10.5)
CHLORIDE SERPL-SCNC: 101 MEQ/L (ref 98–111)
CO2 SERPL-SCNC: 20 MEQ/L (ref 23–33)
CREAT SERPL-MCNC: 0.8 MG/DL (ref 0.4–1.2)
CRP SERPL-MCNC: 0.34 MG/DL (ref 0–1)
DEPRECATED RDW RBC AUTO: 39.2 FL (ref 35–45)
EOSINOPHIL NFR BLD AUTO: 1.7 %
EOSINOPHILS ABSOLUTE: 0.1 THOU/MM3 (ref 0–0.4)
ERYTHROCYTE [DISTWIDTH] IN BLOOD BY AUTOMATED COUNT: 13 % (ref 11.5–14.5)
ERYTHROCYTE [SEDIMENTATION RATE] IN BLOOD BY WESTERGREN METHOD: 10 MM/HR (ref 0–20)
GFR SERPL CREATININE-BSD FRML MDRD: > 60 ML/MIN/1.73M2
GLUCOSE SERPL-MCNC: 132 MG/DL (ref 70–108)
HCT VFR BLD AUTO: 46.8 % (ref 37–47)
HGB BLD-MCNC: 15 GM/DL (ref 12–16)
IMM GRANULOCYTES # BLD AUTO: 0.03 THOU/MM3 (ref 0–0.07)
IMM GRANULOCYTES NFR BLD AUTO: 0.4 %
LYMPHOCYTES ABSOLUTE: 2.4 THOU/MM3 (ref 1–4.8)
LYMPHOCYTES NFR BLD AUTO: 31 %
MCH RBC QN AUTO: 26.8 PG (ref 26–33)
MCHC RBC AUTO-ENTMCNC: 32.1 GM/DL (ref 32.2–35.5)
MCV RBC AUTO: 83.6 FL (ref 81–99)
MONOCYTES ABSOLUTE: 0.6 THOU/MM3 (ref 0.4–1.3)
MONOCYTES NFR BLD AUTO: 7.1 %
NEUTROPHILS NFR BLD AUTO: 59.2 %
NRBC BLD AUTO-RTO: 0 /100 WBC
PLATELET # BLD AUTO: 277 THOU/MM3 (ref 130–400)
PMV BLD AUTO: 11.1 FL (ref 9.4–12.4)
POTASSIUM SERPL-SCNC: 3.6 MEQ/L (ref 3.5–5.2)
PROT SERPL-MCNC: 7.7 G/DL (ref 6.1–8)
RBC # BLD AUTO: 5.6 MILL/MM3 (ref 4.2–5.4)
SEGMENTED NEUTROPHILS ABSOLUTE COUNT: 4.6 THOU/MM3 (ref 1.8–7.7)
SODIUM SERPL-SCNC: 143 MEQ/L (ref 135–145)
WBC # BLD AUTO: 7.8 THOU/MM3 (ref 4.8–10.8)

## 2023-11-01 PROCEDURE — 85651 RBC SED RATE NONAUTOMATED: CPT

## 2023-11-01 PROCEDURE — 80053 COMPREHEN METABOLIC PANEL: CPT

## 2023-11-01 PROCEDURE — 86140 C-REACTIVE PROTEIN: CPT

## 2023-11-01 PROCEDURE — 36415 COLL VENOUS BLD VENIPUNCTURE: CPT

## 2023-11-01 PROCEDURE — 85025 COMPLETE CBC W/AUTO DIFF WBC: CPT

## 2023-11-01 RX ORDER — CYCLOBENZAPRINE HCL 5 MG
5-10 TABLET ORAL 3 TIMES DAILY PRN
Qty: 270 TABLET | Refills: 3 | Status: SHIPPED | OUTPATIENT
Start: 2023-11-01

## 2023-11-22 ENCOUNTER — PROCEDURE VISIT (OUTPATIENT)
Dept: NEUROLOGY | Age: 43
End: 2023-11-22
Payer: MEDICARE

## 2023-11-22 ENCOUNTER — OFFICE VISIT (OUTPATIENT)
Dept: RHEUMATOLOGY | Age: 43
End: 2023-11-22
Payer: MEDICARE

## 2023-11-22 VITALS
HEART RATE: 90 BPM | BODY MASS INDEX: 38.7 KG/M2 | OXYGEN SATURATION: 97 % | DIASTOLIC BLOOD PRESSURE: 86 MMHG | SYSTOLIC BLOOD PRESSURE: 134 MMHG | WEIGHT: 218.4 LBS | HEIGHT: 63 IN

## 2023-11-22 DIAGNOSIS — M54.12 CERVICAL RADICULOPATHY: Primary | ICD-10-CM

## 2023-11-22 DIAGNOSIS — G89.29 CHRONIC BILATERAL LOW BACK PAIN WITHOUT SCIATICA: ICD-10-CM

## 2023-11-22 DIAGNOSIS — M79.7 FIBROMYALGIA: ICD-10-CM

## 2023-11-22 DIAGNOSIS — R20.2 PARESTHESIA AND PAIN OF BOTH UPPER EXTREMITIES: ICD-10-CM

## 2023-11-22 DIAGNOSIS — M46.90 AXIAL SPONDYLOARTHRITIS (HCC): Primary | ICD-10-CM

## 2023-11-22 DIAGNOSIS — R20.0 BILATERAL HAND NUMBNESS: ICD-10-CM

## 2023-11-22 DIAGNOSIS — M79.601 PARESTHESIA AND PAIN OF BOTH UPPER EXTREMITIES: ICD-10-CM

## 2023-11-22 DIAGNOSIS — M54.2 NECK PAIN: ICD-10-CM

## 2023-11-22 DIAGNOSIS — Z51.81 MEDICATION MONITORING ENCOUNTER: ICD-10-CM

## 2023-11-22 DIAGNOSIS — M79.602 PARESTHESIA AND PAIN OF BOTH UPPER EXTREMITIES: ICD-10-CM

## 2023-11-22 DIAGNOSIS — M54.50 CHRONIC BILATERAL LOW BACK PAIN WITHOUT SCIATICA: ICD-10-CM

## 2023-11-22 PROCEDURE — 95886 MUSC TEST DONE W/N TEST COMP: CPT | Performed by: PSYCHIATRY & NEUROLOGY

## 2023-11-22 PROCEDURE — G8427 DOCREV CUR MEDS BY ELIG CLIN: HCPCS | Performed by: INTERNAL MEDICINE

## 2023-11-22 PROCEDURE — G8417 CALC BMI ABV UP PARAM F/U: HCPCS | Performed by: INTERNAL MEDICINE

## 2023-11-22 PROCEDURE — 3079F DIAST BP 80-89 MM HG: CPT | Performed by: INTERNAL MEDICINE

## 2023-11-22 PROCEDURE — G8482 FLU IMMUNIZE ORDER/ADMIN: HCPCS | Performed by: INTERNAL MEDICINE

## 2023-11-22 PROCEDURE — 3075F SYST BP GE 130 - 139MM HG: CPT | Performed by: INTERNAL MEDICINE

## 2023-11-22 PROCEDURE — 95911 NRV CNDJ TEST 9-10 STUDIES: CPT | Performed by: PSYCHIATRY & NEUROLOGY

## 2023-11-22 PROCEDURE — 99214 OFFICE O/P EST MOD 30 MIN: CPT | Performed by: INTERNAL MEDICINE

## 2023-11-22 PROCEDURE — 1036F TOBACCO NON-USER: CPT | Performed by: INTERNAL MEDICINE

## 2023-11-22 ASSESSMENT — ENCOUNTER SYMPTOMS
GASTROINTESTINAL NEGATIVE: 1
RESPIRATORY NEGATIVE: 1
EYES NEGATIVE: 1

## 2023-11-22 NOTE — PROGRESS NOTES
[facility] RHEUMATOLOGY FOLLOW UP NOTE       Date Of Service: 11/22/2023  Provider: Alysha Abebe DO    Name: Cedric Conway   MRN: [id number]    Chief Complaint(s)     Chief Complaint   Patient presents with    3 Month Follow-Up     Axial spondyloarthritis         History of Present Illness (HPI)     Cedric Conway  is a(n)43 y.o. female with a hx of morbid obesity, CVA x1 (2017), GERD, hiatal hernia, IBS, endometriosis s/p hysterectomy, asthma, hypercholesterolemia, type 2 DM, lupus arthritis, fibromyalgia, IgG subclass deficiency  here for the f/u evaluation of h/o lupus, fibromyalgia     Recent worening fibromyalgia pain per pt w/ the weatehr changes / cold weather.   - constnat variable pain in the neck/shoulder, lower bkac, outer hips, knee, ankles, feet and pip joints. Pain up to 6.5/10 - w/ most severe in the evening when trying to go to sleep. Aggravating factors: inactivity, cold, increased use, humid weather. Hands / feet - increased use  Alleviating factors: CBD gummies  Continue. Swelling of hand and feet. Denies AM stiffness. Intermittent sores along the face for the past couple months. Dry skin on shoulder - no know aggravating factors. Redness in the left axiallae.  strenght improved. Numbness from elbow to wrists - worse at night and riding in a car. REVIEW OF SYSTEMS: (ROS)    Review of Systems   Constitutional: Negative. HENT: Negative. Eyes: Negative. Respiratory: Negative. Cardiovascular: Negative. Gastrointestinal: Negative. Endocrine: Negative. Genitourinary: Negative. Musculoskeletal:  Positive for arthralgias. Skin:  Positive for rash. Neurological: Negative. Hematological: Negative.           Allergies   Allergen Reactions    Cayenne Swelling     Tongue and throat swelling    Codeine Rash    Tape Theadore Begin Tape]      Tears skin  Steri strips blisters skin    Amoxicillin Other (See Comments)    Shingrix [Zoster Vac Recomb

## 2023-11-27 DIAGNOSIS — L30.4 INTERTRIGO: ICD-10-CM

## 2023-11-27 RX ORDER — CLOTRIMAZOLE AND BETAMETHASONE DIPROPIONATE 10; .64 MG/G; MG/G
CREAM TOPICAL
Qty: 45 G | Refills: 0 | Status: SHIPPED | OUTPATIENT
Start: 2023-11-27

## 2023-11-29 ENCOUNTER — HOSPITAL ENCOUNTER (OUTPATIENT)
Age: 43
Discharge: HOME | End: 2023-11-29
Payer: MEDICARE

## 2023-11-29 DIAGNOSIS — Z51.81 MEDICATION MONITORING ENCOUNTER: ICD-10-CM

## 2023-11-29 DIAGNOSIS — D83.9 CVID (COMMON VARIABLE IMMUNODEFICIENCY) (HCC): ICD-10-CM

## 2023-11-29 LAB
ALBUMIN SERPL BCG-MCNC: 4.5 G/DL (ref 3.5–5.1)
ALP SERPL-CCNC: 185 U/L (ref 38–126)
ALT SERPL W/O P-5'-P-CCNC: 51 U/L (ref 11–66)
ANION GAP SERPL CALC-SCNC: 18 MEQ/L (ref 8–16)
AST SERPL-CCNC: 46 U/L (ref 5–40)
BASOPHILS ABSOLUTE: 0 THOU/MM3 (ref 0–0.1)
BASOPHILS NFR BLD AUTO: 0.5 %
BILIRUB SERPL-MCNC: 0.9 MG/DL (ref 0.3–1.2)
BUN SERPL-MCNC: 11 MG/DL (ref 7–22)
CALCIUM SERPL-MCNC: 9.9 MG/DL (ref 8.5–10.5)
CHLORIDE SERPL-SCNC: 101 MEQ/L (ref 98–111)
CO2 SERPL-SCNC: 23 MEQ/L (ref 23–33)
CREAT SERPL-MCNC: 0.7 MG/DL (ref 0.4–1.2)
CRP SERPL-MCNC: < 0.3 MG/DL (ref 0–1)
DEPRECATED RDW RBC AUTO: 41.1 FL (ref 35–45)
EOSINOPHIL NFR BLD AUTO: 1.4 %
EOSINOPHILS ABSOLUTE: 0.1 THOU/MM3 (ref 0–0.4)
ERYTHROCYTE [DISTWIDTH] IN BLOOD BY AUTOMATED COUNT: 13.6 % (ref 11.5–14.5)
GFR SERPL CREATININE-BSD FRML MDRD: > 60 ML/MIN/1.73M2
GLUCOSE SERPL-MCNC: 107 MG/DL (ref 70–108)
HCT VFR BLD AUTO: 47.3 % (ref 37–47)
HGB BLD-MCNC: 14.9 GM/DL (ref 12–16)
IMM GRANULOCYTES # BLD AUTO: 0.03 THOU/MM3 (ref 0–0.07)
IMM GRANULOCYTES NFR BLD AUTO: 0.4 %
LYMPHOCYTES ABSOLUTE: 2.1 THOU/MM3 (ref 1–4.8)
LYMPHOCYTES NFR BLD AUTO: 28.1 %
MCH RBC QN AUTO: 26.3 PG (ref 26–33)
MCHC RBC AUTO-ENTMCNC: 31.5 GM/DL (ref 32.2–35.5)
MCV RBC AUTO: 83.4 FL (ref 81–99)
MONOCYTES ABSOLUTE: 0.5 THOU/MM3 (ref 0.4–1.3)
MONOCYTES NFR BLD AUTO: 6.6 %
NEUTROPHILS NFR BLD AUTO: 63 %
NRBC BLD AUTO-RTO: 0 /100 WBC
PLATELET # BLD AUTO: 272 THOU/MM3 (ref 130–400)
PMV BLD AUTO: 10.9 FL (ref 9.4–12.4)
POTASSIUM SERPL-SCNC: 4 MEQ/L (ref 3.5–5.2)
PROT SERPL-MCNC: 7.8 G/DL (ref 6.1–8)
RBC # BLD AUTO: 5.67 MILL/MM3 (ref 4.2–5.4)
SEGMENTED NEUTROPHILS ABSOLUTE COUNT: 4.8 THOU/MM3 (ref 1.8–7.7)
SODIUM SERPL-SCNC: 142 MEQ/L (ref 135–145)
WBC # BLD AUTO: 7.6 THOU/MM3 (ref 4.8–10.8)

## 2023-11-29 PROCEDURE — 85025 COMPLETE CBC W/AUTO DIFF WBC: CPT

## 2023-11-29 PROCEDURE — 36415 COLL VENOUS BLD VENIPUNCTURE: CPT

## 2023-11-29 PROCEDURE — 85651 RBC SED RATE NONAUTOMATED: CPT

## 2023-11-29 PROCEDURE — 82784 ASSAY IGA/IGD/IGG/IGM EACH: CPT

## 2023-11-29 PROCEDURE — 86140 C-REACTIVE PROTEIN: CPT

## 2023-11-29 PROCEDURE — 80053 COMPREHEN METABOLIC PANEL: CPT

## 2023-11-30 LAB
ERYTHROCYTE [SEDIMENTATION RATE] IN BLOOD BY WESTERGREN METHOD: 8 MM/HR (ref 0–20)
IGG SERPL-MCNC: 938 MG/DL (ref 700–1600)

## 2023-12-22 ENCOUNTER — HOSPITAL ENCOUNTER (OUTPATIENT)
Age: 43
Discharge: HOME | End: 2023-12-22
Payer: MEDICARE

## 2023-12-22 DIAGNOSIS — Z51.81 MEDICATION MONITORING ENCOUNTER: ICD-10-CM

## 2023-12-22 DIAGNOSIS — D83.9 CVID (COMMON VARIABLE IMMUNODEFICIENCY) (HCC): ICD-10-CM

## 2023-12-22 LAB
ALBUMIN SERPL BCG-MCNC: 4 G/DL (ref 3.5–5.1)
ALP SERPL-CCNC: 167 U/L (ref 38–126)
ALT SERPL W/O P-5'-P-CCNC: 38 U/L (ref 11–66)
ANION GAP SERPL CALC-SCNC: 13 MEQ/L (ref 8–16)
AST SERPL-CCNC: 32 U/L (ref 5–40)
BASOPHILS ABSOLUTE: 0.1 THOU/MM3 (ref 0–0.1)
BASOPHILS NFR BLD AUTO: 0.7 %
BILIRUB SERPL-MCNC: 0.6 MG/DL (ref 0.3–1.2)
BUN SERPL-MCNC: 11 MG/DL (ref 7–22)
CALCIUM SERPL-MCNC: 9.5 MG/DL (ref 8.5–10.5)
CHLORIDE SERPL-SCNC: 101 MEQ/L (ref 98–111)
CO2 SERPL-SCNC: 26 MEQ/L (ref 23–33)
CREAT SERPL-MCNC: 0.6 MG/DL (ref 0.4–1.2)
CRP SERPL-MCNC: < 0.3 MG/DL (ref 0–1)
DEPRECATED RDW RBC AUTO: 38.9 FL (ref 35–45)
EOSINOPHIL NFR BLD AUTO: 2.4 %
EOSINOPHILS ABSOLUTE: 0.2 THOU/MM3 (ref 0–0.4)
ERYTHROCYTE [DISTWIDTH] IN BLOOD BY AUTOMATED COUNT: 13.5 % (ref 11.5–14.5)
ERYTHROCYTE [SEDIMENTATION RATE] IN BLOOD BY WESTERGREN METHOD: 5 MM/HR (ref 0–20)
GFR SERPL CREATININE-BSD FRML MDRD: > 60 ML/MIN/1.73M2
GLUCOSE SERPL-MCNC: 112 MG/DL (ref 70–108)
HCT VFR BLD AUTO: 44.4 % (ref 37–47)
HGB BLD-MCNC: 14.9 GM/DL (ref 12–16)
IMM GRANULOCYTES # BLD AUTO: 0.02 THOU/MM3 (ref 0–0.07)
IMM GRANULOCYTES NFR BLD AUTO: 0.3 %
LYMPHOCYTES ABSOLUTE: 2.3 THOU/MM3 (ref 1–4.8)
LYMPHOCYTES NFR BLD AUTO: 30.6 %
MCH RBC QN AUTO: 27.3 PG (ref 26–33)
MCHC RBC AUTO-ENTMCNC: 33.6 GM/DL (ref 32.2–35.5)
MCV RBC AUTO: 81.3 FL (ref 81–99)
MONOCYTES ABSOLUTE: 0.5 THOU/MM3 (ref 0.4–1.3)
MONOCYTES NFR BLD AUTO: 7 %
NEUTROPHILS NFR BLD AUTO: 59 %
NRBC BLD AUTO-RTO: 0 /100 WBC
PLATELET # BLD AUTO: 274 THOU/MM3 (ref 130–400)
PMV BLD AUTO: 11.2 FL (ref 9.4–12.4)
POTASSIUM SERPL-SCNC: 3.9 MEQ/L (ref 3.5–5.2)
PROT SERPL-MCNC: 7 G/DL (ref 6.1–8)
RBC # BLD AUTO: 5.46 MILL/MM3 (ref 4.2–5.4)
SEGMENTED NEUTROPHILS ABSOLUTE COUNT: 4.5 THOU/MM3 (ref 1.8–7.7)
SODIUM SERPL-SCNC: 140 MEQ/L (ref 135–145)
WBC # BLD AUTO: 7.6 THOU/MM3 (ref 4.8–10.8)

## 2023-12-22 PROCEDURE — 86140 C-REACTIVE PROTEIN: CPT

## 2023-12-22 PROCEDURE — 36415 COLL VENOUS BLD VENIPUNCTURE: CPT

## 2023-12-22 PROCEDURE — 85651 RBC SED RATE NONAUTOMATED: CPT

## 2023-12-22 PROCEDURE — 80053 COMPREHEN METABOLIC PANEL: CPT

## 2023-12-22 PROCEDURE — 85025 COMPLETE CBC W/AUTO DIFF WBC: CPT

## 2024-01-05 DIAGNOSIS — M46.90 AXIAL SPONDYLOARTHRITIS (HCC): ICD-10-CM

## 2024-01-08 RX ORDER — SECUKINUMAB 150 MG/ML
INJECTION SUBCUTANEOUS
Qty: 2 ML | Refills: 3 | Status: ACTIVE | OUTPATIENT
Start: 2024-01-08

## 2024-01-27 ENCOUNTER — PATIENT MESSAGE (OUTPATIENT)
Dept: ALLERGY | Age: 44
End: 2024-01-27

## 2024-01-27 ENCOUNTER — HOSPITAL ENCOUNTER (OUTPATIENT)
Age: 44
Discharge: HOME | End: 2024-01-27
Payer: MEDICARE

## 2024-01-27 DIAGNOSIS — D83.9 CVID (COMMON VARIABLE IMMUNODEFICIENCY) (HCC): ICD-10-CM

## 2024-01-27 LAB
ALBUMIN SERPL BCG-MCNC: 4.3 G/DL (ref 3.5–5.1)
ALP SERPL-CCNC: 157 U/L (ref 38–126)
ALT SERPL W/O P-5'-P-CCNC: 42 U/L (ref 11–66)
ANION GAP SERPL CALC-SCNC: 9 MEQ/L (ref 8–16)
AST SERPL-CCNC: 33 U/L (ref 5–40)
BASOPHILS ABSOLUTE: 0.1 THOU/MM3 (ref 0–0.1)
BASOPHILS NFR BLD AUTO: 0.8 %
BILIRUB SERPL-MCNC: 0.5 MG/DL (ref 0.3–1.2)
BUN SERPL-MCNC: 11 MG/DL (ref 7–22)
CALCIUM SERPL-MCNC: 9.6 MG/DL (ref 8.5–10.5)
CHLORIDE SERPL-SCNC: 103 MEQ/L (ref 98–111)
CO2 SERPL-SCNC: 29 MEQ/L (ref 23–33)
CREAT SERPL-MCNC: 0.6 MG/DL (ref 0.4–1.2)
CRP SERPL-MCNC: < 0.3 MG/DL (ref 0–1)
DEPRECATED RDW RBC AUTO: 40.5 FL (ref 35–45)
EOSINOPHIL NFR BLD AUTO: 3.2 %
EOSINOPHILS ABSOLUTE: 0.2 THOU/MM3 (ref 0–0.4)
ERYTHROCYTE [DISTWIDTH] IN BLOOD BY AUTOMATED COUNT: 13.6 % (ref 11.5–14.5)
ERYTHROCYTE [SEDIMENTATION RATE] IN BLOOD BY WESTERGREN METHOD: 5 MM/HR (ref 0–20)
GFR SERPL CREATININE-BSD FRML MDRD: > 60 ML/MIN/1.73M2
GLUCOSE SERPL-MCNC: 112 MG/DL (ref 70–108)
HCT VFR BLD AUTO: 45.3 % (ref 37–47)
HGB BLD-MCNC: 14.4 GM/DL (ref 12–16)
IMM GRANULOCYTES # BLD AUTO: 0.01 THOU/MM3 (ref 0–0.07)
IMM GRANULOCYTES NFR BLD AUTO: 0.1 %
LYMPHOCYTES ABSOLUTE: 2.2 THOU/MM3 (ref 1–4.8)
LYMPHOCYTES NFR BLD AUTO: 28.8 %
MCH RBC QN AUTO: 26.2 PG (ref 26–33)
MCHC RBC AUTO-ENTMCNC: 31.8 GM/DL (ref 32.2–35.5)
MCV RBC AUTO: 82.5 FL (ref 81–99)
MONOCYTES ABSOLUTE: 0.6 THOU/MM3 (ref 0.4–1.3)
MONOCYTES NFR BLD AUTO: 7.6 %
NEUTROPHILS NFR BLD AUTO: 59.5 %
NRBC BLD AUTO-RTO: 0 /100 WBC
PLATELET # BLD AUTO: 244 THOU/MM3 (ref 130–400)
PMV BLD AUTO: 10.8 FL (ref 9.4–12.4)
POTASSIUM SERPL-SCNC: 4.3 MEQ/L (ref 3.5–5.2)
PROT SERPL-MCNC: 7.2 G/DL (ref 6.1–8)
RBC # BLD AUTO: 5.49 MILL/MM3 (ref 4.2–5.4)
SEGMENTED NEUTROPHILS ABSOLUTE COUNT: 4.6 THOU/MM3 (ref 1.8–7.7)
SODIUM SERPL-SCNC: 141 MEQ/L (ref 135–145)
WBC # BLD AUTO: 7.8 THOU/MM3 (ref 4.8–10.8)

## 2024-01-27 PROCEDURE — 36415 COLL VENOUS BLD VENIPUNCTURE: CPT

## 2024-01-27 PROCEDURE — 80053 COMPREHEN METABOLIC PANEL: CPT

## 2024-01-27 PROCEDURE — 85025 COMPLETE CBC W/AUTO DIFF WBC: CPT

## 2024-01-27 PROCEDURE — 86140 C-REACTIVE PROTEIN: CPT

## 2024-01-27 PROCEDURE — 85651 RBC SED RATE NONAUTOMATED: CPT

## 2024-01-29 DIAGNOSIS — D83.9 CVID (COMMON VARIABLE IMMUNODEFICIENCY) (HCC): Primary | ICD-10-CM

## 2024-01-29 RX ORDER — EPINEPHRINE 0.3 MG/.3ML
INJECTION SUBCUTANEOUS
Qty: 2 EACH | Refills: 0 | Status: SHIPPED | OUTPATIENT
Start: 2024-01-29

## 2024-01-29 RX ORDER — IMMUNE GLOBULIN 10 PERCENT (HUMAN) WITH RECOMBINANT HUMAN HYALURONIDASE 20 G/200ML
20 KIT SUBCUTANEOUS
Qty: 1 EACH | Refills: 11 | Status: SHIPPED | OUTPATIENT
Start: 2024-01-29 | End: 2024-02-01 | Stop reason: SDUPTHER

## 2024-02-01 DIAGNOSIS — D83.9 CVID (COMMON VARIABLE IMMUNODEFICIENCY) (HCC): ICD-10-CM

## 2024-02-01 RX ORDER — IMMUNE GLOBULIN 10 PERCENT (HUMAN) WITH RECOMBINANT HUMAN HYALURONIDASE 20 G/200ML
20 KIT SUBCUTANEOUS
Qty: 1 EACH | Refills: 11 | Status: SHIPPED | OUTPATIENT
Start: 2024-02-01

## 2024-02-20 ENCOUNTER — TELEPHONE (OUTPATIENT)
Dept: ALLERGY | Age: 44
End: 2024-02-20

## 2024-02-20 ENCOUNTER — HOSPITAL ENCOUNTER (OUTPATIENT)
Age: 44
Discharge: HOME | End: 2024-02-20
Payer: MEDICARE

## 2024-02-20 DIAGNOSIS — D83.9 CVID (COMMON VARIABLE IMMUNODEFICIENCY) (HCC): ICD-10-CM

## 2024-02-20 LAB
ALBUMIN SERPL BCP-MCNC: 3.9 GM/DL (ref 3.4–5)
ALP SERPL-CCNC: 160 U/L (ref 46–116)
ALT SERPL W P-5'-P-CCNC: 57 U/L (ref 14–63)
ANION GAP SERPL CALC-SCNC: 10 MEQ/L (ref 8–16)
AST SERPL W P-5'-P-CCNC: 36 U/L (ref 15–37)
BASOPHILS # BLD: 0.4 % (ref 0–3)
BASOPHILS ABSOLUTE: 0 THOU/MM3 (ref 0–0.1)
BILIRUB SERPL-MCNC: 0.6 MG/DL (ref 0.2–1)
BUN SERPL-MCNC: 10 MG/DL (ref 7–18)
CALCIUM SERPL-MCNC: 9.1 MG/DL (ref 8.5–10.1)
CHLORIDE SERPL-SCNC: 97 MEQ/L (ref 98–107)
CO2 SERPL-SCNC: 28 MEQ/L (ref 21–32)
CREAT SERPL-MCNC: 0.8 MG/DL (ref 0.6–1.3)
CRP SERPL-MCNC: < 0.3 MG/DL (ref 0–1)
EOSINOPHILS ABSOLUTE: 0.2 THOU/MM3 (ref 0–0.5)
EOSINOPHILS RELATIVE PERCENT: 2.5 % (ref 0–4)
ERYTHROCYTE [SEDIMENTATION RATE] IN BLOOD BY WESTERGREN METHOD: 7 MM/HR (ref 0–20)
GFR SERPL CREATININE-BSD FRML MDRD: > 60 ML/MIN/1.73M2
GLUCOSE SERPL-MCNC: 119 MG/DL (ref 74–106)
HCT VFR BLD CALC: 42.7 % (ref 37–47)
HEMOGLOBIN: 13.9 GM/DL (ref 12–16)
IGG SERPL-MCNC: 893 MG/DL (ref 700–1600)
IMMATURE GRANS (ABS): 0 THOU/MM3 (ref 0–0.07)
IMMATURE GRANULOCYTES: 0 %
LYMPHOCYTES # BLD AUTO: 36.8 % (ref 15–47)
LYMPHOCYTES ABSOLUTE: 2.7 THOU/MM3 (ref 1–4.8)
MCH RBC QN AUTO: 26.1 PG (ref 26–32)
MCHC RBC AUTO-ENTMCNC: 32.6 GM/DL (ref 31–35)
MCV RBC AUTO: 80.3 FL (ref 81–99)
MONOCYTES: 0.5 THOU/MM3 (ref 0.3–1.3)
MONOCYTES: 6.6 % (ref 0–12)
PDW BLD-RTO: 13.7 % (ref 11.5–14.9)
PLATELET # BLD AUTO: 260 THOU/MM3 (ref 130–400)
PMV BLD AUTO: 10.5 FL (ref 9.4–12.4)
POTASSIUM SERPL-SCNC: 3.4 MEQ/L (ref 3.5–5.1)
PROT SERPL-MCNC: 7.4 GM/DL (ref 6.4–8.2)
RBC # BLD: 5.32 MILL/MM3 (ref 4.1–5.3)
SEG NEUTROPHILS: 53.6 % (ref 43–75)
SEGMENTED NEUTROPHILS ABSOLUTE COUNT: 3.9 THOU/MM3 (ref 1.8–7.7)
SODIUM SERPL-SCNC: 135 MEQ/L (ref 136–145)
WBC # BLD: 7.3 THOU/MM3 (ref 4.8–10.8)

## 2024-02-20 PROCEDURE — 85651 RBC SED RATE NONAUTOMATED: CPT

## 2024-02-20 PROCEDURE — 82784 ASSAY IGA/IGD/IGG/IGM EACH: CPT

## 2024-02-20 PROCEDURE — 36415 COLL VENOUS BLD VENIPUNCTURE: CPT

## 2024-02-20 PROCEDURE — 86140 C-REACTIVE PROTEIN: CPT

## 2024-02-20 PROCEDURE — 85025 COMPLETE CBC W/AUTO DIFF WBC: CPT

## 2024-02-20 PROCEDURE — 80053 COMPREHEN METABOLIC PANEL: CPT

## 2024-02-20 NOTE — TELEPHONE ENCOUNTER
----- Message from NAHID Bronson CNP sent at 2/20/2024 11:47 AM EST -----  Please call Shilpa and tell her to eat more potassium rich foods

## 2024-02-29 DIAGNOSIS — G89.29 CHRONIC BILATERAL LOW BACK PAIN WITHOUT SCIATICA: ICD-10-CM

## 2024-02-29 DIAGNOSIS — M79.7 FIBROMYALGIA: ICD-10-CM

## 2024-02-29 DIAGNOSIS — M54.50 CHRONIC BILATERAL LOW BACK PAIN WITHOUT SCIATICA: ICD-10-CM

## 2024-03-01 DIAGNOSIS — L30.4 INTERTRIGO: ICD-10-CM

## 2024-03-01 RX ORDER — GABAPENTIN 400 MG/1
CAPSULE ORAL
Qty: 60 CAPSULE | Refills: 3 | Status: SHIPPED | OUTPATIENT
Start: 2024-03-01 | End: 2024-05-30

## 2024-03-01 RX ORDER — CLOPIDOGREL BISULFATE 75 MG/1
TABLET ORAL
Qty: 90 TABLET | Refills: 1 | Status: SHIPPED | OUTPATIENT
Start: 2024-03-01

## 2024-03-01 RX ORDER — POTASSIUM CHLORIDE 750 MG/1
10 TABLET, FILM COATED, EXTENDED RELEASE ORAL DAILY
Qty: 90 TABLET | Refills: 1 | Status: SHIPPED | OUTPATIENT
Start: 2024-03-01

## 2024-03-01 RX ORDER — POTASSIUM CHLORIDE 750 MG/1
TABLET, FILM COATED, EXTENDED RELEASE ORAL
Qty: 90 TABLET | Refills: 1 | Status: SHIPPED | OUTPATIENT
Start: 2024-03-01

## 2024-03-01 RX ORDER — CLOTRIMAZOLE AND BETAMETHASONE DIPROPIONATE 10; .64 MG/G; MG/G
CREAM TOPICAL
Qty: 45 G | Refills: 0 | Status: SHIPPED | OUTPATIENT
Start: 2024-03-01

## 2024-03-01 NOTE — TELEPHONE ENCOUNTER
Last visit- 10/9/2023  Next visit- 4/10/2024    Requested Prescriptions     Pending Prescriptions Disp Refills    clotrimazole-betamethasone (LOTRISONE) 1-0.05 % cream [Pharmacy Med Name: Clotrimazole-Betamethasone 1-0.05 % External Cream] 45 g 0     Sig: APPLY  CREAM TOPICALLY TO AFFECTED AREA TWICE DAILY    potassium chloride (KLOR-CON) 10 MEQ extended release tablet 90 tablet 1     Sig: Take 1 tablet by mouth daily

## 2024-03-22 ENCOUNTER — HOSPITAL ENCOUNTER (OUTPATIENT)
Age: 44
Discharge: HOME | End: 2024-03-22
Payer: MEDICARE

## 2024-03-22 DIAGNOSIS — D83.9 CVID (COMMON VARIABLE IMMUNODEFICIENCY) (HCC): ICD-10-CM

## 2024-03-22 LAB
ALBUMIN SERPL BCG-MCNC: 4.4 G/DL (ref 3.5–5.1)
ALP SERPL-CCNC: 192 U/L (ref 38–126)
ALT SERPL W/O P-5'-P-CCNC: 58 U/L (ref 11–66)
ANION GAP SERPL CALC-SCNC: 20 MEQ/L (ref 8–16)
AST SERPL-CCNC: 46 U/L (ref 5–40)
BASOPHILS ABSOLUTE: 0.1 THOU/MM3 (ref 0–0.1)
BASOPHILS NFR BLD AUTO: 0.9 %
BILIRUB SERPL-MCNC: 0.5 MG/DL (ref 0.3–1.2)
BUN SERPL-MCNC: 10 MG/DL (ref 7–22)
CALCIUM SERPL-MCNC: 9.3 MG/DL (ref 8.5–10.5)
CHLORIDE SERPL-SCNC: 99 MEQ/L (ref 98–111)
CO2 SERPL-SCNC: 22 MEQ/L (ref 23–33)
CREAT SERPL-MCNC: 1.1 MG/DL (ref 0.4–1.2)
CRP SERPL-MCNC: 0.32 MG/DL (ref 0–1)
DEPRECATED RDW RBC AUTO: 42.3 FL (ref 35–45)
EOSINOPHIL NFR BLD AUTO: 2.4 %
EOSINOPHILS ABSOLUTE: 0.2 THOU/MM3 (ref 0–0.4)
ERYTHROCYTE [DISTWIDTH] IN BLOOD BY AUTOMATED COUNT: 13.9 % (ref 11.5–14.5)
ERYTHROCYTE [SEDIMENTATION RATE] IN BLOOD BY WESTERGREN METHOD: 8 MM/HR (ref 0–20)
GFR SERPL CREATININE-BSD FRML MDRD: > 60 ML/MIN/1.73M2
GLUCOSE SERPL-MCNC: 128 MG/DL (ref 70–108)
HCT VFR BLD AUTO: 46 % (ref 37–47)
HGB BLD-MCNC: 14.8 GM/DL (ref 12–16)
IMM GRANULOCYTES # BLD AUTO: 0.03 THOU/MM3 (ref 0–0.07)
IMM GRANULOCYTES NFR BLD AUTO: 0.4 %
LYMPHOCYTES ABSOLUTE: 2.6 THOU/MM3 (ref 1–4.8)
LYMPHOCYTES NFR BLD AUTO: 32.4 %
MCH RBC QN AUTO: 26.8 PG (ref 26–33)
MCHC RBC AUTO-ENTMCNC: 32.2 GM/DL (ref 32.2–35.5)
MCV RBC AUTO: 83.2 FL (ref 81–99)
MONOCYTES ABSOLUTE: 0.5 THOU/MM3 (ref 0.4–1.3)
MONOCYTES NFR BLD AUTO: 6.4 %
NEUTROPHILS NFR BLD AUTO: 57.5 %
NRBC BLD AUTO-RTO: 0 /100 WBC
PLATELET # BLD AUTO: 271 THOU/MM3 (ref 130–400)
PMV BLD AUTO: 11.5 FL (ref 9.4–12.4)
POTASSIUM SERPL-SCNC: 4.4 MEQ/L (ref 3.5–5.2)
PROT SERPL-MCNC: 7.6 G/DL (ref 6.1–8)
RBC # BLD AUTO: 5.53 MILL/MM3 (ref 4.2–5.4)
SEGMENTED NEUTROPHILS ABSOLUTE COUNT: 4.7 THOU/MM3 (ref 1.8–7.7)
SODIUM SERPL-SCNC: 141 MEQ/L (ref 135–145)
WBC # BLD AUTO: 8.1 THOU/MM3 (ref 4.8–10.8)

## 2024-03-22 PROCEDURE — 85651 RBC SED RATE NONAUTOMATED: CPT

## 2024-03-22 PROCEDURE — 36415 COLL VENOUS BLD VENIPUNCTURE: CPT

## 2024-03-22 PROCEDURE — 80053 COMPREHEN METABOLIC PANEL: CPT

## 2024-03-22 PROCEDURE — 85025 COMPLETE CBC W/AUTO DIFF WBC: CPT

## 2024-03-22 PROCEDURE — 86140 C-REACTIVE PROTEIN: CPT

## 2024-03-29 DIAGNOSIS — L30.4 INTERTRIGO: ICD-10-CM

## 2024-03-29 RX ORDER — CLOTRIMAZOLE AND BETAMETHASONE DIPROPIONATE 10; .64 MG/G; MG/G
CREAM TOPICAL
Qty: 45 G | Refills: 1 | Status: SHIPPED | OUTPATIENT
Start: 2024-03-29

## 2024-04-01 ENCOUNTER — TELEPHONE (OUTPATIENT)
Dept: ALLERGY | Age: 44
End: 2024-04-01

## 2024-04-02 ENCOUNTER — HOSPITAL ENCOUNTER (OUTPATIENT)
Dept: WOMENS IMAGING | Age: 44
Discharge: HOME | End: 2024-04-02
Payer: MEDICARE

## 2024-04-02 DIAGNOSIS — Z12.31 VISIT FOR SCREENING MAMMOGRAM: ICD-10-CM

## 2024-04-02 PROCEDURE — 77063 BREAST TOMOSYNTHESIS BI: CPT

## 2024-04-03 NOTE — TELEPHONE ENCOUNTER
Attempted to reach patient again today 4/2/24, Left voicemail confirming appointment. Advised patient to call office to confirm appointment.    
Closing out encounter  
Left voicemail confirming appointment. Advised patient to call office to confirm appointment.   
3 = A little assistance

## 2024-04-09 SDOH — ECONOMIC STABILITY: INCOME INSECURITY: HOW HARD IS IT FOR YOU TO PAY FOR THE VERY BASICS LIKE FOOD, HOUSING, MEDICAL CARE, AND HEATING?: SOMEWHAT HARD

## 2024-04-09 SDOH — ECONOMIC STABILITY: FOOD INSECURITY: WITHIN THE PAST 12 MONTHS, YOU WORRIED THAT YOUR FOOD WOULD RUN OUT BEFORE YOU GOT MONEY TO BUY MORE.: SOMETIMES TRUE

## 2024-04-09 SDOH — HEALTH STABILITY: PHYSICAL HEALTH
ON AVERAGE, HOW MANY DAYS PER WEEK DO YOU ENGAGE IN MODERATE TO STRENUOUS EXERCISE (LIKE A BRISK WALK)?: PATIENT DECLINED

## 2024-04-09 SDOH — ECONOMIC STABILITY: FOOD INSECURITY: WITHIN THE PAST 12 MONTHS, THE FOOD YOU BOUGHT JUST DIDN'T LAST AND YOU DIDN'T HAVE MONEY TO GET MORE.: PATIENT DECLINED

## 2024-04-09 SDOH — HEALTH STABILITY: PHYSICAL HEALTH: ON AVERAGE, HOW MANY MINUTES DO YOU ENGAGE IN EXERCISE AT THIS LEVEL?: PATIENT DECLINED

## 2024-04-09 SDOH — ECONOMIC STABILITY: TRANSPORTATION INSECURITY
IN THE PAST 12 MONTHS, HAS LACK OF TRANSPORTATION KEPT YOU FROM MEETINGS, WORK, OR FROM GETTING THINGS NEEDED FOR DAILY LIVING?: PATIENT DECLINED

## 2024-04-09 ASSESSMENT — PATIENT HEALTH QUESTIONNAIRE - PHQ9
SUM OF ALL RESPONSES TO PHQ QUESTIONS 1-9: 0
1. LITTLE INTEREST OR PLEASURE IN DOING THINGS: NOT AT ALL
SUM OF ALL RESPONSES TO PHQ QUESTIONS 1-9: 0
2. FEELING DOWN, DEPRESSED OR HOPELESS: NOT AT ALL
SUM OF ALL RESPONSES TO PHQ QUESTIONS 1-9: 0
SUM OF ALL RESPONSES TO PHQ QUESTIONS 1-9: 0
SUM OF ALL RESPONSES TO PHQ9 QUESTIONS 1 & 2: 0

## 2024-04-09 ASSESSMENT — LIFESTYLE VARIABLES
HOW OFTEN DO YOU HAVE A DRINK CONTAINING ALCOHOL: NEVER
HOW OFTEN DO YOU HAVE SIX OR MORE DRINKS ON ONE OCCASION: 1
HOW MANY STANDARD DRINKS CONTAINING ALCOHOL DO YOU HAVE ON A TYPICAL DAY: PATIENT DOES NOT DRINK
HOW MANY STANDARD DRINKS CONTAINING ALCOHOL DO YOU HAVE ON A TYPICAL DAY: 0
HOW OFTEN DO YOU HAVE A DRINK CONTAINING ALCOHOL: 1

## 2024-04-10 ENCOUNTER — OFFICE VISIT (OUTPATIENT)
Dept: FAMILY MEDICINE CLINIC | Age: 44
End: 2024-04-10
Payer: MEDICARE

## 2024-04-10 VITALS
HEIGHT: 62 IN | WEIGHT: 222.66 LBS | SYSTOLIC BLOOD PRESSURE: 127 MMHG | HEART RATE: 88 BPM | DIASTOLIC BLOOD PRESSURE: 78 MMHG | TEMPERATURE: 98 F | RESPIRATION RATE: 15 BRPM | BODY MASS INDEX: 40.98 KG/M2

## 2024-04-10 DIAGNOSIS — L30.4 INTERTRIGO: ICD-10-CM

## 2024-04-10 DIAGNOSIS — E66.9 OBESITY (BMI 30-39.9): ICD-10-CM

## 2024-04-10 DIAGNOSIS — M32.9 LUPUS ARTHRITIS (HCC): ICD-10-CM

## 2024-04-10 DIAGNOSIS — E83.42 HYPOMAGNESEMIA: ICD-10-CM

## 2024-04-10 DIAGNOSIS — R79.89 ELEVATED TSH: ICD-10-CM

## 2024-04-10 DIAGNOSIS — E61.1 IRON DEFICIENCY: ICD-10-CM

## 2024-04-10 DIAGNOSIS — R91.1 LUNG NODULE, SOLITARY: ICD-10-CM

## 2024-04-10 DIAGNOSIS — D80.3 IGG SUBCLASS DEFICIENCY (HCC): ICD-10-CM

## 2024-04-10 DIAGNOSIS — M32.9 LUPUS (HCC): ICD-10-CM

## 2024-04-10 DIAGNOSIS — M51.36 DDD (DEGENERATIVE DISC DISEASE), LUMBAR: ICD-10-CM

## 2024-04-10 DIAGNOSIS — I63.9 CEREBROVASCULAR ACCIDENT (CVA), UNSPECIFIED MECHANISM (HCC): ICD-10-CM

## 2024-04-10 DIAGNOSIS — M46.90 AXIAL SPONDYLOARTHRITIS (HCC): ICD-10-CM

## 2024-04-10 DIAGNOSIS — M25.561 CHRONIC PAIN OF RIGHT KNEE: ICD-10-CM

## 2024-04-10 DIAGNOSIS — Z00.00 WELCOME TO MEDICARE PREVENTIVE VISIT: Primary | ICD-10-CM

## 2024-04-10 DIAGNOSIS — K21.9 GASTROESOPHAGEAL REFLUX DISEASE WITHOUT ESOPHAGITIS: ICD-10-CM

## 2024-04-10 DIAGNOSIS — G56.22 ULNAR TUNNEL SYNDROME OF LEFT WRIST: ICD-10-CM

## 2024-04-10 DIAGNOSIS — D80.1 HYPOGAMMAGLOBULINEMIA (HCC): ICD-10-CM

## 2024-04-10 DIAGNOSIS — J30.2 SEASONAL ALLERGIES: ICD-10-CM

## 2024-04-10 DIAGNOSIS — G43.809 OTHER MIGRAINE WITHOUT STATUS MIGRAINOSUS, NOT INTRACTABLE: ICD-10-CM

## 2024-04-10 DIAGNOSIS — E66.01 MORBID OBESITY DUE TO EXCESS CALORIES (HCC): ICD-10-CM

## 2024-04-10 DIAGNOSIS — G89.29 CHRONIC PAIN OF RIGHT KNEE: ICD-10-CM

## 2024-04-10 DIAGNOSIS — Z92.29 HISTORY OF HEPATITIS B VACCINATION: ICD-10-CM

## 2024-04-10 DIAGNOSIS — D83.9 COMMON VARIABLE IMMUNODEFICIENCY (HCC): ICD-10-CM

## 2024-04-10 DIAGNOSIS — J45.40 MODERATE PERSISTENT ASTHMA WITHOUT COMPLICATION: ICD-10-CM

## 2024-04-10 DIAGNOSIS — E78.00 PURE HYPERCHOLESTEROLEMIA: ICD-10-CM

## 2024-04-10 DIAGNOSIS — E11.69 TYPE 2 DIABETES MELLITUS WITH OTHER SPECIFIED COMPLICATION, WITHOUT LONG-TERM CURRENT USE OF INSULIN (HCC): ICD-10-CM

## 2024-04-10 DIAGNOSIS — G40.909 SEIZURE DISORDER (HCC): ICD-10-CM

## 2024-04-10 DIAGNOSIS — G45.9 TIA (TRANSIENT ISCHEMIC ATTACK): ICD-10-CM

## 2024-04-10 DIAGNOSIS — M79.7 FIBROMYALGIA: ICD-10-CM

## 2024-04-10 PROCEDURE — G0402 INITIAL PREVENTIVE EXAM: HCPCS | Performed by: FAMILY MEDICINE

## 2024-04-10 PROCEDURE — 3074F SYST BP LT 130 MM HG: CPT | Performed by: FAMILY MEDICINE

## 2024-04-10 PROCEDURE — 3078F DIAST BP <80 MM HG: CPT | Performed by: FAMILY MEDICINE

## 2024-04-10 PROCEDURE — 3046F HEMOGLOBIN A1C LEVEL >9.0%: CPT | Performed by: FAMILY MEDICINE

## 2024-04-10 RX ORDER — PREDNISONE 20 MG/1
20 TABLET ORAL DAILY
Qty: 5 TABLET | Refills: 0 | Status: SHIPPED | OUTPATIENT
Start: 2024-04-10 | End: 2024-04-15

## 2024-04-10 NOTE — PATIENT INSTRUCTIONS
walking is a good choice. Or you may want to swim, bike, or do other activities. Bit by bit, increase the time you're active every day. Try for at least 30 minutes on most days of the week.     Try to quit or cut back on using tobacco and other nicotine products. This includes smoking and vaping. If you need help quitting, talk to your doctor about stop-smoking programs and medicines. These can increase your chances of quitting for good. Quitting is one of the most important things you can do to protect your heart. It is never too late to quit. Try to avoid secondhand smoke too.     Stay at a weight that's healthy for you. Talk to your doctor if you need help losing weight.     Try to get 7 to 9 hours of sleep each night.     Limit alcohol to 2 drinks a day for men and 1 drink a day for women. Too much alcohol can cause health problems.     Manage other health problems such as diabetes, high blood pressure, and high cholesterol. If you think you may have a problem with alcohol or drug use, talk to your doctor.   Medicines    Take your medicines exactly as prescribed. Call your doctor if you think you are having a problem with your medicine.     If your doctor recommends aspirin, take the amount directed each day. Make sure you take aspirin and not another kind of pain reliever, such as acetaminophen (Tylenol).   When should you call for help?   Call 911 if you have symptoms of a heart attack. These may include:    Chest pain or pressure, or a strange feeling in the chest.     Sweating.     Shortness of breath.     Pain, pressure, or a strange feeling in the back, neck, jaw, or upper belly or in one or both shoulders or arms.     Lightheadedness or sudden weakness.     A fast or irregular heartbeat.   After you call 911, the  may tell you to chew 1 adult-strength or 2 to 4 low-dose aspirin. Wait for an ambulance. Do not try to drive yourself.  Watch closely for changes in your health, and be sure to contact

## 2024-04-10 NOTE — PROGRESS NOTES
[facility] PROFESSIONAL SERVS  [facility] MEDICINE  [address]  [address]  [facility] [address]  Dept: [phone]  Dept Fax: [phone]  Loc: [phone]    Shilpa Gupta is a 43 y.o. female who presents today for:  No chief complaint on file.      HPI:     HPI    Diabetes Mellitus: Patient presents for follow up of diabetes. Symptoms: paresthesia of the feet and visual disturbances. Symptoms have gradually worsened. Patient denies increase appetite, polydipsia and weight loss.  Evaluation to date has been included: fasting blood sugar, fasting lipid panel, hemoglobin A1C and microalbuminuria.  Home sugars: 85-95 am and  pm. Treatment to date: Continued metformin which has been Yes:  , which has been somewhat effective.  Lab Results   Component Value Date    LABA1C 6.0 08/09/2023     Lab Results   Component Value Date     08/09/2023       Hyperlipidemia: Patient presents with hyperlipidemia.  She was tested because screening.  Her last labs showed   Lab Results   Component Value Date    CHOL 152 12/11/2022    CHOL 126 12/12/2021    CHOL 156 04/16/2020     Lab Results   Component Value Date    TRIG 104 12/11/2022    TRIG 97 12/12/2021    TRIG 119 04/16/2020     Lab Results   Component Value Date    HDL 40 12/11/2022    HDL 33 12/12/2021    HDL 35 04/16/2020     Lab Results   Component Value Date    LDLCALC 91 12/11/2022    LDLCALC 74 12/12/2021    LDLCALC 97 04/16/2020     No results found for: \"VLDL\"  No results found for: \"CHOLHDLRATIO\"  There is a family history of hyperlipidemia. There is not a family history of early ischemia heart disease.      GERD: Madlonadot complains of heartburn. This has been associated with early satiety and heartburn.  She denies no other symptoms. Symptoms have been present for several years. She denies dysphagia.  She has not lost weight. She denies melena, hematochezia, hematemesis, and coffee ground emesis. Medical therapy in the past has 
distension and no mass. There is no hepatosplenomegaly. No tenderness. She has no rigidity, no rebound and no guarding. No hernia.   Musculoskeletal:        Right lower leg: She exhibits no edema.        Left lower leg: She exhibits no edema.   Neurological: She is alert.          Allergies   Allergen Reactions    Cayenne Swelling     Tongue and throat swelling    Codeine Rash    Tape [Adhesive Tape]      Tears skin  Steri strips blisters skin    Amoxicillin Other (See Comments)    Shingrix [Zoster Vac Recomb Adjuvanted]      NO LIVE VACCINES WHILE ON IMMUNOSUPPRESSANT - MYFORTIC    Sulfa Antibiotics Other (See Comments)     Can not take medication due to illness     Prior to Visit Medications    Medication Sig Taking? Authorizing Provider   predniSONE (DELTASONE) 20 MG tablet Take 1 tablet by mouth daily for 5 days Yes Lia Melo MD   metFORMIN (GLUCOPHAGE) 500 MG tablet TAKE 1 TABLET BY MOUTH TWICE DAILY WITH MEALS Yes Lia Melo MD   clotrimazole-betamethasone (LOTRISONE) 1-0.05 % cream APPLY  CREAM TOPICALLY TO AFFECTED AREA TWICE DAILY Yes Lia Melo MD   gabapentin (NEURONTIN) 400 MG capsule TAKE 1 CAPSULE BY MOUTH IN THE MORNING AND AT BEDTIME Yes Osiris Medina APRN - CNP   clopidogrel (PLAVIX) 75 MG tablet Take 1 tablet by mouth once daily Yes Lia Melo MD   potassium chloride (KLOR-CON) 10 MEQ extended release tablet Take 1 tablet by mouth once daily Yes Lia Melo MD   potassium chloride (KLOR-CON) 10 MEQ extended release tablet Take 1 tablet by mouth daily Yes Lia Melo MD   Immune Globulin-Hyaluronidase (HYQVIA) 20 GM/200ML KIT Inject 20 g into the skin every 28 days Yes Barbara Tijerina APRN - CNP   EPINEPHrine (EPIPEN 2-FERNANDO) 0.3 MG/0.3ML SOAJ injection Inject one pen as directed STAT for allergic reaction, may disp generic NDC 01356-519-34 Yes Barbara Tijerina APRN - CNP   COSENTYX SENSOREADY, 300 MG, 150 MG/ML SOAJ INJECT 300MG (2 PENS) UNDER THE SKIN EVERY 28 DAYS

## 2024-04-11 ENCOUNTER — HOSPITAL ENCOUNTER (OUTPATIENT)
Age: 44
Discharge: HOME | End: 2024-04-11
Payer: MEDICARE

## 2024-04-11 DIAGNOSIS — E61.1 IRON DEFICIENCY: ICD-10-CM

## 2024-04-11 DIAGNOSIS — R79.89 ELEVATED TSH: ICD-10-CM

## 2024-04-11 DIAGNOSIS — E78.00 PURE HYPERCHOLESTEROLEMIA: ICD-10-CM

## 2024-04-11 DIAGNOSIS — E11.69 TYPE 2 DIABETES MELLITUS WITH OTHER SPECIFIED COMPLICATION, WITHOUT LONG-TERM CURRENT USE OF INSULIN (HCC): ICD-10-CM

## 2024-04-11 LAB
ALBUMIN SERPL BCG-MCNC: 4.1 G/DL (ref 3.5–5.1)
ALP SERPL-CCNC: 152 U/L (ref 38–126)
ALT SERPL W/O P-5'-P-CCNC: 39 U/L (ref 11–66)
ANION GAP SERPL CALC-SCNC: 11 MEQ/L (ref 8–16)
AST SERPL-CCNC: 31 U/L (ref 5–40)
BILIRUB SERPL-MCNC: 0.6 MG/DL (ref 0.3–1.2)
BUN SERPL-MCNC: 12 MG/DL (ref 7–22)
CALCIUM SERPL-MCNC: 8.9 MG/DL (ref 8.5–10.5)
CHLORIDE SERPL-SCNC: 103 MEQ/L (ref 98–111)
CHOLEST SERPL-MCNC: 129 MG/DL (ref 100–199)
CO2 SERPL-SCNC: 26 MEQ/L (ref 23–33)
CREAT SERPL-MCNC: 0.5 MG/DL (ref 0.4–1.2)
DEPRECATED MEAN GLUCOSE BLD GHB EST-ACNC: 138 MG/DL (ref 70–126)
DEPRECATED RDW RBC AUTO: 41 FL (ref 35–45)
ERYTHROCYTE [DISTWIDTH] IN BLOOD BY AUTOMATED COUNT: 13.9 % (ref 11.5–14.5)
FERRITIN SERPL IA-MCNC: 39 NG/ML (ref 10–291)
FOLATE SERPL-MCNC: 11.8 NG/ML (ref 4.8–24.2)
GFR SERPL CREATININE-BSD FRML MDRD: > 90 ML/MIN/1.73M2
GLUCOSE FASTING: 107 MG/DL (ref 70–108)
HBA1C MFR BLD HPLC: 6.6 % (ref 4.4–6.4)
HCT VFR BLD AUTO: 42.1 % (ref 37–47)
HDLC SERPL-MCNC: 36 MG/DL
HGB BLD-MCNC: 13.6 GM/DL (ref 12–16)
IRON SERPL-MCNC: 51 UG/DL (ref 50–170)
LDLC SERPL CALC-MCNC: 75 MG/DL
MCH RBC QN AUTO: 26.7 PG (ref 26–33)
MCHC RBC AUTO-ENTMCNC: 32.3 GM/DL (ref 32.2–35.5)
MCV RBC AUTO: 82.7 FL (ref 81–99)
PLATELET # BLD AUTO: 239 THOU/MM3 (ref 130–400)
PMV BLD AUTO: 11.3 FL (ref 9.4–12.4)
POTASSIUM SERPL-SCNC: 4 MEQ/L (ref 3.5–5.2)
PROT SERPL-MCNC: 6.9 G/DL (ref 6.1–8)
RBC # BLD AUTO: 5.09 MILL/MM3 (ref 4.2–5.4)
SODIUM SERPL-SCNC: 140 MEQ/L (ref 135–145)
TIBC SERPL-MCNC: 377 UG/DL (ref 171–450)
TRIGL SERPL-MCNC: 90 MG/DL (ref 0–199)
TSH SERPL DL<=0.005 MIU/L-ACNC: 2.07 UIU/ML (ref 0.4–4.2)
VIT B12 SERPL-MCNC: 297 PG/ML (ref 211–911)
WBC # BLD AUTO: 6.7 THOU/MM3 (ref 4.8–10.8)

## 2024-04-11 PROCEDURE — 82746 ASSAY OF FOLIC ACID SERUM: CPT

## 2024-04-11 PROCEDURE — 80053 COMPREHEN METABOLIC PANEL: CPT

## 2024-04-11 PROCEDURE — 83036 HEMOGLOBIN GLYCOSYLATED A1C: CPT

## 2024-04-11 PROCEDURE — 84443 ASSAY THYROID STIM HORMONE: CPT

## 2024-04-11 PROCEDURE — 83550 IRON BINDING TEST: CPT

## 2024-04-11 PROCEDURE — 85027 COMPLETE CBC AUTOMATED: CPT

## 2024-04-11 PROCEDURE — 82607 VITAMIN B-12: CPT

## 2024-04-11 PROCEDURE — 82728 ASSAY OF FERRITIN: CPT

## 2024-04-11 PROCEDURE — 36415 COLL VENOUS BLD VENIPUNCTURE: CPT

## 2024-04-11 PROCEDURE — 83540 ASSAY OF IRON: CPT

## 2024-04-11 PROCEDURE — 80061 LIPID PANEL: CPT

## 2024-04-19 ENCOUNTER — HOSPITAL ENCOUNTER (OUTPATIENT)
Age: 44
Discharge: HOME | End: 2024-04-19
Payer: MEDICARE

## 2024-04-19 DIAGNOSIS — D83.9 CVID (COMMON VARIABLE IMMUNODEFICIENCY) (HCC): ICD-10-CM

## 2024-04-19 LAB
BASOPHILS ABSOLUTE: 0.1 THOU/MM3 (ref 0–0.1)
BASOPHILS NFR BLD AUTO: 0.7 %
DEPRECATED RDW RBC AUTO: 41.5 FL (ref 35–45)
EOSINOPHIL NFR BLD AUTO: 1.5 %
EOSINOPHILS ABSOLUTE: 0.1 THOU/MM3 (ref 0–0.4)
ERYTHROCYTE [DISTWIDTH] IN BLOOD BY AUTOMATED COUNT: 13.9 % (ref 11.5–14.5)
ERYTHROCYTE [SEDIMENTATION RATE] IN BLOOD BY WESTERGREN METHOD: 5 MM/HR (ref 0–20)
HCT VFR BLD AUTO: 45 % (ref 37–47)
HGB BLD-MCNC: 14.4 GM/DL (ref 12–16)
IMM GRANULOCYTES # BLD AUTO: 0.02 THOU/MM3 (ref 0–0.07)
IMM GRANULOCYTES NFR BLD AUTO: 0.2 %
LYMPHOCYTES ABSOLUTE: 2.7 THOU/MM3 (ref 1–4.8)
LYMPHOCYTES NFR BLD AUTO: 33.8 %
MCH RBC QN AUTO: 26.5 PG (ref 26–33)
MCHC RBC AUTO-ENTMCNC: 32 GM/DL (ref 32.2–35.5)
MCV RBC AUTO: 82.9 FL (ref 81–99)
MONOCYTES ABSOLUTE: 0.5 THOU/MM3 (ref 0.4–1.3)
MONOCYTES NFR BLD AUTO: 6.5 %
NEUTROPHILS NFR BLD AUTO: 57.3 %
NRBC BLD AUTO-RTO: 0 /100 WBC
PLATELET # BLD AUTO: 279 THOU/MM3 (ref 130–400)
PMV BLD AUTO: 11.4 FL (ref 9.4–12.4)
RBC # BLD AUTO: 5.43 MILL/MM3 (ref 4.2–5.4)
SEGMENTED NEUTROPHILS ABSOLUTE COUNT: 4.6 THOU/MM3 (ref 1.8–7.7)
WBC # BLD AUTO: 8.1 THOU/MM3 (ref 4.8–10.8)

## 2024-04-19 PROCEDURE — 80053 COMPREHEN METABOLIC PANEL: CPT

## 2024-04-19 PROCEDURE — 85651 RBC SED RATE NONAUTOMATED: CPT

## 2024-04-19 PROCEDURE — 86140 C-REACTIVE PROTEIN: CPT

## 2024-04-19 PROCEDURE — 36415 COLL VENOUS BLD VENIPUNCTURE: CPT

## 2024-04-19 PROCEDURE — 85025 COMPLETE CBC W/AUTO DIFF WBC: CPT

## 2024-04-19 PROCEDURE — 82784 ASSAY IGA/IGD/IGG/IGM EACH: CPT

## 2024-04-20 LAB
ALBUMIN SERPL BCG-MCNC: 4.5 G/DL (ref 3.5–5.1)
ALP SERPL-CCNC: 165 U/L (ref 38–126)
ALT SERPL W/O P-5'-P-CCNC: 41 U/L (ref 11–66)
ANION GAP SERPL CALC-SCNC: 15 MEQ/L (ref 8–16)
AST SERPL-CCNC: 31 U/L (ref 5–40)
BILIRUB SERPL-MCNC: 0.5 MG/DL (ref 0.3–1.2)
BUN SERPL-MCNC: 12 MG/DL (ref 7–22)
CALCIUM SERPL-MCNC: 9 MG/DL (ref 8.5–10.5)
CHLORIDE SERPL-SCNC: 100 MEQ/L (ref 98–111)
CO2 SERPL-SCNC: 24 MEQ/L (ref 23–33)
CREAT SERPL-MCNC: 0.5 MG/DL (ref 0.4–1.2)
CRP SERPL-MCNC: 0.4 MG/DL (ref 0–1)
GFR SERPL CREATININE-BSD FRML MDRD: > 90 ML/MIN/1.73M2
GLUCOSE SERPL-MCNC: 190 MG/DL (ref 70–108)
IGG SERPL-MCNC: 862 MG/DL (ref 700–1600)
POTASSIUM SERPL-SCNC: 4.3 MEQ/L (ref 3.5–5.2)
PROT SERPL-MCNC: 7.1 G/DL (ref 6.1–8)
SODIUM SERPL-SCNC: 139 MEQ/L (ref 135–145)

## 2024-04-26 DIAGNOSIS — J30.2 SEASONAL ALLERGIES: ICD-10-CM

## 2024-04-28 RX ORDER — VERAPAMIL HYDROCHLORIDE 240 MG/1
240 TABLET, FILM COATED, EXTENDED RELEASE ORAL DAILY
Qty: 90 TABLET | Refills: 1 | Status: SHIPPED | OUTPATIENT
Start: 2024-04-28

## 2024-04-28 RX ORDER — ATORVASTATIN CALCIUM 80 MG/1
TABLET, FILM COATED ORAL
Qty: 90 TABLET | Refills: 1 | Status: SHIPPED | OUTPATIENT
Start: 2024-04-28

## 2024-04-28 RX ORDER — MONTELUKAST SODIUM 10 MG/1
10 TABLET ORAL NIGHTLY
Qty: 90 TABLET | Refills: 1 | Status: SHIPPED | OUTPATIENT
Start: 2024-04-28

## 2024-04-29 DIAGNOSIS — M46.90 AXIAL SPONDYLOARTHRITIS (HCC): ICD-10-CM

## 2024-04-29 RX ORDER — SECUKINUMAB 150 MG/ML
INJECTION SUBCUTANEOUS
Qty: 2 ML | Refills: 3 | Status: ACTIVE | OUTPATIENT
Start: 2024-04-29

## 2024-05-16 ENCOUNTER — HOSPITAL ENCOUNTER (OUTPATIENT)
Age: 44
Discharge: HOME | End: 2024-05-16
Payer: MEDICARE

## 2024-05-16 ENCOUNTER — NURSE ONLY (OUTPATIENT)
Dept: FAMILY MEDICINE CLINIC | Age: 44
End: 2024-05-16
Payer: MEDICARE

## 2024-05-16 DIAGNOSIS — J02.9 ACUTE PHARYNGITIS, UNSPECIFIED ETIOLOGY: Primary | ICD-10-CM

## 2024-05-16 DIAGNOSIS — D83.9 CVID (COMMON VARIABLE IMMUNODEFICIENCY) (HCC): ICD-10-CM

## 2024-05-16 LAB
ALBUMIN SERPL BCG-MCNC: 4.4 G/DL (ref 3.5–5.1)
ALP SERPL-CCNC: 167 U/L (ref 38–126)
ALT SERPL W/O P-5'-P-CCNC: 40 U/L (ref 11–66)
ANION GAP SERPL CALC-SCNC: 12 MEQ/L (ref 8–16)
AST SERPL-CCNC: 38 U/L (ref 5–40)
BASOPHILS ABSOLUTE: 0.1 THOU/MM3 (ref 0–0.1)
BASOPHILS NFR BLD AUTO: 0.9 %
BILIRUB SERPL-MCNC: 0.6 MG/DL (ref 0.3–1.2)
BUN SERPL-MCNC: 7 MG/DL (ref 7–22)
CALCIUM SERPL-MCNC: 9.5 MG/DL (ref 8.5–10.5)
CHLORIDE SERPL-SCNC: 103 MEQ/L (ref 98–111)
CO2 SERPL-SCNC: 27 MEQ/L (ref 23–33)
CREAT SERPL-MCNC: 0.7 MG/DL (ref 0.4–1.2)
CRP SERPL-MCNC: 0.59 MG/DL (ref 0–1)
DEPRECATED RDW RBC AUTO: 40.1 FL (ref 35–45)
EOSINOPHIL NFR BLD AUTO: 1.9 %
EOSINOPHILS ABSOLUTE: 0.1 THOU/MM3 (ref 0–0.4)
ERYTHROCYTE [DISTWIDTH] IN BLOOD BY AUTOMATED COUNT: 13.8 % (ref 11.5–14.5)
ERYTHROCYTE [SEDIMENTATION RATE] IN BLOOD BY WESTERGREN METHOD: 10 MM/HR (ref 0–20)
GFR SERPL CREATININE-BSD FRML MDRD: > 90 ML/MIN/1.73M2
GLUCOSE SERPL-MCNC: 123 MG/DL (ref 70–108)
HCT VFR BLD AUTO: 45.1 % (ref 37–47)
HGB BLD-MCNC: 15 GM/DL (ref 12–16)
IMM GRANULOCYTES # BLD AUTO: 0.02 THOU/MM3 (ref 0–0.07)
IMM GRANULOCYTES NFR BLD AUTO: 0.3 %
LYMPHOCYTES ABSOLUTE: 3.2 THOU/MM3 (ref 1–4.8)
LYMPHOCYTES NFR BLD AUTO: 41.7 %
MCH RBC QN AUTO: 27.1 PG (ref 26–33)
MCHC RBC AUTO-ENTMCNC: 33.3 GM/DL (ref 32.2–35.5)
MCV RBC AUTO: 81.4 FL (ref 81–99)
MONOCYTES ABSOLUTE: 0.6 THOU/MM3 (ref 0.4–1.3)
MONOCYTES NFR BLD AUTO: 7.9 %
NEUTROPHILS ABSOLUTE: 3.6 THOU/MM3 (ref 1.8–7.7)
NEUTROPHILS NFR BLD AUTO: 47.3 %
NRBC BLD AUTO-RTO: 0 /100 WBC
PLATELET # BLD AUTO: 281 THOU/MM3 (ref 130–400)
PMV BLD AUTO: 11.2 FL (ref 9.4–12.4)
POTASSIUM SERPL-SCNC: 3.5 MEQ/L (ref 3.5–5.2)
PROT SERPL-MCNC: 7.3 G/DL (ref 6.1–8)
RBC # BLD AUTO: 5.54 MILL/MM3 (ref 4.2–5.4)
SODIUM SERPL-SCNC: 142 MEQ/L (ref 135–145)
STREPTOCOCCUS A RNA: NEGATIVE
WBC # BLD AUTO: 7.7 THOU/MM3 (ref 4.8–10.8)

## 2024-05-16 PROCEDURE — 85025 COMPLETE CBC W/AUTO DIFF WBC: CPT

## 2024-05-16 PROCEDURE — 85651 RBC SED RATE NONAUTOMATED: CPT

## 2024-05-16 PROCEDURE — 87651 STREP A DNA AMP PROBE: CPT | Performed by: FAMILY MEDICINE

## 2024-05-16 PROCEDURE — 36415 COLL VENOUS BLD VENIPUNCTURE: CPT

## 2024-05-16 PROCEDURE — 99211 OFF/OP EST MAY X REQ PHY/QHP: CPT | Performed by: FAMILY MEDICINE

## 2024-05-16 PROCEDURE — 86140 C-REACTIVE PROTEIN: CPT

## 2024-05-16 PROCEDURE — 80053 COMPREHEN METABOLIC PANEL: CPT

## 2024-05-16 NOTE — PROGRESS NOTES
Chief Complaint   Patient presents with    Pharyngitis       Results for POC orders placed in visit on 05/16/24   POCT Rapid Strep A DNA (Alere i)   Result Value Ref Range    Streptococcus A RNA Negative        Push fluids  Tylenol or ibuprofen prn fever  Follow up if not better in 1 week or if symptoms get worse.

## 2024-05-16 NOTE — PROGRESS NOTES
Pt c/o sore throat for two days. Pt denies any other symptoms. Strep test-Negative.    Uses [facility] in [place].

## 2024-05-28 ENCOUNTER — OFFICE VISIT (OUTPATIENT)
Dept: RHEUMATOLOGY | Age: 44
End: 2024-05-28
Payer: MEDICARE

## 2024-05-28 VITALS
HEIGHT: 62 IN | OXYGEN SATURATION: 97 % | SYSTOLIC BLOOD PRESSURE: 118 MMHG | HEART RATE: 95 BPM | WEIGHT: 218.4 LBS | DIASTOLIC BLOOD PRESSURE: 86 MMHG | BODY MASS INDEX: 40.19 KG/M2

## 2024-05-28 DIAGNOSIS — E66.9 OBESITY (BMI 30-39.9): ICD-10-CM

## 2024-05-28 DIAGNOSIS — G89.29 CHRONIC BILATERAL LOW BACK PAIN WITHOUT SCIATICA: ICD-10-CM

## 2024-05-28 DIAGNOSIS — M54.50 CHRONIC BILATERAL LOW BACK PAIN WITHOUT SCIATICA: ICD-10-CM

## 2024-05-28 DIAGNOSIS — M79.7 FIBROMYALGIA: ICD-10-CM

## 2024-05-28 DIAGNOSIS — Z51.81 MEDICATION MONITORING ENCOUNTER: ICD-10-CM

## 2024-05-28 DIAGNOSIS — M46.90 AXIAL SPONDYLOARTHRITIS (HCC): Primary | ICD-10-CM

## 2024-05-28 PROCEDURE — G8427 DOCREV CUR MEDS BY ELIG CLIN: HCPCS | Performed by: INTERNAL MEDICINE

## 2024-05-28 PROCEDURE — 99214 OFFICE O/P EST MOD 30 MIN: CPT | Performed by: INTERNAL MEDICINE

## 2024-05-28 PROCEDURE — G8417 CALC BMI ABV UP PARAM F/U: HCPCS | Performed by: INTERNAL MEDICINE

## 2024-05-28 PROCEDURE — 1036F TOBACCO NON-USER: CPT | Performed by: INTERNAL MEDICINE

## 2024-05-28 PROCEDURE — 3079F DIAST BP 80-89 MM HG: CPT | Performed by: INTERNAL MEDICINE

## 2024-05-28 PROCEDURE — 3074F SYST BP LT 130 MM HG: CPT | Performed by: INTERNAL MEDICINE

## 2024-05-28 ASSESSMENT — ENCOUNTER SYMPTOMS
RESPIRATORY NEGATIVE: 1
GASTROINTESTINAL NEGATIVE: 1
EYES NEGATIVE: 1

## 2024-05-28 NOTE — PROGRESS NOTES
[facility] RHEUMATOLOGY FOLLOW UP NOTE       Date Of Service: 5/28/2024  Provider: SJ SILVER DO    Name: Shilpa Gupta   MRN: [id number]    Chief Complaint(s)     Chief Complaint   Patient presents with    Follow-up     4-month follow-up for axial spondyloarthritis.         History of Present Illness (HPI)     Shilpa Gupta  is a(n)43 y.o. female with a hx of morbid obesity, CVA x1 (2017), GERD, hiatal hernia, IBS, endometriosis s/p hysterectomy, asthma, hypercholesterolemia, type 2 DM, lupus arthritis, fibromyalgia, IgG subclass deficiency  here for the f/u evaluation     One flare treated with prednisone from the pcp. No recurrence.     Pt reports having continued pain in the neck, shoulders and lower back, posterior right hip  Constant pain in the neck and lower back w/ pain up to 6.5/10   No particular time of day.   Pain more associated with increased activity / movement.   Aggravating: back/hip - prolonged standing, walking.   Alleviating:  back - sitting with leg elevation.     -- denies weakness, joint swelling .   -- am stiffness lasting 1 hour in the feet (lateral aspect)   -- bilateral arm numbness bilateral upper extremities  -- dependent swelling of legs - unchanged   -- interrupted sleepd.         REVIEW OF SYSTEMS: (ROS)    Review of Systems   Constitutional: Negative.    HENT: Negative.     Eyes: Negative.    Respiratory: Negative.     Cardiovascular: Negative.    Gastrointestinal: Negative.    Endocrine: Negative.    Genitourinary: Negative.    Musculoskeletal:  Positive for arthralgias.   Skin:  Positive for rash.   Neurological: Negative.    Hematological: Negative.          Allergies   Allergen Reactions    Cayenne Swelling     Tongue and throat swelling    Codeine Rash    Tape [Adhesive Tape]      Tears skin  Steri strips blisters skin    Amoxicillin Other (See Comments)    Shingrix [Zoster Vac Recomb Adjuvanted]      NO LIVE VACCINES WHILE ON IMMUNOSUPPRESSANT - MYFORTIC    Sulfa

## 2024-06-14 ENCOUNTER — HOSPITAL ENCOUNTER (OUTPATIENT)
Age: 44
Discharge: HOME | End: 2024-06-14
Payer: MEDICARE

## 2024-06-14 DIAGNOSIS — D83.9 CVID (COMMON VARIABLE IMMUNODEFICIENCY) (HCC): ICD-10-CM

## 2024-06-14 LAB
ALBUMIN SERPL BCG-MCNC: 4.6 G/DL (ref 3.5–5.1)
ALP SERPL-CCNC: 198 U/L (ref 38–126)
ALT SERPL W/O P-5'-P-CCNC: 49 U/L (ref 11–66)
ANION GAP SERPL CALC-SCNC: 12 MEQ/L (ref 8–16)
AST SERPL-CCNC: 44 U/L (ref 5–40)
BASOPHILS ABSOLUTE: 0.1 THOU/MM3 (ref 0–0.1)
BASOPHILS NFR BLD AUTO: 0.8 %
BILIRUB SERPL-MCNC: 0.6 MG/DL (ref 0.3–1.2)
BUN SERPL-MCNC: 8 MG/DL (ref 7–22)
CALCIUM SERPL-MCNC: 9.7 MG/DL (ref 8.5–10.5)
CHLORIDE SERPL-SCNC: 102 MEQ/L (ref 98–111)
CO2 SERPL-SCNC: 28 MEQ/L (ref 23–33)
CREAT SERPL-MCNC: 0.5 MG/DL (ref 0.4–1.2)
CRP SERPL-MCNC: < 0.3 MG/DL (ref 0–1)
DEPRECATED RDW RBC AUTO: 42.7 FL (ref 35–45)
EOSINOPHIL NFR BLD AUTO: 2.7 %
EOSINOPHILS ABSOLUTE: 0.2 THOU/MM3 (ref 0–0.4)
ERYTHROCYTE [DISTWIDTH] IN BLOOD BY AUTOMATED COUNT: 14.1 % (ref 11.5–14.5)
ERYTHROCYTE [SEDIMENTATION RATE] IN BLOOD BY WESTERGREN METHOD: 7 MM/HR (ref 0–20)
GFR SERPL CREATININE-BSD FRML MDRD: > 90 ML/MIN/1.73M2
GLUCOSE SERPL-MCNC: 129 MG/DL (ref 70–108)
HCT VFR BLD AUTO: 47.1 % (ref 37–47)
HGB BLD-MCNC: 14.9 GM/DL (ref 12–16)
IMM GRANULOCYTES # BLD AUTO: 0.03 THOU/MM3 (ref 0–0.07)
IMM GRANULOCYTES NFR BLD AUTO: 0.4 %
LYMPHOCYTES ABSOLUTE: 3.3 THOU/MM3 (ref 1–4.8)
LYMPHOCYTES NFR BLD AUTO: 41.2 %
MCH RBC QN AUTO: 26.4 PG (ref 26–33)
MCHC RBC AUTO-ENTMCNC: 31.6 GM/DL (ref 32.2–35.5)
MCV RBC AUTO: 83.5 FL (ref 81–99)
MONOCYTES ABSOLUTE: 0.5 THOU/MM3 (ref 0.4–1.3)
MONOCYTES NFR BLD AUTO: 6.7 %
NEUTROPHILS ABSOLUTE: 3.8 THOU/MM3 (ref 1.8–7.7)
NEUTROPHILS NFR BLD AUTO: 48.2 %
NRBC BLD AUTO-RTO: 0 /100 WBC
PLATELET # BLD AUTO: 292 THOU/MM3 (ref 130–400)
PMV BLD AUTO: 10.9 FL (ref 9.4–12.4)
POTASSIUM SERPL-SCNC: 3.9 MEQ/L (ref 3.5–5.2)
PROT SERPL-MCNC: 7.5 G/DL (ref 6.1–8)
RBC # BLD AUTO: 5.64 MILL/MM3 (ref 4.2–5.4)
SODIUM SERPL-SCNC: 142 MEQ/L (ref 135–145)
WBC # BLD AUTO: 7.9 THOU/MM3 (ref 4.8–10.8)

## 2024-06-14 PROCEDURE — 80053 COMPREHEN METABOLIC PANEL: CPT

## 2024-06-14 PROCEDURE — 36415 COLL VENOUS BLD VENIPUNCTURE: CPT

## 2024-06-14 PROCEDURE — 85025 COMPLETE CBC W/AUTO DIFF WBC: CPT

## 2024-06-14 PROCEDURE — 85651 RBC SED RATE NONAUTOMATED: CPT

## 2024-06-14 PROCEDURE — 86140 C-REACTIVE PROTEIN: CPT

## 2024-06-20 ENCOUNTER — OFFICE VISIT (OUTPATIENT)
Dept: ALLERGY | Age: 44
End: 2024-06-20
Payer: MEDICARE

## 2024-06-20 VITALS
BODY MASS INDEX: 41.14 KG/M2 | WEIGHT: 225 LBS | OXYGEN SATURATION: 96 % | HEART RATE: 94 BPM | SYSTOLIC BLOOD PRESSURE: 144 MMHG | DIASTOLIC BLOOD PRESSURE: 92 MMHG

## 2024-06-20 DIAGNOSIS — R06.02 SHORTNESS OF BREATH: ICD-10-CM

## 2024-06-20 DIAGNOSIS — D83.9 CVID (COMMON VARIABLE IMMUNODEFICIENCY) (HCC): Primary | ICD-10-CM

## 2024-06-20 PROCEDURE — G8427 DOCREV CUR MEDS BY ELIG CLIN: HCPCS | Performed by: NURSE PRACTITIONER

## 2024-06-20 PROCEDURE — G8417 CALC BMI ABV UP PARAM F/U: HCPCS | Performed by: NURSE PRACTITIONER

## 2024-06-20 PROCEDURE — 1036F TOBACCO NON-USER: CPT | Performed by: NURSE PRACTITIONER

## 2024-06-20 PROCEDURE — 3077F SYST BP >= 140 MM HG: CPT | Performed by: NURSE PRACTITIONER

## 2024-06-20 PROCEDURE — 99214 OFFICE O/P EST MOD 30 MIN: CPT | Performed by: NURSE PRACTITIONER

## 2024-06-20 PROCEDURE — 3080F DIAST BP >= 90 MM HG: CPT | Performed by: NURSE PRACTITIONER

## 2024-06-20 RX ORDER — IMMUNE GLOBULIN 10 PERCENT (HUMAN) WITH RECOMBINANT HUMAN HYALURONIDASE 20 G/200ML
20 KIT SUBCUTANEOUS
Qty: 1 EACH | Refills: 11 | Status: SHIPPED | OUTPATIENT
Start: 2024-06-20

## 2024-06-20 RX ORDER — ALBUTEROL SULFATE 90 UG/1
2 AEROSOL, METERED RESPIRATORY (INHALATION) 4 TIMES DAILY PRN
Qty: 54 G | Refills: 2 | Status: SHIPPED | OUTPATIENT
Start: 2024-06-20

## 2024-06-20 NOTE — PATIENT INSTRUCTIONS
6/20/2024   NAHID Bronson CNP   [facility] [place] [facility] ALLERGY AND ASTHMA  [address] [place] [address]  Dept: [phone]  Dept Fax: [phone]  Loc: [phone]     Dear Shilpa,  Thank you for your recent visit. We are committed to providing amazing patient care, and would like to make sure we were successful in providing you a great experience at our office. In the coming days, you may receive a survey via mail or email about your experience with my office. We ask that you please take a couple of minutes to complete and return it. We use our patients’ feedback to make improvements within the office that will make the experience even better for all of our patients.  We appreciate your time in helping us be the best with can!!!    Thank you, and be well!  NAHID Bronson CNP

## 2024-06-20 NOTE — PROGRESS NOTES
[facility]    Allergy & Asthma   [address]  [place] [address]  Ph:   [phone]  Fax:[phone]    Provider:  Dr. Barbara Tijerina    Follow Up          Diagnosis Orders   1. CVID (common variable immunodeficiency) (HCC)  ALT    CBC with Auto Differential    Comprehensive Metabolic Panel    Creatinine    GFR Calculated    IgG    Immune Globulin-Hyaluronidase (HYQVIA) 20 GM/200ML KIT      2. Shortness of breath  albuterol sulfate HFA (PROVENTIL;VENTOLIN;PROAIR) 108 (90 Base) MCG/ACT inhaler          CHIEF COMPLAINT:   Chief Complaint   Patient presents with    Follow-up     Rescheduled-speak about keeping appointments       HISTORY OF PRESENT ILLNESS: ESTABLISHED PATIENT HERE FOR EVALUATION   43-year-old  female here today for follow-up on IVIG.  Patient had diagnosis of CVID.  She has had a long history of taking IVIG therapy and has done quite well.  She reports a substantial decrease in any infections that she has been getting.  She states her most recently her last infection was a sore throat which lasted over a week.  She reports normally when she gets sick that she has a hard time finding off infections and it may last up to 6 weeks.  Severity of any problems today is mild to moderate.  She denies any nausea, vomiting, fever.  Patient states she has done very well with her HyQvia which is her IVIG that she receives at home.  She has not had any reactions.  She does premedicate with Benadryl and Tylenol.  Patient has been compliant with getting her labs done.  She has missed some appointments for follow-up in this office however she has come in today         Review of Systems:  Review of Systems   All other systems reviewed and are negative.      Past MedicalHistory:    Past Medical History:   Diagnosis Date    Asthma     Diabetes mellitus (HCC) 6/8/2018    Fibromyalgia     GERD (gastroesophageal reflux disease)     Hyperlipidemia     Lupus (systemic lupus erythematosus) ([facility])     Lupus

## 2024-06-25 DIAGNOSIS — L30.4 INTERTRIGO: ICD-10-CM

## 2024-06-25 DIAGNOSIS — J30.2 SEASONAL ALLERGIES: ICD-10-CM

## 2024-06-25 RX ORDER — MONTELUKAST SODIUM 10 MG/1
10 TABLET ORAL NIGHTLY
Qty: 90 TABLET | Refills: 1 | Status: SHIPPED | OUTPATIENT
Start: 2024-06-25

## 2024-06-25 RX ORDER — CLOTRIMAZOLE AND BETAMETHASONE DIPROPIONATE 10; .64 MG/G; MG/G
CREAM TOPICAL
Qty: 45 G | Refills: 1 | Status: SHIPPED | OUTPATIENT
Start: 2024-06-25 | End: 2024-06-26

## 2024-06-25 RX ORDER — CLOPIDOGREL BISULFATE 75 MG/1
75 TABLET ORAL DAILY
Qty: 90 TABLET | Refills: 1 | Status: SHIPPED | OUTPATIENT
Start: 2024-06-25 | End: 2024-06-26

## 2024-06-25 RX ORDER — POTASSIUM CHLORIDE 750 MG/1
10 TABLET, FILM COATED, EXTENDED RELEASE ORAL DAILY
Qty: 90 TABLET | Refills: 1 | Status: SHIPPED | OUTPATIENT
Start: 2024-06-25

## 2024-06-25 RX ORDER — VERAPAMIL HYDROCHLORIDE 240 MG/1
240 TABLET, FILM COATED, EXTENDED RELEASE ORAL DAILY
Qty: 90 TABLET | Refills: 1 | Status: SHIPPED | OUTPATIENT
Start: 2024-06-25

## 2024-06-25 RX ORDER — ATORVASTATIN CALCIUM 80 MG/1
80 TABLET, FILM COATED ORAL DAILY
Qty: 90 TABLET | Refills: 1 | Status: SHIPPED | OUTPATIENT
Start: 2024-06-25

## 2024-06-25 RX ORDER — CYCLOBENZAPRINE HCL 5 MG
5-10 TABLET ORAL 3 TIMES DAILY PRN
Qty: 270 TABLET | Refills: 3 | Status: SHIPPED | OUTPATIENT
Start: 2024-06-25

## 2024-06-26 DIAGNOSIS — E11.69 TYPE 2 DIABETES MELLITUS WITH OTHER SPECIFIED COMPLICATION, WITHOUT LONG-TERM CURRENT USE OF INSULIN (HCC): ICD-10-CM

## 2024-06-26 DIAGNOSIS — L30.4 INTERTRIGO: ICD-10-CM

## 2024-06-26 RX ORDER — CLOPIDOGREL BISULFATE 75 MG/1
75 TABLET ORAL DAILY
Qty: 90 TABLET | Refills: 1 | Status: SHIPPED | OUTPATIENT
Start: 2024-06-26

## 2024-06-26 RX ORDER — CLOTRIMAZOLE AND BETAMETHASONE DIPROPIONATE 10; .64 MG/G; MG/G
CREAM TOPICAL
Qty: 45 G | Refills: 0 | Status: SHIPPED | OUTPATIENT
Start: 2024-06-26

## 2024-06-26 RX ORDER — DULAGLUTIDE 3 MG/.5ML
3 INJECTION, SOLUTION SUBCUTANEOUS WEEKLY
Qty: 12 ADJUSTABLE DOSE PRE-FILLED PEN SYRINGE | Refills: 5 | Status: SHIPPED | OUTPATIENT
Start: 2024-06-26

## 2024-07-11 ENCOUNTER — HOSPITAL ENCOUNTER (OUTPATIENT)
Age: 44
Discharge: HOME | End: 2024-07-11
Payer: MEDICARE

## 2024-07-11 DIAGNOSIS — D83.9 CVID (COMMON VARIABLE IMMUNODEFICIENCY) (HCC): ICD-10-CM

## 2024-07-11 LAB
ALBUMIN SERPL BCG-MCNC: 4.4 G/DL (ref 3.5–5.1)
ALP SERPL-CCNC: 183 U/L (ref 38–126)
ALT SERPL W/O P-5'-P-CCNC: 49 U/L (ref 11–66)
ANION GAP SERPL CALC-SCNC: 12 MEQ/L (ref 8–16)
AST SERPL-CCNC: 42 U/L (ref 5–40)
BASOPHILS ABSOLUTE: 0.1 THOU/MM3 (ref 0–0.1)
BASOPHILS NFR BLD AUTO: 0.7 %
BILIRUB SERPL-MCNC: 0.7 MG/DL (ref 0.3–1.2)
BUN SERPL-MCNC: 9 MG/DL (ref 7–22)
CALCIUM SERPL-MCNC: 9.3 MG/DL (ref 8.5–10.5)
CHLORIDE SERPL-SCNC: 102 MEQ/L (ref 98–111)
CO2 SERPL-SCNC: 25 MEQ/L (ref 23–33)
CREAT SERPL-MCNC: 0.7 MG/DL (ref 0.4–1.2)
CRP SERPL-MCNC: < 0.3 MG/DL (ref 0–1)
DEPRECATED RDW RBC AUTO: 41.2 FL (ref 35–45)
EOSINOPHIL NFR BLD AUTO: 1.8 %
EOSINOPHILS ABSOLUTE: 0.1 THOU/MM3 (ref 0–0.4)
ERYTHROCYTE [DISTWIDTH] IN BLOOD BY AUTOMATED COUNT: 13.9 % (ref 11.5–14.5)
ERYTHROCYTE [SEDIMENTATION RATE] IN BLOOD BY WESTERGREN METHOD: 7 MM/HR (ref 0–20)
GFR SERPL CREATININE-BSD FRML MDRD: > 90 ML/MIN/1.73M2
GLUCOSE SERPL-MCNC: 110 MG/DL (ref 70–108)
HCT VFR BLD AUTO: 44.4 % (ref 37–47)
HGB BLD-MCNC: 14.3 GM/DL (ref 12–16)
IMM GRANULOCYTES # BLD AUTO: 0.03 THOU/MM3 (ref 0–0.07)
IMM GRANULOCYTES NFR BLD AUTO: 0.4 %
LYMPHOCYTES ABSOLUTE: 2.2 THOU/MM3 (ref 1–4.8)
LYMPHOCYTES NFR BLD AUTO: 28.6 %
MCH RBC QN AUTO: 26.6 PG (ref 26–33)
MCHC RBC AUTO-ENTMCNC: 32.2 GM/DL (ref 32.2–35.5)
MCV RBC AUTO: 82.7 FL (ref 81–99)
MONOCYTES ABSOLUTE: 0.5 THOU/MM3 (ref 0.4–1.3)
MONOCYTES NFR BLD AUTO: 6.1 %
NEUTROPHILS ABSOLUTE: 4.8 THOU/MM3 (ref 1.8–7.7)
NEUTROPHILS NFR BLD AUTO: 62.4 %
NRBC BLD AUTO-RTO: 0 /100 WBC
PLATELET # BLD AUTO: 265 THOU/MM3 (ref 130–400)
PMV BLD AUTO: 11 FL (ref 9.4–12.4)
POTASSIUM SERPL-SCNC: 3.9 MEQ/L (ref 3.5–5.2)
PROT SERPL-MCNC: 7.1 G/DL (ref 6.1–8)
RBC # BLD AUTO: 5.37 MILL/MM3 (ref 4.2–5.4)
SODIUM SERPL-SCNC: 139 MEQ/L (ref 135–145)
WBC # BLD AUTO: 7.7 THOU/MM3 (ref 4.8–10.8)

## 2024-07-11 PROCEDURE — 85651 RBC SED RATE NONAUTOMATED: CPT

## 2024-07-11 PROCEDURE — 85025 COMPLETE CBC W/AUTO DIFF WBC: CPT

## 2024-07-11 PROCEDURE — 36415 COLL VENOUS BLD VENIPUNCTURE: CPT

## 2024-07-11 PROCEDURE — 86140 C-REACTIVE PROTEIN: CPT

## 2024-07-11 PROCEDURE — 80053 COMPREHEN METABOLIC PANEL: CPT

## 2024-07-28 DIAGNOSIS — M54.50 CHRONIC BILATERAL LOW BACK PAIN WITHOUT SCIATICA: ICD-10-CM

## 2024-07-28 DIAGNOSIS — M79.7 FIBROMYALGIA: ICD-10-CM

## 2024-07-28 DIAGNOSIS — G89.29 CHRONIC BILATERAL LOW BACK PAIN WITHOUT SCIATICA: ICD-10-CM

## 2024-07-30 RX ORDER — GABAPENTIN 400 MG/1
CAPSULE ORAL
Qty: 60 CAPSULE | Refills: 3 | Status: SHIPPED | OUTPATIENT
Start: 2024-07-30 | End: 2024-10-28

## 2024-08-10 ENCOUNTER — HOSPITAL ENCOUNTER (OUTPATIENT)
Age: 44
Discharge: HOME | End: 2024-08-10
Payer: MEDICARE

## 2024-08-10 DIAGNOSIS — D83.9 CVID (COMMON VARIABLE IMMUNODEFICIENCY) (HCC): ICD-10-CM

## 2024-08-10 LAB
ALBUMIN SERPL BCG-MCNC: 4.3 G/DL (ref 3.5–5.1)
ALP SERPL-CCNC: 164 U/L (ref 38–126)
ALT SERPL W/O P-5'-P-CCNC: 41 U/L (ref 11–66)
ANION GAP SERPL CALC-SCNC: 10 MEQ/L (ref 8–16)
AST SERPL-CCNC: 33 U/L (ref 5–40)
BASOPHILS ABSOLUTE: 0.1 THOU/MM3 (ref 0–0.1)
BASOPHILS NFR BLD AUTO: 0.8 %
BILIRUB SERPL-MCNC: 0.6 MG/DL (ref 0.3–1.2)
BUN SERPL-MCNC: 11 MG/DL (ref 7–22)
CALCIUM SERPL-MCNC: 9.2 MG/DL (ref 8.5–10.5)
CHLORIDE SERPL-SCNC: 100 MEQ/L (ref 98–111)
CO2 SERPL-SCNC: 29 MEQ/L (ref 23–33)
CREAT SERPL-MCNC: 0.7 MG/DL (ref 0.4–1.2)
CRP SERPL-MCNC: < 0.3 MG/DL (ref 0–1)
DEPRECATED RDW RBC AUTO: 39.8 FL (ref 35–45)
EOSINOPHIL NFR BLD AUTO: 2 %
EOSINOPHILS ABSOLUTE: 0.2 THOU/MM3 (ref 0–0.4)
ERYTHROCYTE [DISTWIDTH] IN BLOOD BY AUTOMATED COUNT: 13.7 % (ref 11.5–14.5)
ERYTHROCYTE [SEDIMENTATION RATE] IN BLOOD BY WESTERGREN METHOD: 8 MM/HR (ref 0–20)
GFR SERPL CREATININE-BSD FRML MDRD: > 90 ML/MIN/1.73M2
GLUCOSE SERPL-MCNC: 97 MG/DL (ref 70–108)
HCT VFR BLD AUTO: 43.8 % (ref 37–47)
HGB BLD-MCNC: 14 GM/DL (ref 12–16)
IMM GRANULOCYTES # BLD AUTO: 0.02 THOU/MM3 (ref 0–0.07)
IMM GRANULOCYTES NFR BLD AUTO: 0.3 %
LYMPHOCYTES ABSOLUTE: 2.6 THOU/MM3 (ref 1–4.8)
LYMPHOCYTES NFR BLD AUTO: 34 %
MCH RBC QN AUTO: 26 PG (ref 26–33)
MCHC RBC AUTO-ENTMCNC: 32 GM/DL (ref 32.2–35.5)
MCV RBC AUTO: 81.3 FL (ref 81–99)
MONOCYTES ABSOLUTE: 0.6 THOU/MM3 (ref 0.4–1.3)
MONOCYTES NFR BLD AUTO: 7.6 %
NEUTROPHILS ABSOLUTE: 4.1 THOU/MM3 (ref 1.8–7.7)
NEUTROPHILS NFR BLD AUTO: 55.3 %
NRBC BLD AUTO-RTO: 0 /100 WBC
PLATELET # BLD AUTO: 256 THOU/MM3 (ref 130–400)
PMV BLD AUTO: 11 FL (ref 9.4–12.4)
POTASSIUM SERPL-SCNC: 4.1 MEQ/L (ref 3.5–5.2)
PROT SERPL-MCNC: 7 G/DL (ref 6.1–8)
RBC # BLD AUTO: 5.39 MILL/MM3 (ref 4.2–5.4)
SODIUM SERPL-SCNC: 139 MEQ/L (ref 135–145)
WBC # BLD AUTO: 7.5 THOU/MM3 (ref 4.8–10.8)

## 2024-08-10 PROCEDURE — 85025 COMPLETE CBC W/AUTO DIFF WBC: CPT

## 2024-08-10 PROCEDURE — 85651 RBC SED RATE NONAUTOMATED: CPT

## 2024-08-10 PROCEDURE — 36415 COLL VENOUS BLD VENIPUNCTURE: CPT

## 2024-08-10 PROCEDURE — 80053 COMPREHEN METABOLIC PANEL: CPT

## 2024-08-10 PROCEDURE — 86140 C-REACTIVE PROTEIN: CPT

## 2024-08-19 DIAGNOSIS — M46.90 AXIAL SPONDYLOARTHRITIS (HCC): ICD-10-CM

## 2024-08-19 RX ORDER — SECUKINUMAB 150 MG/ML
INJECTION SUBCUTANEOUS
Qty: 2 ML | Refills: 3 | Status: ACTIVE | OUTPATIENT
Start: 2024-08-19 | End: 2024-08-20

## 2024-09-03 RX ORDER — SUCRALFATE 1 G/1
1 TABLET ORAL 4 TIMES DAILY
Qty: 120 TABLET | Refills: 11 | Status: SHIPPED | OUTPATIENT
Start: 2024-09-03

## 2024-09-04 ENCOUNTER — HOSPITAL ENCOUNTER (OUTPATIENT)
Age: 44
Discharge: HOME | End: 2024-09-04
Payer: MEDICARE

## 2024-09-04 DIAGNOSIS — D83.9 CVID (COMMON VARIABLE IMMUNODEFICIENCY) (HCC): ICD-10-CM

## 2024-09-04 LAB
BASOPHILS ABSOLUTE: 0 THOU/MM3 (ref 0–0.1)
BASOPHILS NFR BLD AUTO: 0.6 %
DEPRECATED RDW RBC AUTO: 40.9 FL (ref 35–45)
EOSINOPHIL NFR BLD AUTO: 2.7 %
EOSINOPHILS ABSOLUTE: 0.2 THOU/MM3 (ref 0–0.4)
ERYTHROCYTE [DISTWIDTH] IN BLOOD BY AUTOMATED COUNT: 13.9 % (ref 11.5–14.5)
HCT VFR BLD AUTO: 43.1 % (ref 37–47)
HGB BLD-MCNC: 14 GM/DL (ref 12–16)
IMM GRANULOCYTES # BLD AUTO: 0.01 THOU/MM3 (ref 0–0.07)
IMM GRANULOCYTES NFR BLD AUTO: 0.1 %
LYMPHOCYTES ABSOLUTE: 2.9 THOU/MM3 (ref 1–4.8)
LYMPHOCYTES NFR BLD AUTO: 37 %
MCH RBC QN AUTO: 26.5 PG (ref 26–33)
MCHC RBC AUTO-ENTMCNC: 32.5 GM/DL (ref 32.2–35.5)
MCV RBC AUTO: 81.5 FL (ref 81–99)
MONOCYTES ABSOLUTE: 0.7 THOU/MM3 (ref 0.4–1.3)
MONOCYTES NFR BLD AUTO: 8.4 %
NEUTROPHILS ABSOLUTE: 4 THOU/MM3 (ref 1.8–7.7)
NEUTROPHILS NFR BLD AUTO: 51.2 %
NRBC BLD AUTO-RTO: 0 /100 WBC
PLATELET # BLD AUTO: 280 THOU/MM3 (ref 130–400)
PMV BLD AUTO: 11.4 FL (ref 9.4–12.4)
RBC # BLD AUTO: 5.29 MILL/MM3 (ref 4.2–5.4)
WBC # BLD AUTO: 7.9 THOU/MM3 (ref 4.8–10.8)

## 2024-09-04 PROCEDURE — 80053 COMPREHEN METABOLIC PANEL: CPT

## 2024-09-04 PROCEDURE — 36415 COLL VENOUS BLD VENIPUNCTURE: CPT

## 2024-09-04 PROCEDURE — 85025 COMPLETE CBC W/AUTO DIFF WBC: CPT

## 2024-09-05 LAB
ALBUMIN SERPL BCG-MCNC: 4.5 G/DL (ref 3.5–5.1)
ALP SERPL-CCNC: 147 U/L (ref 38–126)
ALT SERPL W/O P-5'-P-CCNC: 40 U/L (ref 11–66)
ANION GAP SERPL CALC-SCNC: 13 MEQ/L (ref 8–16)
AST SERPL-CCNC: 39 U/L (ref 5–40)
BILIRUB SERPL-MCNC: 0.5 MG/DL (ref 0.3–1.2)
BUN SERPL-MCNC: 10 MG/DL (ref 7–22)
CALCIUM SERPL-MCNC: 9.6 MG/DL (ref 8.5–10.5)
CHLORIDE SERPL-SCNC: 99 MEQ/L (ref 98–111)
CO2 SERPL-SCNC: 27 MEQ/L (ref 23–33)
CREAT SERPL-MCNC: 1 MG/DL (ref 0.4–1.2)
GFR SERPL CREATININE-BSD FRML MDRD: 71 ML/MIN/1.73M2
GLUCOSE SERPL-MCNC: 104 MG/DL (ref 70–108)
POTASSIUM SERPL-SCNC: 4.1 MEQ/L (ref 3.5–5.2)
PROT SERPL-MCNC: 7.2 G/DL (ref 6.1–8)
SODIUM SERPL-SCNC: 139 MEQ/L (ref 135–145)

## 2024-09-08 DIAGNOSIS — J30.2 SEASONAL ALLERGIES: ICD-10-CM

## 2024-09-09 RX ORDER — ATORVASTATIN CALCIUM 80 MG/1
80 TABLET, FILM COATED ORAL DAILY
Qty: 100 TABLET | Refills: 2 | Status: SHIPPED | OUTPATIENT
Start: 2024-09-09

## 2024-09-09 RX ORDER — VERAPAMIL HYDROCHLORIDE 240 MG/1
240 TABLET, FILM COATED, EXTENDED RELEASE ORAL DAILY
Qty: 100 TABLET | Refills: 2 | Status: SHIPPED | OUTPATIENT
Start: 2024-09-09

## 2024-09-09 RX ORDER — MONTELUKAST SODIUM 10 MG/1
10 TABLET ORAL NIGHTLY
Qty: 100 TABLET | Refills: 2 | Status: SHIPPED | OUTPATIENT
Start: 2024-09-09

## 2024-09-13 NOTE — TELEPHONE ENCOUNTER
From: Tamiko Mckeon  Sent: 8/9/2018 11:39 AM EDT  Subject: Medication Renewal Request    Spencerrobyn Phillips.  Gabe Bello would like a refill of the following medications:     atorvastatin (LIPITOR) 80 MG tablet Josh Spencer MD]    [facility] [address] [facility] [address]  N [phone] - F [phone]    Comment: no

## 2024-09-27 RX ORDER — TIZANIDINE 2 MG/1
TABLET ORAL
Qty: 60 TABLET | Refills: 2 | Status: SHIPPED | OUTPATIENT
Start: 2024-09-27

## 2024-10-04 ENCOUNTER — HOSPITAL ENCOUNTER (OUTPATIENT)
Age: 44
Discharge: HOME | End: 2024-10-04
Payer: MEDICARE

## 2024-10-04 DIAGNOSIS — D83.9 CVID (COMMON VARIABLE IMMUNODEFICIENCY) (HCC): ICD-10-CM

## 2024-10-04 LAB
ALBUMIN SERPL BCG-MCNC: 3.9 G/DL (ref 3.5–5.1)
ALP SERPL-CCNC: 146 U/L (ref 38–126)
ALT SERPL W/O P-5'-P-CCNC: 30 U/L (ref 11–66)
ANION GAP SERPL CALC-SCNC: 13 MEQ/L (ref 8–16)
AST SERPL-CCNC: 28 U/L (ref 5–40)
BASOPHILS ABSOLUTE: 0 THOU/MM3 (ref 0–0.1)
BASOPHILS NFR BLD AUTO: 0.6 %
BILIRUB SERPL-MCNC: 0.5 MG/DL (ref 0.3–1.2)
BUN SERPL-MCNC: 6 MG/DL (ref 7–22)
CALCIUM SERPL-MCNC: 9 MG/DL (ref 8.5–10.5)
CHLORIDE SERPL-SCNC: 100 MEQ/L (ref 98–111)
CO2 SERPL-SCNC: 25 MEQ/L (ref 23–33)
CREAT SERPL-MCNC: 0.5 MG/DL (ref 0.4–1.2)
DEPRECATED RDW RBC AUTO: 43.9 FL (ref 35–45)
EOSINOPHIL NFR BLD AUTO: 2.5 %
EOSINOPHILS ABSOLUTE: 0.2 THOU/MM3 (ref 0–0.4)
ERYTHROCYTE [DISTWIDTH] IN BLOOD BY AUTOMATED COUNT: 14.4 % (ref 11.5–14.5)
GFR SERPL CREATININE-BSD FRML MDRD: > 90 ML/MIN/1.73M2
GLUCOSE SERPL-MCNC: 161 MG/DL (ref 70–108)
HCT VFR BLD AUTO: 42.3 % (ref 37–47)
HGB BLD-MCNC: 13 GM/DL (ref 12–16)
IMM GRANULOCYTES # BLD AUTO: 0.01 THOU/MM3 (ref 0–0.07)
IMM GRANULOCYTES NFR BLD AUTO: 0.2 %
LYMPHOCYTES ABSOLUTE: 2.3 THOU/MM3 (ref 1–4.8)
LYMPHOCYTES NFR BLD AUTO: 36.7 %
MCH RBC QN AUTO: 26.1 PG (ref 26–33)
MCHC RBC AUTO-ENTMCNC: 30.7 GM/DL (ref 32.2–35.5)
MCV RBC AUTO: 84.8 FL (ref 81–99)
MONOCYTES ABSOLUTE: 0.4 THOU/MM3 (ref 0.4–1.3)
MONOCYTES NFR BLD AUTO: 6.7 %
NEUTROPHILS ABSOLUTE: 3.4 THOU/MM3 (ref 1.8–7.7)
NEUTROPHILS NFR BLD AUTO: 53.3 %
NRBC BLD AUTO-RTO: 0 /100 WBC
PLATELET # BLD AUTO: 253 THOU/MM3 (ref 130–400)
PMV BLD AUTO: 11.5 FL (ref 9.4–12.4)
POTASSIUM SERPL-SCNC: 4.2 MEQ/L (ref 3.5–5.2)
PROT SERPL-MCNC: 6.8 G/DL (ref 6.1–8)
RBC # BLD AUTO: 4.99 MILL/MM3 (ref 4.2–5.4)
SODIUM SERPL-SCNC: 138 MEQ/L (ref 135–145)
WBC # BLD AUTO: 6.3 THOU/MM3 (ref 4.8–10.8)

## 2024-10-04 PROCEDURE — 80053 COMPREHEN METABOLIC PANEL: CPT

## 2024-10-04 PROCEDURE — 85025 COMPLETE CBC W/AUTO DIFF WBC: CPT

## 2024-10-04 PROCEDURE — 36415 COLL VENOUS BLD VENIPUNCTURE: CPT

## 2024-10-04 PROCEDURE — 82784 ASSAY IGA/IGD/IGG/IGM EACH: CPT

## 2024-10-04 RX ORDER — CLOPIDOGREL BISULFATE 75 MG/1
75 TABLET ORAL DAILY
Qty: 90 TABLET | Refills: 3 | Status: SHIPPED | OUTPATIENT
Start: 2024-10-04

## 2024-10-04 NOTE — TELEPHONE ENCOUNTER
Last visit- 4/10/2024  Next visit- 10/10/2024    Requested Prescriptions     Pending Prescriptions Disp Refills    clopidogrel (PLAVIX) 75 MG tablet [Pharmacy Med Name: Clopidogrel Bisulfate 75 MG Oral Tablet] 100 tablet 2     Sig: TAKE 1 TABLET BY MOUTH DAILY

## 2024-10-05 LAB — IGG SERPL-MCNC: 878 MG/DL (ref 700–1600)

## 2024-10-08 NOTE — PROGRESS NOTES
[facility] PROFESSIONAL SERVS  [facility]  [address]  [address]  [facility] [address]  Dept: [phone]  Dept Fax: [phone]  Loc: [phone]    Shilpa Gupta is a 44 y.o. female who presents today for:  Chief Complaint   Patient presents with    6 Month Follow-Up     DM2, Chol, GERD    Cough    Congestion    Headache    Fever           HPI:     HPI    Diabetes Mellitus: Patient presents for follow up of diabetes. Symptoms: paresthesia of the feet and visual disturbances. Symptoms have gradually worsened. Patient denies increase appetite, polydipsia and weight loss.  Evaluation to date has been included: fasting blood sugar, fasting lipid panel, hemoglobin A1C and microalbuminuria.  Home sugars:  am and  pm. Treatment to date: Continued metformin which has been Yes:  , which has been somewhat effective.  Hemoglobin A1C   Date Value Ref Range Status   04/11/2024 6.6 (H) 4.4 - 6.4 % Final       Hyperlipidemia: Patient presents with hyperlipidemia.  She was tested because screening.  Her last labs showed   Lab Results   Component Value Date    CHOL 129 04/11/2024    CHOL 152 12/11/2022    CHOL 126 12/12/2021     Lab Results   Component Value Date    TRIG 90 04/11/2024    TRIG 104 12/11/2022    TRIG 97 12/12/2021     Lab Results   Component Value Date    HDL 36 04/11/2024    HDL 40 12/11/2022    HDL 33 12/12/2021     Lab Results   Component Value Date    LDL 75 04/11/2024    LDL 91 12/11/2022    LDL 74 12/12/2021     No results found for: \"VLDL\"  No results found for: \"CHOLHDLRATIO\"  There is a family history of hyperlipidemia. There is not a family history of early ischemia heart disease.      GERD: Maldonadot complains of heartburn. This has been associated with early satiety and heartburn.  She denies no other symptoms. Symptoms have been present for several years. She denies dysphagia.  She has not lost weight. She denies melena, hematochezia, hematemesis, and

## 2024-10-10 ENCOUNTER — OFFICE VISIT (OUTPATIENT)
Dept: FAMILY MEDICINE CLINIC | Age: 44
End: 2024-10-10

## 2024-10-10 VITALS
OXYGEN SATURATION: 96 % | HEIGHT: 62 IN | SYSTOLIC BLOOD PRESSURE: 106 MMHG | WEIGHT: 215.39 LBS | RESPIRATION RATE: 20 BRPM | DIASTOLIC BLOOD PRESSURE: 72 MMHG | BODY MASS INDEX: 39.64 KG/M2 | HEART RATE: 88 BPM | TEMPERATURE: 97.6 F

## 2024-10-10 DIAGNOSIS — E66.9 OBESITY (BMI 30-39.9): ICD-10-CM

## 2024-10-10 DIAGNOSIS — I63.9 CEREBROVASCULAR ACCIDENT (CVA), UNSPECIFIED MECHANISM (HCC): ICD-10-CM

## 2024-10-10 DIAGNOSIS — G56.22 ULNAR TUNNEL SYNDROME OF LEFT WRIST: ICD-10-CM

## 2024-10-10 DIAGNOSIS — E78.00 PURE HYPERCHOLESTEROLEMIA: ICD-10-CM

## 2024-10-10 DIAGNOSIS — D83.9 COMMON VARIABLE IMMUNODEFICIENCY (HCC): ICD-10-CM

## 2024-10-10 DIAGNOSIS — J40 BRONCHITIS: ICD-10-CM

## 2024-10-10 DIAGNOSIS — G43.809 OTHER MIGRAINE WITHOUT STATUS MIGRAINOSUS, NOT INTRACTABLE: ICD-10-CM

## 2024-10-10 DIAGNOSIS — M79.7 FIBROMYALGIA: ICD-10-CM

## 2024-10-10 DIAGNOSIS — D80.3 IGG SUBCLASS DEFICIENCY (HCC): ICD-10-CM

## 2024-10-10 DIAGNOSIS — M51.361 DEGENERATION OF INTERVERTEBRAL DISC OF LUMBAR REGION WITH LOWER EXTREMITY PAIN: ICD-10-CM

## 2024-10-10 DIAGNOSIS — E66.01 MORBID OBESITY DUE TO EXCESS CALORIES: ICD-10-CM

## 2024-10-10 DIAGNOSIS — M25.561 CHRONIC PAIN OF RIGHT KNEE: ICD-10-CM

## 2024-10-10 DIAGNOSIS — R79.89 ELEVATED LFTS: ICD-10-CM

## 2024-10-10 DIAGNOSIS — K21.9 GASTROESOPHAGEAL REFLUX DISEASE WITHOUT ESOPHAGITIS: ICD-10-CM

## 2024-10-10 DIAGNOSIS — D80.1 HYPOGAMMAGLOBULINEMIA (HCC): ICD-10-CM

## 2024-10-10 DIAGNOSIS — J30.2 SEASONAL ALLERGIES: ICD-10-CM

## 2024-10-10 DIAGNOSIS — G40.909 SEIZURE DISORDER (HCC): ICD-10-CM

## 2024-10-10 DIAGNOSIS — E11.69 TYPE 2 DIABETES MELLITUS WITH OTHER SPECIFIED COMPLICATION, WITHOUT LONG-TERM CURRENT USE OF INSULIN (HCC): Primary | ICD-10-CM

## 2024-10-10 DIAGNOSIS — E61.1 IRON DEFICIENCY: ICD-10-CM

## 2024-10-10 DIAGNOSIS — E83.42 HYPOMAGNESEMIA: ICD-10-CM

## 2024-10-10 DIAGNOSIS — L30.4 INTERTRIGO: ICD-10-CM

## 2024-10-10 DIAGNOSIS — R91.1 LUNG NODULE, SOLITARY: ICD-10-CM

## 2024-10-10 DIAGNOSIS — R79.89 ELEVATED TSH: ICD-10-CM

## 2024-10-10 DIAGNOSIS — G45.9 TIA (TRANSIENT ISCHEMIC ATTACK): ICD-10-CM

## 2024-10-10 DIAGNOSIS — M32.9 LUPUS ARTHRITIS (HCC): ICD-10-CM

## 2024-10-10 DIAGNOSIS — J45.40 MODERATE PERSISTENT ASTHMA WITHOUT COMPLICATION: ICD-10-CM

## 2024-10-10 DIAGNOSIS — M46.90 AXIAL SPONDYLOARTHRITIS (HCC): ICD-10-CM

## 2024-10-10 DIAGNOSIS — Z92.29 HISTORY OF HEPATITIS B VACCINATION: ICD-10-CM

## 2024-10-10 DIAGNOSIS — G89.29 CHRONIC PAIN OF RIGHT KNEE: ICD-10-CM

## 2024-10-10 DIAGNOSIS — Z12.11 COLON CANCER SCREENING: ICD-10-CM

## 2024-10-10 RX ORDER — CETIRIZINE HYDROCHLORIDE 10 MG/1
10 TABLET ORAL DAILY
Qty: 90 TABLET | Refills: 3 | Status: SHIPPED | OUTPATIENT
Start: 2024-10-10

## 2024-10-10 RX ORDER — METHYLPREDNISOLONE 4 MG
TABLET, DOSE PACK ORAL
Qty: 1 KIT | Refills: 0 | Status: SHIPPED | OUTPATIENT
Start: 2024-10-10 | End: 2024-10-16

## 2024-10-10 RX ORDER — CEFDINIR 300 MG/1
300 CAPSULE ORAL 2 TIMES DAILY
Qty: 20 CAPSULE | Refills: 0 | Status: SHIPPED | OUTPATIENT
Start: 2024-10-10 | End: 2024-10-20

## 2024-10-31 LAB — NONINV COLON CA DNA+OCC BLD SCRN STL QL: NEGATIVE

## 2024-11-01 ENCOUNTER — HOSPITAL ENCOUNTER (OUTPATIENT)
Age: 44
Discharge: HOME | End: 2024-11-01

## 2024-11-02 ENCOUNTER — HOSPITAL ENCOUNTER (OUTPATIENT)
Age: 44
Discharge: HOME | End: 2024-11-02
Payer: MEDICARE

## 2024-11-02 DIAGNOSIS — D83.9 CVID (COMMON VARIABLE IMMUNODEFICIENCY) (HCC): ICD-10-CM

## 2024-11-02 LAB
ALBUMIN SERPL BCG-MCNC: 4.1 G/DL (ref 3.5–5.1)
ALP SERPL-CCNC: 150 U/L (ref 38–126)
ALT SERPL W/O P-5'-P-CCNC: 43 U/L (ref 11–66)
ANION GAP SERPL CALC-SCNC: 13 MEQ/L (ref 8–16)
AST SERPL-CCNC: 36 U/L (ref 5–40)
BASOPHILS ABSOLUTE: 0 THOU/MM3 (ref 0–0.1)
BASOPHILS NFR BLD AUTO: 0.8 %
BILIRUB SERPL-MCNC: 0.6 MG/DL (ref 0.3–1.2)
BUN SERPL-MCNC: 9 MG/DL (ref 7–22)
CALCIUM SERPL-MCNC: 9.3 MG/DL (ref 8.5–10.5)
CHLORIDE SERPL-SCNC: 101 MEQ/L (ref 98–111)
CO2 SERPL-SCNC: 25 MEQ/L (ref 23–33)
CREAT SERPL-MCNC: 0.6 MG/DL (ref 0.4–1.2)
DEPRECATED RDW RBC AUTO: 42.8 FL (ref 35–45)
EOSINOPHIL NFR BLD AUTO: 2.1 %
EOSINOPHILS ABSOLUTE: 0.1 THOU/MM3 (ref 0–0.4)
ERYTHROCYTE [DISTWIDTH] IN BLOOD BY AUTOMATED COUNT: 14.1 % (ref 11.5–14.5)
GFR SERPL CREATININE-BSD FRML MDRD: > 90 ML/MIN/1.73M2
GLUCOSE SERPL-MCNC: 110 MG/DL (ref 70–108)
HCT VFR BLD AUTO: 42.5 % (ref 37–47)
HGB BLD-MCNC: 13.5 GM/DL (ref 12–16)
IMM GRANULOCYTES # BLD AUTO: 0.01 THOU/MM3 (ref 0–0.07)
IMM GRANULOCYTES NFR BLD AUTO: 0.2 %
LYMPHOCYTES ABSOLUTE: 2.3 THOU/MM3 (ref 1–4.8)
LYMPHOCYTES NFR BLD AUTO: 36.9 %
MCH RBC QN AUTO: 26.5 PG (ref 26–33)
MCHC RBC AUTO-ENTMCNC: 31.8 GM/DL (ref 32.2–35.5)
MCV RBC AUTO: 83.5 FL (ref 81–99)
MONOCYTES ABSOLUTE: 0.4 THOU/MM3 (ref 0.4–1.3)
MONOCYTES NFR BLD AUTO: 6.7 %
NEUTROPHILS ABSOLUTE: 3.3 THOU/MM3 (ref 1.8–7.7)
NEUTROPHILS NFR BLD AUTO: 53.3 %
NRBC BLD AUTO-RTO: 0 /100 WBC
PLATELET # BLD AUTO: 245 THOU/MM3 (ref 130–400)
PMV BLD AUTO: 11.6 FL (ref 9.4–12.4)
POTASSIUM SERPL-SCNC: 3.8 MEQ/L (ref 3.5–5.2)
PROT SERPL-MCNC: 6.8 G/DL (ref 6.1–8)
RBC # BLD AUTO: 5.09 MILL/MM3 (ref 4.2–5.4)
SODIUM SERPL-SCNC: 139 MEQ/L (ref 135–145)
WBC # BLD AUTO: 6.1 THOU/MM3 (ref 4.8–10.8)

## 2024-11-02 PROCEDURE — 85025 COMPLETE CBC W/AUTO DIFF WBC: CPT

## 2024-11-02 PROCEDURE — 36415 COLL VENOUS BLD VENIPUNCTURE: CPT

## 2024-11-02 PROCEDURE — 80053 COMPREHEN METABOLIC PANEL: CPT

## 2024-11-04 ENCOUNTER — OFFICE VISIT (OUTPATIENT)
Dept: RHEUMATOLOGY | Age: 44
End: 2024-11-04
Payer: MEDICARE

## 2024-11-04 VITALS
SYSTOLIC BLOOD PRESSURE: 138 MMHG | DIASTOLIC BLOOD PRESSURE: 70 MMHG | HEIGHT: 62 IN | WEIGHT: 213.6 LBS | OXYGEN SATURATION: 97 % | HEART RATE: 69 BPM | BODY MASS INDEX: 39.31 KG/M2

## 2024-11-04 DIAGNOSIS — M79.601 PARESTHESIA AND PAIN OF BOTH UPPER EXTREMITIES: ICD-10-CM

## 2024-11-04 DIAGNOSIS — M32.9 H/O SYSTEMIC LUPUS ERYTHEMATOSUS (SLE) (HCC): ICD-10-CM

## 2024-11-04 DIAGNOSIS — G89.29 CHRONIC BILATERAL LOW BACK PAIN WITHOUT SCIATICA: ICD-10-CM

## 2024-11-04 DIAGNOSIS — M79.602 PARESTHESIA AND PAIN OF BOTH UPPER EXTREMITIES: ICD-10-CM

## 2024-11-04 DIAGNOSIS — M54.50 CHRONIC BILATERAL LOW BACK PAIN WITHOUT SCIATICA: ICD-10-CM

## 2024-11-04 DIAGNOSIS — M46.90 AXIAL SPONDYLOARTHRITIS (HCC): Primary | ICD-10-CM

## 2024-11-04 DIAGNOSIS — M79.7 FIBROMYALGIA: ICD-10-CM

## 2024-11-04 DIAGNOSIS — Z51.81 MEDICATION MONITORING ENCOUNTER: ICD-10-CM

## 2024-11-04 DIAGNOSIS — R20.2 PARESTHESIA AND PAIN OF BOTH UPPER EXTREMITIES: ICD-10-CM

## 2024-11-04 PROCEDURE — 3075F SYST BP GE 130 - 139MM HG: CPT | Performed by: NURSE PRACTITIONER

## 2024-11-04 PROCEDURE — 99214 OFFICE O/P EST MOD 30 MIN: CPT | Performed by: NURSE PRACTITIONER

## 2024-11-04 PROCEDURE — G2211 COMPLEX E/M VISIT ADD ON: HCPCS | Performed by: NURSE PRACTITIONER

## 2024-11-04 PROCEDURE — G8427 DOCREV CUR MEDS BY ELIG CLIN: HCPCS | Performed by: NURSE PRACTITIONER

## 2024-11-04 PROCEDURE — 1036F TOBACCO NON-USER: CPT | Performed by: NURSE PRACTITIONER

## 2024-11-04 PROCEDURE — 3078F DIAST BP <80 MM HG: CPT | Performed by: NURSE PRACTITIONER

## 2024-11-04 PROCEDURE — G8417 CALC BMI ABV UP PARAM F/U: HCPCS | Performed by: NURSE PRACTITIONER

## 2024-11-04 PROCEDURE — G8484 FLU IMMUNIZE NO ADMIN: HCPCS | Performed by: NURSE PRACTITIONER

## 2024-11-04 ASSESSMENT — ENCOUNTER SYMPTOMS
SHORTNESS OF BREATH: 0
EYE PAIN: 0
NAUSEA: 0
CONSTIPATION: 0
COUGH: 1
DIARRHEA: 0
EYE ITCHING: 0
ABDOMINAL PAIN: 0
BACK PAIN: 0
TROUBLE SWALLOWING: 0

## 2024-11-04 NOTE — PROGRESS NOTES
[facility] RHEUMATOLOGY FOLLOW UP NOTE       Date Of Service: 11/4/2024  Provider: Osiris Medina, APRN - CNP    Name: Shilpa Gupta   MRN: [id number]    Chief Complaint(s)     Chief Complaint   Patient presents with    Follow-up     4-month f/u for axial spondyloarthritis.        History of Present Illness (HPI)     Shilpa Gupta  is a(n)44 y.o. female with a hx of morbid obesity, CVA x1 (2017), GERD, hiatal hernia, IBS, endometriosis s/p hysterectomy, asthma, hypercholesterolemia, type 2 DM, lupus arthritis, fibromyalgia, IgG subclass deficiency  here for the f/u evaluation of h/o lupus, fibromyalgia     Interval hx:    - no new issues     pain affecting the neck, back, knees  Pain on a scale 0-10: 6/10  Type of pain: constant  Timing: evenings  Aggravating factors: inactivity, cold, increased use, humid weather  Alleviating factors: CBD gummies    Associated symptoms:  denies swelling/  Redness/ warmth, + AM stiffness lasting 1 hour       REVIEW OF SYSTEMS: (ROS)    Review of Systems   Constitutional:  Negative for fatigue, fever and unexpected weight change.   HENT:  Negative for congestion and trouble swallowing.         Dry mouth   Eyes:  Negative for pain and itching.   Respiratory:  Positive for cough. Negative for shortness of breath.    Cardiovascular:  Negative for chest pain and leg swelling.   Gastrointestinal:  Negative for abdominal pain, constipation, diarrhea and nausea.   Endocrine: Negative for cold intolerance and heat intolerance.   Genitourinary:  Negative for difficulty urinating, frequency and urgency.   Musculoskeletal:  Positive for arthralgias and neck pain. Negative for back pain, joint swelling and myalgias.   Skin:  Positive for rash.   Neurological:  Positive for numbness. Negative for dizziness, weakness and headaches.   Psychiatric/Behavioral:  The patient is not nervous/anxious.        PAST MEDICAL HISTORY      Past Medical History:   Diagnosis Date    Asthma     Diabetes

## 2024-11-11 RX ORDER — POTASSIUM CHLORIDE 750 MG/1
10 TABLET, EXTENDED RELEASE ORAL DAILY
Qty: 90 TABLET | Refills: 3 | Status: SHIPPED | OUTPATIENT
Start: 2024-11-11

## 2024-11-24 ENCOUNTER — PATIENT MESSAGE (OUTPATIENT)
Dept: FAMILY MEDICINE CLINIC | Age: 44
End: 2024-11-24

## 2024-11-30 ENCOUNTER — HOSPITAL ENCOUNTER (OUTPATIENT)
Age: 44
Discharge: HOME | End: 2024-11-30
Payer: MEDICARE

## 2024-11-30 DIAGNOSIS — D83.9 CVID (COMMON VARIABLE IMMUNODEFICIENCY) (HCC): ICD-10-CM

## 2024-11-30 LAB
ALBUMIN SERPL BCG-MCNC: 4.2 G/DL (ref 3.5–5.1)
ALP SERPL-CCNC: 151 U/L (ref 38–126)
ALT SERPL W/O P-5'-P-CCNC: 40 U/L (ref 11–66)
ANION GAP SERPL CALC-SCNC: 15 MEQ/L (ref 8–16)
AST SERPL-CCNC: 34 U/L (ref 5–40)
BASOPHILS ABSOLUTE: 0.1 THOU/MM3 (ref 0–0.1)
BASOPHILS NFR BLD AUTO: 0.6 %
BILIRUB SERPL-MCNC: 0.7 MG/DL (ref 0.3–1.2)
BUN SERPL-MCNC: 10 MG/DL (ref 7–22)
CALCIUM SERPL-MCNC: 9.6 MG/DL (ref 8.5–10.5)
CHLORIDE SERPL-SCNC: 98 MEQ/L (ref 98–111)
CO2 SERPL-SCNC: 24 MEQ/L (ref 23–33)
CREAT SERPL-MCNC: 0.6 MG/DL (ref 0.4–1.2)
DEPRECATED RDW RBC AUTO: 41.3 FL (ref 35–45)
EOSINOPHIL NFR BLD AUTO: 2 %
EOSINOPHILS ABSOLUTE: 0.2 THOU/MM3 (ref 0–0.4)
ERYTHROCYTE [DISTWIDTH] IN BLOOD BY AUTOMATED COUNT: 14 % (ref 11.5–14.5)
GFR SERPL CREATININE-BSD FRML MDRD: > 90 ML/MIN/1.73M2
GLUCOSE SERPL-MCNC: 122 MG/DL (ref 70–108)
HCT VFR BLD AUTO: 43.6 % (ref 37–47)
HGB BLD-MCNC: 14.1 GM/DL (ref 12–16)
IMM GRANULOCYTES # BLD AUTO: 0.03 THOU/MM3 (ref 0–0.07)
IMM GRANULOCYTES NFR BLD AUTO: 0.4 %
LYMPHOCYTES ABSOLUTE: 2.6 THOU/MM3 (ref 1–4.8)
LYMPHOCYTES NFR BLD AUTO: 30.9 %
MCH RBC QN AUTO: 26.6 PG (ref 26–33)
MCHC RBC AUTO-ENTMCNC: 32.3 GM/DL (ref 32.2–35.5)
MCV RBC AUTO: 82.1 FL (ref 81–99)
MONOCYTES ABSOLUTE: 0.6 THOU/MM3 (ref 0.4–1.3)
MONOCYTES NFR BLD AUTO: 7.2 %
NEUTROPHILS ABSOLUTE: 5 THOU/MM3 (ref 1.8–7.7)
NEUTROPHILS NFR BLD AUTO: 58.9 %
NRBC BLD AUTO-RTO: 0 /100 WBC
PLATELET # BLD AUTO: 281 THOU/MM3 (ref 130–400)
PMV BLD AUTO: 11.3 FL (ref 9.4–12.4)
POTASSIUM SERPL-SCNC: 3.8 MEQ/L (ref 3.5–5.2)
PROT SERPL-MCNC: 7.1 G/DL (ref 6.1–8)
RBC # BLD AUTO: 5.31 MILL/MM3 (ref 4.2–5.4)
SODIUM SERPL-SCNC: 137 MEQ/L (ref 135–145)
WBC # BLD AUTO: 8.5 THOU/MM3 (ref 4.8–10.8)

## 2024-11-30 PROCEDURE — 36415 COLL VENOUS BLD VENIPUNCTURE: CPT

## 2024-11-30 PROCEDURE — 85025 COMPLETE CBC W/AUTO DIFF WBC: CPT

## 2024-11-30 PROCEDURE — 80053 COMPREHEN METABOLIC PANEL: CPT

## 2024-12-02 ENCOUNTER — OFFICE VISIT (OUTPATIENT)
Dept: FAMILY MEDICINE CLINIC | Age: 44
End: 2024-12-02
Payer: MEDICARE

## 2024-12-02 ENCOUNTER — HOSPITAL ENCOUNTER (OUTPATIENT)
Age: 44
Discharge: HOME | End: 2024-12-02
Payer: MEDICARE

## 2024-12-02 ENCOUNTER — HOSPITAL ENCOUNTER (OUTPATIENT)
Dept: GENERAL RADIOLOGY | Age: 44
Discharge: HOME | End: 2024-12-02
Payer: MEDICARE

## 2024-12-02 VITALS
HEIGHT: 62 IN | RESPIRATION RATE: 16 BRPM | HEART RATE: 72 BPM | SYSTOLIC BLOOD PRESSURE: 128 MMHG | DIASTOLIC BLOOD PRESSURE: 84 MMHG | OXYGEN SATURATION: 96 % | WEIGHT: 218.03 LBS | BODY MASS INDEX: 40.12 KG/M2 | TEMPERATURE: 98.7 F

## 2024-12-02 DIAGNOSIS — J40 BRONCHITIS: ICD-10-CM

## 2024-12-02 DIAGNOSIS — J30.2 SEASONAL ALLERGIES: ICD-10-CM

## 2024-12-02 DIAGNOSIS — J40 BRONCHITIS: Primary | ICD-10-CM

## 2024-12-02 PROCEDURE — 71046 X-RAY EXAM CHEST 2 VIEWS: CPT

## 2024-12-02 PROCEDURE — G8417 CALC BMI ABV UP PARAM F/U: HCPCS | Performed by: FAMILY MEDICINE

## 2024-12-02 PROCEDURE — 99213 OFFICE O/P EST LOW 20 MIN: CPT | Performed by: FAMILY MEDICINE

## 2024-12-02 PROCEDURE — G8427 DOCREV CUR MEDS BY ELIG CLIN: HCPCS | Performed by: FAMILY MEDICINE

## 2024-12-02 PROCEDURE — 3079F DIAST BP 80-89 MM HG: CPT | Performed by: FAMILY MEDICINE

## 2024-12-02 PROCEDURE — 1036F TOBACCO NON-USER: CPT | Performed by: FAMILY MEDICINE

## 2024-12-02 PROCEDURE — G8484 FLU IMMUNIZE NO ADMIN: HCPCS | Performed by: FAMILY MEDICINE

## 2024-12-02 PROCEDURE — 3074F SYST BP LT 130 MM HG: CPT | Performed by: FAMILY MEDICINE

## 2024-12-02 RX ORDER — BUDESONIDE AND FORMOTEROL FUMARATE DIHYDRATE 160; 4.5 UG/1; UG/1
2 AEROSOL RESPIRATORY (INHALATION) 2 TIMES DAILY
Qty: 10.2 G | Refills: 3 | Status: SHIPPED | OUTPATIENT
Start: 2024-12-02

## 2024-12-02 RX ORDER — FLUTICASONE PROPIONATE 50 MCG
2 SPRAY, SUSPENSION (ML) NASAL DAILY
COMMUNITY
Start: 2024-12-02 | End: 2024-12-02 | Stop reason: SDUPTHER

## 2024-12-02 RX ORDER — AZELASTINE 1 MG/ML
2 SPRAY, METERED NASAL 2 TIMES DAILY
Qty: 1 EACH | Refills: 11 | Status: SHIPPED | OUTPATIENT
Start: 2024-12-02

## 2024-12-02 NOTE — PROGRESS NOTES
[facility] PROFESSIONAL SERVS  [facility] MEDICINE  [address]  [address]  [facility] [address]  Dept: [phone]  Dept Fax: [phone]  Loc: [phone]    Shilpa Gupta is a 44 y.o. female who presents today for:  Chief Complaint   Patient presents with    Cough     Has been going on for two months     Follow-up     From bronchitis            HPI:     HPI  Here for recheck after bronchitis 2 months ago.   Took zyrtec and still taking, finished medrol and omnicef which helped all the other symptoms but still coughing.  Asthma is exacerbated, albuterol is no help.  Not sleeping.   Tried:  robitussin, delsym, nyquil and alkaseltzer cold with no help.  History of Present Illness      Reviewed chart forpast medical history , surgical history , allergies, social history , family history and medications.    Health Maintenance   Topic Date Due    Hepatitis B vaccine (1 of 3 - 19+ 3-dose series) Never done    Diabetic foot exam  07/17/2020    Diabetic Alb to Cr ratio (uACR) test  12/12/2022    Diabetic retinal exam  02/01/2024    COVID-19 Vaccine (5 - 2023-24 season) 09/01/2024    Breast cancer screen  04/02/2025    Depression Screen  04/09/2025    A1C test (Diabetic or Prediabetic)  04/11/2025    Lipids  04/11/2025    Annual Wellness Visit (Medicare)  04/11/2025    GFR test (Diabetes, CKD 3-4, OR last GFR 15-59)  11/30/2025    Colorectal Cancer Screen  10/24/2027    DTaP/Tdap/Td vaccine (2 - Td or Tdap) 07/11/2030    Pneumococcal 0-64 years Vaccine (4 of 4 - PPSV23 or PCV20) 08/10/2044    Flu vaccine  Completed    Hepatitis C screen  Completed    Hepatitis A vaccine  Aged Out    Hib vaccine  Aged Out    HPV vaccine  Aged Out    Polio vaccine  Aged Out    Meningococcal (ACWY) vaccine  Aged Out    HIV screen  Discontinued       Subjective:      Constitutional:Negative for fever, chills, diaphoresis, activity change, appetite change and fatigue.   HENT: Negative for hearing loss, ear pain,

## 2024-12-05 DIAGNOSIS — D83.9 CVID (COMMON VARIABLE IMMUNODEFICIENCY) (HCC): Primary | ICD-10-CM

## 2024-12-05 RX ORDER — DIPHENHYDRAMINE HCL 25 MG
25 TABLET ORAL
Qty: 1 TABLET | Refills: 12
Start: 2024-12-05

## 2024-12-05 RX ORDER — SENNOSIDES 8.6 MG
650 CAPSULE ORAL
Qty: 2 TABLET | Refills: 12
Start: 2024-12-05

## 2024-12-05 NOTE — PROGRESS NOTES
[facility] request refill for HyQvia, acetaminophen and diphenhydramine.  Order faxed back

## 2024-12-06 DIAGNOSIS — M46.90 AXIAL SPONDYLOARTHRITIS (HCC): ICD-10-CM

## 2024-12-06 NOTE — TELEPHONE ENCOUNTER
Last visit- 12/2/2024  Next visit- 4/10/2025    Requested Prescriptions     Pending Prescriptions Disp Refills    TRULICITY 4.5 MG/0.5ML SOAJ [Pharmacy Med Name: Trulicity 4.5 MG/0.5ML Subcutaneous Solution Pen-injector] 12 mL 0     Sig: INJECT 4.5 MG INTO THE SKIN ONCE WEEKLY

## 2024-12-07 RX ORDER — DULAGLUTIDE 4.5 MG/.5ML
INJECTION, SOLUTION SUBCUTANEOUS
Qty: 12 ML | Refills: 0 | Status: SHIPPED | OUTPATIENT
Start: 2024-12-07

## 2024-12-09 RX ORDER — SECUKINUMAB 150 MG/ML
INJECTION SUBCUTANEOUS
Qty: 2 ML | Refills: 3 | Status: ACTIVE | OUTPATIENT
Start: 2024-12-09

## 2024-12-10 DIAGNOSIS — J30.2 SEASONAL ALLERGIES: ICD-10-CM

## 2024-12-10 RX ORDER — ATORVASTATIN CALCIUM 80 MG/1
80 TABLET, FILM COATED ORAL DAILY
Qty: 90 TABLET | Refills: 1 | Status: SHIPPED | OUTPATIENT
Start: 2024-12-10

## 2024-12-10 RX ORDER — CYCLOBENZAPRINE HCL 5 MG
TABLET ORAL
Qty: 270 TABLET | Refills: 0 | Status: SHIPPED | OUTPATIENT
Start: 2024-12-10

## 2024-12-10 RX ORDER — MONTELUKAST SODIUM 10 MG/1
10 TABLET ORAL NIGHTLY
Qty: 90 TABLET | Refills: 1 | Status: SHIPPED | OUTPATIENT
Start: 2024-12-10

## 2024-12-10 NOTE — TELEPHONE ENCOUNTER
Last visit- 12/2/2024  Next visit- 4/10/2025    Requested Prescriptions     Pending Prescriptions Disp Refills    cyclobenzaprine (FLEXERIL) 5 MG tablet [Pharmacy Med Name: Cyclobenzaprine HCl 5 MG Oral Tablet] 270 tablet 0     Sig: TAKE 1 TO 2 TABLETS BY MOUTH THREE TIMES DAILY AS NEEDED FOR MUSCLE SPASM

## 2024-12-30 ENCOUNTER — HOSPITAL ENCOUNTER (OUTPATIENT)
Age: 44
Discharge: HOME | End: 2024-12-30
Payer: MEDICARE

## 2024-12-30 DIAGNOSIS — M54.50 CHRONIC BILATERAL LOW BACK PAIN WITHOUT SCIATICA: ICD-10-CM

## 2024-12-30 DIAGNOSIS — M79.7 FIBROMYALGIA: ICD-10-CM

## 2024-12-30 DIAGNOSIS — G89.29 CHRONIC BILATERAL LOW BACK PAIN WITHOUT SCIATICA: ICD-10-CM

## 2024-12-30 DIAGNOSIS — D83.9 CVID (COMMON VARIABLE IMMUNODEFICIENCY) (HCC): ICD-10-CM

## 2024-12-30 LAB
ALBUMIN SERPL BCG-MCNC: 4.5 G/DL (ref 3.5–5.1)
ALP SERPL-CCNC: 179 U/L (ref 38–126)
ALT SERPL W/O P-5'-P-CCNC: 42 U/L (ref 11–66)
ANION GAP SERPL CALC-SCNC: 13 MEQ/L (ref 8–16)
AST SERPL-CCNC: 37 U/L (ref 5–40)
BASOPHILS ABSOLUTE: 0.1 THOU/MM3 (ref 0–0.1)
BASOPHILS NFR BLD AUTO: 0.9 %
BILIRUB SERPL-MCNC: 0.6 MG/DL (ref 0.3–1.2)
BUN SERPL-MCNC: 9 MG/DL (ref 7–22)
CALCIUM SERPL-MCNC: 9.6 MG/DL (ref 8.5–10.5)
CHLORIDE SERPL-SCNC: 99 MEQ/L (ref 98–111)
CO2 SERPL-SCNC: 28 MEQ/L (ref 23–33)
CREAT SERPL-MCNC: 0.7 MG/DL (ref 0.4–1.2)
DEPRECATED RDW RBC AUTO: 43.8 FL (ref 35–45)
EOSINOPHIL NFR BLD AUTO: 1.8 %
EOSINOPHILS ABSOLUTE: 0.1 THOU/MM3 (ref 0–0.4)
ERYTHROCYTE [DISTWIDTH] IN BLOOD BY AUTOMATED COUNT: 14.5 % (ref 11.5–14.5)
GFR SERPL CREATININE-BSD FRML MDRD: > 90 ML/MIN/1.73M2
GLUCOSE SERPL-MCNC: 130 MG/DL (ref 70–108)
HCT VFR BLD AUTO: 45.4 % (ref 37–47)
HGB BLD-MCNC: 14.3 GM/DL (ref 12–16)
IMM GRANULOCYTES # BLD AUTO: 0.02 THOU/MM3 (ref 0–0.07)
IMM GRANULOCYTES NFR BLD AUTO: 0.3 %
LYMPHOCYTES ABSOLUTE: 2.7 THOU/MM3 (ref 1–4.8)
LYMPHOCYTES NFR BLD AUTO: 34 %
MCH RBC QN AUTO: 26.4 PG (ref 26–33)
MCHC RBC AUTO-ENTMCNC: 31.5 GM/DL (ref 32.2–35.5)
MCV RBC AUTO: 83.9 FL (ref 81–99)
MONOCYTES ABSOLUTE: 0.6 THOU/MM3 (ref 0.4–1.3)
MONOCYTES NFR BLD AUTO: 7 %
NEUTROPHILS ABSOLUTE: 4.5 THOU/MM3 (ref 1.8–7.7)
NEUTROPHILS NFR BLD AUTO: 56 %
NRBC BLD AUTO-RTO: 0 /100 WBC
PLATELET # BLD AUTO: 300 THOU/MM3 (ref 130–400)
PMV BLD AUTO: 10.9 FL (ref 9.4–12.4)
POTASSIUM SERPL-SCNC: 4 MEQ/L (ref 3.5–5.2)
PROT SERPL-MCNC: 7.4 G/DL (ref 6.1–8)
RBC # BLD AUTO: 5.41 MILL/MM3 (ref 4.2–5.4)
SODIUM SERPL-SCNC: 140 MEQ/L (ref 135–145)
WBC # BLD AUTO: 8 THOU/MM3 (ref 4.8–10.8)

## 2024-12-30 PROCEDURE — 85025 COMPLETE CBC W/AUTO DIFF WBC: CPT

## 2024-12-30 PROCEDURE — 80053 COMPREHEN METABOLIC PANEL: CPT

## 2024-12-30 PROCEDURE — 36415 COLL VENOUS BLD VENIPUNCTURE: CPT

## 2024-12-30 RX ORDER — GABAPENTIN 400 MG/1
CAPSULE ORAL
Qty: 60 CAPSULE | Refills: 3 | Status: SHIPPED | OUTPATIENT
Start: 2024-12-30 | End: 2025-03-30

## 2025-01-22 ENCOUNTER — HOSPITAL ENCOUNTER (OUTPATIENT)
Age: 45
Discharge: HOME | End: 2025-01-22
Payer: MEDICARE

## 2025-01-22 DIAGNOSIS — D83.9 CVID (COMMON VARIABLE IMMUNODEFICIENCY) (HCC): ICD-10-CM

## 2025-01-22 LAB
BASOPHILS ABSOLUTE: 0.1 THOU/MM3 (ref 0–0.1)
BASOPHILS NFR BLD AUTO: 0.7 %
DEPRECATED RDW RBC AUTO: 42.4 FL (ref 35–45)
EOSINOPHIL NFR BLD AUTO: 2.5 %
EOSINOPHILS ABSOLUTE: 0.2 THOU/MM3 (ref 0–0.4)
ERYTHROCYTE [DISTWIDTH] IN BLOOD BY AUTOMATED COUNT: 14.3 % (ref 11.5–14.5)
HCT VFR BLD AUTO: 45.3 % (ref 37–47)
HGB BLD-MCNC: 14 GM/DL (ref 12–16)
IMM GRANULOCYTES # BLD AUTO: 0.01 THOU/MM3 (ref 0–0.07)
IMM GRANULOCYTES NFR BLD AUTO: 0.1 %
LYMPHOCYTES ABSOLUTE: 2.5 THOU/MM3 (ref 1–4.8)
LYMPHOCYTES NFR BLD AUTO: 35.1 %
MCH RBC QN AUTO: 25.7 PG (ref 26–33)
MCHC RBC AUTO-ENTMCNC: 30.9 GM/DL (ref 32.2–35.5)
MCV RBC AUTO: 83.3 FL (ref 81–99)
MONOCYTES ABSOLUTE: 0.5 THOU/MM3 (ref 0.4–1.3)
MONOCYTES NFR BLD AUTO: 6.6 %
NEUTROPHILS ABSOLUTE: 4 THOU/MM3 (ref 1.8–7.7)
NEUTROPHILS NFR BLD AUTO: 55 %
NRBC BLD AUTO-RTO: 0 /100 WBC
PLATELET # BLD AUTO: 282 THOU/MM3 (ref 130–400)
PMV BLD AUTO: 11.2 FL (ref 9.4–12.4)
RBC # BLD AUTO: 5.44 MILL/MM3 (ref 4.2–5.4)
WBC # BLD AUTO: 7.2 THOU/MM3 (ref 4.8–10.8)

## 2025-01-22 PROCEDURE — 80053 COMPREHEN METABOLIC PANEL: CPT

## 2025-01-22 PROCEDURE — 85025 COMPLETE CBC W/AUTO DIFF WBC: CPT

## 2025-01-22 PROCEDURE — 36415 COLL VENOUS BLD VENIPUNCTURE: CPT

## 2025-01-23 LAB
ALBUMIN SERPL BCG-MCNC: 4.2 G/DL (ref 3.5–5.1)
ALP SERPL-CCNC: 174 U/L (ref 38–126)
ALT SERPL W/O P-5'-P-CCNC: 38 U/L (ref 11–66)
ANION GAP SERPL CALC-SCNC: 12 MEQ/L (ref 8–16)
AST SERPL-CCNC: 37 U/L (ref 5–40)
BILIRUB SERPL-MCNC: 0.4 MG/DL (ref 0.3–1.2)
BUN SERPL-MCNC: 8 MG/DL (ref 7–22)
CALCIUM SERPL-MCNC: 9.2 MG/DL (ref 8.5–10.5)
CHLORIDE SERPL-SCNC: 100 MEQ/L (ref 98–111)
CO2 SERPL-SCNC: 26 MEQ/L (ref 23–33)
CREAT SERPL-MCNC: 0.6 MG/DL (ref 0.4–1.2)
GFR SERPL CREATININE-BSD FRML MDRD: > 90 ML/MIN/1.73M2
GLUCOSE SERPL-MCNC: 99 MG/DL (ref 70–108)
POTASSIUM SERPL-SCNC: 3.5 MEQ/L (ref 3.5–5.2)
PROT SERPL-MCNC: 7.1 G/DL (ref 6.1–8)
SODIUM SERPL-SCNC: 138 MEQ/L (ref 135–145)

## 2025-02-06 ENCOUNTER — TELEPHONE (OUTPATIENT)
Dept: FAMILY MEDICINE CLINIC | Age: 45
End: 2025-02-06

## 2025-02-06 RX ORDER — BUDESONIDE AND FORMOTEROL FUMARATE DIHYDRATE 160; 4.5 UG/1; UG/1
2 AEROSOL RESPIRATORY (INHALATION) 2 TIMES DAILY
Qty: 3 EACH | Refills: 3 | Status: SHIPPED | OUTPATIENT
Start: 2025-02-06

## 2025-02-06 NOTE — TELEPHONE ENCOUNTER
[facility] sent letter stating Budes/Formot Aer 160-40.5  Is not on pts formulary    Recommended alternative meds:  Symbicort  Breo  Fluticasone/Salmeterol (Advair)  Dulera    Document scanned in

## 2025-02-11 ENCOUNTER — HOSPITAL ENCOUNTER (EMERGENCY)
Age: 45
Discharge: HOME | End: 2025-02-11
Attending: EMERGENCY MEDICINE
Payer: MEDICARE

## 2025-02-11 VITALS
HEART RATE: 108 BPM | SYSTOLIC BLOOD PRESSURE: 152 MMHG | RESPIRATION RATE: 18 BRPM | DIASTOLIC BLOOD PRESSURE: 93 MMHG | OXYGEN SATURATION: 96 % | BODY MASS INDEX: 37.74 KG/M2 | WEIGHT: 213 LBS | TEMPERATURE: 97.5 F | HEIGHT: 63 IN

## 2025-02-11 DIAGNOSIS — R05.1 ACUTE COUGH: Primary | ICD-10-CM

## 2025-02-11 LAB
INFLUENZA A BY PCR: NOT DETECTED
INFLUENZA B BY PCR: NOT DETECTED
SARS-COV-2 RNA, RT PCR: NOT DETECTED

## 2025-02-11 PROCEDURE — 99283 EMERGENCY DEPT VISIT LOW MDM: CPT

## 2025-02-11 PROCEDURE — 87636 SARSCOV2 & INF A&B AMP PRB: CPT

## 2025-02-11 RX ORDER — BENZONATATE 100 MG/1
100 CAPSULE ORAL 2 TIMES DAILY PRN
Qty: 14 CAPSULE | Refills: 0 | Status: SHIPPED | OUTPATIENT
Start: 2025-02-11 | End: 2025-02-18

## 2025-02-11 RX ORDER — AZITHROMYCIN 250 MG/1
TABLET, FILM COATED ORAL
Qty: 1 PACKET | Refills: 0 | Status: SHIPPED | OUTPATIENT
Start: 2025-02-11

## 2025-02-11 NOTE — DISCHARGE INSTR - COC
PF, 0.5mL 10/23/2021, 09/18/2023    Pneumococcal, PCV-13, PREVNAR 13, (age 6w+), IM, 0.5mL 09/06/2019    Pneumococcal, PPSV23, PNEUMOVAX 23, (age 2y+), SC/IM, 0.5mL 05/15/2014, 07/11/2020    TDaP, ADACEL (age 10y-64y), BOOSTRIX (age 10y+), IM, 0.5mL 07/11/2020       Active Problems:  Patient Active Problem List   Diagnosis Code    Hyperlipidemia E78.5    Uncomplicated asthma J45.909    GERD (gastroesophageal reflux disease) K21.9    Convulsions (HCC) R56.9    Intractable chronic migraine without aura and without status migrainosus G43.719    Fibromyalgia M79.7    Lupus arthritis ([facility]) M32.9    Drowsiness R40.0    Left-sided weakness R53.1    Lupus [id number]    Seizure disorder ([facility]) G40.909    Dizziness R42    Morbid obesity due to excess calories E66.01    Seasonal allergies J30.2    Cholecystitis K81.9    Chest pain R07.9    Dysphagia R13.10    Dyspepsia R10.13    Diabetes mellitus (HCC) E11.9    Acute cerebrovascular accident (CVA) ([facility]) I63.9    Stroke-like symptoms R29.90    TIA (transient ischemic attack) G45.9    Lung nodule, solitary R91.1    Obesity (BMI 30-39.9) E66.9    Long term systemic steroid user Z79.52    Hypertensive disorder I10    IgG subclass deficiency (HCC) D80.3    Migraine G43.909    Recurrent infections B99.9    Spells of decreased attentiveness R68.89    Common variable immunodeficiency (HCC) D83.9       Isolation/Infection:   Isolation            No Isolation          Patient Infection Status       None to display                     Nurse Assessment:  Last Vital Signs: BP (!) 152/93   Pulse (!) 108   Temp 97.5 °F (36.4 °C) (Temporal)   Resp 18   Ht 1.6 m (5' 3\")   Wt 96.6 kg (213 lb)   SpO2 96%   BMI 37.73 kg/m²     Last documented pain score (0-10 scale):    Last Weight:   Wt Readings from Last 1 Encounters:   02/11/25 96.6 kg (213 lb)     Mental Status:  {IP PT MENTAL STATUS:20030}    IV Access:  {[facility] IV ACCESS:[id number]}    Nursing Mobility/ADLs:  Walking   {[facility] DME

## 2025-02-11 NOTE — DISCHARGE INSTRUCTIONS
Continue home medications.  Use Flonase to decrease nasal inflammation.  Start taking Zithromax.  Zithromax treats ear infection strep throat bronchitis and pneumonia related to bacteria.  If this is viral will not help.  Follow-up with your primary care doctor.  They may need to do further testing in regards to your persistent recurrent cough.  You may try some Tessalon Perles for persistent cough or over-the-counter Mucinex or Robitussin.

## 2025-02-11 NOTE — ED PROVIDER NOTES
[facility] Emergency Department  [address]  [facility] [address]  Phone: [phone]  EMERGENCY DEPARTMENT ENCOUNTER      Pt Name: Shilpa Gupta  MRN: [id number]  Birthdate 1980  Date of evaluation: 2/11/2025  Provider: Jeremy Jaime MD    CHIEF COMPLAINT       Chief Complaint   Patient presents with    Cough    Nasal Congestion         HISTORY OF PRESENT ILLNESS      Shilpa Gupta is a 44 y.o. female who presents to the emergency department with above-noted complaint.  Patient is doing okay.  Developed cough congestion.  Check she said she was sick most of September through the last December.  Seem like she got better and now has developed cough congestion again.  She has some rhinorrhea.  No chest pain.        REVIEW OF SYSTEMS     Positive for cough.  No chest pain or abdominal pain or vomiting  Review of Systems  All systems negative except as marked.     PAST MEDICAL HISTORY     Past Medical History:   Diagnosis Date    Asthma     Diabetes mellitus (HCC) 6/8/2018    Fibromyalgia     GERD (gastroesophageal reflux disease)     Hyperlipidemia     Lupus (systemic lupus erythematosus) (Former[facility])     Lupus arthritis ([facility])     [facility]    Migraine     Plaquenil adverse reaction in therapeutic use 5/2016    changes in the eyes    TIA (transient ischemic attack)     8/2016         SURGICAL HISTORY       Past Surgical History:   Procedure Laterality Date    HYSTERECTOMY (CERVIX STATUS UNKNOWN)  2009    Total, endometriosis    HYSTERECTOMY, TOTAL ABDOMINAL (CERVIX REMOVED)  2009    LAPAROSCOPY      CESIA STEREO BREAST BX W LOC DEVICE 1ST LESION LEFT Left 02/26/2020    2 sites, benign    CESIA STEREO BREAST BX W LOC DEVICE ADDL LESION LEFT Left 02/26/2020    benign    OVARY REMOVAL Bilateral 2009    SHOULDER ARTHROSCOPY Left 08/20/2015    Debridement of rotator cuff and bone spurs         CURRENT MEDICATIONS       Previous Medications    ACETAMINOPHEN (TYLENOL 8 HOUR) 650 MG EXTENDED

## 2025-02-25 RX ORDER — TIZANIDINE 2 MG/1
TABLET ORAL
Qty: 60 TABLET | Refills: 0 | Status: SHIPPED | OUTPATIENT
Start: 2025-02-25

## 2025-03-05 DIAGNOSIS — M46.90 AXIAL SPONDYLOARTHRITIS: ICD-10-CM

## 2025-03-05 RX ORDER — SECUKINUMAB 150 MG/ML
INJECTION SUBCUTANEOUS
Qty: 2 ML | Refills: 3 | OUTPATIENT
Start: 2025-03-05

## 2025-03-24 ENCOUNTER — HOSPITAL ENCOUNTER (OUTPATIENT)
Age: 45
Discharge: HOME | End: 2025-03-24
Payer: MEDICARE

## 2025-03-24 DIAGNOSIS — D83.9 CVID (COMMON VARIABLE IMMUNODEFICIENCY) (HCC): ICD-10-CM

## 2025-03-24 LAB
ALBUMIN SERPL BCG-MCNC: 4 G/DL (ref 3.4–4.9)
ALP SERPL-CCNC: 151 U/L (ref 35–104)
ALT SERPL W/O P-5'-P-CCNC: 37 U/L (ref 10–35)
ANION GAP SERPL CALC-SCNC: 11 MEQ/L (ref 8–16)
AST SERPL-CCNC: 34 U/L (ref 10–35)
BASOPHILS ABSOLUTE: 0 THOU/MM3 (ref 0–0.1)
BASOPHILS NFR BLD AUTO: 0.6 %
BILIRUB SERPL-MCNC: 0.5 MG/DL (ref 0.3–1.2)
BUN SERPL-MCNC: 7 MG/DL (ref 8–23)
CALCIUM SERPL-MCNC: 9.6 MG/DL (ref 8.6–10)
CHLORIDE SERPL-SCNC: 105 MEQ/L (ref 98–111)
CO2 SERPL-SCNC: 26 MEQ/L (ref 22–29)
CREAT SERPL-MCNC: 0.6 MG/DL (ref 0.5–0.9)
DEPRECATED RDW RBC AUTO: 41.1 FL (ref 35–45)
EOSINOPHIL NFR BLD AUTO: 2.3 %
EOSINOPHILS ABSOLUTE: 0.2 THOU/MM3 (ref 0–0.4)
ERYTHROCYTE [DISTWIDTH] IN BLOOD BY AUTOMATED COUNT: 14.1 % (ref 11.5–14.5)
GFR SERPL CREATININE-BSD FRML MDRD: > 90 ML/MIN/1.73M2
GLUCOSE SERPL-MCNC: 133 MG/DL (ref 74–109)
HCT VFR BLD AUTO: 42.4 % (ref 37–47)
HGB BLD-MCNC: 13.6 GM/DL (ref 12–16)
IMM GRANULOCYTES # BLD AUTO: 0.02 THOU/MM3 (ref 0–0.07)
IMM GRANULOCYTES NFR BLD AUTO: 0.3 %
LYMPHOCYTES ABSOLUTE: 3.1 THOU/MM3 (ref 1–4.8)
LYMPHOCYTES NFR BLD AUTO: 39.7 %
MCH RBC QN AUTO: 26 PG (ref 26–33)
MCHC RBC AUTO-ENTMCNC: 32.1 GM/DL (ref 32.2–35.5)
MCV RBC AUTO: 81.1 FL (ref 81–99)
MONOCYTES ABSOLUTE: 0.6 THOU/MM3 (ref 0.4–1.3)
MONOCYTES NFR BLD AUTO: 7.3 %
NEUTROPHILS ABSOLUTE: 3.9 THOU/MM3 (ref 1.8–7.7)
NEUTROPHILS NFR BLD AUTO: 49.8 %
NRBC BLD AUTO-RTO: 0 /100 WBC
PLATELET # BLD AUTO: 260 THOU/MM3 (ref 130–400)
PMV BLD AUTO: 11.4 FL (ref 9.4–12.4)
POTASSIUM SERPL-SCNC: 4.2 MEQ/L (ref 3.5–5.2)
PROT SERPL-MCNC: 6.9 G/DL (ref 6.4–8.3)
RBC # BLD AUTO: 5.23 MILL/MM3 (ref 4.2–5.4)
SODIUM SERPL-SCNC: 142 MEQ/L (ref 135–145)
WBC # BLD AUTO: 7.9 THOU/MM3 (ref 4.8–10.8)

## 2025-03-24 PROCEDURE — 85025 COMPLETE CBC W/AUTO DIFF WBC: CPT

## 2025-03-24 PROCEDURE — 80053 COMPREHEN METABOLIC PANEL: CPT

## 2025-03-24 PROCEDURE — 36415 COLL VENOUS BLD VENIPUNCTURE: CPT

## 2025-03-31 ENCOUNTER — OFFICE VISIT (OUTPATIENT)
Age: 45
End: 2025-03-31
Payer: MEDICARE

## 2025-03-31 VITALS
DIASTOLIC BLOOD PRESSURE: 86 MMHG | WEIGHT: 220.5 LBS | HEART RATE: 102 BPM | OXYGEN SATURATION: 96 % | BODY MASS INDEX: 39.07 KG/M2 | HEIGHT: 63 IN | SYSTOLIC BLOOD PRESSURE: 134 MMHG

## 2025-03-31 DIAGNOSIS — M79.601 PARESTHESIA AND PAIN OF BOTH UPPER EXTREMITIES: ICD-10-CM

## 2025-03-31 DIAGNOSIS — R20.2 PARESTHESIA AND PAIN OF BOTH UPPER EXTREMITIES: ICD-10-CM

## 2025-03-31 DIAGNOSIS — Z51.81 MEDICATION MONITORING ENCOUNTER: ICD-10-CM

## 2025-03-31 DIAGNOSIS — M54.50 CHRONIC BILATERAL LOW BACK PAIN WITHOUT SCIATICA: ICD-10-CM

## 2025-03-31 DIAGNOSIS — G89.29 CHRONIC BILATERAL LOW BACK PAIN WITHOUT SCIATICA: ICD-10-CM

## 2025-03-31 DIAGNOSIS — M46.90 AXIAL SPONDYLOARTHRITIS: Primary | ICD-10-CM

## 2025-03-31 DIAGNOSIS — M79.602 PARESTHESIA AND PAIN OF BOTH UPPER EXTREMITIES: ICD-10-CM

## 2025-03-31 DIAGNOSIS — M79.7 FIBROMYALGIA: ICD-10-CM

## 2025-03-31 PROCEDURE — G8417 CALC BMI ABV UP PARAM F/U: HCPCS | Performed by: NURSE PRACTITIONER

## 2025-03-31 PROCEDURE — 3079F DIAST BP 80-89 MM HG: CPT | Performed by: NURSE PRACTITIONER

## 2025-03-31 PROCEDURE — 1036F TOBACCO NON-USER: CPT | Performed by: NURSE PRACTITIONER

## 2025-03-31 PROCEDURE — G8427 DOCREV CUR MEDS BY ELIG CLIN: HCPCS | Performed by: NURSE PRACTITIONER

## 2025-03-31 PROCEDURE — 3075F SYST BP GE 130 - 139MM HG: CPT | Performed by: NURSE PRACTITIONER

## 2025-03-31 PROCEDURE — 99214 OFFICE O/P EST MOD 30 MIN: CPT | Performed by: NURSE PRACTITIONER

## 2025-03-31 ASSESSMENT — ENCOUNTER SYMPTOMS
BACK PAIN: 1
COUGH: 1
SHORTNESS OF BREATH: 1
GASTROINTESTINAL NEGATIVE: 1
EYES NEGATIVE: 1

## 2025-03-31 NOTE — PROGRESS NOTES
[facility] RHEUMATOLOGY FOLLOW UP NOTE       Date Of Service: 3/31/2025  Provider: NAHID Sanchez - CNP    Name: Shilpa Gupta   MRN: [id number]    Chief Complaint(s)     Chief Complaint   Patient presents with    Follow-up     4 month follow up axial spondyloarthritis         History of Present Illness (HPI)     Shilpa Gupta  is a(n)44 y.o. female with a hx of morbid obesity, CVA x1 (2017), GERD, hiatal hernia, IBS, endometriosis s/p hysterectomy, asthma, hypercholesterolemia, type 2 DM, lupus arthritis, fibromyalgia, IgG subclass deficiency  here for the f/u evaluation of h/o lupus, fibromyalgia     Interval hx:    - some increased pain over the past couple weeks-  generalized aching. Was sick recently- not sleeping well. Feels like this is cause of aching.     pain affecting the fingers, shoulders, neck, back, hips, knees, feet   Pain on a scale 0-10: 7/10  Type of pain: constant  Timing: evenings  Aggravating factors: inactivity, cold, increased use, humid weather  Alleviating factors: CBD gummies    Associated symptoms:  denies swelling/  Redness/ warmth, + AM stiffness lasting 30 mins       REVIEW OF SYSTEMS: (ROS)    Review of Systems   Constitutional: Negative.    HENT: Negative.     Eyes: Negative.    Respiratory:  Positive for cough and shortness of breath.    Cardiovascular: Negative.    Gastrointestinal: Negative.    Endocrine: Negative.    Genitourinary: Negative.    Musculoskeletal:  Positive for arthralgias, back pain and neck pain.   Skin: Negative.    Neurological: Negative.    Psychiatric/Behavioral: Negative.         PAST MEDICAL HISTORY      Past Medical History:   Diagnosis Date    Asthma     Diabetes mellitus (HCC) 6/8/2018    Fibromyalgia     GERD (gastroesophageal reflux disease)     Hyperlipidemia     Lupus (systemic lupus erythematosus) ([facility])     Lupus arthritis ([facility])     [facility]    Migraine     Plaquenil adverse reaction in therapeutic use 5/2016    changes in

## 2025-04-01 RX ORDER — TIZANIDINE 2 MG/1
TABLET ORAL
Qty: 60 TABLET | Refills: 0 | Status: SHIPPED | OUTPATIENT
Start: 2025-04-01

## 2025-04-02 DIAGNOSIS — M46.90 AXIAL SPONDYLOARTHRITIS: ICD-10-CM

## 2025-04-02 RX ORDER — SECUKINUMAB 150 MG/ML
150 INJECTION SUBCUTANEOUS
Qty: 2 ML | Refills: 3 | Status: SHIPPED | OUTPATIENT
Start: 2025-04-02 | End: 2025-04-02 | Stop reason: SDUPTHER

## 2025-04-02 RX ORDER — SECUKINUMAB 150 MG/ML
300 INJECTION SUBCUTANEOUS
Qty: 2 ML | Refills: 3 | Status: ACTIVE | OUTPATIENT
Start: 2025-04-02

## 2025-04-21 ENCOUNTER — HOSPITAL ENCOUNTER (OUTPATIENT)
Age: 45
Discharge: HOME | End: 2025-04-21
Payer: MEDICARE

## 2025-04-21 DIAGNOSIS — E61.1 IRON DEFICIENCY: ICD-10-CM

## 2025-04-21 DIAGNOSIS — E11.69 TYPE 2 DIABETES MELLITUS WITH OTHER SPECIFIED COMPLICATION, WITHOUT LONG-TERM CURRENT USE OF INSULIN (HCC): ICD-10-CM

## 2025-04-21 DIAGNOSIS — Z51.81 MEDICATION MONITORING ENCOUNTER: ICD-10-CM

## 2025-04-21 DIAGNOSIS — M46.90 AXIAL SPONDYLOARTHRITIS: ICD-10-CM

## 2025-04-21 DIAGNOSIS — R79.89 ELEVATED TSH: ICD-10-CM

## 2025-04-21 DIAGNOSIS — D80.1 HYPOGAMMAGLOBULINEMIA: ICD-10-CM

## 2025-04-21 DIAGNOSIS — E78.00 PURE HYPERCHOLESTEROLEMIA: ICD-10-CM

## 2025-04-21 DIAGNOSIS — D83.9 CVID (COMMON VARIABLE IMMUNODEFICIENCY) (HCC): ICD-10-CM

## 2025-04-21 LAB
ALBUMIN SERPL BCG-MCNC: 4.3 G/DL (ref 3.4–4.9)
ALP SERPL-CCNC: 155 U/L (ref 38–126)
ALT SERPL W/O P-5'-P-CCNC: 56 U/L (ref 10–35)
ANION GAP SERPL CALC-SCNC: 11 MEQ/L (ref 8–16)
AST SERPL-CCNC: 47 U/L (ref 10–35)
BASOPHILS ABSOLUTE: 0 THOU/MM3 (ref 0–0.1)
BASOPHILS NFR BLD AUTO: 0.6 %
BILIRUB SERPL-MCNC: 0.6 MG/DL (ref 0.3–1.2)
BUN SERPL-MCNC: 12 MG/DL (ref 8–23)
CALCIUM SERPL-MCNC: 9.4 MG/DL (ref 8.6–10)
CHLORIDE SERPL-SCNC: 102 MEQ/L (ref 98–111)
CHOLEST SERPL-MCNC: 134 MG/DL (ref 100–199)
CO2 SERPL-SCNC: 27 MEQ/L (ref 22–29)
CREAT SERPL-MCNC: 0.6 MG/DL (ref 0.5–0.9)
CRP SERPL-MCNC: < 0.3 MG/DL (ref 0–0.5)
DEPRECATED MEAN GLUCOSE BLD GHB EST-ACNC: 147 MG/DL (ref 70–126)
DEPRECATED RDW RBC AUTO: 43.1 FL (ref 35–45)
EOSINOPHIL NFR BLD AUTO: 1.3 %
EOSINOPHILS ABSOLUTE: 0.1 THOU/MM3 (ref 0–0.4)
ERYTHROCYTE [DISTWIDTH] IN BLOOD BY AUTOMATED COUNT: 14.6 % (ref 11.5–14.5)
ERYTHROCYTE [SEDIMENTATION RATE] IN BLOOD BY WESTERGREN METHOD: 14 MM/HR (ref 0–20)
FERRITIN SERPL IA-MCNC: 26 NG/ML (ref 13–150)
FOLATE SERPL-MCNC: 11.9 NG/ML (ref 4.6–34.8)
GFR SERPL CREATININE-BSD FRML MDRD: > 90 ML/MIN/1.73M2
GLUCOSE FASTING: 99 MG/DL (ref 74–109)
HBA1C MFR BLD HPLC: 6.9 % (ref 4–6)
HCT VFR BLD AUTO: 43.5 % (ref 37–47)
HDLC SERPL-MCNC: 34 MG/DL
HGB BLD-MCNC: 13.8 GM/DL (ref 12–16)
IMM GRANULOCYTES # BLD AUTO: 0.03 THOU/MM3 (ref 0–0.07)
IMM GRANULOCYTES NFR BLD AUTO: 0.4 %
IRON SATN MFR SERPL: 12 % (ref 20–50)
IRON SERPL-MCNC: 48 UG/DL (ref 37–145)
LDLC SERPL CALC-MCNC: 82 MG/DL
LYMPHOCYTES ABSOLUTE: 2.7 THOU/MM3 (ref 1–4.8)
LYMPHOCYTES NFR BLD AUTO: 32.4 %
MCH RBC QN AUTO: 25.8 PG (ref 26–33)
MCHC RBC AUTO-ENTMCNC: 31.7 GM/DL (ref 32.2–35.5)
MCV RBC AUTO: 81.5 FL (ref 81–99)
MONOCYTES ABSOLUTE: 0.5 THOU/MM3 (ref 0.4–1.3)
MONOCYTES NFR BLD AUTO: 5.9 %
NEUTROPHILS ABSOLUTE: 4.9 THOU/MM3 (ref 1.8–7.7)
NEUTROPHILS NFR BLD AUTO: 59.4 %
NRBC BLD AUTO-RTO: 0 /100 WBC
PLATELET # BLD AUTO: 272 THOU/MM3 (ref 130–400)
PMV BLD AUTO: 11.8 FL (ref 9.4–12.4)
POTASSIUM SERPL-SCNC: 4.6 MEQ/L (ref 3.5–5.2)
PROT SERPL-MCNC: 7.3 G/DL (ref 6.4–8.3)
RBC # BLD AUTO: 5.34 MILL/MM3 (ref 4.2–5.4)
SODIUM SERPL-SCNC: 140 MEQ/L (ref 135–145)
TIBC SERPL-MCNC: 399 UG/DL (ref 171–450)
TRIGL SERPL-MCNC: 91 MG/DL (ref 0–199)
TSH SERPL DL<=0.05 MIU/L-ACNC: 2.74 UIU/ML (ref 0.27–4.2)
VIT B12 SERPL-MCNC: 348 PG/ML (ref 232–1245)
WBC # BLD AUTO: 8.2 THOU/MM3 (ref 4.8–10.8)

## 2025-04-21 PROCEDURE — 85025 COMPLETE CBC W/AUTO DIFF WBC: CPT

## 2025-04-21 PROCEDURE — 85651 RBC SED RATE NONAUTOMATED: CPT

## 2025-04-21 PROCEDURE — 80061 LIPID PANEL: CPT

## 2025-04-21 PROCEDURE — 82607 VITAMIN B-12: CPT

## 2025-04-21 PROCEDURE — 82728 ASSAY OF FERRITIN: CPT

## 2025-04-21 PROCEDURE — 82746 ASSAY OF FOLIC ACID SERUM: CPT

## 2025-04-21 PROCEDURE — 84443 ASSAY THYROID STIM HORMONE: CPT

## 2025-04-21 PROCEDURE — 80053 COMPREHEN METABOLIC PANEL: CPT

## 2025-04-21 PROCEDURE — 86140 C-REACTIVE PROTEIN: CPT

## 2025-04-21 PROCEDURE — 36415 COLL VENOUS BLD VENIPUNCTURE: CPT

## 2025-04-21 PROCEDURE — 82784 ASSAY IGA/IGD/IGG/IGM EACH: CPT

## 2025-04-21 PROCEDURE — 83540 ASSAY OF IRON: CPT

## 2025-04-21 PROCEDURE — 83550 IRON BINDING TEST: CPT

## 2025-04-21 PROCEDURE — 83036 HEMOGLOBIN GLYCOSYLATED A1C: CPT

## 2025-04-22 ENCOUNTER — RESULTS FOLLOW-UP (OUTPATIENT)
Dept: FAMILY MEDICINE CLINIC | Age: 45
End: 2025-04-22

## 2025-04-22 LAB — IGG SERPL-MCNC: 1030 MG/DL (ref 700–1600)

## 2025-04-25 DIAGNOSIS — M79.7 FIBROMYALGIA: ICD-10-CM

## 2025-04-25 DIAGNOSIS — G89.29 CHRONIC BILATERAL LOW BACK PAIN WITHOUT SCIATICA: ICD-10-CM

## 2025-04-25 DIAGNOSIS — M54.50 CHRONIC BILATERAL LOW BACK PAIN WITHOUT SCIATICA: ICD-10-CM

## 2025-04-28 RX ORDER — TIZANIDINE 2 MG/1
TABLET ORAL
Qty: 60 TABLET | Refills: 0 | Status: SHIPPED | OUTPATIENT
Start: 2025-04-28

## 2025-04-28 RX ORDER — GABAPENTIN 400 MG/1
400 CAPSULE ORAL 2 TIMES DAILY
Qty: 60 CAPSULE | Refills: 2 | Status: SHIPPED | OUTPATIENT
Start: 2025-04-28 | End: 2025-07-27

## 2025-05-01 RX ORDER — DULAGLUTIDE 4.5 MG/.5ML
INJECTION, SOLUTION SUBCUTANEOUS
Qty: 12 ML | Refills: 0 | Status: SHIPPED | OUTPATIENT
Start: 2025-05-01

## 2025-05-01 NOTE — TELEPHONE ENCOUNTER
Last visit- 12/2/2024  Next visit- 5/2/2025    Requested Prescriptions     Pending Prescriptions Disp Refills    TRULICITY 4.5 MG/0.5ML SOAJ [Pharmacy Med Name: Trulicity 4.5 MG/0.5ML Subcutaneous Solution Pen-injector] 12 mL 0     Sig: INJECT 4.5MG INTO THE SKIN ONCE WEEKLY

## 2025-05-18 SDOH — ECONOMIC STABILITY: FOOD INSECURITY: WITHIN THE PAST 12 MONTHS, YOU WORRIED THAT YOUR FOOD WOULD RUN OUT BEFORE YOU GOT MONEY TO BUY MORE.: SOMETIMES TRUE

## 2025-05-18 SDOH — ECONOMIC STABILITY: INCOME INSECURITY: IN THE LAST 12 MONTHS, WAS THERE A TIME WHEN YOU WERE NOT ABLE TO PAY THE MORTGAGE OR RENT ON TIME?: NO

## 2025-05-18 SDOH — ECONOMIC STABILITY: FOOD INSECURITY: WITHIN THE PAST 12 MONTHS, THE FOOD YOU BOUGHT JUST DIDN'T LAST AND YOU DIDN'T HAVE MONEY TO GET MORE.: NEVER TRUE

## 2025-05-18 ASSESSMENT — PATIENT HEALTH QUESTIONNAIRE - PHQ9
1. LITTLE INTEREST OR PLEASURE IN DOING THINGS: NOT AT ALL
SUM OF ALL RESPONSES TO PHQ QUESTIONS 1-9: 0
2. FEELING DOWN, DEPRESSED OR HOPELESS: NOT AT ALL
SUM OF ALL RESPONSES TO PHQ QUESTIONS 1-9: 0
SUM OF ALL RESPONSES TO PHQ9 QUESTIONS 1 & 2: 0
1. LITTLE INTEREST OR PLEASURE IN DOING THINGS: NOT AT ALL
SUM OF ALL RESPONSES TO PHQ QUESTIONS 1-9: 0
2. FEELING DOWN, DEPRESSED OR HOPELESS: NOT AT ALL
SUM OF ALL RESPONSES TO PHQ QUESTIONS 1-9: 0

## 2025-05-19 ENCOUNTER — OFFICE VISIT (OUTPATIENT)
Dept: FAMILY MEDICINE CLINIC | Age: 45
End: 2025-05-19
Payer: MEDICARE

## 2025-05-19 ENCOUNTER — HOSPITAL ENCOUNTER (OUTPATIENT)
Age: 45
Discharge: HOME | End: 2025-05-19
Payer: MEDICARE

## 2025-05-19 VITALS
SYSTOLIC BLOOD PRESSURE: 112 MMHG | RESPIRATION RATE: 16 BRPM | TEMPERATURE: 97 F | BODY MASS INDEX: 38.75 KG/M2 | WEIGHT: 218.7 LBS | DIASTOLIC BLOOD PRESSURE: 70 MMHG | HEART RATE: 92 BPM | OXYGEN SATURATION: 98 %

## 2025-05-19 DIAGNOSIS — E83.42 HYPOMAGNESEMIA: ICD-10-CM

## 2025-05-19 DIAGNOSIS — E78.00 PURE HYPERCHOLESTEROLEMIA: ICD-10-CM

## 2025-05-19 DIAGNOSIS — G56.22 ULNAR TUNNEL SYNDROME OF LEFT WRIST: ICD-10-CM

## 2025-05-19 DIAGNOSIS — G45.9 TIA (TRANSIENT ISCHEMIC ATTACK): ICD-10-CM

## 2025-05-19 DIAGNOSIS — R91.1 LUNG NODULE, SOLITARY: ICD-10-CM

## 2025-05-19 DIAGNOSIS — Z92.29 HISTORY OF HEPATITIS B VACCINATION: ICD-10-CM

## 2025-05-19 DIAGNOSIS — D83.9 CVID (COMMON VARIABLE IMMUNODEFICIENCY) (HCC): ICD-10-CM

## 2025-05-19 DIAGNOSIS — M32.9 H/O SYSTEMIC LUPUS ERYTHEMATOSUS (SLE) (HCC): ICD-10-CM

## 2025-05-19 DIAGNOSIS — D80.3 IGG SUBCLASS DEFICIENCY (HCC): ICD-10-CM

## 2025-05-19 DIAGNOSIS — M32.9 LUPUS ARTHRITIS (HCC): ICD-10-CM

## 2025-05-19 DIAGNOSIS — M25.561 CHRONIC PAIN OF RIGHT KNEE: ICD-10-CM

## 2025-05-19 DIAGNOSIS — K21.9 GASTROESOPHAGEAL REFLUX DISEASE WITHOUT ESOPHAGITIS: ICD-10-CM

## 2025-05-19 DIAGNOSIS — E11.69 TYPE 2 DIABETES MELLITUS WITH OTHER SPECIFIED COMPLICATION, WITHOUT LONG-TERM CURRENT USE OF INSULIN (HCC): Primary | ICD-10-CM

## 2025-05-19 DIAGNOSIS — M46.90 AXIAL SPONDYLOARTHRITIS: ICD-10-CM

## 2025-05-19 DIAGNOSIS — E66.9 OBESITY (BMI 30-39.9): ICD-10-CM

## 2025-05-19 DIAGNOSIS — E61.1 IRON DEFICIENCY: ICD-10-CM

## 2025-05-19 DIAGNOSIS — D83.9 COMMON VARIABLE IMMUNODEFICIENCY (HCC): ICD-10-CM

## 2025-05-19 DIAGNOSIS — D80.1 HYPOGAMMAGLOBULINEMIA: ICD-10-CM

## 2025-05-19 DIAGNOSIS — I63.9 CEREBROVASCULAR ACCIDENT (CVA), UNSPECIFIED MECHANISM (HCC): ICD-10-CM

## 2025-05-19 DIAGNOSIS — E66.01 MORBID OBESITY DUE TO EXCESS CALORIES (HCC): ICD-10-CM

## 2025-05-19 DIAGNOSIS — J45.40 MODERATE PERSISTENT ASTHMA WITHOUT COMPLICATION: ICD-10-CM

## 2025-05-19 DIAGNOSIS — G89.29 CHRONIC PAIN OF RIGHT KNEE: ICD-10-CM

## 2025-05-19 DIAGNOSIS — G40.909 SEIZURE DISORDER (HCC): ICD-10-CM

## 2025-05-19 DIAGNOSIS — R79.89 ELEVATED TSH: ICD-10-CM

## 2025-05-19 DIAGNOSIS — M51.361 DEGENERATION OF INTERVERTEBRAL DISC OF LUMBAR REGION WITH LOWER EXTREMITY PAIN: ICD-10-CM

## 2025-05-19 DIAGNOSIS — L30.4 INTERTRIGO: ICD-10-CM

## 2025-05-19 DIAGNOSIS — M79.7 FIBROMYALGIA: ICD-10-CM

## 2025-05-19 DIAGNOSIS — J30.2 SEASONAL ALLERGIES: ICD-10-CM

## 2025-05-19 DIAGNOSIS — R79.89 ELEVATED LFTS: ICD-10-CM

## 2025-05-19 DIAGNOSIS — G43.809 OTHER MIGRAINE WITHOUT STATUS MIGRAINOSUS, NOT INTRACTABLE: ICD-10-CM

## 2025-05-19 LAB
ALBUMIN SERPL BCG-MCNC: 4.3 G/DL (ref 3.4–4.9)
ALP SERPL-CCNC: 158 U/L (ref 38–126)
ALT SERPL W/O P-5'-P-CCNC: 56 U/L (ref 10–35)
ANION GAP SERPL CALC-SCNC: 11 MEQ/L (ref 8–16)
AST SERPL-CCNC: 43 U/L (ref 10–35)
BASOPHILS ABSOLUTE: 0 THOU/MM3 (ref 0–0.1)
BASOPHILS NFR BLD AUTO: 0.6 %
BILIRUB SERPL-MCNC: 0.5 MG/DL (ref 0.3–1.2)
BUN SERPL-MCNC: 9 MG/DL (ref 8–23)
CALCIUM SERPL-MCNC: 9.6 MG/DL (ref 8.6–10)
CHLORIDE SERPL-SCNC: 102 MEQ/L (ref 98–111)
CO2 SERPL-SCNC: 28 MEQ/L (ref 22–29)
CREAT SERPL-MCNC: 0.7 MG/DL (ref 0.5–0.9)
DEPRECATED RDW RBC AUTO: 42.9 FL (ref 35–45)
EOSINOPHIL NFR BLD AUTO: 1.6 %
EOSINOPHILS ABSOLUTE: 0.1 THOU/MM3 (ref 0–0.4)
ERYTHROCYTE [DISTWIDTH] IN BLOOD BY AUTOMATED COUNT: 14.7 % (ref 11.5–14.5)
GFR SERPL CREATININE-BSD FRML MDRD: > 90 ML/MIN/1.73M2
GLUCOSE SERPL-MCNC: 103 MG/DL (ref 74–109)
HCT VFR BLD AUTO: 42.8 % (ref 37–47)
HGB BLD-MCNC: 13.6 GM/DL (ref 12–16)
IMM GRANULOCYTES # BLD AUTO: 0.02 THOU/MM3 (ref 0–0.07)
IMM GRANULOCYTES NFR BLD AUTO: 0.2 %
LYMPHOCYTES ABSOLUTE: 2.4 THOU/MM3 (ref 1–4.8)
LYMPHOCYTES NFR BLD AUTO: 29.7 %
MCH RBC QN AUTO: 25.7 PG (ref 26–33)
MCHC RBC AUTO-ENTMCNC: 31.8 GM/DL (ref 32.2–35.5)
MCV RBC AUTO: 80.9 FL (ref 81–99)
MONOCYTES ABSOLUTE: 0.5 THOU/MM3 (ref 0.4–1.3)
MONOCYTES NFR BLD AUTO: 6.8 %
NEUTROPHILS ABSOLUTE: 4.9 THOU/MM3 (ref 1.8–7.7)
NEUTROPHILS NFR BLD AUTO: 61.1 %
NRBC BLD AUTO-RTO: 0 /100 WBC
PLATELET # BLD AUTO: 270 THOU/MM3 (ref 130–400)
PMV BLD AUTO: 11.2 FL (ref 9.4–12.4)
POTASSIUM SERPL-SCNC: 4.4 MEQ/L (ref 3.5–5.2)
PROT SERPL-MCNC: 7.3 G/DL (ref 6.4–8.3)
RBC # BLD AUTO: 5.29 MILL/MM3 (ref 4.2–5.4)
SODIUM SERPL-SCNC: 141 MEQ/L (ref 135–145)
WBC # BLD AUTO: 8 THOU/MM3 (ref 4.8–10.8)

## 2025-05-19 PROCEDURE — G8417 CALC BMI ABV UP PARAM F/U: HCPCS | Performed by: FAMILY MEDICINE

## 2025-05-19 PROCEDURE — 36415 COLL VENOUS BLD VENIPUNCTURE: CPT

## 2025-05-19 PROCEDURE — 80053 COMPREHEN METABOLIC PANEL: CPT

## 2025-05-19 PROCEDURE — 2022F DILAT RTA XM EVC RTNOPTHY: CPT | Performed by: FAMILY MEDICINE

## 2025-05-19 PROCEDURE — 3074F SYST BP LT 130 MM HG: CPT | Performed by: FAMILY MEDICINE

## 2025-05-19 PROCEDURE — G8427 DOCREV CUR MEDS BY ELIG CLIN: HCPCS | Performed by: FAMILY MEDICINE

## 2025-05-19 PROCEDURE — 85025 COMPLETE CBC W/AUTO DIFF WBC: CPT

## 2025-05-19 PROCEDURE — 1036F TOBACCO NON-USER: CPT | Performed by: FAMILY MEDICINE

## 2025-05-19 PROCEDURE — 3044F HG A1C LEVEL LT 7.0%: CPT | Performed by: FAMILY MEDICINE

## 2025-05-19 PROCEDURE — 3078F DIAST BP <80 MM HG: CPT | Performed by: FAMILY MEDICINE

## 2025-05-19 PROCEDURE — 99215 OFFICE O/P EST HI 40 MIN: CPT | Performed by: FAMILY MEDICINE

## 2025-05-19 RX ORDER — PROMETHAZINE HYDROCHLORIDE 25 MG/1
25 TABLET ORAL 4 TIMES DAILY PRN
Qty: 20 TABLET | Refills: 3 | Status: SHIPPED | OUTPATIENT
Start: 2025-05-19 | End: 2025-05-26

## 2025-05-19 NOTE — PROGRESS NOTES
[facility] PROFESSIONAL SERVS  [facility] MEDICINE  [address]  [address]  [facility] [address]  Dept: [phone]  Dept Fax: [phone]  Loc: [phone]    Shilpa Gupta is a 44 y.o. female who presents today for:  Chief Complaint   Patient presents with    Annual Exam       HPI:     HPI    Diabetes Mellitus: Patient presents for follow up of diabetes. Symptoms: paresthesia of the feet and visual disturbances. Symptoms have gradually worsened. Patient denies increase appetite, polydipsia and weight loss.  Evaluation to date has been included: fasting blood sugar, fasting lipid panel, hemoglobin A1C and microalbuminuria.  Home sugars: 90-99 am and  pm. Treatment to date: Continued metformin which has been Yes:  , which has been somewhat effective.  Hemoglobin A1C   Date Value Ref Range Status   04/21/2025 6.9 (H) 4.0 - 6.0 % Final       Hyperlipidemia: Patient presents with hyperlipidemia.  She was tested because screening.  Her last labs showed   Lab Results   Component Value Date    CHOL 134 04/21/2025    CHOL 129 04/11/2024    CHOL 152 12/11/2022     Lab Results   Component Value Date    TRIG 91 04/21/2025    TRIG 90 04/11/2024    TRIG 104 12/11/2022     Lab Results   Component Value Date    HDL 34 04/21/2025    HDL 36 04/11/2024    HDL 40 12/11/2022     Lab Results   Component Value Date    LDL 82 04/21/2025    LDL 75 04/11/2024    LDL 91 12/11/2022     No results found for: \"VLDL\"  No results found for: \"CHOLHDLRATIO\"  There is a family history of hyperlipidemia. There is not a family history of early ischemia heart disease.      GERD: Lili complains of heartburn. This has been associated with early satiety and heartburn.  She denies no other symptoms. Symptoms have been present for several years. She denies dysphagia.  She has not lost weight. She denies melena, hematochezia, hematemesis, and coffee ground emesis. Medical therapy in the past has included proton pump

## 2025-05-23 ENCOUNTER — HOSPITAL ENCOUNTER (OUTPATIENT)
Dept: WOMENS IMAGING | Age: 45
Discharge: HOME | End: 2025-05-23
Payer: MEDICARE

## 2025-05-23 DIAGNOSIS — Z12.31 VISIT FOR SCREENING MAMMOGRAM: ICD-10-CM

## 2025-05-23 PROCEDURE — 77067 SCR MAMMO BI INCL CAD: CPT

## 2025-05-26 RX ORDER — TIZANIDINE 2 MG/1
TABLET ORAL
Qty: 60 TABLET | Refills: 0 | Status: SHIPPED | OUTPATIENT
Start: 2025-05-26

## 2025-05-27 RX ORDER — POTASSIUM CHLORIDE 750 MG/1
10 TABLET, EXTENDED RELEASE ORAL DAILY
Qty: 90 TABLET | Refills: 1 | Status: SHIPPED | OUTPATIENT
Start: 2025-05-27

## 2025-06-19 ENCOUNTER — TELEPHONE (OUTPATIENT)
Dept: ALLERGY | Age: 45
End: 2025-06-19

## 2025-06-19 NOTE — TELEPHONE ENCOUNTER
[facility]. Signed by provider and faxed. Fax confirmation scanned into media.

## 2025-06-19 NOTE — PROGRESS NOTES
Patient discharged from practice due to non-compliance in keep appointments.  Patient has been notified via phone.  Letter being sent out.  Discontinue continue IVIG and associated orders.

## 2025-06-19 NOTE — TELEPHONE ENCOUNTER
Called [facility] to inform pt has dismissed from practice due to no show history. [facility] last shipment was 6/12/25 and infusion was on 6/16/25. Transferred to pharmacy and spoke Billie, maximus and gave verbal to discontinue order for IVIG. [facility] discharge order will be faxed to our office to sign.

## 2025-06-23 RX ORDER — TIZANIDINE 2 MG/1
TABLET ORAL
Qty: 60 TABLET | Refills: 0 | Status: SHIPPED | OUTPATIENT
Start: 2025-06-23

## 2025-07-07 DIAGNOSIS — M46.90 AXIAL SPONDYLOARTHRITIS: ICD-10-CM

## 2025-07-07 RX ORDER — SECUKINUMAB 150 MG/ML
INJECTION SUBCUTANEOUS
Qty: 2 ML | Refills: 3 | Status: ACTIVE | OUTPATIENT
Start: 2025-07-07

## 2025-07-17 RX ORDER — TIZANIDINE 2 MG/1
TABLET ORAL
Qty: 60 TABLET | Refills: 0 | Status: SHIPPED | OUTPATIENT
Start: 2025-07-17

## 2025-07-24 RX ORDER — CLOPIDOGREL BISULFATE 75 MG/1
75 TABLET ORAL DAILY
Qty: 90 TABLET | Refills: 1 | Status: SHIPPED | OUTPATIENT
Start: 2025-07-24

## 2025-07-24 RX ORDER — VERAPAMIL HYDROCHLORIDE 240 MG/1
240 TABLET, FILM COATED, EXTENDED RELEASE ORAL DAILY
Qty: 90 TABLET | Refills: 1 | Status: SHIPPED | OUTPATIENT
Start: 2025-07-24

## 2025-07-28 DIAGNOSIS — Z51.81 MEDICATION MONITORING ENCOUNTER: Primary | ICD-10-CM

## 2025-08-04 ENCOUNTER — OFFICE VISIT (OUTPATIENT)
Age: 45
End: 2025-08-04
Payer: MEDICARE

## 2025-08-04 VITALS
HEART RATE: 68 BPM | OXYGEN SATURATION: 97 % | DIASTOLIC BLOOD PRESSURE: 82 MMHG | WEIGHT: 225.3 LBS | SYSTOLIC BLOOD PRESSURE: 122 MMHG | HEIGHT: 63 IN | BODY MASS INDEX: 39.92 KG/M2

## 2025-08-04 DIAGNOSIS — M46.90 AXIAL SPONDYLOARTHRITIS: Primary | ICD-10-CM

## 2025-08-04 DIAGNOSIS — M54.50 CHRONIC BILATERAL LOW BACK PAIN WITHOUT SCIATICA: ICD-10-CM

## 2025-08-04 DIAGNOSIS — Z51.81 MEDICATION MONITORING ENCOUNTER: ICD-10-CM

## 2025-08-04 DIAGNOSIS — G89.29 CHRONIC BILATERAL LOW BACK PAIN WITHOUT SCIATICA: ICD-10-CM

## 2025-08-04 DIAGNOSIS — M79.7 FIBROMYALGIA: ICD-10-CM

## 2025-08-04 PROCEDURE — 1036F TOBACCO NON-USER: CPT | Performed by: INTERNAL MEDICINE

## 2025-08-04 PROCEDURE — 3079F DIAST BP 80-89 MM HG: CPT | Performed by: INTERNAL MEDICINE

## 2025-08-04 PROCEDURE — G8417 CALC BMI ABV UP PARAM F/U: HCPCS | Performed by: INTERNAL MEDICINE

## 2025-08-04 PROCEDURE — G8427 DOCREV CUR MEDS BY ELIG CLIN: HCPCS | Performed by: INTERNAL MEDICINE

## 2025-08-04 PROCEDURE — 99214 OFFICE O/P EST MOD 30 MIN: CPT | Performed by: INTERNAL MEDICINE

## 2025-08-04 PROCEDURE — 3074F SYST BP LT 130 MM HG: CPT | Performed by: INTERNAL MEDICINE

## 2025-08-04 RX ORDER — PROMETHAZINE HYDROCHLORIDE 25 MG/1
TABLET ORAL
COMMUNITY
Start: 2025-07-17

## 2025-08-04 RX ORDER — MEDROXYPROGESTERONE ACETATE 150 MG/ML
50 INJECTION, SUSPENSION INTRAMUSCULAR WEEKLY
Qty: 4 ML | Refills: 2 | Status: SHIPPED | OUTPATIENT
Start: 2025-08-04 | End: 2025-08-07

## 2025-08-04 ASSESSMENT — ENCOUNTER SYMPTOMS
BACK PAIN: 1
EYES NEGATIVE: 1
SHORTNESS OF BREATH: 0
COUGH: 0
GASTROINTESTINAL NEGATIVE: 1

## 2025-08-06 ENCOUNTER — PATIENT MESSAGE (OUTPATIENT)
Age: 45
End: 2025-08-06

## 2025-08-06 DIAGNOSIS — M32.9 LUPUS ARTHRITIS (HCC): ICD-10-CM

## 2025-08-06 DIAGNOSIS — I63.9 CEREBROVASCULAR ACCIDENT (CVA), UNSPECIFIED MECHANISM (HCC): ICD-10-CM

## 2025-08-06 DIAGNOSIS — G40.909 SEIZURE DISORDER (HCC): ICD-10-CM

## 2025-08-07 ENCOUNTER — HOSPITAL ENCOUNTER (OUTPATIENT)
Age: 45
Discharge: HOME | End: 2025-08-07
Payer: MEDICARE

## 2025-08-07 ENCOUNTER — RESULTS FOLLOW-UP (OUTPATIENT)
Age: 45
End: 2025-08-07

## 2025-08-07 DIAGNOSIS — Z51.81 MEDICATION MONITORING ENCOUNTER: ICD-10-CM

## 2025-08-07 DIAGNOSIS — M46.90 AXIAL SPONDYLOARTHRITIS: Primary | ICD-10-CM

## 2025-08-07 LAB
ALBUMIN SERPL BCG-MCNC: 4.1 G/DL (ref 3.4–4.9)
ALP SERPL-CCNC: 145 U/L (ref 38–126)
ALT SERPL W/O P-5'-P-CCNC: 46 U/L (ref 10–35)
ANION GAP SERPL CALC-SCNC: 12 MEQ/L (ref 8–16)
AST SERPL-CCNC: 43 U/L (ref 10–35)
BASOPHILS ABSOLUTE: 0.1 THOU/MM3 (ref 0–0.1)
BASOPHILS NFR BLD AUTO: 0.8 %
BILIRUB SERPL-MCNC: 0.6 MG/DL (ref 0.3–1.2)
BUN SERPL-MCNC: 9 MG/DL (ref 8–23)
CALCIUM SERPL-MCNC: 9.1 MG/DL (ref 8.6–10)
CHLORIDE SERPL-SCNC: 102 MEQ/L (ref 98–111)
CO2 SERPL-SCNC: 25 MEQ/L (ref 22–29)
CREAT SERPL-MCNC: 0.7 MG/DL (ref 0.5–0.9)
CRP SERPL-MCNC: < 0.3 MG/DL (ref 0–0.5)
DEPRECATED RDW RBC AUTO: 43.3 FL (ref 35–45)
EOSINOPHIL NFR BLD AUTO: 2.9 %
EOSINOPHILS ABSOLUTE: 0.2 THOU/MM3 (ref 0–0.4)
ERYTHROCYTE [DISTWIDTH] IN BLOOD BY AUTOMATED COUNT: 14.3 % (ref 11.5–14.5)
ERYTHROCYTE [SEDIMENTATION RATE] IN BLOOD BY WESTERGREN METHOD: 11 MM/HR (ref 0–20)
GFR SERPL CREATININE-BSD FRML MDRD: > 90 ML/MIN/1.73M2
GLUCOSE SERPL-MCNC: 178 MG/DL (ref 74–109)
HCT VFR BLD AUTO: 41.6 % (ref 37–47)
HGB BLD-MCNC: 13 GM/DL (ref 12–16)
IMM GRANULOCYTES # BLD AUTO: 0.02 THOU/MM3 (ref 0–0.07)
IMM GRANULOCYTES NFR BLD AUTO: 0.3 %
LYMPHOCYTES ABSOLUTE: 2.4 THOU/MM3 (ref 1–4.8)
LYMPHOCYTES NFR BLD AUTO: 35.3 %
MCH RBC QN AUTO: 26.2 PG (ref 26–33)
MCHC RBC AUTO-ENTMCNC: 31.3 GM/DL (ref 32.2–35.5)
MCV RBC AUTO: 83.7 FL (ref 81–99)
MONOCYTES ABSOLUTE: 0.5 THOU/MM3 (ref 0.4–1.3)
MONOCYTES NFR BLD AUTO: 7.5 %
NEUTROPHILS ABSOLUTE: 3.6 THOU/MM3 (ref 1.8–7.7)
NEUTROPHILS NFR BLD AUTO: 53.2 %
NRBC BLD AUTO-RTO: 0 /100 WBC
PLATELET # BLD AUTO: 255 THOU/MM3 (ref 130–400)
PMV BLD AUTO: 11.5 FL (ref 9.4–12.4)
POTASSIUM SERPL-SCNC: 3.8 MEQ/L (ref 3.5–5.2)
PROT SERPL-MCNC: 7 G/DL (ref 6.4–8.3)
RBC # BLD AUTO: 4.97 MILL/MM3 (ref 4.2–5.4)
SODIUM SERPL-SCNC: 139 MEQ/L (ref 135–145)
WBC # BLD AUTO: 6.7 THOU/MM3 (ref 4.8–10.8)

## 2025-08-07 PROCEDURE — 85651 RBC SED RATE NONAUTOMATED: CPT

## 2025-08-07 PROCEDURE — 36415 COLL VENOUS BLD VENIPUNCTURE: CPT

## 2025-08-07 PROCEDURE — 86140 C-REACTIVE PROTEIN: CPT

## 2025-08-07 PROCEDURE — 86480 TB TEST CELL IMMUN MEASURE: CPT

## 2025-08-07 PROCEDURE — 80053 COMPREHEN METABOLIC PANEL: CPT

## 2025-08-07 PROCEDURE — 85025 COMPLETE CBC W/AUTO DIFF WBC: CPT

## 2025-08-09 LAB
QUANTI TB1 MINUS NIL: 0.03 IU/ML (ref 0–0.34)
QUANTI TB2 MINUS NIL: 0.01 IU/ML (ref 0–0.34)
QUANTIFERON MITOGEN MINUS NIL: 9.94 IU/ML
QUANTIFERON NIL: 0.06 IU/ML
QUANTIFERON TB INTERPRETATION: NEGATIVE IU/ML

## 2025-08-14 DIAGNOSIS — M46.90 AXIAL SPONDYLOARTHRITIS: Primary | ICD-10-CM

## 2025-08-14 RX ORDER — CONTAINER,EMPTY
1 EACH MISCELLANEOUS PRN
Qty: 1 EACH | Refills: 3 | Status: SHIPPED | OUTPATIENT
Start: 2025-08-14

## 2025-08-17 DIAGNOSIS — M54.50 CHRONIC BILATERAL LOW BACK PAIN WITHOUT SCIATICA: ICD-10-CM

## 2025-08-17 DIAGNOSIS — G89.29 CHRONIC BILATERAL LOW BACK PAIN WITHOUT SCIATICA: ICD-10-CM

## 2025-08-17 DIAGNOSIS — M79.7 FIBROMYALGIA: ICD-10-CM

## 2025-08-18 RX ORDER — GABAPENTIN 400 MG/1
400 CAPSULE ORAL 2 TIMES DAILY
Qty: 60 CAPSULE | Refills: 3 | Status: SHIPPED | OUTPATIENT
Start: 2025-08-18 | End: 2025-12-16

## 2025-09-02 RX ORDER — TIZANIDINE 2 MG/1
TABLET ORAL
Qty: 60 TABLET | Refills: 0 | Status: SHIPPED | OUTPATIENT
Start: 2025-09-02